# Patient Record
Sex: FEMALE | Race: BLACK OR AFRICAN AMERICAN | NOT HISPANIC OR LATINO | Employment: OTHER | URBAN - METROPOLITAN AREA
[De-identification: names, ages, dates, MRNs, and addresses within clinical notes are randomized per-mention and may not be internally consistent; named-entity substitution may affect disease eponyms.]

---

## 2017-02-22 ENCOUNTER — OFFICE VISIT (OUTPATIENT)
Dept: PLASTIC SURGERY | Facility: CLINIC | Age: 72
End: 2017-02-22
Payer: MEDICARE

## 2017-02-22 VITALS
HEART RATE: 87 BPM | SYSTOLIC BLOOD PRESSURE: 203 MMHG | BODY MASS INDEX: 31.19 KG/M2 | WEIGHT: 154.44 LBS | TEMPERATURE: 99 F | DIASTOLIC BLOOD PRESSURE: 110 MMHG

## 2017-02-22 DIAGNOSIS — T85.44XA CAPSULAR CONTRACTURE OF BREAST IMPLANT, INITIAL ENCOUNTER: Primary | ICD-10-CM

## 2017-02-22 PROCEDURE — 99212 OFFICE O/P EST SF 10 MIN: CPT | Mod: S$GLB,,, | Performed by: SURGERY

## 2017-02-22 PROCEDURE — 99999 PR PBB SHADOW E&M-EST. PATIENT-LVL II: CPT | Mod: PBBFAC,,, | Performed by: SURGERY

## 2017-02-22 PROCEDURE — 99212 OFFICE O/P EST SF 10 MIN: CPT | Mod: PBBFAC,PO | Performed by: SURGERY

## 2017-02-22 NOTE — PROGRESS NOTES
[facility]el[facility]bs [facility] giving a history of having   breast cancer in the 1980s.  She had her mastectomy through a lateral and   nipple-sparing technique with placement of implants.  The patient had no further   treatment.  She had implants placed.  She has a severe capsular contracture   with rupture on the right side.  Capsular contracture is bilateral.  The   implants being this old and over 99% are ruptured.  She needs to have a   bilateral implant exchange with total capsulectomy.      CRB/[facility]  dd: 02/22/2017 17:08:35 (CST)  td: 02/23/2017 09:19:33 (CST)  Doc ID   #[id number]  Job ID #[id number]    CC:

## 2017-03-06 DIAGNOSIS — Z85.3 PERSONAL HISTORY OF MALIGNANT NEOPLASM OF BREAST: Primary | ICD-10-CM

## 2017-04-04 ENCOUNTER — ANESTHESIA EVENT (OUTPATIENT)
Dept: SURGERY | Facility: HOSPITAL | Age: 72
End: 2017-04-04
Payer: MEDICARE

## 2017-04-04 NOTE — PRE ADMISSION SCREENING
Anesthesia Assessment: Preoperative EQUATION    Planned Procedure: Procedure(s) (LRB):  EXCHANGE IMPLANT-BREAST Bilateral (Bilateral)  CAPSULECTOMY-BREAST Bilateral (Bilateral)  Requested Anesthesia Type:General  Surgeon: Rishi Sanchez MD  Service: Plastics  Known or anticipated Date of Surgery:4/10/2017    Surgeon notes: reviewed    Electronic QUestionnaire Assessment completed via nurse interview with patient.        Mariaelena Grubbs [[id number]] - 72 y.o. Female        Providers Outside of [facility]           Pre-admit from 4/10/2017 in [facility]     Has outside PCP  Yes [Dr Figueroa]     Has outside Cardiologist  Yes     Has outside Pulmonologist  Yes [Dr Mccoy]       Surgical Risk Level      Surgical Risk Level:  2           caRDScore (Clinical Anesthesia Rapid Decision Score)        Low  Total Score: 13      13 Sum of Clinical Scores       caRDScores (Grouped)      caRDScore - Ane:  5                caRDScore - CVD:  1                caRDScore - Pul:  6                caRDScore - Met:  1                caRDScore - Phy:  0           caRDScore Items           Pre-admit from 4/10/2017 in [facility]     Anesthesia      Great difficulty with starting IV  Yes     Has severe degenerative arthritis (osteoarthritis)  Yes [knee]     Has sleep apnea confirmed by a sleep study / on CPAP  Yes     CVD      Activity similar to best ability for maximal activity or exercise  METS 4     Diagnosed with high blood pressure  Yes     Typical BP runs <150/90  Yes     Pulmonary      Has history of Asthma  Yes     Asthma requires chronic treatment  Yes [daily advair-last flare up 1 week ago]     Has to take steroids for asthma related bronchospasm within the last year  Yes     Has sleep apnea confirmed by a sleep study / on CPAP  Yes     Metabolic      Has hyperlipoproteinemia  Yes     Physiologic      Obesity Status  Mild Obesity (BMI 30-34.9)       Flags      Red Flag Score:  0                 Yellow Flag Score:  4           Red Flags           Pre-admit from 4/10/2017 in [facility]     Obesity Status  Mild Obesity (BMI 30-34.9)       Yellow Flags           Pre-admit from 4/10/2017 in [facility]     Has had steroids in the last year  Yes [po prednisone sept oct and november 2016]     Obesity Status  Mild Obesity (BMI 30-34.9)     Has history of Asthma  Yes     Has sleep apnea confirmed by a sleep study / on CPAP  Yes     Has pain  Yes       PONV Risk Score (assumes periop narcotic use = +1, Max=4)      PONV Risk Score:  3           PONV Risk Factors  Total Score: 2      1 Female     1 Non-Smoker at present       Sleep Apnea  Total Score: 1      1 Has sleep apnea confirmed by a sleep study / on CPAP       DAT STOP-Bang Risk Factors (Max=8)  Total Score: 3      1 Has loud snoring     1 Takes medication for high blood pressure     1 Age >50       DAT Risk Level - 1 (Low), 2 (Moderate), 3 (High)      DAT Risk Level:  2           RCRI (Revised Cardiac Risk Indices of ACC/AHA guidelines, Max=6)  Total Score: 0        CAD Risk Factors  Total Score: 3      1 Female. Age >55     1 Diagnosed with high blood pressure     1 Has hyperlipoproteinemia       CHADS Score if applicable (history of atrial fib/flutter, Max=6)  Total Score: 1      1 Diagnosed with high blood pressure       Maximal Exercise Capacity           Pre-admit from 4/10/2017 in [facility]     Maximal Exercise Capacity  METS 4       Summary of Dependence  Total Score: 1      1 Is totally independent of others for activities of daily living       Phone Fraility Score (Max = 17)  Total Score: 1      1 Uses 5 or more meds on reg basis       Pain Factors           Pre-admit from 4/10/2017 in [facility]     Has pain  Yes     Location and description of pain  knee     Duration of pain  Pain is chronic, has been present greater than 12 mo     Typical Pain Scores  0 to 4      Uses one of the following medications:  percocet [po percocet prn]       Risk Triggers (Evidence-Based Risk Triggers)        Pulmonary Risk Triggers  Total Score: 4      1 Age > or = 60     1 Obesity Status: Mild Obesity (BMI 30-34.9)     1 Has to take steroids for asthma related bronchospasm within the last year     1 Has sleep apnea confirmed by a sleep study / on CPAP       Renal Risk Triggers  Total Score: 1      1 Diagnosed with high blood pressure       Delirium Risk Triggers  Total Score: 0        Urologic Risk Triggers  Total Score: 0        Logistics        Pre-op Clinic Logistics  Total Score: 6      1 Has outside PCP     1 Has outside Cardiologist     1 Has outside Pulmonologist     1 Has had anesthesia, either as adult or as a child     1 Previous transfusions     1 Takes medication for high blood pressure       DOSC Logistics  Total Score: 3      3 Great difficulty with starting IV       Discharge Logistics  Total Score: 2      1 Has severe degenerative arthritis (osteoarthritis)     1 Has sleep apnea confirmed by a sleep study / on CPAP       Discharge Planning           Pre-admit from 4/10/2017 in [facility]     Discharge Planning      Will assist patient 24/7, if needed  daughter     Who will transport you to therapy, if need  daughter       Anes <For office use only>      Total Score: 12        Surgical Risk Level Assessment                 Triage considerations:     The patient has no apparent active cardiac condition (No unstable coronary Syndrome such as severe unstable angina or recent [<1 month] myocardial infarction, decompensated CHF, severe valvular   disease or significant arrhythmia)    Previous anesthesia records:GETA, MAC and Not available-PT is a HARD STICK    Last PCP note: within 3 months , outside [facility] Dr Nolasco  Subspecialty notes: Cardiology: General, Pulmonary    Other important co-morbidities: HTN/HLP, asthma, DAT, HX breast cancer     Tests already  available:  to be determined-will ask for OS labs and EKG            Instructions given. (See in Nurse's note)    Optimization:  Anesthesia Preop Clinic Assessment  Indicated-not required for this surgery    Medical Opinion Indicated-will get last visit note and testing           Plan:    Testing:  Hematology Profile and EKG      Patient  has previously scheduled Medical Appointment:none    Navigation: Tests Scheduled. -TBD             Consults scheduled.             Results will be tracked by Preop Clinic.

## 2017-04-04 NOTE — ANESTHESIA PREPROCEDURE EVALUATION
Pre Admission Screening  Cate Vázquez RN      []Hide copied text  Anesthesia Assessment: Preoperative EQUATION     Planned Procedure: Procedure(s) (LRB):  EXCHANGE IMPLANT-BREAST Bilateral (Bilateral)  CAPSULECTOMY-BREAST Bilateral (Bilateral)  Requested Anesthesia Type:General  Surgeon: Rishi Sanchez MD  Service: Plastics  Known or anticipated Date of Surgery:4/10/2017     Surgeon notes: reviewed     Electronic QUestionnaire Assessment completed via nurse interview with patient.                    Mariaelena Grubbs [[id number]] - 72 y.o. Female         Providers Outside of [facility]             Pre-admit from 4/10/2017 in [facility]      Has outside PCP   Yes [Dr Figueroa]      Has outside Cardiologist   Yes      Has outside Pulmonologist   Yes [Dr Mccoy]        Surgical Risk Level       Surgical Risk Level:   2              caRDScore (Clinical Anesthesia Rapid Decision Score)         Low  Total Score: 13       13 Sum of Clinical Scores        caRDScores (Grouped)       caRDScore - Ane:   5                       caRDScore - CVD:   1                       caRDScore - Pul:   6                       caRDScore - Met:   1                       caRDScore - Phy:   0              caRDScore Items             Pre-admit from 4/10/2017 in [facility]      Anesthesia        Great difficulty with starting IV   Yes      Has severe degenerative arthritis (osteoarthritis)   Yes [knee]      Has sleep apnea confirmed by a sleep study / on CPAP   Yes      CVD        Activity similar to best ability for maximal activity or exercise   METS 4      Diagnosed with high blood pressure   Yes      Typical BP runs <150/90   Yes      Pulmonary        Has history of Asthma   Yes      Asthma requires chronic treatment   Yes [daily advair-last flare up 1 week ago]      Has to take steroids for asthma related bronchospasm within the last year   Yes      Has sleep apnea confirmed by a sleep study / on  CPAP   Yes      Metabolic        Has hyperlipoproteinemia   Yes      Physiologic        Obesity Status   Mild Obesity (BMI 30-34.9)        Flags       Red Flag Score:   0                       Yellow Flag Score:   4              Red Flags             Pre-admit from 4/10/2017 in [facility]      Obesity Status   Mild Obesity (BMI 30-34.9)        Yellow Flags             Pre-admit from 4/10/2017 in [facility]      Has had steroids in the last year   Yes [po prednisone sept oct and november 2016]      Obesity Status   Mild Obesity (BMI 30-34.9)      Has history of Asthma   Yes      Has sleep apnea confirmed by a sleep study / on CPAP   Yes      Has pain   Yes        PONV Risk Score (assumes periop narcotic use = +1, Max=4)       PONV Risk Score:   3              PONV Risk Factors  Total Score: 2       1 Female      1 Non-Smoker at present        Sleep Apnea  Total Score: 1       1 Has sleep apnea confirmed by a sleep study / on CPAP        DAT STOP-Bang Risk Factors (Max=8)  Total Score: 3       1 Has loud snoring      1 Takes medication for high blood pressure      1 Age >50        DAT Risk Level - 1 (Low), 2 (Moderate), 3 (High)       DAT Risk Level:   2              RCRI (Revised Cardiac Risk Indices of ACC/AHA guidelines, Max=6)  Total Score: 0         CAD Risk Factors  Total Score: 3       1 Female. Age >55      1 Diagnosed with high blood pressure      1 Has hyperlipoproteinemia        CHADS Score if applicable (history of atrial fib/flutter, Max=6)  Total Score: 1       1 Diagnosed with high blood pressure        Maximal Exercise Capacity             Pre-admit from 4/10/2017 in [facility]      Maximal Exercise Capacity   METS 4        Summary of Dependence  Total Score: 1       1 Is totally independent of others for activities of daily living        Phone Fraility Score (Max = 17)  Total Score: 1       1 Uses 5 or more meds on reg basis        Pain  Factors             Pre-admit from 4/10/2017 in [facility]      Has pain   Yes      Location and description of pain   knee      Duration of pain   Pain is chronic, has been present greater than 12 mo      Typical Pain Scores   0 to 4      Uses one of the following medications:   percocet [po percocet prn]        Risk Triggers (Evidence-Based Risk Triggers)         Pulmonary Risk Triggers  Total Score: 4       1 Age > or = 60      1 Obesity Status: Mild Obesity (BMI 30-34.9)      1 Has to take steroids for asthma related bronchospasm within the last year      1 Has sleep apnea confirmed by a sleep study / on CPAP        Renal Risk Triggers  Total Score: 1       1 Diagnosed with high blood pressure        Delirium Risk Triggers  Total Score: 0         Urologic Risk Triggers  Total Score: 0         Logistics         Pre-op Clinic Logistics  Total Score: 6       1 Has outside PCP      1 Has outside Cardiologist      1 Has outside Pulmonologist      1 Has had anesthesia, either as adult or as a child      1 Previous transfusions      1 Takes medication for high blood pressure        DOSC Logistics  Total Score: 3       3 Great difficulty with starting IV        Discharge Logistics  Total Score: 2       1 Has severe degenerative arthritis (osteoarthritis)      1 Has sleep apnea confirmed by a sleep study / on CPAP        Discharge Planning             Pre-admit from 4/10/2017 in [facility]      Discharge Planning        Will assist patient 24/7, if needed   daughter      Who will transport you to therapy, if need   daughter        Anes <For office use only>       Total Score: 12          Surgical Risk Level Assessment                       Triage considerations:      The patient has no apparent active cardiac condition (No unstable coronary Syndrome such as severe unstable angina or recent [<1 month] myocardial infarction, decompensated CHF, severe valvular disease or significant  arrhythmia)     Previous anesthesia records:GETA, MAC and Not available-PT is a HARD STICK     Last PCP note: within 3 months , outside [facility] Dr Nolasco  Subspecialty notes: Cardiology: General, Pulmonary     Other important co-morbidities: HTN/HLP, COPD/asthma, DAT, HX breast cancer      Tests already available:  to be determined-will ask for OS labs and EKG      Instructions given. (See in Nurse's note)     Optimization:  Anesthesia Preop Clinic Assessment Indicated-not required for this surgery  Medical Opinion Indicated-will get last visit note and testing      Plan:   Testing: Hematology Profile and EKG  Patient has previously scheduled Medical Appointment:none     Navigation: Tests Scheduled. -TBD  Consults scheduled.  Results will be tracked by Preop Clinic.  4/4 last PCP visit note 3/7/17; 12/29/16 CBC, TSH,PTH, A1c, CMP scanned to media.  4/7 after multiple attempts to get OS EKG-cards office fax is broken. Will order EKG for am of surgery          Electronically signed by Cate Vázquez RN at 4/4/2017 12:10 PM        Pre-admit on 4/10/2017              Detailed Report                                                                                                                         04/04/2017  Mariaelena Grubbs is a 72 y.o., female.    [facility] Anesthesia Evaluation    I have reviewed the Patient Summary Reports.    I have reviewed the Nursing Notes.   I have reviewed the Medications.     Review of Systems  Anesthesia Hx:  No problems with previous Anesthesia  History of prior surgery of interest to airway management or planning: Previous anesthesia: General Denies Family Hx of Anesthesia complications.   Denies Personal Hx of Anesthesia complications.   Cardiovascular:   Exercise tolerance: good Hypertension    Pulmonary:   Asthma    Renal/:   Chronic Renal Disease, CRI    Psych:   anxiety          Physical Exam  General:  Well nourished    Airway/Jaw/Neck:  Airway Findings: Mouth Opening: Normal  Tongue: Normal  General Airway Assessment: Adult  Mallampati: II  Improves to II with phonation.  TM Distance: Normal, at least 6 cm  Jaw/Neck Findings:  Neck ROM: Normal ROM      Dental:  Dental Findings: In tact   Chest/Lungs:  Chest/Lungs Findings: Clear to auscultation, Normal Respiratory Rate     Heart/Vascular:  Heart Findings: Rate: Normal  Rhythm: Regular Rhythm  Sounds: Normal        Mental Status:  Mental Status Findings:  Cooperative, Alert and Oriented         Anesthesia Plan  Type of Anesthesia, risks & benefits discussed:  Anesthesia Type:  general  Patient's Preference:   Intra-op Monitoring Plan: standard ASA monitors  Intra-op Monitoring Plan Comments:   Post Op Pain Control Plan:   Post Op Pain Control Plan Comments:   Induction:   IV  Beta Blocker:  Patient is on a Beta-Blocker and has received one dose within the past 24 hours (No further documentation required).       Informed Consent: Patient understands risks and agrees with Anesthesia plan.  Questions answered. Anesthesia consent signed with patient.  ASA Score: 3     Day of Surgery Review of History & Physical:    H&P update referred to the surgeon.         Ready For Surgery From Anesthesia Perspective.

## 2017-04-07 ENCOUNTER — TELEPHONE (OUTPATIENT)
Dept: PLASTIC SURGERY | Facility: CLINIC | Age: 72
End: 2017-04-07

## 2017-04-10 ENCOUNTER — ANESTHESIA (OUTPATIENT)
Dept: SURGERY | Facility: HOSPITAL | Age: 72
End: 2017-04-10
Payer: MEDICARE

## 2017-04-10 ENCOUNTER — HOSPITAL ENCOUNTER (OUTPATIENT)
Facility: HOSPITAL | Age: 72
Discharge: HOME | End: 2017-04-10
Attending: SURGERY | Admitting: SURGERY
Payer: MEDICARE

## 2017-04-10 VITALS
WEIGHT: 152 LBS | BODY MASS INDEX: 30.64 KG/M2 | HEART RATE: 58 BPM | TEMPERATURE: 98 F | SYSTOLIC BLOOD PRESSURE: 122 MMHG | DIASTOLIC BLOOD PRESSURE: 62 MMHG | HEIGHT: 59 IN | OXYGEN SATURATION: 100 % | RESPIRATION RATE: 18 BRPM

## 2017-04-10 DIAGNOSIS — T85.44XA BREAST IMPLANT CAPSULAR CONTRACTURE: Primary | ICD-10-CM

## 2017-04-10 DIAGNOSIS — I10 HYPERTENSION: ICD-10-CM

## 2017-04-10 DIAGNOSIS — Z01.818 PREOPERATIVE TESTING: ICD-10-CM

## 2017-04-10 DIAGNOSIS — T85.44XA CAPSULAR CONTRACTURE OF BREAST IMPLANT: ICD-10-CM

## 2017-04-10 LAB
ERYTHROCYTE [DISTWIDTH] IN BLOOD BY AUTOMATED COUNT: 13.5 %
HCT VFR BLD AUTO: 39.6 %
HGB BLD-MCNC: 12.5 G/DL
MCH RBC QN AUTO: 29.9 PG
MCHC RBC AUTO-ENTMCNC: 31.6 %
MCV RBC AUTO: 95 FL
PLATELET # BLD AUTO: 317 K/UL
PMV BLD AUTO: 10.8 FL
RBC # BLD AUTO: 4.18 M/UL
WBC # BLD AUTO: 9.63 K/UL

## 2017-04-10 PROCEDURE — 88304 TISSUE EXAM BY PATHOLOGIST: CPT | Mod: 26,,, | Performed by: PATHOLOGY

## 2017-04-10 PROCEDURE — 63600175 PHARM REV CODE 636 W HCPCS: Performed by: NURSE ANESTHETIST, CERTIFIED REGISTERED

## 2017-04-10 PROCEDURE — 27200750 HC INSULATED NEEDLE/ STIMUPLEX: Performed by: ANESTHESIOLOGY

## 2017-04-10 PROCEDURE — 36000707: Performed by: SURGERY

## 2017-04-10 PROCEDURE — 63600175 PHARM REV CODE 636 W HCPCS: Performed by: ANESTHESIOLOGY

## 2017-04-10 PROCEDURE — 25000003 PHARM REV CODE 250: Performed by: SURGERY

## 2017-04-10 PROCEDURE — 71000033 HC RECOVERY, INTIAL HOUR: Performed by: SURGERY

## 2017-04-10 PROCEDURE — 71000015 HC POSTOP RECOV 1ST HR: Performed by: SURGERY

## 2017-04-10 PROCEDURE — D9220A PRA ANESTHESIA: Mod: CRNA,,, | Performed by: NURSE ANESTHETIST, CERTIFIED REGISTERED

## 2017-04-10 PROCEDURE — C1789 PROSTHESIS, BREAST, IMP: HCPCS | Performed by: SURGERY

## 2017-04-10 PROCEDURE — 63600175 PHARM REV CODE 636 W HCPCS

## 2017-04-10 PROCEDURE — 88300 SURGICAL PATH GROSS: CPT | Mod: 26,,, | Performed by: PATHOLOGY

## 2017-04-10 PROCEDURE — 19371 PERI-IMPLT CAPSLC BRST COMPL: CPT | Mod: 50,51,, | Performed by: SURGERY

## 2017-04-10 PROCEDURE — 94761 N-INVAS EAR/PLS OXIMETRY MLT: CPT

## 2017-04-10 PROCEDURE — 36000706: Performed by: SURGERY

## 2017-04-10 PROCEDURE — 71000039 HC RECOVERY, EACH ADD'L HOUR: Performed by: SURGERY

## 2017-04-10 PROCEDURE — 27100025 HC TUBING, SET FLUID WARMER: Performed by: NURSE ANESTHETIST, CERTIFIED REGISTERED

## 2017-04-10 PROCEDURE — C1729 CATH, DRAINAGE: HCPCS | Performed by: SURGERY

## 2017-04-10 PROCEDURE — 93010 ELECTROCARDIOGRAM REPORT: CPT | Mod: ,,, | Performed by: INTERNAL MEDICINE

## 2017-04-10 PROCEDURE — 93005 ELECTROCARDIOGRAM TRACING: CPT

## 2017-04-10 PROCEDURE — D9220A PRA ANESTHESIA: Mod: ANES,,, | Performed by: ANESTHESIOLOGY

## 2017-04-10 PROCEDURE — 88300 SURGICAL PATH GROSS: CPT | Performed by: PATHOLOGY

## 2017-04-10 PROCEDURE — 19340 INSJ BREAST IMPLT SM D MAST: CPT | Mod: 50,,, | Performed by: SURGERY

## 2017-04-10 PROCEDURE — 25000003 PHARM REV CODE 250: Performed by: NURSE ANESTHETIST, CERTIFIED REGISTERED

## 2017-04-10 PROCEDURE — 37000008 HC ANESTHESIA 1ST 15 MINUTES: Performed by: SURGERY

## 2017-04-10 PROCEDURE — 25000003 PHARM REV CODE 250: Performed by: ANESTHESIOLOGY

## 2017-04-10 PROCEDURE — 85027 COMPLETE CBC AUTOMATED: CPT

## 2017-04-10 PROCEDURE — 88304 TISSUE EXAM BY PATHOLOGIST: CPT | Performed by: PATHOLOGY

## 2017-04-10 PROCEDURE — 37000009 HC ANESTHESIA EA ADD 15 MINS: Performed by: SURGERY

## 2017-04-10 DEVICE — IMPLANTABLE DEVICE: Type: IMPLANTABLE DEVICE | Site: BREAST | Status: FUNCTIONAL

## 2017-04-10 RX ORDER — PROPOFOL 10 MG/ML
VIAL (ML) INTRAVENOUS
Status: DISCONTINUED | OUTPATIENT
Start: 2017-04-10 | End: 2017-04-10

## 2017-04-10 RX ORDER — HYDROMORPHONE HYDROCHLORIDE 1 MG/ML
0.2 INJECTION, SOLUTION INTRAMUSCULAR; INTRAVENOUS; SUBCUTANEOUS EVERY 5 MIN PRN
Status: DISCONTINUED | OUTPATIENT
Start: 2017-04-10 | End: 2017-04-10 | Stop reason: HOSPADM

## 2017-04-10 RX ORDER — SODIUM CHLORIDE 9 MG/ML
INJECTION, SOLUTION INTRAVENOUS CONTINUOUS
Status: DISCONTINUED | OUTPATIENT
Start: 2017-04-10 | End: 2017-04-10 | Stop reason: HOSPADM

## 2017-04-10 RX ORDER — FENTANYL CITRATE 50 UG/ML
25 INJECTION, SOLUTION INTRAMUSCULAR; INTRAVENOUS EVERY 5 MIN PRN
Status: DISCONTINUED | OUTPATIENT
Start: 2017-04-10 | End: 2017-04-10 | Stop reason: HOSPADM

## 2017-04-10 RX ORDER — PHENYLEPHRINE HYDROCHLORIDE 10 MG/ML
INJECTION INTRAVENOUS
Status: DISCONTINUED | OUTPATIENT
Start: 2017-04-10 | End: 2017-04-10

## 2017-04-10 RX ORDER — GLYCOPYRROLATE 0.2 MG/ML
INJECTION INTRAMUSCULAR; INTRAVENOUS
Status: DISCONTINUED | OUTPATIENT
Start: 2017-04-10 | End: 2017-04-10

## 2017-04-10 RX ORDER — ONDANSETRON 2 MG/ML
INJECTION INTRAMUSCULAR; INTRAVENOUS
Status: DISCONTINUED | OUTPATIENT
Start: 2017-04-10 | End: 2017-04-10

## 2017-04-10 RX ORDER — FENTANYL CITRATE 50 UG/ML
INJECTION, SOLUTION INTRAMUSCULAR; INTRAVENOUS
Status: DISCONTINUED | OUTPATIENT
Start: 2017-04-10 | End: 2017-04-10

## 2017-04-10 RX ORDER — CIPROFLOXACIN 2 MG/ML
INJECTION, SOLUTION INTRAVENOUS CONTINUOUS PRN
Status: DISCONTINUED | OUTPATIENT
Start: 1840-12-31 | End: 2017-04-10 | Stop reason: HOSPADM

## 2017-04-10 RX ORDER — BACITRACIN 50000 [IU]/1
INJECTION, POWDER, FOR SOLUTION INTRAMUSCULAR
Status: DISCONTINUED | OUTPATIENT
Start: 2017-04-10 | End: 2017-04-10 | Stop reason: HOSPADM

## 2017-04-10 RX ORDER — SODIUM CHLORIDE 0.9 % (FLUSH) 0.9 %
3 SYRINGE (ML) INJECTION
Status: DISCONTINUED | OUTPATIENT
Start: 2017-04-10 | End: 2017-04-10 | Stop reason: HOSPADM

## 2017-04-10 RX ORDER — ACETAMINOPHEN 10 MG/ML
INJECTION, SOLUTION INTRAVENOUS
Status: COMPLETED
Start: 2017-04-10 | End: 2017-04-10

## 2017-04-10 RX ORDER — CLINDAMYCIN PHOSPHATE 150 MG/ML
INJECTION, SOLUTION INTRAVENOUS
Status: DISCONTINUED | OUTPATIENT
Start: 2017-04-10 | End: 2017-04-10 | Stop reason: HOSPADM

## 2017-04-10 RX ORDER — LIDOCAINE HYDROCHLORIDE 10 MG/ML
1 INJECTION, SOLUTION EPIDURAL; INFILTRATION; INTRACAUDAL; PERINEURAL ONCE
Status: COMPLETED | OUTPATIENT
Start: 2017-04-10 | End: 2017-04-10

## 2017-04-10 RX ORDER — LIDOCAINE HYDROCHLORIDE 10 MG/ML
1 INJECTION, SOLUTION EPIDURAL; INFILTRATION; INTRACAUDAL; PERINEURAL ONCE
Status: DISCONTINUED | OUTPATIENT
Start: 2017-04-10 | End: 2017-04-10 | Stop reason: HOSPADM

## 2017-04-10 RX ORDER — ACETAMINOPHEN 10 MG/ML
1000 INJECTION, SOLUTION INTRAVENOUS ONCE
Status: COMPLETED | OUTPATIENT
Start: 2017-04-10 | End: 2017-04-10

## 2017-04-10 RX ORDER — MIDAZOLAM HYDROCHLORIDE 1 MG/ML
0.5 INJECTION INTRAMUSCULAR; INTRAVENOUS EVERY 5 MIN PRN
Status: DISCONTINUED | OUTPATIENT
Start: 2017-04-10 | End: 2017-04-10 | Stop reason: HOSPADM

## 2017-04-10 RX ORDER — LORAZEPAM 2 MG/ML
0.25 INJECTION INTRAMUSCULAR ONCE AS NEEDED
Status: DISCONTINUED | OUTPATIENT
Start: 2017-04-10 | End: 2017-04-10 | Stop reason: HOSPADM

## 2017-04-10 RX ORDER — CEPHALEXIN 500 MG/1
500 CAPSULE ORAL 4 TIMES DAILY
Qty: 40 CAPSULE | Refills: 0 | Status: SHIPPED | OUTPATIENT
Start: 2017-04-10 | End: 2017-04-20

## 2017-04-10 RX ORDER — DIPHENHYDRAMINE HYDROCHLORIDE 50 MG/ML
25 INJECTION INTRAMUSCULAR; INTRAVENOUS EVERY 6 HOURS PRN
Status: DISCONTINUED | OUTPATIENT
Start: 2017-04-10 | End: 2017-04-10 | Stop reason: HOSPADM

## 2017-04-10 RX ORDER — HYDROCODONE BITARTRATE AND ACETAMINOPHEN 5; 325 MG/1; MG/1
1 TABLET ORAL ONCE AS NEEDED
Status: COMPLETED | OUTPATIENT
Start: 2017-04-10 | End: 2017-04-10

## 2017-04-10 RX ORDER — HYDROCODONE BITARTRATE AND ACETAMINOPHEN 5; 325 MG/1; MG/1
TABLET ORAL
Status: COMPLETED
Start: 2017-04-10 | End: 2017-04-10

## 2017-04-10 RX ORDER — ROCURONIUM BROMIDE 10 MG/ML
INJECTION, SOLUTION INTRAVENOUS
Status: DISCONTINUED | OUTPATIENT
Start: 2017-04-10 | End: 2017-04-10

## 2017-04-10 RX ORDER — ONDANSETRON 2 MG/ML
4 INJECTION INTRAMUSCULAR; INTRAVENOUS ONCE AS NEEDED
Status: DISCONTINUED | OUTPATIENT
Start: 2017-04-10 | End: 2017-04-10 | Stop reason: HOSPADM

## 2017-04-10 RX ORDER — HYDROCODONE BITARTRATE AND ACETAMINOPHEN 5; 325 MG/1; MG/1
1 TABLET ORAL EVERY 6 HOURS PRN
Qty: 41 TABLET | Refills: 0 | OUTPATIENT
Start: 2017-04-10 | End: 2021-09-13

## 2017-04-10 RX ORDER — NEOSTIGMINE METHYLSULFATE 1 MG/ML
INJECTION, SOLUTION INTRAVENOUS
Status: DISCONTINUED | OUTPATIENT
Start: 2017-04-10 | End: 2017-04-10

## 2017-04-10 RX ORDER — LIDOCAINE HCL/PF 100 MG/5ML
SYRINGE (ML) INTRAVENOUS
Status: DISCONTINUED | OUTPATIENT
Start: 2017-04-10 | End: 2017-04-10

## 2017-04-10 RX ADMIN — PHENYLEPHRINE HYDROCHLORIDE 100 MCG: 10 INJECTION INTRAVENOUS at 11:04

## 2017-04-10 RX ADMIN — ACETAMINOPHEN 1000 MG: 10 INJECTION, SOLUTION INTRAVENOUS at 02:04

## 2017-04-10 RX ADMIN — ROCURONIUM BROMIDE 30 MG: 10 INJECTION, SOLUTION INTRAVENOUS at 10:04

## 2017-04-10 RX ADMIN — HYDROMORPHONE HYDROCHLORIDE 0.2 MG: 1 INJECTION, SOLUTION INTRAMUSCULAR; INTRAVENOUS; SUBCUTANEOUS at 12:04

## 2017-04-10 RX ADMIN — GLYCOPYRROLATE 0.6 MG: 0.2 INJECTION, SOLUTION INTRAMUSCULAR; INTRAVENOUS at 11:04

## 2017-04-10 RX ADMIN — FENTANYL CITRATE 50 MCG: 50 INJECTION, SOLUTION INTRAMUSCULAR; INTRAVENOUS at 10:04

## 2017-04-10 RX ADMIN — HYDROCODONE BITARTRATE AND ACETAMINOPHEN 1 TABLET: 5; 325 TABLET ORAL at 12:04

## 2017-04-10 RX ADMIN — PHENYLEPHRINE HYDROCHLORIDE 200 MCG: 10 INJECTION INTRAVENOUS at 10:04

## 2017-04-10 RX ADMIN — NEOSTIGMINE METHYLSULFATE 5 MG: 1 INJECTION INTRAVENOUS at 11:04

## 2017-04-10 RX ADMIN — LIDOCAINE HYDROCHLORIDE 60 MG: 20 INJECTION, SOLUTION INTRAVENOUS at 10:04

## 2017-04-10 RX ADMIN — MIDAZOLAM HYDROCHLORIDE 2 MG: 1 INJECTION, SOLUTION INTRAMUSCULAR; INTRAVENOUS at 09:04

## 2017-04-10 RX ADMIN — PROPOFOL 140 MG: 10 INJECTION, EMULSION INTRAVENOUS at 10:04

## 2017-04-10 RX ADMIN — ONDANSETRON 4 MG: 2 INJECTION INTRAMUSCULAR; INTRAVENOUS at 11:04

## 2017-04-10 RX ADMIN — SODIUM CHLORIDE, SODIUM GLUCONATE, SODIUM ACETATE, POTASSIUM CHLORIDE, MAGNESIUM CHLORIDE, SODIUM PHOSPHATE, DIBASIC, AND POTASSIUM PHOSPHATE: .53; .5; .37; .037; .03; .012; .00082 INJECTION, SOLUTION INTRAVENOUS at 11:04

## 2017-04-10 RX ADMIN — SODIUM CHLORIDE: 0.9 INJECTION, SOLUTION INTRAVENOUS at 08:04

## 2017-04-10 RX ADMIN — LIDOCAINE HYDROCHLORIDE 1 MG: 10 INJECTION, SOLUTION EPIDURAL; INFILTRATION; INTRACAUDAL; PERINEURAL at 08:04

## 2017-04-10 RX ADMIN — DEXTROSE 2 G: 50 INJECTION, SOLUTION INTRAVENOUS at 10:04

## 2017-04-10 NOTE — PROGRESS NOTES
Dr. Crowley notified of pts c/o pain 10/10, although pt falls asleep and is difficult to arouse at times. When aroused, pt is grimacing, moaning, stating pain is 10/10 to bilateral breast. MD stated pt may have acetaminophen IV 1G at this time. MD s/w surgery service and they stated pt may not have Toradol.  Will administer Acetaminophen as ordered and reassess pain after completion.

## 2017-04-10 NOTE — ANESTHESIA RELEASE NOTE
"Anesthesia Release from PACU Note    Patient: Mariaelena Grubbs    Procedure(s) Performed: Procedure(s) (LRB):  EXCHANGE IMPLANT-BREAST Bilateral (Bilateral)  CAPSULECTOMY-BREAST Bilateral (Bilateral)  CAPSULORRHAPHY (Bilateral)    Anesthesia type: general    Post pain: Adequate analgesia    Post assessment: no apparent anesthetic complications, tolerated procedure well and no evidence of recall    Last Vitals:   Visit Vitals    BP (!) 106/55    Pulse 60    Temp 36.8 °C (98.3 °F) (Oral)    Resp 18    Ht 4' 11" (1.499 m)    Wt 68.9 kg (152 lb)    SpO2 95%    Breastfeeding No    BMI 30.7 kg/m2       Post vital signs: stable    Level of consciousness: awake, alert  and oriented    Nausea/Vomiting: no nausea/no vomiting    Complications: none    Airway Patency: patent    Respiratory: unassisted    Cardiovascular: stable and blood pressure at baseline    Hydration: euvolemic  "

## 2017-04-10 NOTE — OP NOTE
DATE OF PROCEDURE:  04/10/2017.    PREOPERATIVE DIAGNOSIS:  History of breast cancer.    POSTOPERATIVE DIAGNOSIS:  History of breast cancer.    PROCEDURES PERFORMED:  1.  Bilateral implant exchange.  2.  Bilateral total capsulectomy.  3.  Bilateral lateral capsulorrhaphy.    SURGEON:  Rishi Sanchez M.D., F.A.C.S.    ANESTHESIA:  General.    DESCRIPTION OF PROCEDURE:  The patient was evaluated in the preoperative holding   area.  In the standing position, the patient was marked for the procedure.  I   used the same previous mastectomy incisions that she had in the 1980s.  She   stated that she wanted her breasts to look the same.  She did not want a lift or   any other alteration.  The patient was then taken to the Operating Room, placed   in the supine position after adequate general endotracheal anesthesia, and was   prepped and draped in a normal sterile fashion.  The previous incisions were   then reexcised.  Extracapsular dissection was then proceeded.  On the left side,   the capsule was noted to be thick, but minimal calcification.  On the right   side, dense calcification was noted.  The capsules came directly up to the   posterior aspect of each nipple.  They were shaved off the back of the nipple   using a #10 blade.  Total capsulectomies were then performed on both sides.  It   was necessary then to perform lateral capsulorrhaphies on both sides using   running 2-0 Vicryl sutures.  Next, the patient was prepped and draped.  Gloves   were changed.  Next, two 371 mL smooth silicone implants were opened on the back   table, soaked in antibiotic solution, and placed in the pockets.  The fascia   was closed using running 2-0 Vicryl and the skin using 3-0 Monocryl, followed by   a running 4-0 Monocryl subcuticular suture.  Similar closure was performed on   the opposite side.  There were no complications.      CRB/HN  dd: 04/10/2017 14:55:47 (CDT)  td: 04/10/2017 15:58:03 (CDT)  Doc ID   #[id number]  Job ID  #[id number]    CC:

## 2017-04-10 NOTE — IP AVS SNAPSHOT
[facility]  [address]  [facility] [address]  Phone: [phone]           Patient Discharge Instructions   Our goal is to set you up for success. This packet includes information on your condition, medications, and your home care.  It will help you care for yourself to prevent having to return to the hospital.     Please ask your nurse if you have any questions.      There are many details to remember when preparing to leave the hospital. Here is what you will need to do:    1. Take your medicine. If you are prescribed medications, review your Medication List on the following pages. You may have new medications to  at the pharmacy and others that you'll need to stop taking. Review the instructions for how and when to take your medications. Talk with your doctor or nurses if you are unsure of what to do.     2. Go to your follow-up appointments. Specific follow-up information is listed in the following pages. Your may be contacted by a nurse or clinical provider about future appointments. Be sure we have all of the phone numbers to reach you. Please contact your provider's office if you are unable to make an appointment.     3. Watch for warning signs. Your doctor or nurse will give you detailed warning signs to watch for and when to call for assistance. These instructions may also include educational information about your condition. If you experience any of warning signs to your health, call your doctor.           [facility]  Unless otherwise directed by your provider, please   contact [facility], our nurse care line   that is available for 24/7 assistance.     [phone] (toll-free)     Registered nurses in the [facility]   provide: appointment scheduling, clinical advisement, health education, and other advisory services.                  ** Verify the list of medication(s) below is accurate and up to date. Carry this with you in case of  emergency. If your medications have changed, please notify your healthcare provider.             Medication List      START taking these medications        Additional Info                      cephALEXin 500 MG capsule   Commonly known as:  KEFLEX   Quantity:  40 capsule   Refills:  0   Dose:  500 mg    Instructions:  Take 1 capsule (500 mg total) by mouth 4 (four) times daily.     Begin Date    AM    Noon    PM    Bedtime       hydrocodone-acetaminophen 5-325mg 5-325 mg per tablet   Commonly known as:  NORCO   Quantity:  41 tablet   Refills:  0   Dose:  1 tablet    Last time this was given:  1 tablet on 4/10/2017 12:29 PM   Instructions:  Take 1 tablet by mouth every 6 (six) hours as needed for Pain.     Begin Date    AM    Noon    PM    Bedtime         CONTINUE taking these medications        Additional Info                      * albuterol 1.25 mg/3 mL Nebu   Commonly known as:  ACCUNEB   Refills:  0   Dose:  1.25 mg    Instructions:  Take 1.25 mg by nebulization every 6 (six) hours as needed.     Begin Date    AM    Noon    PM    Bedtime       * PROAIR HFA INHL   Refills:  0    Instructions:  Inhale into the lungs.     Begin Date    AM    Noon    PM    Bedtime       alprazolam 2 MG Tab   Commonly known as:  XANAX   Refills:  0    Instructions:  Take by mouth nightly as needed.     Begin Date    AM    Noon    PM    Bedtime       diphenoxylate-atropine 2.5-0.025 mg 2.5-0.025 mg per tablet   Commonly known as:  LOMOTIL   Refills:  0   Dose:  1 tablet    Instructions:  Take 1 tablet by mouth 4 (four) times daily as needed.     Begin Date    AM    Noon    PM    Bedtime       EPIPEN INJ   Refills:  0    Instructions:  Inject as directed.     Begin Date    AM    Noon    PM    Bedtime       fluconazole 150 MG Tab   Commonly known as:  DIFLUCAN   Refills:  0   Dose:  100 mg    Instructions:  Take 100 mg by mouth once daily.     Begin Date    AM    Noon    PM    Bedtime       fluticasone-salmeterol 250-50 mcg/dose 250-50  mcg/dose diskus inhaler   Commonly known as:  ADVAIR   Refills:  0   Dose:  1 puff    Instructions:  Inhale 1 puff into the lungs 2 (two) times daily.     Begin Date    AM    Noon    PM    Bedtime       ibuprofen 800 MG tablet   Commonly known as:  ADVIL,MOTRIN   Refills:  0   Dose:  800 mg    Instructions:  Take 800 mg by mouth every 6 (six) hours as needed.     Begin Date    AM    Noon    PM    Bedtime       metoprolol succinate 200 MG 24 hr tablet   Commonly known as:  TOPROL-XL   Refills:  0   Dose:  200 mg    Instructions:  Take 200 mg by mouth once daily.     Begin Date    AM    Noon    PM    Bedtime       nifedipine 90 MG (OSM) Tr24   Commonly known as:  PROCARDIA-XL   Refills:  0   Dose:  60 mg    Instructions:  Take 60 mg by mouth once daily.     Begin Date    AM    Noon    PM    Bedtime       oxycodone-acetaminophen 5-325 mg per tablet   Commonly known as:  PERCOCET   Refills:  0   Dose:  1 tablet    Instructions:  Take 1 tablet by mouth every 4 (four) hours as needed.     Begin Date    AM    Noon    PM    Bedtime       valsartan-hydrochlorothiazide 320-25 mg per tablet   Commonly known as:  DIOVAN-HCT   Refills:  0   Dose:  1 tablet    Instructions:  Take 1 tablet by mouth once daily.     Begin Date    AM    Noon    PM    Bedtime       * Notice:  This list has 2 medication(s) that are the same as other medications prescribed for you. Read the directions carefully, and ask your doctor or other care provider to review them with you.         Where to Get Your Medications      You can get these medications from any pharmacy     Bring a paper prescription for each of these medications     cephALEXin 500 MG capsule    hydrocodone-acetaminophen 5-325mg 5-325 mg per tablet                  Please bring to all follow up appointments:    1. A copy of your discharge instructions.  2. All medicines you are currently taking in their original bottles.  3. Identification and insurance card.    Please arrive 15 minutes  ahead of scheduled appointment time.    Please call 24 hours in advance if you must reschedule your appointment and/or time.        Your Scheduled Appointments     Apr 19, 2017 10:45 AM CDT   Post OP with MD Law Hoff Autumn - Plastic Surg [place] ([facility] )    [address]  [facility] [address] [phone]              Follow-up Information     Follow up with Rishi Sanchez MD. Call in 1 week.    Specialty:  Plastic Surgery    Contact information:    [address]  [facility] [id number] [phone]          Discharge Instructions     Future Orders    Activity as tolerated     Call MD for:  difficulty breathing or increased cough     Call MD for:  increased confusion or weakness     Call MD for:  persistent dizziness, light-headedness, or visual disturbances     Call MD for:  persistent nausea and vomiting or diarrhea     Call MD for:  redness, tenderness, or signs of infection (pain, swelling, redness, odor or green/yellow discharge around incision site)     Call MD for:  severe persistent headache     Call MD for:  severe uncontrolled pain     Call MD for:  temperature >100.4     Call MD for:  worsening rash     Diet general     Questions:    Total calories:      Fat restriction, if any:      Protein restriction, if any:      Na restriction, if any:      Fluid restriction:      Additional restrictions:      EKG 12-lead     Questions:    Diagnosis:  Hypertension        Discharge Instructions       Rev 3/07  Activity: Remember to:   Turn, cough and deep breathe every 2-3 hrs while awake to expand your lungs.   Use a breathing device every 1-2 hrs while awake to expand your lungs.   Wear your PRERNA hose daily until you are moving about freely.   Use a pillow to splint your incisions while coughing.   Point your toes to the ceiling and push your heels downward frequently in order to prevent blood clots.   Keep your chest elevated on 2-3 pillows for 2 weeks  or until all the swelling has diminished. Do not lie  on either side for 6 weeks.   You may begin to do leg and lower body exercises but nothing using the upper torso for 4-6 weeks  after surgery. Check with your doctor before resuming any exercise program.   Do not engage in sexual activity for at least 2 weeks after surgery.   Avoid bending down and picking up heavy objects. Do not  anything over 10 lbs for the first 4  weeks. At 4-6 weeks you may increase weight up to 25 lbs. After 6 weeks you are free to lift anything.   Do not operate a motor vehicle until you can handle the steering wheel without causing any discomfort  or within 24 hrs of taking prescribed pain medication.  Dressings:   Keep your dressings dry. You may shower in 48 hours at which time you may change your dressings.   Do not remove your steristrips. They will fall off in 5-7 days or be removed by your doctor on your  postoperative visit.   Wear the surgical bra at all times for the first 2-3 weeks. You may remove it to shower or to clean it.  Check with your doctor to see when you can switch to a good support bra. Additional bras may be  purchased from the department at an additional cost.  If you have any drains, you will need to:   Strip the drain tubings   Empty the drain bulbs   Compress the bulbs, every 3-4 hrs unless they are more than ½ full  The nurse should demonstrate this before your discharge from the surgery unit. The drains will be removed  when they put out less than 25 cc in 24 hrs. You should record the amount of drainage on a sheet of paper for  each drain for every 24 hour period. Call those totals to your doctors nurse daily.  You may see little pimple-like blisters along your incision site. They may appear as tiny abscesses, wounds that  wont heal, or as pimples. Theyre sutures that have not completely dissolved.  Home Care Instructions  Breast Implant/Explant  Dr Sanchez  Page 1 of 2  Rev  3/07  Medications:  Take your prescribed medications as ordered. Take your antibiotics until theyre all gone. You will need to be  on antibiotics as long as you have a drain tube in. Stay away from aspirin, ibuprofen, and Vitamin E products  for 2 weeks after your surgery. You may take Tylenol if experiencing pain, if it is not an ingredient in your pain  medication. Pain medication, anesthesia, and a lack of exercise can cause you to have bowel problems. If you  have not had a bowel movement in 3-5 days after surgery call the office and an over the counter remedy will  be recommended.  Diet:  At home, continue with liquids, and if there is no nausea, you may eat a soft bland diet. Gradually resume your  normal diet. Avoid foods, such as Mexican, Chinese, seafood, or salty soups, for 1 week after surgery.  Other:   Do not smoke or be with smokers for 2 weeks after surgery.   Avoid direct sunlight to your incision. Apply a good sun block frequently, (a spf 20 or greater) to your  incision site if you plan to expose them to sunlight.   Do not be alarmed if your breasts appear too large or too high. The swelling and shape will change the  further away from surgery you get.  Call the office immediately if you have:   A temperature of 101 or greater.   Redness thats beginning to spread away from the incision site.   Any unusually painful swelling.   Any active bleeding saturating more than a 4x4 gauze.   Any purulent drainage coming from the incision site.   Pain that is not relieved by your pain medication.   If you notice any asymmetry or flatness of your chest. (This may mean your implant is leaking or  has a hole in it.)  RETURN APPOINTMENT:________________________________________________________________  FOR EMERGENCIES:  If any unusual problems or difficulties occur, contact Dr. Sanchez or the resident at [phone] (page  ) or at the Clinic office, [phone].  Home Care  Instructions  Breast Implant/Explant  Dr Sanchez      Rev 11/09  CARE OF THE DRAIN:   Wash hands before starting!   Change the bandages daily, keep drain site clean and dry.   Check the drain every 4 hours for the first 24 hours and empty the drain when half full of liquid. After  the first day, empty when half full or every 8 to 12 hours. DO NOT LET THE DRAIN FILL MORE  THAN HALF WAY.   After the drain is emptied, the bulb needs to be squeezed between the palm and the fingers until they  meet and then recap the plug. (See picture.) This is to keep the suction continuous.  HOW TO EMPTY THE DRAIN:   Wash hands before starting!   Remove plug from top and pour fluid into measuring cup.   DO NOT TOUCH OPEN PORT.   Squeeze bulb tightly while plug is still off.   While squeezing bulb replace plug to seal the bulb.   Measure the amount of fluid that was removed from the  bulb and record the amount of the fluid for the doctor  with the date and time it was collected.   Flush the fluid down toilet.  HOW TO PREVENT PROBLEMS:   Always keep the bulb lower than the wound. This will stop the fluid from going back into your body.   Do not pull on the tubing. This can loosen the stitches holding the drain to your skin, causing the drain  to fall out.  WHEN TO CALL THE DOCTOR OR SEEK IMMEDIATE CARE IF:   The fluid removed by the HILARY drain is cloudy, yellow or foul-smelling OR suddenly stops draining into  the bulb of your HILARY drain.   Swelling or redness where the drain enters your skin.   Feel more pain in the drain area.   See holes or cracks in the tubing or bulb of the drain, or if the drain leaks, the drain stitches come loose  or break off, OR the HILARY DRAIN COMES OUT.   The HILRAY drain starts filling up very quickly with bright red blood.   You develop fever above 101ºF (38.4ºC).   Your bandages are soaked with blood.  FOR EMERGENCIES:  If any unusual problems or difficulties occur, contact  __________________  "at [phone] (page  ) or at the Clinic office, [phone]  Home Care Instructions  HILARY Drain      Admission Information     Date & Time Provider Department CSN    4/10/2017  7:35 AM Rishi Sanchez MD [facility] [id number]      Care Providers     Provider Role Specialty Primary office phone    Rishi Sanchez MD Attending Provider Plastic Surgery [phone]    Rishi Sanchez MD Surgeon  Plastic Surgery [phone]      Your Vitals Were     BP Pulse Temp Resp Height Weight    111/66 (BP Location: Right arm, Patient Position: Lying, BP Method: Automatic) 56 98.3 °F (36.8 °C) (Oral) 18 4' 11" (1.499 m) 68.9 kg (152 lb)    SpO2 BMI             100% 30.7 kg/m2         Recent Lab Values     No lab values to display.      Pending Labs     Order Current Status    Specimen to Pathology - Surgery Collected (04/10/17 1150)      Allergies as of 4/10/2017        Reactions    Bananas [Banana] Anaphylaxis    Crayfish     Hard time breathing    Iodine And Iodide Containing Products     Told to avoid due to crayfish allergy    Avocado (Laurus Persea) Swelling, Rash    Kiwi (Actinidia Chinensis) Itching, Swelling, Rash    Latex, Natural Rubber Itching, Swelling, Rash    Papaya Itching, Swelling, Rash      Advance Directives     An advance directive is a document which, in the event you are no longer able to make decisions for yourself, tells your healthcare team what kind of treatment you do or do not want to receive, or who you would like to make those decisions for you.  If you do not currently have an advance directive, [facility] encourages you to create one.  For more information call:  [phone]), [phone] ([phone]),  or log on to [email or web address].        Language Assistance Services     ATTENTION: Language assistance services are available, free of charge. Please call [phone].      ATENCIÓN: Si betty sahu " disposición servicios gratuitos de asistencia lingüística. Gary al [phone].     [facility] Ý: N?u b?n nói Ti?ng Vi?t, có các d?ch v? h? tr? ngôn ng? mi?n phí dành cho b?n. G?i s? [phone].        Chronic Kindey Disease Education             [facility] Sign-Up     Activating your [facility] account is as easy as 1-2-3!     1) Visit [email or web address], select Sign Up Now, enter this activation code and your date of birth, then select Next.  [email or web address]  Expires: 5/25/2017  2:59 PM      2) Create a username and password to use when you visit [facility] in the future and select a security question in case you lose your password and select Next.    3) Enter your e-mail address and click Sign Up!    Additional Information  If you have questions, please e-mail [email or web address] or call [phone] to talk to our [facility] staff. Remember, [facility] is NOT to be used for urgent needs. For medical emergencies, dial 911.          [facility] complies with applicable Federal civil rights laws and does not discriminate on the basis of race, color, national origin, age, disability, or sex.

## 2017-04-10 NOTE — H&P
[facility]  History & Physical  Plastic Surgery    SUBJECTIVE:     Chief Complaint/Reason for Admission: Capsular contracture     History of Present Illness:  Patient is a 72 y.o. female presents with Capsular contracture for bilateral implant exchange and capsulectomy.    No prescriptions prior to admission.       Review of patient's allergies indicates:   Allergen Reactions    Bananas [banana] Anaphylaxis    Crayfish     Iodine and iodide containing products     Avocado (laurus persea) Swelling and Rash    Kiwi (actinidia chinensis) Itching, Swelling and Rash    Latex, natural rubber Itching, Swelling and Rash    Papaya Itching, Swelling and Rash       Past Medical History:   Diagnosis Date    Anxiety 4/13/2014    Arthritis     Asthma     Asthma 4/13/2014    Gout     HTN (hypertension) 4/13/2014    Hyperlipidemia 4/13/2014    Hypertension      Past Surgical History:   Procedure Laterality Date    APPENDECTOMY      HYSTERECTOMY      implanted defibrillator      SHOULDER ARTHROSCOPY  2012    TONSILLECTOMY       History reviewed. No pertinent family history.  Social History   Substance Use Topics    Smoking status: Never Smoker    Smokeless tobacco: None    Alcohol use No        OBJECTIVE:     Vital Signs (Most Recent):       Physical Exam:  Bilateral breast s/p implant placement    ASSESSMENT/PLAN:     73 y/o female with Capsular contracture  Presents for implant exchange and capsulectomy.    Risks benefits and alternatives of the procedure were explained to the patient. Specific risks including but not limited to bleeding, infection, asymmetry, skin necrosis, skin loss, wound healing problems, seroma, hematoma, need for drain placement, scarring, pain, and PE/DVT. Patient understands these risks and wishes to proceed with surgery.

## 2017-04-10 NOTE — TRANSFER OF CARE
"Anesthesia Transfer of Care Note    Patient: Mariaelena Grubbs    Procedure(s) Performed: Procedure(s) (LRB):  EXCHANGE IMPLANT-BREAST Bilateral (Bilateral)  CAPSULECTOMY-BREAST Bilateral (Bilateral)  CAPSULORRHAPHY (Bilateral)    Patient location: PACU    Anesthesia Type: general    Transport from OR: Transported from OR on room air with adequate spontaneous ventilation    Post pain: adequate analgesia    Post assessment: no apparent anesthetic complications and tolerated procedure well    Post vital signs: stable    Level of consciousness: awake, alert and oriented    Nausea/Vomiting: no nausea/vomiting    Complications: none          Last vitals:   Visit Vitals    BP (!) 144/81    Pulse 60    Temp 36.4 °C (97.6 °F) (Oral)    Resp 14    Ht 4' 11" (1.499 m)    Wt 68.9 kg (152 lb)    SpO2 99%    Breastfeeding No    BMI 30.7 kg/m2     "

## 2017-04-10 NOTE — PLAN OF CARE
D/C instructions and HILARY drain instructions given to pt. and family. Verb. Understanding. RX for pain given and next time and dose explained. Pt. Tolerating PO fluids. VSS. IV dc'd. Pain level tolerable. Pt. Denies N/V at this time. Family at  for d/c. All consent in chart at time of d/c.

## 2017-04-10 NOTE — ANESTHESIA PROCEDURE NOTES
PEC I    Patient location during procedure: pre-op   Block not for primary anesthetic.  Reason for block: at surgeon's request and post-op pain management   Post-op Pain Location: bilateral breast pain  Start time: 4/10/2017 9:05 AM  Timeout: 4/10/2017 9:05 AM   End time: 4/10/2017 9:12 AM  Staffing  Anesthesiologist: ALLAN REECE  Resident/CRNA: LANETTE BRIDGES  Performed by: resident/CRNA   Preanesthetic Checklist  Completed: patient identified, site marked, surgical consent, pre-op evaluation, timeout performed, IV checked, risks and benefits discussed and monitors and equipment checked  Peripheral Block  Patient position: sitting  Prep: ChloraPrep  Patient monitoring: heart rate, cardiac monitor, continuous capnometry and frequent blood pressure checks  Laterality: bilateral  Injection technique: single shot  Needle  Needle type: Stimuplex   Needle gauge: 21 G  Needle length: 4 in  Needle localization: anatomical landmarks and ultrasound guidance     Assessment  Injection assessment: negative aspiration  Paresthesia pain: none  Heart rate change: no  Slow fractionated injection: yes  Medications:  Bolus administered: 40 mL of 0.25 ropivacaine  Epinephrine added: 3.75 mcg/mL (1/300,000)

## 2017-04-11 NOTE — ADDENDUM NOTE
Addendum  created 04/11/17 0615 by Tavares Ann MD    Anesthesia Event edited, Procedure Event Log accessed

## 2017-04-11 NOTE — DISCHARGE SUMMARY
[facility]  Discharge Note  Short Stay    Admit Date: 4/10/2017    Discharge Date and Time: 4/10/2017  3:40 PM     Attending Physician: No att. 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       Discharged Condition: good    Hospital Course: Patient was admitted for an outpatient procedure and tolerated the procedure well with no complications.    Final Diagnoses: Same as principal problem.    Disposition: Home or Self Care    Follow up/Patient Instructions:    Medications:  Reconciled Home Medications:   Discharge Medication List as of 4/10/2017  3:07 PM      START taking these medications    Details   cephALEXin (KEFLEX) 500 MG capsule Take 1 capsule (500 mg total) by mouth 4 (four) times daily., Starting 4/10/2017, Until Thu 4/20/17, Print      hydrocodone-acetaminophen 5-325mg (NORCO) 5-325 mg per tablet Take 1 tablet by mouth every 6 (six) hours as needed for Pain., Starting 4/10/2017, Until Discontinued, Print         CONTINUE these medications which have NOT CHANGED    Details   albuterol (ACCUNEB) 1.25 mg/3 mL Nebu Take 1.25 mg by nebulization every 6 (six) hours as needed., Until Discontinued, Historical Med      ALBUTEROL SULFATE (PROAIR HFA INHL) Inhale into the lungs., Until Discontinued, Historical Med      alprazolam (XANAX) 2 MG Tab Take by mouth nightly as needed., Until Discontinued, Historical Med      diphenoxylate-atropine 2.5-0.025 mg (LOMOTIL) 2.5-0.025 mg per tablet Take 1 tablet by mouth 4 (four) times daily as needed., Until Discontinued, Historical Med      EPINEPHRINE (EPIPEN INJ) Inject as directed., Until Discontinued, Historical Med      fluconazole (DIFLUCAN) 150 MG Tab Take 100 mg by mouth once daily., Until Discontinued, Historical Med       fluticasone-salmeterol 250-50 mcg/dose (ADVAIR) 250-50 mcg/dose diskus inhaler Inhale 1 puff into the lungs 2 (two) times daily., Until Discontinued, Historical Med      ibuprofen (ADVIL,MOTRIN) 800 MG tablet Take 800 mg by mouth every 6 (six) hours as needed., Until Discontinued, Historical Med      metoprolol succinate (TOPROL-XL) 200 MG 24 hr tablet Take 200 mg by mouth once daily., Until Discontinued, Historical Med      nifedipine (PROCARDIA-XL) 90 MG (OSM) TR24 Take 60 mg by mouth once daily., Until Discontinued, Historical Med      oxycodone-acetaminophen 5-325 mg (PERCOCET) 5-325 mg per tablet Take 1 tablet by mouth every 4 (four) hours as needed., Until Discontinued, Historical Med      valsartan-hydrochlorothiazide (DIOVAN-HCT) 320-25 mg per tablet Take 1 tablet by mouth once daily., Until Discontinued, Historical Med             Discharge Procedure Orders  Diet general     Activity as tolerated     Call MD for:  increased confusion or weakness     Call MD for:  persistent dizziness, light-headedness, or visual disturbances     Call MD for:  worsening rash     Call MD for:  severe persistent headache     Call MD for:  difficulty breathing or increased cough     Call MD for:  redness, tenderness, or signs of infection (pain, swelling, redness, odor or green/yellow discharge around incision site)     Call MD for:  severe uncontrolled pain     Call MD for:  persistent nausea and vomiting or diarrhea     Call MD for:  temperature >100.4     Change dressing (specify)   Order Comments: May remove gauze dressings in 24 hours.  May shower starting 4/12/17.  Empty and record output from drains twice daily.     EKG 12-lead   Standing Status: Future  Standing Exp. Date: 04/07/18   Order Specific Question Answer Comments   Diagnosis Hypertension [746196]        Follow-up Information     Follow up with Rishi Sanchez MD. Call in 1 week.    Specialty:  Plastic Surgery    Contact information:    [address]  [facility] [id number]  [phone]            Discharge Procedure Orders (must include Diet, Follow-up, Activity):    Discharge Procedure Orders (must include Diet, Follow-up, Activity)  Diet general     Activity as tolerated     Call MD for:  increased confusion or weakness     Call MD for:  persistent dizziness, light-headedness, or visual disturbances     Call MD for:  worsening rash     Call MD for:  severe persistent headache     Call MD for:  difficulty breathing or increased cough     Call MD for:  redness, tenderness, or signs of infection (pain, swelling, redness, odor or green/yellow discharge around incision site)     Call MD for:  severe uncontrolled pain     Call MD for:  persistent nausea and vomiting or diarrhea     Call MD for:  temperature >100.4     Change dressing (specify)   Order Comments: May remove gauze dressings in 24 hours.  May shower starting 4/12/17.  Empty and record output from drains twice daily.     EKG 12-lead   Standing Status: Future  Standing Exp. Date: 04/07/18   Order Specific Question Answer Comments   Diagnosis Hypertension [[id number]]

## 2017-04-12 ENCOUNTER — OFFICE VISIT (OUTPATIENT)
Dept: PLASTIC SURGERY | Facility: CLINIC | Age: 72
End: 2017-04-12
Payer: MEDICARE

## 2017-04-12 VITALS
WEIGHT: 155.56 LBS | TEMPERATURE: 99 F | HEART RATE: 63 BPM | DIASTOLIC BLOOD PRESSURE: 77 MMHG | BODY MASS INDEX: 31.41 KG/M2 | SYSTOLIC BLOOD PRESSURE: 144 MMHG

## 2017-04-12 DIAGNOSIS — Z09 SURGERY FOLLOW-UP EXAMINATION: Primary | ICD-10-CM

## 2017-04-12 PROCEDURE — 99024 POSTOP FOLLOW-UP VISIT: CPT | Mod: S$GLB,,, | Performed by: SURGERY

## 2017-04-12 PROCEDURE — 99999 PR PBB SHADOW E&M-EST. PATIENT-LVL II: CPT | Mod: PBBFAC,,, | Performed by: SURGERY

## 2017-04-12 NOTE — PROGRESS NOTES
[facility]elena Grubbs [facility] after having an implant   exchange and extensive capsulectomy with lateral capsulorrhaphies.  She has done   very well.  Everything looks good.  Her drains are removed.  We will see her   back in two weeks.      ANYA/MARSHALL  dd: 04/12/2017 14:50:40 (CDT)  td: 04/13/2017 10:24:22 (CDT)  Doc ID   #[id number]  Job ID #[id number]    CC:     Dictation #2  MRN:[id number]  CSN:[id number]

## 2017-04-26 ENCOUNTER — OFFICE VISIT (OUTPATIENT)
Dept: PLASTIC SURGERY | Facility: CLINIC | Age: 72
End: 2017-04-26
Payer: MEDICARE

## 2017-04-26 VITALS
HEART RATE: 63 BPM | BODY MASS INDEX: 30.38 KG/M2 | DIASTOLIC BLOOD PRESSURE: 86 MMHG | HEIGHT: 60 IN | SYSTOLIC BLOOD PRESSURE: 146 MMHG | WEIGHT: 154.75 LBS | TEMPERATURE: 99 F

## 2017-04-26 DIAGNOSIS — Z09 SURGERY FOLLOW-UP EXAMINATION: Primary | ICD-10-CM

## 2017-04-26 PROCEDURE — 99024 POSTOP FOLLOW-UP VISIT: CPT | Mod: S$GLB,,, | Performed by: SURGERY

## 2017-04-26 PROCEDURE — 99999 PR PBB SHADOW E&M-EST. PATIENT-LVL III: CPT | Mod: PBBFAC,,, | Performed by: SURGERY

## 2017-04-26 NOTE — PROGRESS NOTES
Mariaelena Grubbs presents after having a bilateral silicone implant removal with   bilateral capsulectomy, right implant was ruptured.  She had gross calcification   of both of the capsules.  She had an implant exchange, everything looks great.    She is healing nicely.  We will see her back in three weeks.      CRISSYB/IN  dd: 04/26/2017 09:54:48 (CDT)  td: 04/27/2017 06:42:39 (CDT)  Doc ID   #[id number]  Job ID #[id number]    CC:

## 2019-10-05 ENCOUNTER — HOSPITAL ENCOUNTER (EMERGENCY)
Facility: OTHER | Age: 74
Discharge: HOME | End: 2019-10-05
Attending: EMERGENCY MEDICINE
Payer: MEDICARE

## 2019-10-05 VITALS
OXYGEN SATURATION: 100 % | WEIGHT: 148 LBS | BODY MASS INDEX: 29.84 KG/M2 | HEART RATE: 99 BPM | HEIGHT: 59 IN | SYSTOLIC BLOOD PRESSURE: 160 MMHG | RESPIRATION RATE: 15 BRPM | TEMPERATURE: 99 F | DIASTOLIC BLOOD PRESSURE: 94 MMHG

## 2019-10-05 DIAGNOSIS — J10.1 INFLUENZA B: ICD-10-CM

## 2019-10-05 DIAGNOSIS — L72.3 SEBACEOUS CYST: ICD-10-CM

## 2019-10-05 DIAGNOSIS — J18.9 PNEUMONIA OF RIGHT LOWER LOBE DUE TO INFECTIOUS ORGANISM: Primary | ICD-10-CM

## 2019-10-05 DIAGNOSIS — R05.9 COUGH: ICD-10-CM

## 2019-10-05 LAB
INFLUENZA A, MOLECULAR: NEGATIVE
INFLUENZA B, MOLECULAR: POSITIVE
SPECIMEN SOURCE: ABNORMAL

## 2019-10-05 PROCEDURE — 99284 EMERGENCY DEPT VISIT MOD MDM: CPT | Mod: 25

## 2019-10-05 PROCEDURE — 87502 INFLUENZA DNA AMP PROBE: CPT

## 2019-10-05 RX ORDER — OSELTAMIVIR PHOSPHATE 75 MG/1
75 CAPSULE ORAL 2 TIMES DAILY
Qty: 10 CAPSULE | Refills: 0 | Status: SHIPPED | OUTPATIENT
Start: 2019-10-05 | End: 2019-10-10

## 2019-10-05 RX ORDER — LEVOFLOXACIN 500 MG/1
500 TABLET, FILM COATED ORAL DAILY
Qty: 10 TABLET | Refills: 0 | Status: SHIPPED | OUTPATIENT
Start: 2019-10-05 | End: 2019-10-15

## 2019-10-05 NOTE — ED PROVIDER NOTES
"Encounter Date: 10/5/2019    SCRIBE #1 NOTE: I, Loni Woodard, am scribing for, and in the presence of, Dr. Santacruz .       History     Chief Complaint   Patient presents with    Cough     Reports dry cough with hoarseness since Thursday with chills at night.      Time seen by provider: 4:54 PM    This is a 74 y.o. female who presents with complaint of cough since 3 days ago and no appetite. She mentions being around her 18 month year old grandson who may have been sick. She did not check her fever at home however she does report a sebaceous cyst on her back. Her friend squeezed the cyst a couple of days ago and "black stuff" came out followed by a "creamy color". Her PCP is Dr. Mosher and she has an upcoming surgery with Dr. Larson to get kidney stones removed.       The history is provided by the patient.     Review of patient's allergies indicates:   Allergen Reactions    Bananas [banana] Anaphylaxis    Crayfish      Hard time breathing    Iodine and iodide containing products      Told to avoid due to crayfish allergy    Avocado (laurus persea) Swelling and Rash    Kiwi (actinidia chinensis) Itching, Swelling and Rash    Latex, natural rubber Itching, Swelling and Rash    Papaya Itching, Swelling and Rash     Past Medical History:   Diagnosis Date    Anxiety 4/13/2014    Arthritis     Asthma     Asthma 4/13/2014    Cancer     Breast    Gout     HTN (hypertension) 4/13/2014    Hyperlipidemia 4/13/2014    Hypertension      Past Surgical History:   Procedure Laterality Date    APPENDECTOMY      BREAST SURGERY      HYSTERECTOMY      Port a cath placement and removal      SHOULDER ARTHROSCOPY  2012    TONSILLECTOMY       No family history on file.  Social History     Tobacco Use    Smoking status: Never Smoker   Substance Use Topics    Alcohol use: No     Comment: socially    Drug use: No     Review of Systems   Constitutional: Positive for appetite change. Negative for chills and fever.   HENT: " Negative for congestion, rhinorrhea and sore throat.    Eyes: Negative for visual disturbance.   Respiratory: Positive for cough. Negative for shortness of breath.    Cardiovascular: Negative for chest pain.   Gastrointestinal: Negative for abdominal pain, diarrhea, nausea and vomiting.   Genitourinary: Negative for dysuria.   Musculoskeletal: Negative for back pain.   Skin: Negative for rash.        Positive for cyst.    Neurological: Negative for dizziness, weakness and light-headedness.   Psychiatric/Behavioral: Negative for confusion.       Physical Exam     Initial Vitals [10/05/19 1620]   BP Pulse Resp Temp SpO2   (!) 140/105 92 18 99.9 °F (37.7 °C) 100 %      MAP       --         Physical Exam    Nursing note and vitals reviewed.  Constitutional: She appears well-developed and well-nourished. She is not diaphoretic. No distress.   Well appearing. Not diaphoretic or hot to touch.    HENT:   Head: Normocephalic and atraumatic.   Right Ear: External ear normal.   Left Ear: External ear normal.   Eyes: Right eye exhibits no discharge. Left eye exhibits no discharge.   Neck: Normal range of motion. Neck supple.   Cardiovascular: Normal rate, regular rhythm and normal heart sounds. Exam reveals no gallop and no friction rub.    No murmur heard.  Pulmonary/Chest: Breath sounds normal. No respiratory distress. She has no wheezes. She has no rhonchi. She has no rales.   Frequent cough during exam. No dyspnea with ambulation. Speaks in full sentences. No accessory muscle use.    Abdominal: Soft. Bowel sounds are normal. She exhibits no distension. There is no tenderness. There is no rebound and no guarding.   Musculoskeletal: Normal range of motion. She exhibits no edema or tenderness.   Lymphadenopathy:     She has no cervical adenopathy.   Neurological: She is alert and oriented to person, place, and time. She has normal strength. No sensory deficit.   Skin: Skin is warm and dry. No rash noted. There is erythema.    Right posterior thorax approximately T6 there is a sebaceous cyst with mild erythema and tenderness. No fluctuance or surrounding cellulitis.     Psychiatric: She has a normal mood and affect. Her behavior is normal.         ED Course   Procedures  Labs Reviewed   INFLUENZA A & B BY MOLECULAR - Abnormal; Notable for the following components:       Result Value    Influenza B, Molecular Positive (*)     All other components within normal limits          Imaging Results          X-Ray Chest PA And Lateral (Final result)  Result time 10/05/19 17:11:58    Final result by Dimitrios Mckeon MD (10/05/19 17:11:58)                 Impression:      Airspace opacity in the right lower lobe, consistent with pneumonia.  Follow-up to resolution is suggested.      Electronically signed by: Dimitrios Mckeon MD  Date:    10/05/2019  Time:    17:11             Narrative:    EXAMINATION:  XR CHEST PA AND LATERAL    CLINICAL HISTORY:  Cough    TECHNIQUE:  PA and lateral views of the chest were performed.    COMPARISON:  08/27/2011.    FINDINGS:  The trachea is unremarkable.  There are calcifications of the aortic knob.  The cardiomediastinal silhouette is within normal limits.  There is no evidence of free air beneath the hemidiaphragms.  No significant pleural effusion is identified.  There is no evidence of a pneumothorax.  There is no evidence of pneumomediastinum.  There is an airspace opacity in the right lower lobe.  There are postoperative changes involving the right breast.  There also postoperative changes of the right shoulder.                              X-Rays:   Independently Interpreted Readings:   Chest X-Ray: Right lower lobe infiltrate.      Medical Decision Making:   History:   Old Medical Records: I decided to obtain old medical records.  Independently Interpreted Test(s):   I have ordered and independently interpreted X-rays - see prior notes.  Clinical Tests:   Lab Tests: Ordered and Reviewed  Radiological Study:  Ordered and Reviewed            Scribe Attestation:   Scribe #1: I performed the above scribed service and the documentation accurately describes the services I performed. I attest to the accuracy of the note.    Attending Attestation:           Physician Attestation for Scribe:  Physician Attestation Statement for Scribe #1: I, Dr. Santacruz, reviewed documentation, as scribed by Loni Woodard  in my presence, and it is both accurate and complete.           Patient presents with cough for the past 2 days.  She was concerned as she had been taking care of her 18-month-old grandchild earlier in the week although the the child had not been ill.  She states the cough is occasionally productive.  Complains of mild sore throat.  Denies fever.  Is not a smoker.  Does have a history of asthma but has not felt tight or short winded.  Has not had to use her inhaler more often.  On exam she is afebrile, well-appearing not warm to touch or toxic appearing.  Lung exam did not reveal any abnormalities however chest x-ray shows right lower lobe infiltrate.  In addition although she was not expect displaying the classic symptoms of influenza based on the early appearance of fluid this season this was checked as well and is positive for influenza B. she will be started on Levaquin, and Tamiflu.  Upon repeat assessment prior to discharge she is still well-appearing, afebrile, is comfortable with outpatient management.  Reports she has her inhalers at home feels comfortable returning for any worsening in her condition. She also incidentally complained of a sebaceous cyst.  This does not appear acutely infected.  Recommend she speak with her primary care physician for referral to either Dermatology plastic surgery or similar for excision.  Return precautions discussed.  Encouraged follow-up with primary care             Clinical Impression:     1. Pneumonia of right lower lobe due to infectious organism    2. Cough    3. Influenza B    4.  Sebaceous cyst                                 Giancarlo Santacruz II, MD  10/05/19 0026

## 2020-01-02 ENCOUNTER — HOSPITAL ENCOUNTER (EMERGENCY)
Facility: HOSPITAL | Age: 75
Discharge: HOME | End: 2020-01-03
Attending: EMERGENCY MEDICINE
Payer: MEDICARE

## 2020-01-02 VITALS
BODY MASS INDEX: 26.45 KG/M2 | RESPIRATION RATE: 15 BRPM | OXYGEN SATURATION: 98 % | DIASTOLIC BLOOD PRESSURE: 76 MMHG | HEIGHT: 59 IN | SYSTOLIC BLOOD PRESSURE: 161 MMHG | WEIGHT: 131.19 LBS | HEART RATE: 88 BPM | TEMPERATURE: 99 F

## 2020-01-02 DIAGNOSIS — R10.9 ABDOMINAL PAIN, UNSPECIFIED ABDOMINAL LOCATION: Primary | ICD-10-CM

## 2020-01-02 LAB
ALBUMIN SERPL BCP-MCNC: 3.2 G/DL (ref 3.5–5.2)
ALP SERPL-CCNC: 93 U/L (ref 55–135)
ALT SERPL W/O P-5'-P-CCNC: 12 U/L (ref 10–44)
ANION GAP SERPL CALC-SCNC: 9 MMOL/L (ref 8–16)
AST SERPL-CCNC: 14 U/L (ref 10–40)
BACTERIA #/AREA URNS AUTO: ABNORMAL /HPF
BASOPHILS # BLD AUTO: 0.02 K/UL (ref 0–0.2)
BASOPHILS NFR BLD: 0.2 % (ref 0–1.9)
BILIRUB SERPL-MCNC: 0.6 MG/DL (ref 0.1–1)
BILIRUB UR QL STRIP: NEGATIVE
BUN SERPL-MCNC: 13 MG/DL (ref 8–23)
CALCIUM SERPL-MCNC: 9.2 MG/DL (ref 8.7–10.5)
CHLORIDE SERPL-SCNC: 106 MMOL/L (ref 95–110)
CLARITY UR REFRACT.AUTO: CLEAR
CO2 SERPL-SCNC: 28 MMOL/L (ref 23–29)
COLOR UR AUTO: ABNORMAL
CREAT SERPL-MCNC: 1 MG/DL (ref 0.5–1.4)
DIFFERENTIAL METHOD: ABNORMAL
EOSINOPHIL # BLD AUTO: 0.2 K/UL (ref 0–0.5)
EOSINOPHIL NFR BLD: 1.6 % (ref 0–8)
ERYTHROCYTE [DISTWIDTH] IN BLOOD BY AUTOMATED COUNT: 15.5 % (ref 11.5–14.5)
EST. GFR  (AFRICAN AMERICAN): >60 ML/MIN/1.73 M^2
EST. GFR  (NON AFRICAN AMERICAN): 56 ML/MIN/1.73 M^2
GLUCOSE SERPL-MCNC: 96 MG/DL (ref 70–110)
GLUCOSE UR QL STRIP: NEGATIVE
HCT VFR BLD AUTO: 31.7 % (ref 37–48.5)
HGB BLD-MCNC: 9.6 G/DL (ref 12–16)
HGB UR QL STRIP: ABNORMAL
IMM GRANULOCYTES # BLD AUTO: 0.03 K/UL (ref 0–0.04)
IMM GRANULOCYTES NFR BLD AUTO: 0.3 % (ref 0–0.5)
KETONES UR QL STRIP: NEGATIVE
LACTATE SERPL-SCNC: 1 MMOL/L (ref 0.5–2.2)
LEUKOCYTE ESTERASE UR QL STRIP: ABNORMAL
LIPASE SERPL-CCNC: 17 U/L (ref 4–60)
LYMPHOCYTES # BLD AUTO: 3.8 K/UL (ref 1–4.8)
LYMPHOCYTES NFR BLD: 39.6 % (ref 18–48)
MCH RBC QN AUTO: 28.6 PG (ref 27–31)
MCHC RBC AUTO-ENTMCNC: 30.3 G/DL (ref 32–36)
MCV RBC AUTO: 94 FL (ref 82–98)
MICROSCOPIC COMMENT: ABNORMAL
MONOCYTES # BLD AUTO: 0.6 K/UL (ref 0.3–1)
MONOCYTES NFR BLD: 6.6 % (ref 4–15)
NEUTROPHILS # BLD AUTO: 5 K/UL (ref 1.8–7.7)
NEUTROPHILS NFR BLD: 51.7 % (ref 38–73)
NITRITE UR QL STRIP: NEGATIVE
NRBC BLD-RTO: 0 /100 WBC
PH UR STRIP: 7 [PH] (ref 5–8)
PLATELET # BLD AUTO: 383 K/UL (ref 150–350)
PMV BLD AUTO: 9.8 FL (ref 9.2–12.9)
POTASSIUM SERPL-SCNC: 2.9 MMOL/L (ref 3.5–5.1)
PROT SERPL-MCNC: 7.1 G/DL (ref 6–8.4)
PROT UR QL STRIP: NEGATIVE
RBC # BLD AUTO: 3.36 M/UL (ref 4–5.4)
RBC #/AREA URNS AUTO: 3 /HPF (ref 0–4)
SODIUM SERPL-SCNC: 143 MMOL/L (ref 136–145)
SP GR UR STRIP: 1.02 (ref 1–1.03)
SQUAMOUS #/AREA URNS AUTO: 1 /HPF
URN SPEC COLLECT METH UR: ABNORMAL
WBC # BLD AUTO: 9.63 K/UL (ref 3.9–12.7)
WBC #/AREA URNS AUTO: 21 /HPF (ref 0–5)

## 2020-01-02 PROCEDURE — 80053 COMPREHEN METABOLIC PANEL: CPT

## 2020-01-02 PROCEDURE — 96361 HYDRATE IV INFUSION ADD-ON: CPT

## 2020-01-02 PROCEDURE — 25500020 PHARM REV CODE 255: Performed by: EMERGENCY MEDICINE

## 2020-01-02 PROCEDURE — 96374 THER/PROPH/DIAG INJ IV PUSH: CPT

## 2020-01-02 PROCEDURE — 63600175 PHARM REV CODE 636 W HCPCS: Performed by: EMERGENCY MEDICINE

## 2020-01-02 PROCEDURE — 83605 ASSAY OF LACTIC ACID: CPT

## 2020-01-02 PROCEDURE — 25000003 PHARM REV CODE 250: Performed by: EMERGENCY MEDICINE

## 2020-01-02 PROCEDURE — 81001 URINALYSIS AUTO W/SCOPE: CPT

## 2020-01-02 PROCEDURE — 99285 EMERGENCY DEPT VISIT HI MDM: CPT | Mod: ,,, | Performed by: EMERGENCY MEDICINE

## 2020-01-02 PROCEDURE — 99285 PR EMERGENCY DEPT VISIT,LEVEL V: ICD-10-PCS | Mod: ,,, | Performed by: EMERGENCY MEDICINE

## 2020-01-02 PROCEDURE — 85025 COMPLETE CBC W/AUTO DIFF WBC: CPT

## 2020-01-02 PROCEDURE — 96375 TX/PRO/DX INJ NEW DRUG ADDON: CPT

## 2020-01-02 PROCEDURE — 99285 EMERGENCY DEPT VISIT HI MDM: CPT | Mod: 25

## 2020-01-02 PROCEDURE — 83690 ASSAY OF LIPASE: CPT

## 2020-01-02 PROCEDURE — 87086 URINE CULTURE/COLONY COUNT: CPT

## 2020-01-02 RX ORDER — DIPHENHYDRAMINE HYDROCHLORIDE 50 MG/ML
25 INJECTION INTRAMUSCULAR; INTRAVENOUS
Status: DISCONTINUED | OUTPATIENT
Start: 2020-01-02 | End: 2020-01-03 | Stop reason: HOSPADM

## 2020-01-02 RX ORDER — METHYLPREDNISOLONE SOD SUCC 125 MG
125 VIAL (EA) INJECTION
Status: DISCONTINUED | OUTPATIENT
Start: 2020-01-02 | End: 2020-01-02

## 2020-01-02 RX ORDER — VALSARTAN 160 MG/1
160 TABLET ORAL
Status: COMPLETED | OUTPATIENT
Start: 2020-01-02 | End: 2020-01-02

## 2020-01-02 RX ORDER — DIPHENHYDRAMINE HYDROCHLORIDE 50 MG/ML
25 INJECTION INTRAMUSCULAR; INTRAVENOUS
Status: DISCONTINUED | OUTPATIENT
Start: 2020-01-02 | End: 2020-01-02

## 2020-01-02 RX ORDER — OXYCODONE AND ACETAMINOPHEN 5; 325 MG/1; MG/1
1 TABLET ORAL
Status: COMPLETED | OUTPATIENT
Start: 2020-01-02 | End: 2020-01-02

## 2020-01-02 RX ORDER — LABETALOL HCL 20 MG/4 ML
10 SYRINGE (ML) INTRAVENOUS
Status: COMPLETED | OUTPATIENT
Start: 2020-01-02 | End: 2020-01-02

## 2020-01-02 RX ORDER — METHYLPREDNISOLONE SOD SUCC 125 MG
125 VIAL (EA) INJECTION
Status: DISCONTINUED | OUTPATIENT
Start: 2020-01-02 | End: 2020-01-03 | Stop reason: HOSPADM

## 2020-01-02 RX ADMIN — POTASSIUM BICARBONATE 50 MEQ: 978 TABLET, EFFERVESCENT ORAL at 11:01

## 2020-01-02 RX ADMIN — DIPHENHYDRAMINE HYDROCHLORIDE 25 MG: 50 INJECTION, SOLUTION INTRAMUSCULAR; INTRAVENOUS at 10:01

## 2020-01-02 RX ADMIN — IOHEXOL 75 ML: 350 INJECTION, SOLUTION INTRAVENOUS at 10:01

## 2020-01-02 RX ADMIN — VALSARTAN 160 MG: 160 TABLET, FILM COATED ORAL at 11:01

## 2020-01-02 RX ADMIN — SODIUM CHLORIDE 500 ML: 0.9 INJECTION, SOLUTION INTRAVENOUS at 09:01

## 2020-01-02 RX ADMIN — LABETALOL HYDROCHLORIDE 10 MG: 5 INJECTION, SOLUTION INTRAVENOUS at 09:01

## 2020-01-02 RX ADMIN — OXYCODONE HYDROCHLORIDE AND ACETAMINOPHEN 1 TABLET: 5; 325 TABLET ORAL at 09:01

## 2020-01-02 RX ADMIN — METHYLPREDNISOLONE SODIUM SUCCINATE 125 MG: 125 INJECTION, POWDER, FOR SOLUTION INTRAMUSCULAR; INTRAVENOUS at 10:01

## 2020-01-03 LAB
BACTERIA UR CULT: NORMAL
BACTERIA UR CULT: NORMAL

## 2020-01-03 PROCEDURE — 63600175 PHARM REV CODE 636 W HCPCS: Performed by: EMERGENCY MEDICINE

## 2020-01-03 PROCEDURE — 25000003 PHARM REV CODE 250: Performed by: EMERGENCY MEDICINE

## 2020-01-03 RX ORDER — HEPARIN 100 UNIT/ML
5 SYRINGE INTRAVENOUS
Status: COMPLETED | OUTPATIENT
Start: 2020-01-03 | End: 2020-01-03

## 2020-01-03 RX ADMIN — Medication 500 UNITS: at 12:01

## 2020-01-03 NOTE — ED TRIAGE NOTES
"Pt came ion PMV c\o Abdominal Pain (diffuse lower abd 9/10, "for weeks." Hx of colon surgery)  + Arm Pain (Pt reports pain at PICC line site in RUE. 3 weeks  "They need to take it out")  "

## 2020-01-03 NOTE — PROVIDER PROGRESS NOTES - EMERGENCY DEPT.
Signed out to me from previous attending.   CT shows no acute infection or other emergent cause of patient's pain.  She remains hemodynamically stable.  Urine shows no sign of infection.  Stable for discharge and outpatient follow-up.  Advised pt to follow up with PCP or return if concerning symptoms arise. Pt understands and agrees with plan. Will d/c home.

## 2020-01-03 NOTE — ED PROVIDER NOTES
"Encounter Date: 1/2/2020       History     Chief Complaint   Patient presents with    Arm Pain     Pt reports pain at PICC line site in RUE. "They need to take it out"    Abdominal Pain     diffuse lower abd 9/10, "for weeks." Hx of colon surgery     HPI     74-year-old female with past medical history of hypertension, hyperlipidemia, remote history of breast cancer, asthma, 3 months status post right hemicolectomy, partial nephrectomy, and pyelolithotomy, complicated by delayed wound closing /infection, not currently on abxs,  coming in with abdominal pain since discharge from the hospital.  She states her abdominal pain is similar to previous, aching, intermittent.  She states her wound care nurse stating that her wounds are healing appropriately.  She also is concerned that her PICC line needs to be taken out as he is not being used.  She denies significant pain in her arm (nursing notes noted), but does endorse some discomfort from the plastic. She states she has follow-up with her surgeon as well as for infectious disease doctors.      She denies vomiting, she sources having regular bowel movements and passing gas.  No urinary symptoms.    She notes that she has had previously low potassiums.    She endorses non-compliance with her BP meds.      Review of patient's allergies indicates:   Allergen Reactions    Bananas [banana] Anaphylaxis    Crayfish      Hard time breathing    Iodine and iodide containing products      Told to avoid due to crayfish allergy    Avocado (laurus persea) Swelling and Rash    Kiwi (actinidia chinensis) Itching, Swelling and Rash    Latex, natural rubber Itching, Swelling and Rash    Papaya Itching, Swelling and Rash     Past Medical History:   Diagnosis Date    Anxiety 4/13/2014    Arthritis     Asthma     Asthma 4/13/2014    Cancer     Breast    Gout     HTN (hypertension) 4/13/2014    Hyperlipidemia 4/13/2014    Hypertension      Past Surgical History:   Procedure " Laterality Date    APPENDECTOMY      BREAST SURGERY      HYSTERECTOMY      Port a cath placement and removal      SHOULDER ARTHROSCOPY  2012    TONSILLECTOMY       No family history on file.  Social History     Tobacco Use    Smoking status: Never Smoker   Substance Use Topics    Alcohol use: No     Comment: socially    Drug use: No     Review of Systems   Constitutional:  No Fever  Eyes: No Vision Changes  ENT/Mouth: No sore throat  Cardiovascular:  No Chest Pain  Respiratory:  No Cough, No SOB  Gastrointestinal:  No Nausea, No Vomiting, No Diarrhea, positive abdo pain.  Genitourinary:  No  pain, No dysuria   Musculoskeletal:  No Arthralgias, No Back Pain, No Neck Pain, No recent trauma.  Skin:  No skin Lesions  Neuro:  No Weakness,No Dizziness, No Headache        Physical Exam     Initial Vitals [01/02/20 1934]   BP Pulse Resp Temp SpO2   (!) 223/122 109 15 98.5 °F (36.9 °C) 97 %      MAP       --         Physical Exam  Physical Exam:  CONSTITUTIONAL: Well developed, well nourished, in no acute distress.  HENT: Normocephalic, atraumatic    EYES: Sclerae anicteric   NECK: Supple, no thyroid enlargement  CARDIOVASCULAR: Regular rate and rhythm without any murmurs, gallops, rubs.  RESPIRATORY: Speaking in full sentences. Breathing comfortably. Auscultation of the lungs revealed normal breath sounds b/l, no wheezing, no rales, no rhonchi.  ABDOMEN: Soft, with some mild-to-moderate tenderness in the left mid abdomen as well as lower abdomen as well as left lower abdomen.  There is a well-healing right-sided abdominal incision scar with no surrounding erythema, no foul odor, no discharge. Midline incision appears to be healing.  NEUROLOGIC: Alert, interacting normally. No facial droop.   MSK: Moving all four extremities.  Skin: R arm pick line with no surrounding erythema, no TTP. Warm and dry. No visible rash on exposed areas of skin.    Psych: Mood and affect normal.       ED Course   Procedures  Labs  Reviewed - No data to display       Imaging Results    None          Medical Decision Making:   Initial Assessment:   74-year-old female with past medical history as noted coming in with abdominal pain.  Exam is significant for healing incisional scars with no signs of infection as well as tenderness in the right abdomen as well as lower abdomen.    Also her blood pressure is very elevated she reports noncompliance with her BP medications. Will give dose of labetolol and one of her home BP meds. Explained to patient importance of BP and other med compliance.     Will undertake work-up with labs to look for any hemodynamic/electroyte abnormalities, Urine studies to look for UTI.  Will undertake CTAP to look for any collections, abscess or complications from her recent surgery.     Abdo wounds and R arm picc line re-dressed. Also explained that PICC line will not be removed until she is cleared by ID and they confirm it will not be need. No signs of active infection, DVT or other concerning findings from PICC line.   ED Management:  Update:   Pt report allergy to idodine, but does not know what it is, she has had CT scan recently but she is unsure of if they were with or without contrast.   Given Solumedrol/benadryl, had CT with no issues.    Initially labs are benign. K is low, given oral K, she endorses low K since her collectomy. She is not on oral K at home.    S/o to Dr. Lopez pending CT AP and urine studies. If those are not remarkable, and pt continues to be stable anticipate pt can be discharged home with outpatient follow up with her surgeon and Infectious disease.    MDM Complexity Points:   Problem Points:  1.New problem, with no additional ED work-up planned (maximum of 1) - abdominal pain.     Data Points:  Review or order clinical lab tests, Review or order radiology test, Decision to obtain old records (in the EHR) and Review and summarization of old records                                     Clinical  Impression:       ICD-10-CM ICD-9-CM   1. Abdominal pain, unspecified abdominal location R10.9 789.00                             Víctor Escoto MD  01/03/20 0025

## 2021-09-13 ENCOUNTER — HOSPITAL ENCOUNTER (EMERGENCY)
Facility: OTHER | Age: 76
Discharge: HOME | End: 2021-09-13
Attending: EMERGENCY MEDICINE
Payer: MEDICARE

## 2021-09-13 VITALS
DIASTOLIC BLOOD PRESSURE: 99 MMHG | SYSTOLIC BLOOD PRESSURE: 186 MMHG | WEIGHT: 150 LBS | TEMPERATURE: 98 F | RESPIRATION RATE: 18 BRPM | BODY MASS INDEX: 30.3 KG/M2 | HEART RATE: 79 BPM | OXYGEN SATURATION: 97 %

## 2021-09-13 DIAGNOSIS — K64.4 EXTERNAL HEMORRHOID: Primary | ICD-10-CM

## 2021-09-13 PROCEDURE — 25000003 PHARM REV CODE 250: Performed by: EMERGENCY MEDICINE

## 2021-09-13 PROCEDURE — 99284 EMERGENCY DEPT VISIT MOD MDM: CPT

## 2021-09-13 RX ORDER — LIDOCAINE HYDROCHLORIDE 20 MG/ML
5 SOLUTION OROPHARYNGEAL
Status: COMPLETED | OUTPATIENT
Start: 2021-09-13 | End: 2021-09-13

## 2021-09-13 RX ORDER — OXYCODONE AND ACETAMINOPHEN 5; 325 MG/1; MG/1
1 TABLET ORAL EVERY 6 HOURS PRN
Qty: 12 TABLET | Refills: 0 | Status: ON HOLD | OUTPATIENT
Start: 2021-09-13 | End: 2022-07-28 | Stop reason: HOSPADM

## 2021-09-13 RX ORDER — HYDROCORTISONE 25 MG/G
CREAM TOPICAL 2 TIMES DAILY
Qty: 20 G | Refills: 0 | Status: ON HOLD | OUTPATIENT
Start: 2021-09-13 | End: 2022-10-11 | Stop reason: HOSPADM

## 2021-09-13 RX ADMIN — LIDOCAINE HYDROCHLORIDE 5 ML: 20 SOLUTION ORAL; TOPICAL at 10:09

## 2021-11-13 ENCOUNTER — HOSPITAL ENCOUNTER (EMERGENCY)
Facility: OTHER | Age: 76
Discharge: HOME | End: 2021-11-13
Attending: EMERGENCY MEDICINE
Payer: MEDICARE

## 2021-11-13 VITALS
HEART RATE: 95 BPM | OXYGEN SATURATION: 100 % | TEMPERATURE: 100 F | DIASTOLIC BLOOD PRESSURE: 104 MMHG | BODY MASS INDEX: 29.25 KG/M2 | WEIGHT: 149 LBS | SYSTOLIC BLOOD PRESSURE: 197 MMHG | HEIGHT: 60 IN | RESPIRATION RATE: 18 BRPM

## 2021-11-13 DIAGNOSIS — J45.21 INTERMITTENT ASTHMA WITH ACUTE EXACERBATION, UNSPECIFIED ASTHMA SEVERITY: ICD-10-CM

## 2021-11-13 DIAGNOSIS — J18.9 PNEUMONIA OF RIGHT LOWER LOBE DUE TO INFECTIOUS ORGANISM: Primary | ICD-10-CM

## 2021-11-13 DIAGNOSIS — R05.9 COUGH: ICD-10-CM

## 2021-11-13 LAB
CTP QC/QA: YES
SARS-COV-2 RDRP RESP QL NAA+PROBE: NEGATIVE

## 2021-11-13 PROCEDURE — 25000242 PHARM REV CODE 250 ALT 637 W/ HCPCS: Performed by: EMERGENCY MEDICINE

## 2021-11-13 PROCEDURE — U0002 COVID-19 LAB TEST NON-CDC: HCPCS | Performed by: EMERGENCY MEDICINE

## 2021-11-13 PROCEDURE — 99284 EMERGENCY DEPT VISIT MOD MDM: CPT | Mod: 25

## 2021-11-13 PROCEDURE — 94640 AIRWAY INHALATION TREATMENT: CPT

## 2021-11-13 RX ORDER — PREDNISONE 20 MG/1
40 TABLET ORAL DAILY
Qty: 10 TABLET | Refills: 0 | Status: SHIPPED | OUTPATIENT
Start: 2021-11-13 | End: 2021-11-18

## 2021-11-13 RX ORDER — BENZONATATE 100 MG/1
100 CAPSULE ORAL 3 TIMES DAILY PRN
Qty: 20 CAPSULE | Refills: 0 | Status: SHIPPED | OUTPATIENT
Start: 2021-11-13 | End: 2021-11-23

## 2021-11-13 RX ORDER — IPRATROPIUM BROMIDE AND ALBUTEROL SULFATE 2.5; .5 MG/3ML; MG/3ML
3 SOLUTION RESPIRATORY (INHALATION)
Status: COMPLETED | OUTPATIENT
Start: 2021-11-13 | End: 2021-11-13

## 2021-11-13 RX ADMIN — IPRATROPIUM BROMIDE AND ALBUTEROL SULFATE 3 ML: .5; 2.5 SOLUTION RESPIRATORY (INHALATION) at 10:11

## 2021-12-01 ENCOUNTER — OFFICE VISIT (OUTPATIENT)
Dept: PULMONOLOGY | Facility: CLINIC | Age: 76
End: 2021-12-01
Payer: MEDICARE

## 2021-12-01 VITALS
SYSTOLIC BLOOD PRESSURE: 176 MMHG | HEIGHT: 60 IN | OXYGEN SATURATION: 95 % | DIASTOLIC BLOOD PRESSURE: 82 MMHG | HEART RATE: 90 BPM | WEIGHT: 149.94 LBS | BODY MASS INDEX: 29.44 KG/M2

## 2021-12-01 DIAGNOSIS — J18.9 PNEUMONIA DUE TO INFECTIOUS ORGANISM, UNSPECIFIED LATERALITY, UNSPECIFIED PART OF LUNG: ICD-10-CM

## 2021-12-01 DIAGNOSIS — J45.909 UNCOMPLICATED ASTHMA, UNSPECIFIED ASTHMA SEVERITY, UNSPECIFIED WHETHER PERSISTENT: Primary | ICD-10-CM

## 2021-12-01 DIAGNOSIS — I10 HYPERTENSION, UNSPECIFIED TYPE: ICD-10-CM

## 2021-12-01 PROCEDURE — 4010F PR ACE/ARB THEARPY RXD/TAKEN: ICD-10-PCS | Mod: CPTII,S$GLB,, | Performed by: NURSE PRACTITIONER

## 2021-12-01 PROCEDURE — 99999 PR PBB SHADOW E&M-EST. PATIENT-LVL V: ICD-10-PCS | Mod: PBBFAC,,, | Performed by: NURSE PRACTITIONER

## 2021-12-01 PROCEDURE — 99999 PR PBB SHADOW E&M-EST. PATIENT-LVL V: CPT | Mod: PBBFAC,,, | Performed by: NURSE PRACTITIONER

## 2021-12-01 PROCEDURE — 99204 OFFICE O/P NEW MOD 45 MIN: CPT | Mod: S$GLB,,, | Performed by: NURSE PRACTITIONER

## 2021-12-01 PROCEDURE — 4010F ACE/ARB THERAPY RXD/TAKEN: CPT | Mod: CPTII,S$GLB,, | Performed by: NURSE PRACTITIONER

## 2021-12-01 PROCEDURE — 99204 PR OFFICE/OUTPT VISIT, NEW, LEVL IV, 45-59 MIN: ICD-10-PCS | Mod: S$GLB,,, | Performed by: NURSE PRACTITIONER

## 2021-12-01 RX ORDER — DORZOLAMIDE HCL 20 MG/ML
SOLUTION/ DROPS OPHTHALMIC
COMMUNITY
End: 2022-07-25

## 2021-12-01 RX ORDER — BRIMONIDINE TARTRATE 2 MG/ML
SOLUTION/ DROPS OPHTHALMIC
COMMUNITY
End: 2022-07-25

## 2021-12-01 RX ORDER — PREDNISONE 20 MG/1
20 TABLET ORAL 2 TIMES DAILY PRN
Status: ON HOLD | COMMUNITY
Start: 2021-11-26 | End: 2022-10-11 | Stop reason: HOSPADM

## 2021-12-01 RX ORDER — FLUTICASONE PROPIONATE 50 MCG
1 SPRAY, SUSPENSION (ML) NASAL 3 TIMES DAILY PRN
COMMUNITY
Start: 2021-11-09

## 2021-12-01 RX ORDER — HYDRALAZINE HYDROCHLORIDE 50 MG/1
TABLET, FILM COATED ORAL
COMMUNITY
End: 2022-07-25

## 2021-12-01 RX ORDER — ALBUTEROL SULFATE 0.83 MG/ML
2.5 SOLUTION RESPIRATORY (INHALATION) DAILY PRN
COMMUNITY
Start: 2021-04-20

## 2021-12-01 RX ORDER — ONDANSETRON 4 MG/1
4 TABLET, ORALLY DISINTEGRATING ORAL
COMMUNITY

## 2021-12-01 RX ORDER — ALBUTEROL SULFATE 90 UG/1
1 AEROSOL, METERED RESPIRATORY (INHALATION) DAILY PRN
COMMUNITY
Start: 2021-11-29

## 2021-12-01 RX ORDER — ASPIRIN 81 MG/1
81 TABLET ORAL DAILY
COMMUNITY

## 2021-12-01 RX ORDER — PROMETHAZINE HYDROCHLORIDE 6.25 MG/5ML
5 SYRUP ORAL EVERY 6 HOURS
COMMUNITY
Start: 2021-11-12 | End: 2022-07-25

## 2021-12-01 RX ORDER — CHOLESTYRAMINE 4 G/4.8G
POWDER, FOR SUSPENSION ORAL
COMMUNITY
End: 2022-07-25

## 2021-12-03 PROBLEM — J18.9 PNEUMONIA DUE TO INFECTIOUS ORGANISM: Status: ACTIVE | Noted: 2021-12-03

## 2022-02-02 ENCOUNTER — TELEPHONE (OUTPATIENT)
Dept: ORTHOPEDICS | Facility: CLINIC | Age: 77
End: 2022-02-02
Payer: MEDICARE

## 2022-02-02 NOTE — TELEPHONE ENCOUNTER
Pt number is not in service.     Spoke with pt's daughter, Monalisa. Advised Monalisa that pt has an upcoming appt that needs to be rescheduled. Monalisa stated she would try to get in touch with pt and have her call clinic back. No further questions or concerns noted.

## 2022-07-24 ENCOUNTER — HOSPITAL ENCOUNTER (INPATIENT)
Facility: HOSPITAL | Age: 77
LOS: 3 days | Discharge: HOME HEALTH | DRG: 641 | End: 2022-07-28
Attending: EMERGENCY MEDICINE | Admitting: HOSPITALIST
Payer: MEDICARE

## 2022-07-24 DIAGNOSIS — E83.42 HYPOMAGNESEMIA: ICD-10-CM

## 2022-07-24 DIAGNOSIS — R07.9 CHEST PAIN: ICD-10-CM

## 2022-07-24 DIAGNOSIS — N17.9 AKI (ACUTE KIDNEY INJURY): Primary | ICD-10-CM

## 2022-07-24 DIAGNOSIS — R53.83 FATIGUE: ICD-10-CM

## 2022-07-24 DIAGNOSIS — E87.6 HYPOKALEMIA: ICD-10-CM

## 2022-07-24 DIAGNOSIS — M48.02 CERVICAL STENOSIS OF SPINAL CANAL: ICD-10-CM

## 2022-07-24 DIAGNOSIS — K52.9 CHRONIC DIARRHEA: Chronic | ICD-10-CM

## 2022-07-24 DIAGNOSIS — E87.20 METABOLIC ACIDOSIS: ICD-10-CM

## 2022-07-24 LAB
ALBUMIN SERPL BCP-MCNC: 3.8 G/DL (ref 3.5–5.2)
ALLENS TEST: ABNORMAL
ALP SERPL-CCNC: 90 U/L (ref 55–135)
ALT SERPL W/O P-5'-P-CCNC: 28 U/L (ref 10–44)
ANION GAP SERPL CALC-SCNC: 16 MMOL/L (ref 8–16)
AST SERPL-CCNC: 44 U/L (ref 10–40)
BACTERIA #/AREA URNS AUTO: ABNORMAL /HPF
BASOPHILS # BLD AUTO: 0.02 K/UL (ref 0–0.2)
BASOPHILS NFR BLD: 0.2 % (ref 0–1.9)
BILIRUB SERPL-MCNC: 1.5 MG/DL (ref 0.1–1)
BILIRUB UR QL STRIP: NEGATIVE
BNP SERPL-MCNC: 143 PG/ML (ref 0–99)
BUN SERPL-MCNC: 24 MG/DL (ref 8–23)
CALCIUM SERPL-MCNC: 9.5 MG/DL (ref 8.7–10.5)
CHLORIDE SERPL-SCNC: 120 MMOL/L (ref 95–110)
CK SERPL-CCNC: 1558 U/L (ref 20–180)
CLARITY UR REFRACT.AUTO: ABNORMAL
CO2 SERPL-SCNC: 12 MMOL/L (ref 23–29)
COLOR UR AUTO: YELLOW
CREAT SERPL-MCNC: 1.5 MG/DL (ref 0.5–1.4)
DELSYS: ABNORMAL
DIFFERENTIAL METHOD: ABNORMAL
EOSINOPHIL # BLD AUTO: 0 K/UL (ref 0–0.5)
EOSINOPHIL NFR BLD: 0 % (ref 0–8)
ERYTHROCYTE [DISTWIDTH] IN BLOOD BY AUTOMATED COUNT: 15.2 % (ref 11.5–14.5)
EST. GFR  (AFRICAN AMERICAN): 38.7 ML/MIN/1.73 M^2
EST. GFR  (NON AFRICAN AMERICAN): 33.6 ML/MIN/1.73 M^2
GLUCOSE SERPL-MCNC: 117 MG/DL (ref 70–110)
GLUCOSE UR QL STRIP: NEGATIVE
HCO3 UR-SCNC: 18.6 MMOL/L (ref 24–28)
HCT VFR BLD AUTO: 39.5 % (ref 37–48.5)
HGB BLD-MCNC: 12.7 G/DL (ref 12–16)
HGB UR QL STRIP: ABNORMAL
HYALINE CASTS UR QL AUTO: 0 /LPF
IMM GRANULOCYTES # BLD AUTO: 0.06 K/UL (ref 0–0.04)
IMM GRANULOCYTES NFR BLD AUTO: 0.6 % (ref 0–0.5)
KETONES UR QL STRIP: NEGATIVE
LACTATE SERPL-SCNC: 0.8 MMOL/L (ref 0.5–2.2)
LEUKOCYTE ESTERASE UR QL STRIP: ABNORMAL
LIPASE SERPL-CCNC: 5 U/L (ref 4–60)
LYMPHOCYTES # BLD AUTO: 1.4 K/UL (ref 1–4.8)
LYMPHOCYTES NFR BLD: 13.9 % (ref 18–48)
MAGNESIUM SERPL-MCNC: 1.1 MG/DL (ref 1.6–2.6)
MCH RBC QN AUTO: 30.5 PG (ref 27–31)
MCHC RBC AUTO-ENTMCNC: 32.2 G/DL (ref 32–36)
MCV RBC AUTO: 95 FL (ref 82–98)
MICROSCOPIC COMMENT: ABNORMAL
MODE: ABNORMAL
MONOCYTES # BLD AUTO: 0.3 K/UL (ref 0.3–1)
MONOCYTES NFR BLD: 2.5 % (ref 4–15)
NEUTROPHILS # BLD AUTO: 8.6 K/UL (ref 1.8–7.7)
NEUTROPHILS NFR BLD: 82.8 % (ref 38–73)
NITRITE UR QL STRIP: NEGATIVE
NRBC BLD-RTO: 0 /100 WBC
PCO2 BLDA: 45.7 MMHG (ref 35–45)
PH SMN: 7.22 [PH] (ref 7.35–7.45)
PH UR STRIP: 6 [PH] (ref 5–8)
PHOSPHATE SERPL-MCNC: 1.8 MG/DL (ref 2.7–4.5)
PLATELET # BLD AUTO: 328 K/UL (ref 150–450)
PMV BLD AUTO: 11.3 FL (ref 9.2–12.9)
PO2 BLDA: 23 MMHG (ref 40–60)
POC BE: -9 MMOL/L
POC SATURATED O2: 29 % (ref 95–100)
POC TCO2: 20 MMOL/L (ref 24–29)
POTASSIUM SERPL-SCNC: <2 MMOL/L (ref 3.5–5.1)
PROT SERPL-MCNC: 7.5 G/DL (ref 6–8.4)
PROT UR QL STRIP: ABNORMAL
RBC # BLD AUTO: 4.17 M/UL (ref 4–5.4)
RBC #/AREA URNS AUTO: 17 /HPF (ref 0–4)
SAMPLE: ABNORMAL
SITE: ABNORMAL
SODIUM SERPL-SCNC: 148 MMOL/L (ref 136–145)
SP GR UR STRIP: 1 (ref 1–1.03)
SQUAMOUS #/AREA URNS AUTO: 2 /HPF
T4 FREE SERPL-MCNC: 0.75 NG/DL (ref 0.71–1.51)
TROPONIN I SERPL DL<=0.01 NG/ML-MCNC: 0.02 NG/ML (ref 0–0.03)
TSH SERPL DL<=0.005 MIU/L-ACNC: 0.12 UIU/ML (ref 0.4–4)
URN SPEC COLLECT METH UR: ABNORMAL
WBC # BLD AUTO: 10.18 K/UL (ref 3.9–12.7)
WBC #/AREA URNS AUTO: 12 /HPF (ref 0–5)

## 2022-07-24 PROCEDURE — 87077 CULTURE AEROBIC IDENTIFY: CPT | Performed by: STUDENT IN AN ORGANIZED HEALTH CARE EDUCATION/TRAINING PROGRAM

## 2022-07-24 PROCEDURE — 84300 ASSAY OF URINE SODIUM: CPT | Performed by: STUDENT IN AN ORGANIZED HEALTH CARE EDUCATION/TRAINING PROGRAM

## 2022-07-24 PROCEDURE — 87086 URINE CULTURE/COLONY COUNT: CPT | Performed by: STUDENT IN AN ORGANIZED HEALTH CARE EDUCATION/TRAINING PROGRAM

## 2022-07-24 PROCEDURE — 87088 URINE BACTERIA CULTURE: CPT | Performed by: STUDENT IN AN ORGANIZED HEALTH CARE EDUCATION/TRAINING PROGRAM

## 2022-07-24 PROCEDURE — 81001 URINALYSIS AUTO W/SCOPE: CPT | Performed by: STUDENT IN AN ORGANIZED HEALTH CARE EDUCATION/TRAINING PROGRAM

## 2022-07-24 PROCEDURE — 96367 TX/PROPH/DG ADDL SEQ IV INF: CPT

## 2022-07-24 PROCEDURE — 82803 BLOOD GASES ANY COMBINATION: CPT

## 2022-07-24 PROCEDURE — 25000003 PHARM REV CODE 250: Performed by: STUDENT IN AN ORGANIZED HEALTH CARE EDUCATION/TRAINING PROGRAM

## 2022-07-24 PROCEDURE — 96366 THER/PROPH/DIAG IV INF ADDON: CPT

## 2022-07-24 PROCEDURE — 84484 ASSAY OF TROPONIN QUANT: CPT | Performed by: STUDENT IN AN ORGANIZED HEALTH CARE EDUCATION/TRAINING PROGRAM

## 2022-07-24 PROCEDURE — 83735 ASSAY OF MAGNESIUM: CPT | Performed by: STUDENT IN AN ORGANIZED HEALTH CARE EDUCATION/TRAINING PROGRAM

## 2022-07-24 PROCEDURE — 84156 ASSAY OF PROTEIN URINE: CPT | Performed by: STUDENT IN AN ORGANIZED HEALTH CARE EDUCATION/TRAINING PROGRAM

## 2022-07-24 PROCEDURE — 63600175 PHARM REV CODE 636 W HCPCS: Performed by: EMERGENCY MEDICINE

## 2022-07-24 PROCEDURE — 93005 ELECTROCARDIOGRAM TRACING: CPT

## 2022-07-24 PROCEDURE — 84439 ASSAY OF FREE THYROXINE: CPT | Performed by: STUDENT IN AN ORGANIZED HEALTH CARE EDUCATION/TRAINING PROGRAM

## 2022-07-24 PROCEDURE — 87186 SC STD MICRODIL/AGAR DIL: CPT | Performed by: STUDENT IN AN ORGANIZED HEALTH CARE EDUCATION/TRAINING PROGRAM

## 2022-07-24 PROCEDURE — 83690 ASSAY OF LIPASE: CPT | Performed by: STUDENT IN AN ORGANIZED HEALTH CARE EDUCATION/TRAINING PROGRAM

## 2022-07-24 PROCEDURE — 85025 COMPLETE CBC W/AUTO DIFF WBC: CPT | Performed by: STUDENT IN AN ORGANIZED HEALTH CARE EDUCATION/TRAINING PROGRAM

## 2022-07-24 PROCEDURE — 99291 PR CRITICAL CARE, E/M 30-74 MINUTES: ICD-10-PCS | Mod: ,,, | Performed by: EMERGENCY MEDICINE

## 2022-07-24 PROCEDURE — 84100 ASSAY OF PHOSPHORUS: CPT | Performed by: STUDENT IN AN ORGANIZED HEALTH CARE EDUCATION/TRAINING PROGRAM

## 2022-07-24 PROCEDURE — 96365 THER/PROPH/DIAG IV INF INIT: CPT

## 2022-07-24 PROCEDURE — 84443 ASSAY THYROID STIM HORMONE: CPT | Performed by: STUDENT IN AN ORGANIZED HEALTH CARE EDUCATION/TRAINING PROGRAM

## 2022-07-24 PROCEDURE — 93010 EKG 12-LEAD: ICD-10-PCS | Mod: ,,, | Performed by: INTERNAL MEDICINE

## 2022-07-24 PROCEDURE — 99900035 HC TECH TIME PER 15 MIN (STAT)

## 2022-07-24 PROCEDURE — 96368 THER/DIAG CONCURRENT INF: CPT

## 2022-07-24 PROCEDURE — 83605 ASSAY OF LACTIC ACID: CPT | Performed by: EMERGENCY MEDICINE

## 2022-07-24 PROCEDURE — 99291 CRITICAL CARE FIRST HOUR: CPT | Mod: ,,, | Performed by: EMERGENCY MEDICINE

## 2022-07-24 PROCEDURE — 63600175 PHARM REV CODE 636 W HCPCS: Performed by: STUDENT IN AN ORGANIZED HEALTH CARE EDUCATION/TRAINING PROGRAM

## 2022-07-24 PROCEDURE — 99291 CRITICAL CARE FIRST HOUR: CPT | Mod: 25

## 2022-07-24 PROCEDURE — 84133 ASSAY OF URINE POTASSIUM: CPT | Performed by: STUDENT IN AN ORGANIZED HEALTH CARE EDUCATION/TRAINING PROGRAM

## 2022-07-24 PROCEDURE — 83935 ASSAY OF URINE OSMOLALITY: CPT | Performed by: STUDENT IN AN ORGANIZED HEALTH CARE EDUCATION/TRAINING PROGRAM

## 2022-07-24 PROCEDURE — 80053 COMPREHEN METABOLIC PANEL: CPT | Performed by: STUDENT IN AN ORGANIZED HEALTH CARE EDUCATION/TRAINING PROGRAM

## 2022-07-24 PROCEDURE — 83880 ASSAY OF NATRIURETIC PEPTIDE: CPT | Performed by: STUDENT IN AN ORGANIZED HEALTH CARE EDUCATION/TRAINING PROGRAM

## 2022-07-24 PROCEDURE — 82550 ASSAY OF CK (CPK): CPT | Performed by: STUDENT IN AN ORGANIZED HEALTH CARE EDUCATION/TRAINING PROGRAM

## 2022-07-24 PROCEDURE — 93010 ELECTROCARDIOGRAM REPORT: CPT | Mod: 76,,, | Performed by: INTERNAL MEDICINE

## 2022-07-24 PROCEDURE — 80047 BASIC METABLC PNL IONIZED CA: CPT | Mod: 59

## 2022-07-24 RX ORDER — POTASSIUM CHLORIDE 7.45 MG/ML
10 INJECTION INTRAVENOUS
Status: COMPLETED | OUTPATIENT
Start: 2022-07-24 | End: 2022-07-24

## 2022-07-24 RX ORDER — HYDRALAZINE HYDROCHLORIDE 25 MG/1
50 TABLET, FILM COATED ORAL
Status: DISCONTINUED | OUTPATIENT
Start: 2022-07-24 | End: 2022-07-24

## 2022-07-24 RX ORDER — HYDRALAZINE HYDROCHLORIDE 20 MG/ML
10 INJECTION INTRAMUSCULAR; INTRAVENOUS
Status: DISPENSED | OUTPATIENT
Start: 2022-07-24 | End: 2022-07-25

## 2022-07-24 RX ORDER — MAGNESIUM SULFATE HEPTAHYDRATE 40 MG/ML
2 INJECTION, SOLUTION INTRAVENOUS
Status: COMPLETED | OUTPATIENT
Start: 2022-07-24 | End: 2022-07-24

## 2022-07-24 RX ORDER — NIFEDIPINE 30 MG/1
60 TABLET, EXTENDED RELEASE ORAL ONCE
Status: DISCONTINUED | OUTPATIENT
Start: 2022-07-24 | End: 2022-07-24

## 2022-07-24 RX ORDER — MORPHINE SULFATE 4 MG/ML
4 INJECTION, SOLUTION INTRAMUSCULAR; INTRAVENOUS
Status: DISPENSED | OUTPATIENT
Start: 2022-07-24 | End: 2022-07-25

## 2022-07-24 RX ORDER — MAGNESIUM SULFATE 1 G/100ML
1 INJECTION INTRAVENOUS
Status: DISCONTINUED | OUTPATIENT
Start: 2022-07-24 | End: 2022-07-24

## 2022-07-24 RX ADMIN — MAGNESIUM SULFATE HEPTAHYDRATE 2 G: 40 INJECTION, SOLUTION INTRAVENOUS at 07:07

## 2022-07-24 RX ADMIN — SODIUM CHLORIDE 500 ML: 0.9 INJECTION, SOLUTION INTRAVENOUS at 06:07

## 2022-07-24 RX ADMIN — POTASSIUM CHLORIDE 10 MEQ: 7.45 INJECTION INTRAVENOUS at 10:07

## 2022-07-24 RX ADMIN — POTASSIUM BICARBONATE 40 MEQ: 391 TABLET, EFFERVESCENT ORAL at 09:07

## 2022-07-24 RX ADMIN — CEFTRIAXONE 1 G: 1 INJECTION, SOLUTION INTRAVENOUS at 10:07

## 2022-07-24 RX ADMIN — POTASSIUM CHLORIDE 10 MEQ: 7.45 INJECTION INTRAVENOUS at 06:07

## 2022-07-24 NOTE — ED PROVIDER NOTES
Encounter Date: 7/24/2022       History     Chief Complaint   Patient presents with    Multiple Complaints     Seen at  this morning. States cant hold neck up      Ms. Grubbs is a 77yr old female with pmh of Lumbar and Cervical degenerative disc disease, Asthma, Htn and hyperlipidemia who presents to the emergency department due to multiple concerns.  Patient states that she has been having worsening neck pain for the past 2 days she has also had weakness in her hands and her feet and she has not been able to walk she states that she does have a history of chronic neck pain but over the past 2 days something changed.  Patient states that she had gone to [facility] today and she was only given pain medication as well as steroids and discharged but no tests were done  She states that she was fully independent 2 days ago and now everything has changed.  She also states that she is having pain in her abdomen which she describes an aching sensation all over her abdomen she states that she has a history of multiple surgeries to her abdomen.  Additionally patient states that she has had some increased urinary frequency but she denies any urinary or bladder incontinence  Patient denies any fever, chills, chest pain, shortness of breath or vomiting.        Review of patient's allergies indicates:   Allergen Reactions    Bananas [banana] Anaphylaxis    Crayfish      Hard time breathing    Iodine and iodide containing products      Told to avoid due to crayfish allergy    Avocado (laurus persea) Swelling and Rash    Kiwi (actinidia chinensis) Itching, Swelling and Rash    Latex, natural rubber Itching, Swelling and Rash    Papaya Itching, Swelling and Rash     Past Medical History:   Diagnosis Date    Anxiety 4/13/2014    Arthritis     Asthma     Asthma 4/13/2014    Cancer     Breast    Gout     HTN (hypertension) 4/13/2014    Hyperlipidemia 4/13/2014    Hypertension      Past Surgical History:    Procedure Laterality Date    APPENDECTOMY      BREAST SURGERY      HYSTERECTOMY      Port a cath placement and removal      SHOULDER ARTHROSCOPY  2012    TONSILLECTOMY       History reviewed. No pertinent family history.  Social History     Tobacco Use    Smoking status: Former Smoker     Start date: 1966     Quit date: 2011     Years since quittin.5    Smokeless tobacco: Never Used   Substance Use Topics    Alcohol use: No     Comment: socially    Drug use: No     Review of Systems   Constitutional: Positive for activity change and fatigue. Negative for appetite change, chills, fever and unexpected weight change.   HENT: Negative for congestion, postnasal drip, rhinorrhea, sinus pressure, sinus pain and trouble swallowing.    Eyes: Negative for photophobia, pain and visual disturbance.   Respiratory: Negative for cough, chest tightness, shortness of breath and wheezing.    Cardiovascular: Negative for chest pain, palpitations and leg swelling.   Gastrointestinal: Positive for abdominal pain and nausea. Negative for constipation, diarrhea and vomiting.   Genitourinary: Negative for difficulty urinating, dysuria, flank pain, pelvic pain and urgency.   Musculoskeletal: Positive for gait problem, myalgias and neck pain. Negative for arthralgias, back pain, joint swelling and neck stiffness.   Skin: Negative for color change and wound.   Neurological: Positive for weakness. Negative for dizziness, seizures, speech difficulty, light-headedness, numbness and headaches.   Psychiatric/Behavioral: Negative for agitation and confusion.       Physical Exam     Initial Vitals   BP Pulse Resp Temp SpO2   22 1518 22 1518 22 1708 22 1518 22 1518   (!) 209/96 87 18 98.3 °F (36.8 °C) 98 %      MAP       --                Physical Exam    Nursing note and vitals reviewed.  Constitutional: She appears well-developed and well-nourished. She is not diaphoretic. She appears  distressed.   HENT:   Head: Normocephalic and atraumatic.   Mouth/Throat: Oropharynx is clear and moist. No oropharyngeal exudate.   Eyes: Conjunctivae and EOM are normal. Pupils are equal, round, and reactive to light. Right eye exhibits no discharge. Left eye exhibits no discharge. No scleral icterus.   Neck: No tracheal deviation present. No JVD present.   Decreased range of motion in moving neck from left to right   Cardiovascular: Normal rate, normal heart sounds and intact distal pulses. Exam reveals no gallop.    No murmur heard.  Pulmonary/Chest: Breath sounds normal. No stridor. No respiratory distress. She has no wheezes. She has no rales. She exhibits no tenderness.   Abdominal: Abdomen is soft. Bowel sounds are normal. She exhibits no mass. There is abdominal tenderness.   Generalized abdominal tenderness  Presence of well-healing surgical scar There is no rebound.   Musculoskeletal:         General: Tenderness present. No edema. Normal range of motion.      Comments: Cervical spine tenderness     Neurological: She is alert and oriented to person, place, and time.   Decreased strength in bilateral upper and lower extremities  Intact sensation  Unable to lift lower extremities off bed or hold them against gravity     Skin: Skin is warm. Capillary refill takes less than 2 seconds.   Psychiatric: She has a normal mood and affect.         ED Course   Procedures  Labs Reviewed   CBC W/ AUTO DIFFERENTIAL - Abnormal; Notable for the following components:       Result Value    RDW 15.2 (*)     Immature Granulocytes 0.6 (*)     Gran # (ANC) 8.6 (*)     Immature Grans (Abs) 0.06 (*)     Gran % 82.8 (*)     Lymph % 13.9 (*)     Mono % 2.5 (*)     All other components within normal limits   COMPREHENSIVE METABOLIC PANEL - Abnormal; Notable for the following components:    Sodium 148 (*)     Potassium <2.0 (*)     Chloride 120 (*)     CO2 12 (*)     Glucose 117 (*)     BUN 24 (*)     Creatinine 1.5 (*)     Total  Bilirubin 1.5 (*)     AST 44 (*)     eGFR if  38.7 (*)     eGFR if non  33.6 (*)     All other components within normal limits    Narrative:     ADD ON TROP #[id number] PER BAYLEE ZUNIGA RN 07/24/2022  18:06    URINALYSIS, REFLEX TO URINE CULTURE - Abnormal; Notable for the following components:    Appearance, UA Hazy (*)     Protein, UA 1+ (*)     Occult Blood UA 3+ (*)     Leukocytes, UA 1+ (*)     All other components within normal limits    Narrative:     Specimen Source->Urine   MAGNESIUM - Abnormal; Notable for the following components:    Magnesium 1.1 (*)     All other components within normal limits    Narrative:     ADD ON TROP #[id number] PER BAYLEE ZUNIGA RN 07/24/2022  18:06    PHOSPHORUS - Abnormal; Notable for the following components:    Phosphorus 1.8 (*)     All other components within normal limits    Narrative:     ADD ON TROP #[id number] PER BAYLEE ZUNIGA RN 07/24/2022  18:06    TSH - Abnormal; Notable for the following components:    TSH 0.118 (*)     All other components within normal limits    Narrative:     ADD ON TROP #[id number] PER BAYLEE ZUNIGA RN 07/24/2022  18:06    CK - Abnormal; Notable for the following components:    CPK 1558 (*)     All other components within normal limits    Narrative:     ADD ON TROP #[id number] PER BAYLEE ZUNIGA RN 07/24/2022  18:06    B-TYPE NATRIURETIC PEPTIDE - Abnormal; Notable for the following components:     (*)     All other components within normal limits    Narrative:     ADD O BNP PER BAYLEE ZUNIGA RN 07/24/2022  19:59 [id number]    ADD ON TROP #[id number] PER BAYLEE ZUNIGA RN 07/24/2022  18:06    URINALYSIS MICROSCOPIC - Abnormal; Notable for the following components:    RBC, UA 17 (*)     WBC, UA 12 (*)     All other components within normal limits    Narrative:     Specimen Source->Urine   BASIC METABOLIC PANEL - Abnormal; Notable for the following components:    Sodium 149 (*)     Potassium <2.0 (*)      Chloride 119 (*)     CO2 18 (*)     BUN 25 (*)     eGFR if  42.1 (*)     eGFR if non  36.5 (*)     All other components within normal limits   OSMOLALITY, SERUM - Abnormal; Notable for the following components:    Osmolality 318 (*)     All other components within normal limits   PROTEIN / CREATININE RATIO, URINE - Abnormal; Notable for the following components:    Protein, Urine Random 35 (*)     Prot/Creat Ratio, Urine 0.73 (*)     All other components within normal limits    Narrative:     ADD ON URINE OSMOLALITY, POTASSIUM, SODIUM AND PROTEIN/CREATININE   RATIO PER DR EMANUEL SWEENEY/ORDER# [id number], [id number], [id number] AND   [id number] @ 5:34AM       Specimen Source->Urine   POTASSIUM, URINE, RANDOM - Abnormal; Notable for the following components:    Potassium, Urine <10 (*)     All other components within normal limits    Narrative:     ADD ON URINE OSMOLALITY, POTASSIUM, SODIUM AND PROTEIN/CREATININE   RATIO PER DR EMANUEL SWEENEY/ORDER# [id number], [id number], [id number] AND   [id number] @ 5:34AM       Specimen Source->Urine   SODIUM, URINE, RANDOM - Abnormal; Notable for the following components:    Sodium, Urine 16 (*)     All other components within normal limits    Narrative:     ADD ON URINE OSMOLALITY, POTASSIUM, SODIUM AND PROTEIN/CREATININE   RATIO PER DR EMANUEL SWEENEY/ORDER# [id number], [id number], [id number] AND   [id number] @ 5:34AM       Specimen Source->Urine   ISTAT PROCEDURE - Abnormal; Notable for the following components:    POC PH 7.218 (*)     POC PCO2 45.7 (*)     POC PO2 23 (*)     POC HCO3 18.6 (*)     POC SATURATED O2 29 (*)     POC TCO2 20 (*)     All other components within normal limits   CULTURE, URINE   LIPASE    Narrative:     ADD ON TROP #[id number] PER BAYLEE ZUNIGA RN 07/24/2022  18:06    B-TYPE NATRIURETIC PEPTIDE   TROPONIN I   TROPONIN I    Narrative:     ADD ON TROP #[id number] PER BAYLEE ZUNIGA RN 07/24/2022  18:06    T4, FREE    Narrative:     ADD  ON TROP #[id number] PER BAYLEE ZUNIGA RN 07/24/2022  18:06    LACTIC ACID, PLASMA   MAGNESIUM   POTASSIUM, URINE, RANDOM   PROTEIN / CREATININE RATIO, URINE   SODIUM, URINE, RANDOM   OSMOLALITY, URINE RANDOM   CHLORIDE, URINE, RANDOM   OSMOLALITY, URINE RANDOM    Narrative:     ADD ON URINE OSMOLALITY, POTASSIUM, SODIUM AND PROTEIN/CREATININE   RATIO PER DR EMANUEL SWEENEY/ORDER# [id number], [id number], [id number] AND   [id number] @ 5:34AM       Specimen Source->Urine   BASIC METABOLIC PANEL   MAGNESIUM   PHOSPHORUS   CK   ISTAT CHEM8   ISTAT CHEM8     EKG Readings: (Independently Interpreted)   Initial Reading: No STEMI. Rhythm: Normal Sinus Rhythm. Heart Rate: 73.       Imaging Results          CT Head Without Contrast (Final result)  Result time 07/24/22 20:19:13    Final result by Baljit Weber MD (07/24/22 20:19:13)                 Impression:      No CT evidence of acute intracranial abnormality.  If the patient has an acute, focal neurological deficit, MRI of the brain may be indicated.      Electronically signed by: Baljit Weber MD  Date:    07/24/2022  Time:    20:19             Narrative:    EXAMINATION:  CT HEAD WITHOUT CONTRAST    CLINICAL HISTORY:  Neuro deficit, acute, stroke suspected;    TECHNIQUE:  Low dose axial images were obtained through the head.  Coronal and sagittal reformations were also performed. Contrast was not administered.    COMPARISON:  None.    FINDINGS:  Mild generalized cerebral volume loss and minimal periventricular white matter hypoattenuation suggestive of chronic microvascular ischemic change.  Punctate remote lacunar infarct in the left basal ganglia.    No evidence of acute territorial infarct, hemorrhage, mass effect, or midline shift.    Ventricles are normal in size and configuration.    No displaced calvarial fracture.    Visualized paranasal sinuses and mastoid air cells are essentially clear.                                MRI Spine Cervical-Thoracic-Lumbar Without  Contrast (XPD) (Final result)  Result time 07/24/22 20:01:59    Final result by Dimitrios Mckeon MD (07/24/22 20:01:59)                 Impression:      Cervical spine:    Disc osteophyte complexes with disc protrusions resulting in moderate to severe spinal canal narrowing at the C3-C4 and C4-C5 levels.  No cord signal abnormality.  Spine surgery consultation is recommended.    Thoracic spine:    Mild multilevel degenerative changes as above.  No significant spinal canal neural foraminal narrowing.  No cord signal abnormality.    Lumbar spine:    Mild multilevel degenerative changes as above.    No significant neural foraminal or spinal canal narrowing.    This report was flagged in Epic as abnormal.    Electronically signed by resident: Brittany Cooper  Date:    07/24/2022  Time:    18:22    Electronically signed by: Dimitrios Mckeon MD  Date:    07/24/2022  Time:    20:01             Narrative:    EXAMINATION:  MRI SPINE CERVICAL-THORACIC-LUMBAR WITHOUT CONTRAST (XPD)    CLINICAL HISTORY:  Myelopathy, acute;    TECHNIQUE:  Multiplanar, multisequence MR images of the cervical , thoracic and lumbar spine without IV contrast.    COMPARISON:  CT abdomen pelvis: 01/02/2020.    FINDINGS:  Skull base and craniocervical junction: Normal.    CERVICAL SPINE:    Alignment: There is minimal anterolisthesis of C5 on C6 and C6 on C7.    Vertebrae: Normal marrow signal with probable scattered intraosseous hemangioma.  No fracture.    Discs: Multilevel degenerative disease, most prominent at the level of C3-C4 and C4-C5.    Cord: No intramedullary signal abnormality.    Degenerative findings:    C2-C3: Posterior disc osteophyte complex, bilateral uncovertebral spurring and bilateral facet joint arthropathy, resulting in moderate left-sided neural foraminal narrowing.  No spinal canal stenosis.    C3-C4: Posterior disc osteophyte complex, bilateral uncovertebral spurring and bilateral facet joint arthropathy, resulting in moderate to  severe spinal canal stenosis.  No neural foraminal narrowing.    C4-C5: Posterior disc osteophyte complex, bilateral uncovertebral spurring and bilateral facet joint arthropathy, resulting in severe spinal canal stenosis and moderate left/mild right neural foraminal narrowing.    C5-C6: Posterior disc osteophyte complex and bilateral uncovertebral spurring abutting the ventral thecal sac.  No spinal canal stenosis or neural foraminal narrowing.    C6-C7: Posterior disc osteophyte complex and bilateral uncovertebral spurring, resulting in mild spinal canal stenosis.  No neural foraminal narrowing.    C7-T1: No spinal canal stenosis or neural foraminal narrowing.    T1-T2: Posterior disc osteophyte complex abutting the ventral thecal sac.  No spinal canal stenosis or neural foraminal narrowing.    THORACIC SPINE:    Alignment: Normal.    Vertebrae: Normal marrow signal. No fracture.    Discs: Multilevel degenerative disease, most prominent at the level of T6-T7.    Cord: No intramedullary signal abnormality.    No spinal canal stenosis or neural foraminal narrowing throughout the thoracic spine.    LUMBAR SPINE:    Alignment: Normal.    Vertebrae: Normal marrow signal. No fracture.    Discs: Multilevel degenerative disease, most prominent in the lower lumbar vertebral discs.    Cord: Unremarkable.  The conus terminates at the level of L1-L2.    Degenerative findings:    T12-L1: No spinal canal stenosis or neural foraminal narrowing.    L1-L2: No spinal canal stenosis or neural foraminal narrowing.    L2-L3: No spinal canal stenosis or neural foraminal narrowing.    L3-L4: Circumferential disc bulge, bilateral facet joint arthropathy and ligamentum flavum thickening, resulting in moderate spinal canal stenosis.  No neural foraminal narrowing.    L4-L5: Circumferential disc bulge, bilateral facet joint arthropathy and ligamentum flavum thickening, resulting in moderate spinal canal stenosis.  No neural foraminal  narrowing.    L5-S1: Circumferential disc bulge and bilateral facet joint arthropathy, resulting in moderate left and mild right neural foraminal narrowing.  No spinal canal stenosis.    MISCELLANEOUS:    Multiple bilateral thyroid nodules.    Multiple bilateral renal cysts.  There is probable left-sided nonobstructing nephrolithiasis.    Paraspinal muscles & soft tissues: Unremarkable.                               X-Ray Chest AP Portable (Final result)  Result time 07/24/22 16:43:28    Final result by Jose Celestin MD (07/24/22 16:43:28)                 Impression:      1. Bilateral basilar subsegmental atelectasis/scarring, no large focal consolidation.      Electronically signed by: Jose Celestin MD  Date:    07/24/2022  Time:    16:43             Narrative:    EXAMINATION:  XR CHEST AP PORTABLE    CLINICAL HISTORY:  Other fatigue    TECHNIQUE:  Single frontal view of the chest was performed.    COMPARISON:  11/13/2021    FINDINGS:  The cardiomediastinal silhouette is not enlarged noting calcification of the aorta..  There is no pleural effusion.  The trachea is midline.  The lungs are symmetrically expanded bilaterally with bilateral basilar subsegmental atelectasis/scarring..  No large focal consolidation seen.  There is no pneumothorax.  The osseous structures are remarkable for degenerative changes.  Surgical change noted of the proximal right humerus..                                 Medications   morphine injection 4 mg (4 mg Intravenous Not Given 7/24/22 1630)   hydrALAZINE injection 10 mg (10 mg Intravenous Not Given 7/24/22 2145)   sodium chloride 0.9% flush 10 mL (has no administration in time range)   melatonin tablet 6 mg (has no administration in time range)   sodium chloride 0.9% flush 10 mL (has no administration in time range)   naloxone 0.4 mg/mL injection 0.02 mg (has no administration in time range)   glucose chewable tablet 16 g (has no administration in time range)   glucose chewable  tablet 24 g (has no administration in time range)   glucagon (human recombinant) injection 1 mg (has no administration in time range)   dextrose 10% bolus 125 mL (has no administration in time range)   dextrose 10% bolus 250 mL (has no administration in time range)   heparin (porcine) injection 5,000 Units (5,000 Units Subcutaneous Given 7/25/22 0559)   prochlorperazine injection Soln 5 mg (has no administration in time range)   albuterol inhaler 2 puff (has no administration in time range)   aspirin EC tablet 81 mg (has no administration in time range)   brimonidine 0.2% ophthalmic solution 1 drop (1 drop Right Eye Given 7/25/22 0614)   cholestyramine-aspartame 4 gram packet 4 g (has no administration in time range)   diphenoxylate-atropine 2.5-0.025 mg per tablet 1 tablet (has no administration in time range)   dorzolamide 2 % ophthalmic solution 1 drop (has no administration in time range)   fluticasone propionate 50 mcg/actuation nasal spray 50 mcg (has no administration in time range)   fluticasone furoate-vilanteroL 100-25 mcg/dose diskus inhaler 1 puff ( Inhalation Canceled Entry 7/25/22 0900)   hydrALAZINE tablet 50 mg (has no administration in time range)   NIFEdipine 24 hr tablet 60 mg (has no administration in time range)   oxyCODONE-acetaminophen 5-325 mg per tablet 1 tablet (has no administration in time range)   0.45 % NaCl with KCl 20 mEq infusion ( Intravenous New Bag 7/25/22 0624)   potassium chloride SA CR tablet 40 mEq (has no administration in time range)   potassium, sodium phosphates 280-160-250 mg packet 2 packet (has no administration in time range)   potassium chloride 10 mEq in 100 mL IVPB (has no administration in time range)   sodium chloride 0.9% bolus 500 mL (0 mLs Intravenous Stopped 7/24/22 1943)   potassium chloride 10 mEq in 100 mL IVPB (0 mEq Intravenous Stopped 7/24/22 1948)   potassium bicarbonate disintegrating tablet 40 mEq (40 mEq Oral Given 7/24/22 2136)   sodium chloride  0.9% bolus 500 mL (0 mLs Intravenous Stopped 7/24/22 1942)   magnesium sulfate 2g in water 50mL IVPB (premix) (0 g Intravenous Stopped 7/24/22 2020)   cefTRIAXone (ROCEPHIN) 1 g/50 mL D5W IVPB (0 g Intravenous Stopped 7/24/22 2239)   potassium chloride 10 mEq in 100 mL IVPB (0 mEq Intravenous Stopped 7/24/22 2300)   potassium chloride SA CR tablet 40 mEq (40 mEq Oral Given 7/25/22 0211)     Medical Decision Making:   History:   Old Medical Records: I decided to obtain old medical records.  Old Records Summarized: records from previous admission(s).  Initial Assessment:   Ms. Grubbs is a 77yr old female with pmh of Lumbar and Cervical degenerative disc disease, Asthma, Htn and hyperlipidemia who presents to the emergency department due to multiple concerns.   Differential Diagnosis:   Electrolyte Abnormality  Cervical spinal stenosis  Spinal epidural abscess  Myasthenia Gravis  Sepsis    Clinical Tests:   Lab Tests: Ordered and Reviewed  Radiological Study: Ordered and Reviewed  ED Management:  Patient was examined,  Given patient's neck pain and her neurologic deficits a CT of her head was obtained which did not show any significant findings   MRI of her cervical spine was obtained which showed severe cervical spine stenosis  Discussion was had with neurosurgery who evaluated the patient  They stated that there was no emergent intervention at this time but they will set up follow-up after patient is discharged   Patient had multiple electrolyte abnormalities including hypokalemia, hypomagnesemia and hypophosphatemia   Her electrolytes were repleted  Patient was signed out to incoming team pending discussion with medicine and re-evaluation of her electrolytes            Attending Attestation:   Physician Attestation Statement for Resident:  As the supervising MD   Physician Attestation Statement: I have personally seen and examined this patient.   I agree with the above history. -:   As the supervising MD I agree  "with the above PE.    As the supervising MD I agree with the above treatment, course, plan, and disposition.   -: 76 yo F presents with low back and neck pain x 3-4 days associated w/ left shoulder pain and inability to walk today    + urinary frequency no dysuria, + a few urinary "accidents" today  + n/v  + abd pain  No diarrhea or constipation  No fever  No falls    Took her valsartan today but not the hydralazine ort nifedipine      On exam: dry mucosa  aaox3  UE and LE 3/5 strenght, sensation intact, toes down going, reflexes symmetrical x 4 extremities      Labs with severe hypoK and hypoMg, davonte, metabolic acidosis. ? RTA  BP repeat improved    Mri C spine with severe spinal stenosis. NSGY consulted, no surgical intervention  EKG with RBBB, PVCs, prolonged QTc.   Rpt EKGs with improving QTc  IV and PO KCL, Mg sulphate    Patient was signed-out to Dr. Reno at the change of shift with plan for: rpt K pending for decision re: HM vs MICU admit          Attending Critical Care:   Critical Care Times:   ==============================================================  · Total Critical Care Time - exclusive of procedural time: 30 minutes.  ==============================================================  Critical care reasons: Hypokalemia, hypermagnesemia, metabolic acidosis, generalized weakness.   Critical care was time spent personally by me on the following activities: obtaining history from patient or relative, examination of patient, review of old charts, ordering lab, x-rays, and/or EKG, development of treatment plan with patient or relative, ordering and performing treatments and interventions and evaluation of patient's response to treatment.   Critical Care Condition: potentially life-threatening           ED Course as of 07/25/22 0909   Sun Jul 24, 2022 2020 Discussion was had with neurosurgery concerning patient's MRI and they stated that they will come see the patient but is unlikely that there will be " any emergent neurosurgical intervention [OO]   2044 Patient's urinalysis showed signs of infection and so she was given antibiotics [OO]   2346 Patient continues to undetectable potassium despite a total of 80 mEq oral and 20 mg IV infused.  Plan for admission higher level of care [DS]   Mon Jul 25, 2022   0117 MICU @ bedside [DS]      ED Course User Index  [DS] Alejandro Reno MD  [OO] Ryan Klein MD             Clinical Impression:   Final diagnoses:  [R53.83] Fatigue  [N17.9] CADE (acute kidney injury) (Primary)  [E87.6] Hypokalemia  [E83.42] Hypomagnesemia  [E87.2] Metabolic acidosis          ED Disposition Condition    Admit               Ryan Klein MD  Resident  07/25/22 0909       April Webb MD  07/25/22 1051

## 2022-07-24 NOTE — ED TRIAGE NOTES
Mariaelena Grubbs, a 77 y.o. female presents to the ED w/ complaint of multiple complaints. Pt states she has had increased weakness, fatigue, pain in her neck x several days. Arrives from triage after having an episode of emesis w/ difficulty holding weight. Seen at  earlier today for same chief complaint. Pt is AAO x4. Hx of spine surgery, kidney surgery, kidney stones, abdominal surgery per pt. Pt is a poor historian. Two family members at bedside.     Triage note:  Chief Complaint   Patient presents with    Multiple Complaints     Seen at  this morning. States cant hold neck up      Review of patient's allergies indicates:   Allergen Reactions    Bananas [banana] Anaphylaxis    Crayfish      Hard time breathing    Iodine and iodide containing products      Told to avoid due to crayfish allergy    Avocado (laurus persea) Swelling and Rash    Kiwi (actinidia chinensis) Itching, Swelling and Rash    Latex, natural rubber Itching, Swelling and Rash    Papaya Itching, Swelling and Rash     Past Medical History:   Diagnosis Date    Anxiety 4/13/2014    Arthritis     Asthma     Asthma 4/13/2014    Cancer     Breast    Gout     HTN (hypertension) 4/13/2014    Hyperlipidemia 4/13/2014    Hypertension

## 2022-07-25 PROBLEM — K52.9 CHRONIC DIARRHEA: Chronic | Status: ACTIVE | Noted: 2022-07-25

## 2022-07-25 PROBLEM — E87.8 HYPERCHLOREMIA: Status: ACTIVE | Noted: 2022-07-25

## 2022-07-25 PROBLEM — E87.0 HYPERNATREMIA: Status: ACTIVE | Noted: 2022-07-25

## 2022-07-25 PROBLEM — E87.20 METABOLIC ACIDOSIS: Status: ACTIVE | Noted: 2022-07-25

## 2022-07-25 PROBLEM — H40.9 GLAUCOMA OF RIGHT EYE: Status: ACTIVE | Noted: 2022-07-25

## 2022-07-25 PROBLEM — E87.6 HYPOKALEMIA: Status: ACTIVE | Noted: 2022-07-25

## 2022-07-25 PROBLEM — T81.49XA SURGICAL WOUND INFECTION: Status: ACTIVE | Noted: 2022-07-25

## 2022-07-25 PROBLEM — E87.6 HYPOKALEMIA: Chronic | Status: ACTIVE | Noted: 2022-07-25

## 2022-07-25 LAB
ANION GAP SERPL CALC-SCNC: 11 MMOL/L (ref 8–16)
ANION GAP SERPL CALC-SCNC: 12 MMOL/L (ref 8–16)
ANION GAP SERPL CALC-SCNC: 12 MMOL/L (ref 8–16)
ANION GAP SERPL CALC-SCNC: 9 MMOL/L (ref 8–16)
BACTERIA #/AREA URNS AUTO: ABNORMAL /HPF
BILIRUB UR QL STRIP: NEGATIVE
BUN SERPL-MCNC: 23 MG/DL (ref 8–23)
BUN SERPL-MCNC: 24 MG/DL (ref 8–23)
BUN SERPL-MCNC: 25 MG/DL (ref 8–23)
BUN SERPL-MCNC: 26 MG/DL (ref 8–23)
CALCIUM SERPL-MCNC: 8.1 MG/DL (ref 8.7–10.5)
CALCIUM SERPL-MCNC: 8.3 MG/DL (ref 8.7–10.5)
CALCIUM SERPL-MCNC: 8.6 MG/DL (ref 8.7–10.5)
CALCIUM SERPL-MCNC: 9.2 MG/DL (ref 8.7–10.5)
CHLORIDE SERPL-SCNC: 114 MMOL/L (ref 95–110)
CHLORIDE SERPL-SCNC: 114 MMOL/L (ref 95–110)
CHLORIDE SERPL-SCNC: 115 MMOL/L (ref 95–110)
CHLORIDE SERPL-SCNC: 119 MMOL/L (ref 95–110)
CHLORIDE UR-SCNC: 35 MMOL/L (ref 25–200)
CK SERPL-CCNC: 1760 U/L (ref 20–180)
CLARITY UR REFRACT.AUTO: CLEAR
CO2 SERPL-SCNC: 17 MMOL/L (ref 23–29)
CO2 SERPL-SCNC: 17 MMOL/L (ref 23–29)
CO2 SERPL-SCNC: 18 MMOL/L (ref 23–29)
CO2 SERPL-SCNC: 20 MMOL/L (ref 23–29)
COLOR UR AUTO: YELLOW
CREAT SERPL-MCNC: 1.3 MG/DL (ref 0.5–1.4)
CREAT SERPL-MCNC: 1.4 MG/DL (ref 0.5–1.4)
CREAT SERPL-MCNC: 1.4 MG/DL (ref 0.5–1.4)
CREAT SERPL-MCNC: 1.5 MG/DL (ref 0.5–1.4)
CREAT UR-MCNC: 48 MG/DL (ref 15–325)
CREAT UR-MCNC: 53 MG/DL (ref 15–325)
CREAT UR-MCNC: 53 MG/DL (ref 15–325)
EST. GFR  (AFRICAN AMERICAN): 38.7 ML/MIN/1.73 M^2
EST. GFR  (AFRICAN AMERICAN): 42.1 ML/MIN/1.73 M^2
EST. GFR  (AFRICAN AMERICAN): 42.1 ML/MIN/1.73 M^2
EST. GFR  (AFRICAN AMERICAN): 46 ML/MIN/1.73 M^2
EST. GFR  (NON AFRICAN AMERICAN): 33.6 ML/MIN/1.73 M^2
EST. GFR  (NON AFRICAN AMERICAN): 36.5 ML/MIN/1.73 M^2
EST. GFR  (NON AFRICAN AMERICAN): 36.5 ML/MIN/1.73 M^2
EST. GFR  (NON AFRICAN AMERICAN): 39.9 ML/MIN/1.73 M^2
GLUCOSE SERPL-MCNC: 101 MG/DL (ref 70–110)
GLUCOSE SERPL-MCNC: 105 MG/DL (ref 70–110)
GLUCOSE SERPL-MCNC: 92 MG/DL (ref 70–110)
GLUCOSE SERPL-MCNC: 95 MG/DL (ref 70–110)
GLUCOSE UR QL STRIP: NEGATIVE
HGB UR QL STRIP: ABNORMAL
KETONES UR QL STRIP: NEGATIVE
LEUKOCYTE ESTERASE UR QL STRIP: ABNORMAL
MAGNESIUM SERPL-MCNC: 1.8 MG/DL (ref 1.6–2.6)
MAGNESIUM SERPL-MCNC: 2.2 MG/DL (ref 1.6–2.6)
MICROSCOPIC COMMENT: ABNORMAL
NITRITE UR QL STRIP: NEGATIVE
OSMOLALITY SERPL: 318 MOSM/KG (ref 275–295)
OSMOLALITY UR: 205 MOSM/KG (ref 50–1200)
PH UR STRIP: 6 [PH] (ref 5–8)
PHOSPHATE SERPL-MCNC: 2 MG/DL (ref 2.7–4.5)
POTASSIUM SERPL-SCNC: 2 MMOL/L (ref 3.5–5.1)
POTASSIUM SERPL-SCNC: 2.1 MMOL/L (ref 3.5–5.1)
POTASSIUM SERPL-SCNC: <2 MMOL/L (ref 3.5–5.1)
POTASSIUM SERPL-SCNC: <2 MMOL/L (ref 3.5–5.1)
POTASSIUM UR-SCNC: <10 MMOL/L (ref 15–95)
POTASSIUM UR-SCNC: <10 MMOL/L (ref 15–95)
PROT UR QL STRIP: NEGATIVE
PROT UR-MCNC: 32 MG/DL (ref 0–15)
PROT UR-MCNC: 35 MG/DL (ref 0–15)
PROT/CREAT UR: 0.6 MG/G{CREAT} (ref 0–0.2)
PROT/CREAT UR: 0.73 MG/G{CREAT} (ref 0–0.2)
RBC #/AREA URNS AUTO: 20 /HPF (ref 0–4)
SODIUM SERPL-SCNC: 143 MMOL/L (ref 136–145)
SODIUM SERPL-SCNC: 149 MMOL/L (ref 136–145)
SODIUM UR-SCNC: 16 MMOL/L (ref 20–250)
SODIUM UR-SCNC: 21 MMOL/L (ref 20–250)
SP GR UR STRIP: 1.01 (ref 1–1.03)
SQUAMOUS #/AREA URNS AUTO: 1 /HPF
URN SPEC COLLECT METH UR: ABNORMAL
WBC #/AREA URNS AUTO: 4 /HPF (ref 0–5)

## 2022-07-25 PROCEDURE — 81001 URINALYSIS AUTO W/SCOPE: CPT | Performed by: INTERNAL MEDICINE

## 2022-07-25 PROCEDURE — 93010 EKG 12-LEAD: ICD-10-PCS | Mod: ,,, | Performed by: INTERNAL MEDICINE

## 2022-07-25 PROCEDURE — 25000003 PHARM REV CODE 250: Performed by: EMERGENCY MEDICINE

## 2022-07-25 PROCEDURE — 11000001 HC ACUTE MED/SURG PRIVATE ROOM

## 2022-07-25 PROCEDURE — 80048 BASIC METABOLIC PNL TOTAL CA: CPT | Performed by: NURSE PRACTITIONER

## 2022-07-25 PROCEDURE — 25000003 PHARM REV CODE 250

## 2022-07-25 PROCEDURE — 93010 ELECTROCARDIOGRAM REPORT: CPT | Mod: ,,, | Performed by: INTERNAL MEDICINE

## 2022-07-25 PROCEDURE — 82550 ASSAY OF CK (CPK): CPT | Performed by: HOSPITALIST

## 2022-07-25 PROCEDURE — 63600175 PHARM REV CODE 636 W HCPCS

## 2022-07-25 PROCEDURE — 99223 PR INITIAL HOSPITAL CARE,LEVL III: ICD-10-PCS | Mod: AI,GC,, | Performed by: HOSPITALIST

## 2022-07-25 PROCEDURE — 99233 SBSQ HOSP IP/OBS HIGH 50: CPT | Mod: ,,, | Performed by: PHYSICIAN ASSISTANT

## 2022-07-25 PROCEDURE — 99223 1ST HOSP IP/OBS HIGH 75: CPT | Mod: AI,GC,, | Performed by: HOSPITALIST

## 2022-07-25 PROCEDURE — 99233 PR SUBSEQUENT HOSPITAL CARE,LEVL III: ICD-10-PCS | Mod: ,,, | Performed by: PHYSICIAN ASSISTANT

## 2022-07-25 PROCEDURE — 84156 ASSAY OF PROTEIN URINE: CPT | Performed by: INTERNAL MEDICINE

## 2022-07-25 PROCEDURE — 84100 ASSAY OF PHOSPHORUS: CPT | Performed by: HOSPITALIST

## 2022-07-25 PROCEDURE — 25000003 PHARM REV CODE 250: Performed by: STUDENT IN AN ORGANIZED HEALTH CARE EDUCATION/TRAINING PROGRAM

## 2022-07-25 PROCEDURE — 99223 PR INITIAL HOSPITAL CARE,LEVL III: ICD-10-PCS | Mod: GC,,, | Performed by: NEUROLOGICAL SURGERY

## 2022-07-25 PROCEDURE — 84133 ASSAY OF URINE POTASSIUM: CPT | Performed by: INTERNAL MEDICINE

## 2022-07-25 PROCEDURE — 99284 EMERGENCY DEPT VISIT MOD MDM: CPT | Mod: GC,,, | Performed by: INTERNAL MEDICINE

## 2022-07-25 PROCEDURE — 80048 BASIC METABOLIC PNL TOTAL CA: CPT | Mod: 91 | Performed by: HOSPITALIST

## 2022-07-25 PROCEDURE — 93005 ELECTROCARDIOGRAM TRACING: CPT

## 2022-07-25 PROCEDURE — 25000242 PHARM REV CODE 250 ALT 637 W/ HCPCS

## 2022-07-25 PROCEDURE — 99223 1ST HOSP IP/OBS HIGH 75: CPT | Mod: ,,, | Performed by: INTERNAL MEDICINE

## 2022-07-25 PROCEDURE — 63600175 PHARM REV CODE 636 W HCPCS: Performed by: HOSPITALIST

## 2022-07-25 PROCEDURE — 94761 N-INVAS EAR/PLS OXIMETRY MLT: CPT

## 2022-07-25 PROCEDURE — 25000003 PHARM REV CODE 250: Performed by: HOSPITALIST

## 2022-07-25 PROCEDURE — 99284 PR EMERGENCY DEPT VISIT,LEVEL IV: ICD-10-PCS | Mod: GC,,, | Performed by: INTERNAL MEDICINE

## 2022-07-25 PROCEDURE — 99223 PR INITIAL HOSPITAL CARE,LEVL III: ICD-10-PCS | Mod: ,,, | Performed by: INTERNAL MEDICINE

## 2022-07-25 PROCEDURE — 84300 ASSAY OF URINE SODIUM: CPT | Performed by: INTERNAL MEDICINE

## 2022-07-25 PROCEDURE — 94640 AIRWAY INHALATION TREATMENT: CPT

## 2022-07-25 PROCEDURE — 82436 ASSAY OF URINE CHLORIDE: CPT | Performed by: INTERNAL MEDICINE

## 2022-07-25 PROCEDURE — 99223 1ST HOSP IP/OBS HIGH 75: CPT | Mod: GC,,, | Performed by: NEUROLOGICAL SURGERY

## 2022-07-25 PROCEDURE — 83735 ASSAY OF MAGNESIUM: CPT | Performed by: EMERGENCY MEDICINE

## 2022-07-25 PROCEDURE — 83735 ASSAY OF MAGNESIUM: CPT | Mod: 91 | Performed by: HOSPITALIST

## 2022-07-25 PROCEDURE — 83930 ASSAY OF BLOOD OSMOLALITY: CPT

## 2022-07-25 RX ORDER — POTASSIUM CHLORIDE 7.45 MG/ML
10 INJECTION INTRAVENOUS
Status: COMPLETED | OUTPATIENT
Start: 2022-07-25 | End: 2022-07-25

## 2022-07-25 RX ORDER — IBUPROFEN 200 MG
24 TABLET ORAL
Status: DISCONTINUED | OUTPATIENT
Start: 2022-07-25 | End: 2022-07-28 | Stop reason: HOSPADM

## 2022-07-25 RX ORDER — PROCHLORPERAZINE EDISYLATE 5 MG/ML
5 INJECTION INTRAMUSCULAR; INTRAVENOUS EVERY 6 HOURS PRN
Status: DISCONTINUED | OUTPATIENT
Start: 2022-07-25 | End: 2022-07-28 | Stop reason: HOSPADM

## 2022-07-25 RX ORDER — HEPARIN SODIUM 5000 [USP'U]/ML
5000 INJECTION, SOLUTION INTRAVENOUS; SUBCUTANEOUS EVERY 8 HOURS
Status: DISCONTINUED | OUTPATIENT
Start: 2022-07-25 | End: 2022-07-28 | Stop reason: HOSPADM

## 2022-07-25 RX ORDER — POTASSIUM CHLORIDE 750 MG/1
30 CAPSULE, EXTENDED RELEASE ORAL
Status: DISCONTINUED | OUTPATIENT
Start: 2022-07-25 | End: 2022-07-25

## 2022-07-25 RX ORDER — FLUTICASONE PROPIONATE 50 MCG
1 SPRAY, SUSPENSION (ML) NASAL DAILY
Status: DISCONTINUED | OUTPATIENT
Start: 2022-07-25 | End: 2022-07-28 | Stop reason: HOSPADM

## 2022-07-25 RX ORDER — SODIUM CHLORIDE AND POTASSIUM CHLORIDE 150; 450 MG/100ML; MG/100ML
INJECTION, SOLUTION INTRAVENOUS CONTINUOUS
Status: DISCONTINUED | OUTPATIENT
Start: 2022-07-25 | End: 2022-07-25

## 2022-07-25 RX ORDER — VALSARTAN 320 MG/1
320 TABLET ORAL DAILY
Status: ON HOLD | COMMUNITY
Start: 2022-05-03 | End: 2022-10-12 | Stop reason: HOSPADM

## 2022-07-25 RX ORDER — ACETAMINOPHEN 500 MG
500 TABLET ORAL EVERY 8 HOURS PRN
Status: ON HOLD | COMMUNITY
End: 2022-10-11 | Stop reason: HOSPADM

## 2022-07-25 RX ORDER — DORZOLAMIDE HCL 20 MG/ML
1 SOLUTION/ DROPS OPHTHALMIC 3 TIMES DAILY
Status: DISCONTINUED | OUTPATIENT
Start: 2022-07-25 | End: 2022-07-28 | Stop reason: HOSPADM

## 2022-07-25 RX ORDER — IBUPROFEN 200 MG
16 TABLET ORAL
Status: DISCONTINUED | OUTPATIENT
Start: 2022-07-25 | End: 2022-07-28 | Stop reason: HOSPADM

## 2022-07-25 RX ORDER — SODIUM,POTASSIUM PHOSPHATES 280-250MG
2 POWDER IN PACKET (EA) ORAL EVERY 4 HOURS
Status: COMPLETED | OUTPATIENT
Start: 2022-07-25 | End: 2022-07-26

## 2022-07-25 RX ORDER — POTASSIUM CHLORIDE 7.45 MG/ML
10 INJECTION INTRAVENOUS
Status: DISPENSED | OUTPATIENT
Start: 2022-07-25 | End: 2022-07-25

## 2022-07-25 RX ORDER — HYDRALAZINE HYDROCHLORIDE 50 MG/1
50 TABLET, FILM COATED ORAL
Status: DISCONTINUED | OUTPATIENT
Start: 2022-07-25 | End: 2022-07-28 | Stop reason: HOSPADM

## 2022-07-25 RX ORDER — NALOXONE HCL 0.4 MG/ML
0.02 VIAL (ML) INJECTION
Status: DISCONTINUED | OUTPATIENT
Start: 2022-07-25 | End: 2022-07-28 | Stop reason: HOSPADM

## 2022-07-25 RX ORDER — POTASSIUM CHLORIDE 750 MG/1
10 CAPSULE, EXTENDED RELEASE ORAL ONCE
Status: CANCELLED | OUTPATIENT
Start: 2022-07-26 | End: 2022-07-26

## 2022-07-25 RX ORDER — ALENDRONATE SODIUM 70 MG/1
70 TABLET ORAL
COMMUNITY
Start: 2022-07-05

## 2022-07-25 RX ORDER — POTASSIUM CHLORIDE 20 MEQ/1
40 TABLET, EXTENDED RELEASE ORAL
Status: ACTIVE | OUTPATIENT
Start: 2022-07-25 | End: 2022-07-25

## 2022-07-25 RX ORDER — ESCITALOPRAM OXALATE 10 MG/1
10 TABLET ORAL DAILY
COMMUNITY
Start: 2022-07-08

## 2022-07-25 RX ORDER — SODIUM CHLORIDE 0.9 % (FLUSH) 0.9 %
10 SYRINGE (ML) INJECTION
Status: DISCONTINUED | OUTPATIENT
Start: 2022-07-25 | End: 2022-07-28 | Stop reason: HOSPADM

## 2022-07-25 RX ORDER — ALBUTEROL SULFATE 90 UG/1
2 AEROSOL, METERED RESPIRATORY (INHALATION) EVERY 4 HOURS PRN
Status: DISCONTINUED | OUTPATIENT
Start: 2022-07-25 | End: 2022-07-28 | Stop reason: HOSPADM

## 2022-07-25 RX ORDER — POTASSIUM CHLORIDE 750 MG/1
30 CAPSULE, EXTENDED RELEASE ORAL
Status: COMPLETED | OUTPATIENT
Start: 2022-07-25 | End: 2022-07-26

## 2022-07-25 RX ORDER — GABAPENTIN 300 MG/1
300 CAPSULE ORAL 3 TIMES DAILY
COMMUNITY
Start: 2022-06-02

## 2022-07-25 RX ORDER — POTASSIUM CHLORIDE 20 MEQ/1
40 TABLET, EXTENDED RELEASE ORAL
Status: DISCONTINUED | OUTPATIENT
Start: 2022-07-25 | End: 2022-07-25

## 2022-07-25 RX ORDER — DORZOLAMIDE HCL 20 MG/ML
1 SOLUTION/ DROPS OPHTHALMIC 3 TIMES DAILY
Status: DISCONTINUED | OUTPATIENT
Start: 2022-07-25 | End: 2022-07-25

## 2022-07-25 RX ORDER — CHOLESTYRAMINE 4 G/4.8G
1 POWDER, FOR SUSPENSION ORAL DAILY
Status: DISCONTINUED | OUTPATIENT
Start: 2022-07-25 | End: 2022-07-28 | Stop reason: HOSPADM

## 2022-07-25 RX ORDER — BRIMONIDINE TARTRATE 2 MG/ML
1 SOLUTION/ DROPS OPHTHALMIC EVERY 8 HOURS
Status: DISCONTINUED | OUTPATIENT
Start: 2022-07-25 | End: 2022-07-25

## 2022-07-25 RX ORDER — OXYCODONE AND ACETAMINOPHEN 5; 325 MG/1; MG/1
1 TABLET ORAL EVERY 6 HOURS PRN
Status: DISCONTINUED | OUTPATIENT
Start: 2022-07-25 | End: 2022-07-28 | Stop reason: HOSPADM

## 2022-07-25 RX ORDER — ALBUTEROL SULFATE 2.5 MG/.5ML
2.5 SOLUTION RESPIRATORY (INHALATION)
Status: DISCONTINUED | OUTPATIENT
Start: 2022-07-25 | End: 2022-07-25

## 2022-07-25 RX ORDER — BRIMONIDINE TARTRATE 1.5 MG/ML
1 SOLUTION/ DROPS OPHTHALMIC 3 TIMES DAILY
Status: DISCONTINUED | OUTPATIENT
Start: 2022-07-25 | End: 2022-07-28 | Stop reason: HOSPADM

## 2022-07-25 RX ORDER — DIPHENOXYLATE HYDROCHLORIDE AND ATROPINE SULFATE 2.5; .025 MG/1; MG/1
1 TABLET ORAL 4 TIMES DAILY PRN
Status: DISCONTINUED | OUTPATIENT
Start: 2022-07-25 | End: 2022-07-27

## 2022-07-25 RX ORDER — ASPIRIN 81 MG/1
81 TABLET ORAL DAILY
Status: DISCONTINUED | OUTPATIENT
Start: 2022-07-25 | End: 2022-07-28 | Stop reason: HOSPADM

## 2022-07-25 RX ORDER — NYSTATIN AND TRIAMCINOLONE ACETONIDE 100000; 1 [USP'U]/G; MG/G
OINTMENT TOPICAL
COMMUNITY
Start: 2022-07-20

## 2022-07-25 RX ORDER — TALC
6 POWDER (GRAM) TOPICAL NIGHTLY PRN
Status: DISCONTINUED | OUTPATIENT
Start: 2022-07-25 | End: 2022-07-28 | Stop reason: HOSPADM

## 2022-07-25 RX ORDER — NIFEDIPINE 60 MG/1
60 TABLET, EXTENDED RELEASE ORAL DAILY
Status: DISCONTINUED | OUTPATIENT
Start: 2022-07-25 | End: 2022-07-28 | Stop reason: HOSPADM

## 2022-07-25 RX ORDER — TRAVOPROST OPHTHALMIC SOLUTION 0.04 MG/ML
1 SOLUTION OPHTHALMIC NIGHTLY
Status: DISCONTINUED | OUTPATIENT
Start: 2022-07-25 | End: 2022-07-28 | Stop reason: HOSPADM

## 2022-07-25 RX ORDER — FLUTICASONE FUROATE AND VILANTEROL 100; 25 UG/1; UG/1
1 POWDER RESPIRATORY (INHALATION) DAILY
Status: DISCONTINUED | OUTPATIENT
Start: 2022-07-25 | End: 2022-07-28 | Stop reason: HOSPADM

## 2022-07-25 RX ORDER — POTASSIUM CHLORIDE 20 MEQ/1
40 TABLET, EXTENDED RELEASE ORAL ONCE
Status: COMPLETED | OUTPATIENT
Start: 2022-07-25 | End: 2022-07-25

## 2022-07-25 RX ORDER — GLUCAGON 1 MG
1 KIT INJECTION
Status: DISCONTINUED | OUTPATIENT
Start: 2022-07-25 | End: 2022-07-28 | Stop reason: HOSPADM

## 2022-07-25 RX ORDER — SODIUM CHLORIDE 0.9 % (FLUSH) 0.9 %
10 SYRINGE (ML) INJECTION EVERY 12 HOURS PRN
Status: DISCONTINUED | OUTPATIENT
Start: 2022-07-25 | End: 2022-07-28 | Stop reason: HOSPADM

## 2022-07-25 RX ORDER — SODIUM CHLORIDE, SODIUM LACTATE, POTASSIUM CHLORIDE, CALCIUM CHLORIDE 600; 310; 30; 20 MG/100ML; MG/100ML; MG/100ML; MG/100ML
INJECTION, SOLUTION INTRAVENOUS CONTINUOUS
Status: DISCONTINUED | OUTPATIENT
Start: 2022-07-25 | End: 2022-07-25

## 2022-07-25 RX ADMIN — POTASSIUM CHLORIDE 40 MEQ: 1500 TABLET, EXTENDED RELEASE ORAL at 02:07

## 2022-07-25 RX ADMIN — ASPIRIN 81 MG: 81 TABLET, COATED ORAL at 09:07

## 2022-07-25 RX ADMIN — HEPARIN SODIUM 5000 UNITS: 5000 INJECTION INTRAVENOUS; SUBCUTANEOUS at 10:07

## 2022-07-25 RX ADMIN — POTASSIUM & SODIUM PHOSPHATES POWDER PACK 280-160-250 MG 2 PACKET: 280-160-250 PACK at 08:07

## 2022-07-25 RX ADMIN — POTASSIUM CHLORIDE AND SODIUM CHLORIDE: 450; 150 INJECTION, SOLUTION INTRAVENOUS at 06:07

## 2022-07-25 RX ADMIN — POTASSIUM CHLORIDE 10 MEQ: 7.46 INJECTION, SOLUTION INTRAVENOUS at 08:07

## 2022-07-25 RX ADMIN — HYDRALAZINE HYDROCHLORIDE 50 MG: 50 TABLET ORAL at 01:07

## 2022-07-25 RX ADMIN — DORZOLAMIDE HYDROCHLORIDE 1 DROP: 20 SOLUTION/ DROPS OPHTHALMIC at 09:07

## 2022-07-25 RX ADMIN — POTASSIUM CHLORIDE 10 MEQ: 10 INJECTION, SOLUTION INTRAVENOUS at 04:07

## 2022-07-25 RX ADMIN — POTASSIUM CHLORIDE 10 MEQ: 10 INJECTION, SOLUTION INTRAVENOUS at 01:07

## 2022-07-25 RX ADMIN — HYDRALAZINE HYDROCHLORIDE 50 MG: 50 TABLET ORAL at 08:07

## 2022-07-25 RX ADMIN — POTASSIUM CHLORIDE 10 MEQ: 10 INJECTION, SOLUTION INTRAVENOUS at 11:07

## 2022-07-25 RX ADMIN — BRIMONIDINE TARTRATE 1 DROP: 2 SOLUTION OPHTHALMIC at 06:07

## 2022-07-25 RX ADMIN — POTASSIUM & SODIUM PHOSPHATES POWDER PACK 280-160-250 MG 2 PACKET: 280-160-250 PACK at 09:07

## 2022-07-25 RX ADMIN — NIFEDIPINE 60 MG: 60 TABLET, FILM COATED, EXTENDED RELEASE ORAL at 09:07

## 2022-07-25 RX ADMIN — POTASSIUM CHLORIDE 30 MEQ: 10 CAPSULE, COATED, EXTENDED RELEASE ORAL at 09:07

## 2022-07-25 RX ADMIN — Medication 6 MG: at 11:07

## 2022-07-25 RX ADMIN — POTASSIUM CHLORIDE 10 MEQ: 7.46 INJECTION, SOLUTION INTRAVENOUS at 10:07

## 2022-07-25 RX ADMIN — ALBUTEROL SULFATE 2.5 MG: 2.5 SOLUTION RESPIRATORY (INHALATION) at 07:07

## 2022-07-25 RX ADMIN — POTASSIUM CHLORIDE 10 MEQ: 7.46 INJECTION, SOLUTION INTRAVENOUS at 11:07

## 2022-07-25 RX ADMIN — HEPARIN SODIUM 5000 UNITS: 5000 INJECTION INTRAVENOUS; SUBCUTANEOUS at 05:07

## 2022-07-25 RX ADMIN — HYDRALAZINE HYDROCHLORIDE 50 MG: 50 TABLET ORAL at 09:07

## 2022-07-25 RX ADMIN — FLUTICASONE FUROATE AND VILANTEROL TRIFENATATE 1 PUFF: 100; 25 POWDER RESPIRATORY (INHALATION) at 07:07

## 2022-07-25 RX ADMIN — POTASSIUM & SODIUM PHOSPHATES POWDER PACK 280-160-250 MG 2 PACKET: 280-160-250 PACK at 04:07

## 2022-07-25 RX ADMIN — DORZOLAMIDE HYDROCHLORIDE 1 DROP: 20 SOLUTION/ DROPS OPHTHALMIC at 04:07

## 2022-07-25 RX ADMIN — POTASSIUM CHLORIDE 30 MEQ: 10 CAPSULE, COATED, EXTENDED RELEASE ORAL at 05:07

## 2022-07-25 RX ADMIN — DIPHENOXYLATE HYDROCHLORIDE AND ATROPINE SULFATE 1 TABLET: 2.5; .025 TABLET ORAL at 07:07

## 2022-07-25 RX ADMIN — POTASSIUM CHLORIDE 10 MEQ: 10 INJECTION, SOLUTION INTRAVENOUS at 03:07

## 2022-07-25 RX ADMIN — OXYCODONE HYDROCHLORIDE AND ACETAMINOPHEN 1 TABLET: 5; 325 TABLET ORAL at 11:07

## 2022-07-25 RX ADMIN — POTASSIUM CHLORIDE 10 MEQ: 7.46 INJECTION, SOLUTION INTRAVENOUS at 05:07

## 2022-07-25 RX ADMIN — CHOLESTYRAMINE 4 G: 4 POWDER, FOR SUSPENSION ORAL at 11:07

## 2022-07-25 NOTE — ASSESSMENT & PLAN NOTE
Metabolic acidosis  Chronic diarrhea  CKD3  Hypernatremia  Hyperchloremia    On admission, anion gap 16 with pH of 7.22. Patient endorsing abdominal tenderness with intermittent drainage from midline abdominal scar. Denies fevers, SOB, or HA. Abdomen is tender globally with suprapubic>right side>left side. Lactate and WBC normal.  Patient had complicated partial bowel and partial kidney resection in 2019 at [facility] and has been admitted a couple of times this year for hypokalemia, most recently in April to [facility].  Suspect this is likely 2/2 to her chronic diarrhea vs RTA type I or II. On chart review, it does not appear patient has been worked up and was not discharged with potassium or bicarb medications to mediate her now chronic electrolyte derangements. EKG unchanged from past 3 from previous admissions.     PLAN:   RTA workup pending, including Serum Osm and Urine 'lytes & Osm   Consider abdominal source if patient fevers or has further infectious concern    Pt can eat and drink and hypokalemia is chronic, so start potassium repletion with LR infusion and PO K-Cl tablets given pt's other electrolyte abnormalities.  K repletion rate not to exceed 20 mEq/hr   Max correction of Na in coming 24 hrs: no lower than 140.  However, using free water at this time is not possible given her hypokalemia.  Use LR infusion.   Maintain Mg>2   Telemetry    Nephrology consult placed

## 2022-07-25 NOTE — ASSESSMENT & PLAN NOTE
On admission, anion gap 16 with pH of 7.22. Patient endorsing abdominal tenderness with intermittent drainage from midline abdominal scar. Denies fevers, SOB, or HA. Abdomen is tender globally with suprapubic>right side>left side. Lactate and WBC normal. Believe this may be related to chronic diarrhea vs RTA vs both.     - RTA workup as above  - Consider abdominal source if patient fevers or has further infectious concern

## 2022-07-25 NOTE — ASSESSMENT & PLAN NOTE
Continue home glaucoma drops.  Unclear when last evaluated by an ophthalmologist, so I will consult them for optimization of her meds.

## 2022-07-25 NOTE — SUBJECTIVE & OBJECTIVE
Interval History: Patient reports pain is improved. She remains weak proximally L > R upper extremities. She will required decompression given onset was Saturday and no improvement. Will needs medical optimization prior to surgery.     Medications:  Continuous Infusions:  Scheduled Meds:   aspirin  81 mg Oral Daily    brimonidine 0.15 % OPTH DROP  1 drop Both Eyes TID    cholestyramine-aspartame  1 packet Oral Daily    dorzolamide  1 drop Both Eyes TID    fluticasone furoate-vilanteroL  1 puff Inhalation Daily    fluticasone propionate  1 spray Each Nostril Daily    heparin (porcine)  5,000 Units Subcutaneous Q8H    hydrALAZINE  50 mg Oral TID WM    NIFEdipine  60 mg Oral Daily    potassium chloride  10 mEq Intravenous Q1H    potassium chloride  30 mEq Oral Q3H    potassium, sodium phosphates  2 packet Oral Q4H    travoprost  1 drop Both Eyes QHS     PRN Meds:albuterol, dextrose 10%, dextrose 10%, diphenoxylate-atropine 2.5-0.025 mg, glucagon (human recombinant), glucose, glucose, melatonin, naloxone, oxyCODONE-acetaminophen, prochlorperazine, sodium chloride 0.9%, sodium chloride 0.9%     Review of Systems  Objective:     Weight: 67.6 kg (149 lb)  Body mass index is 29.59 kg/m².  Vital Signs (Most Recent):  Temp: 98.3 °F (36.8 °C) (07/24/22 1518)  Pulse: 76 (07/25/22 1338)  Resp: 18 (07/25/22 1338)  BP: 135/60 (07/25/22 1338)  SpO2: 97 % (07/25/22 0920) Vital Signs (24h Range):  Pulse:  [63-85] 76  Resp:  [14-24] 18  SpO2:  [96 %-100 %] 97 %  BP: (119-200)/(60-89) 135/60     Date 07/25/22 0700 - 07/26/22 0659   Shift 8951-5992 0818-3413 7620-3813 24 Hour Total   INTAKE   IV Piggyback  100  100   Shift Total(mL/kg)  100(1.5)  100(1.5)   OUTPUT   Urine(mL/kg/hr) 300(0.6)   300   Shift Total(mL/kg) 300(4.4)   300(4.4)   Weight (kg) 67.6 67.6 67.6 67.6                        Closed/Suction Drain 04/10/17 1135 Right Breast Bulb 10 Fr. (Active)            Closed/Suction Drain 04/10/17 1135 Left Breast Bulb 10 Fr.  (Active)       Female External Urinary Catheter 07/25/22 0612 (Active)       Neurosurgery Physical Exam  General: well developed, well nourished, no distress.   Head: normocephalic, atraumatic  Neurologic: Alert and oriented. Thought content appropriate.  Cranial nerves: face symmetric, tongue midline, CN II-XII grossly intact.   Eyes: pupils equal, round, reactive to light with accommodation, EOMI.   Pulmonary: normal respirations, no signs of respiratory distress  Skin: Skin is warm, dry and intact.  Sensory: intact to light touch throughout  Motor Strength:    Strength  Deltoids Triceps Biceps Wrist Extension Wrist Flexion Hand    Upper: R 3/5 4/5 4/5 4/5 4/5 4/5    L 2/5 3/5 4/5 4/5 4/5 4/5     Iliopsoas Quadriceps Knee  Flexion Tibialis  anterior Gastro- cnemius EHL   Lower: R 3/5 4/5 4/5 4/5 4/5 4/5    L 2/5 4/5 4/5 4/5 4/5 4/5       Balderas's: +R balderas's.  Clonus: Negative.       Significant Labs:  Recent Labs   Lab 07/24/22  1704 07/25/22  0001 07/25/22  0141 07/25/22  0939 07/25/22  1507   *  --  105 92 95   *  --  149* 143 143   K <2.0*  --  <2.0* 2.1* <2.0*   *  --  119* 114* 115*   CO2 12*  --  18* 20* 17*   BUN 24*  --  25* 26* 24*   CREATININE 1.5*  --  1.4 1.5* 1.4   CALCIUM 9.5  --  9.2 8.6* 8.1*   MG 1.1* 2.2  --  1.8  --      Recent Labs   Lab 07/24/22  1704   WBC 10.18   HGB 12.7   HCT 39.5        No results for input(s): LABPT, INR, APTT in the last 48 hours.  Microbiology Results (last 7 days)       Procedure Component Value Units Date/Time    Urine culture [[id number]]  (Abnormal) Collected: 07/24/22 1957    Order Status: Completed Specimen: Urine Updated: 07/25/22 1551     Urine Culture, Routine GRAM NEGATIVE FEDE  10,000 - 49,999 cfu/ml  Identification and susceptibility pending  No other significant isolate      Narrative:      ADD ON URINE OSMOLALITY, POTASSIUM, SODIUM AND PROTEIN/CREATININE   RATIO PER DR EMANUEL SWEENEY/ORDER# [id number], [id number], [id number] AND    [id number] @ :[facility]       Specimen Source->Urine          All pertinent labs from the last 24 hours have been reviewed.    Imaging Results              X-Ray Cervical Spine AP Lat with Flexion Extension (Final result)  Result time 07/25/22 15:16:26      Final result by John Wu IV, MD (07/25/22 15:16:26)                   Impression:      No acute abnormality.  Cervical spondylosis as above.      Electronically signed by: John Wu  Date:    07/25/2022  Time:    15:16               Narrative:    EXAMINATION:  XR CERVICAL SPINE AP LAT WITH FLEX EXTEN    CLINICAL HISTORY:  cervical stenosis;    TECHNIQUE:  Five views of the cervical spine including flexion and extension views were.    COMPARISON:  CT cervical spine from earlier today.  MRI cervical spine from 10/24/2022.    FINDINGS:  C1-C2: Pre-dens space is maintained.  Dens and lateral masses of C1 appear within normal limits although poorly characterized.    Alignment: Stable stepwise grade 1 anterolisthesis of C5 on C6 and C6 on C7.  No evidence of dynamic instability on flexion/extension views.    Vertebrae: Vertebral body heights are maintained.  No suspicious appearing lytic or blastic lesions.    Discs and facets: Multilevel mild to moderate disc space narrowing most prominent at C3-C4 and C4-C5.  Multilevel anterior osteophyte formation.  Moderate facet arthropathy.    Miscellaneous: Prevertebral soft tissues are unremarkable.                                       CT Cervical Spine Without Contrast (Final result)  Result time 07/25/22 13:20:46      Final result by Talat Zhao MD (07/25/22 13:20:46)                   Impression:      Degenerative changes resulting in high-grade canal and foraminal stenosis at multiple levels, better demonstrated on yesterday's cervical spine MRI.    No acute fractures or traumatic malalignment.      Electronically signed by: Talat Zhao  Date:    07/25/2022  Time:    13:20               Narrative:     EXAMINATION:  CT CERVICAL SPINE WITHOUT CONTRAST    CLINICAL HISTORY:  cervical stenosis;    TECHNIQUE:  Low dose axial images, sagittal and coronal reformations were performed though the cervical spine.  Contrast was not administered.    COMPARISON:  MRI cervical spine 07/24/2022; CT head 07/24/2022    FINDINGS:  Straightening of the normal cervical lordosis.  Stepwise grade 1 anterolisthesis spanning C5-T1.    No fractures.  No focal osseous lesions.    Multilevel disc, uncovertebral, and facet joint degeneration.  Degenerative changes result in high-grade canal and foraminal stenosis at multiple levels, better demonstrated on yesterday's cervical spine MRI.  Degenerative changes also involve the atlantodental joint.    Visualized structures in the posterior fossa are unremarkable. The cervical spinal cord is grossly unremarkable.    Vascular calcifications.  Paraspinal soft tissues are otherwise unremarkable.  Lung apices are clear.                                       CT Head Without Contrast (Final result)  Result time 07/24/22 20:19:13      Final result by Baljit Weber MD (07/24/22 20:19:13)                   Impression:      No CT evidence of acute intracranial abnormality.  If the patient has an acute, focal neurological deficit, MRI of the brain may be indicated.      Electronically signed by: Baljit Weber MD  Date:    07/24/2022  Time:    20:19               Narrative:    EXAMINATION:  CT HEAD WITHOUT CONTRAST    CLINICAL HISTORY:  Neuro deficit, acute, stroke suspected;    TECHNIQUE:  Low dose axial images were obtained through the head.  Coronal and sagittal reformations were also performed. Contrast was not administered.    COMPARISON:  None.    FINDINGS:  Mild generalized cerebral volume loss and minimal periventricular white matter hypoattenuation suggestive of chronic microvascular ischemic change.  Punctate remote lacunar infarct in the left basal ganglia.    No evidence of acute  territorial infarct, hemorrhage, mass effect, or midline shift.    Ventricles are normal in size and configuration.    No displaced calvarial fracture.    Visualized paranasal sinuses and mastoid air cells are essentially clear.                                        MRI Spine Cervical-Thoracic-Lumbar Without Contrast (XPD) (Final result)  Result time 07/24/22 20:01:59      Final result by Dimitrios Mckeon MD (07/24/22 20:01:59)                   Impression:      Cervical spine:    Disc osteophyte complexes with disc protrusions resulting in moderate to severe spinal canal narrowing at the C3-C4 and C4-C5 levels.  No cord signal abnormality.  Spine surgery consultation is recommended.    Thoracic spine:    Mild multilevel degenerative changes as above.  No significant spinal canal neural foraminal narrowing.  No cord signal abnormality.    Lumbar spine:    Mild multilevel degenerative changes as above.    No significant neural foraminal or spinal canal narrowing.    This report was flagged in Epic as abnormal.    Electronically signed by resident: Brittany Cooper  Date:    07/24/2022  Time:    18:22    Electronically signed by: Dimitrios Mckeon MD  Date:    07/24/2022  Time:    20:01               Narrative:    EXAMINATION:  MRI SPINE CERVICAL-THORACIC-LUMBAR WITHOUT CONTRAST (XPD)    CLINICAL HISTORY:  Myelopathy, acute;    TECHNIQUE:  Multiplanar, multisequence MR images of the cervical , thoracic and lumbar spine without IV contrast.    COMPARISON:  CT abdomen pelvis: 01/02/2020.    FINDINGS:  Skull base and craniocervical junction: Normal.    CERVICAL SPINE:    Alignment: There is minimal anterolisthesis of C5 on C6 and C6 on C7.    Vertebrae: Normal marrow signal with probable scattered intraosseous hemangioma.  No fracture.    Discs: Multilevel degenerative disease, most prominent at the level of C3-C4 and C4-C5.    Cord: No intramedullary signal abnormality.    Degenerative findings:    C2-C3: Posterior disc  osteophyte complex, bilateral uncovertebral spurring and bilateral facet joint arthropathy, resulting in moderate left-sided neural foraminal narrowing.  No spinal canal stenosis.    C3-C4: Posterior disc osteophyte complex, bilateral uncovertebral spurring and bilateral facet joint arthropathy, resulting in moderate to severe spinal canal stenosis.  No neural foraminal narrowing.    C4-C5: Posterior disc osteophyte complex, bilateral uncovertebral spurring and bilateral facet joint arthropathy, resulting in severe spinal canal stenosis and moderate left/mild right neural foraminal narrowing.    C5-C6: Posterior disc osteophyte complex and bilateral uncovertebral spurring abutting the ventral thecal sac.  No spinal canal stenosis or neural foraminal narrowing.    C6-C7: Posterior disc osteophyte complex and bilateral uncovertebral spurring, resulting in mild spinal canal stenosis.  No neural foraminal narrowing.    C7-T1: No spinal canal stenosis or neural foraminal narrowing.    T1-T2: Posterior disc osteophyte complex abutting the ventral thecal sac.  No spinal canal stenosis or neural foraminal narrowing.    THORACIC SPINE:    Alignment: Normal.    Vertebrae: Normal marrow signal. No fracture.    Discs: Multilevel degenerative disease, most prominent at the level of T6-T7.    Cord: No intramedullary signal abnormality.    No spinal canal stenosis or neural foraminal narrowing throughout the thoracic spine.    LUMBAR SPINE:    Alignment: Normal.    Vertebrae: Normal marrow signal. No fracture.    Discs: Multilevel degenerative disease, most prominent in the lower lumbar vertebral discs.    Cord: Unremarkable.  The conus terminates at the level of L1-L2.    Degenerative findings:    T12-L1: No spinal canal stenosis or neural foraminal narrowing.    L1-L2: No spinal canal stenosis or neural foraminal narrowing.    L2-L3: No spinal canal stenosis or neural foraminal narrowing.    L3-L4: Circumferential disc bulge,  bilateral facet joint arthropathy and ligamentum flavum thickening, resulting in moderate spinal canal stenosis.  No neural foraminal narrowing.    L4-L5: Circumferential disc bulge, bilateral facet joint arthropathy and ligamentum flavum thickening, resulting in moderate spinal canal stenosis.  No neural foraminal narrowing.    L5-S1: Circumferential disc bulge and bilateral facet joint arthropathy, resulting in moderate left and mild right neural foraminal narrowing.  No spinal canal stenosis.    MISCELLANEOUS:    Multiple bilateral thyroid nodules.    Multiple bilateral renal cysts.  There is probable left-sided nonobstructing nephrolithiasis.    Paraspinal muscles & soft tissues: Unremarkable.                                       X-Ray Chest AP Portable (Final result)  Result time 07/24/22 16:43:28      Final result by Jose Celestin MD (07/24/22 16:43:28)                   Impression:      1. Bilateral basilar subsegmental atelectasis/scarring, no large focal consolidation.      Electronically signed by: Jose Celestin MD  Date:    07/24/2022  Time:    16:43               Narrative:    EXAMINATION:  XR CHEST AP PORTABLE    CLINICAL HISTORY:  Other fatigue    TECHNIQUE:  Single frontal view of the chest was performed.    COMPARISON:  11/13/2021    FINDINGS:  The cardiomediastinal silhouette is not enlarged noting calcification of the aorta..  There is no pleural effusion.  The trachea is midline.  The lungs are symmetrically expanded bilaterally with bilateral basilar subsegmental atelectasis/scarring..  No large focal consolidation seen.  There is no pneumothorax.  The osseous structures are remarkable for degenerative changes.  Surgical change noted of the proximal right humerus..

## 2022-07-25 NOTE — HPI
Mariaelena Grubbs is a 77 y.o. female with cervical and lumbar stenosis, hx of multiple bowel resections, hx of chronic pyelopnephritis s/p R partial nephrectomy, asthma, HTN, HLD who presented to [facility] ED on 07/24/2022 with worsening neck pain and diffuse weakness since the previous morning 7/23. Patient was in her usual state of health prior to this time.  Patient states that she was folding laundry, when she felt a popping sensation in her neck when she turned it to the side. After this she felt neck pain which was remitted with recumbent positioning and exacerbated with movement. She is usually independent in her ADLs however since Saturday she has required significant assistance with ambulation and other basic tasks. They also report that she has chronic diarrhea, and that recently she has not been eating much and been vomiting. Denies fever/chills, HA, vision/hearing changes, dysphagia, dysarthria, new-onset sensory change, new bowel/bladder changes. She does also c/o diffuse abominal pain. Denies AC/AP. Patient states that she had gone to [facility] prior to coming to [facility], and she was only given pain medication as well as steroids and discharged but no tests were done.     On presentation to [facility] ED her BP was 209/96, but within several hours came down to normotensive levels.  Her abdomen was tender on exam, and there was drainage noted from a midline scar that looked concerning for potential fungal infection.  Her labs were significant for K<2.0, Mg 1.1, and CK 1500.  EKG: Frequent PVCs with QTc > 480.  MRI C-spine: moderate to severe spinal canal narrowing at the C3-C4 and C4-C5 levels without cord signal change.  IV and PO K repletion were given.  MICU was consulted to evaluate her for ICU admission due to her immeasurably low K in the setting of PVCs on EKG, but declined given the chronic nature of her hypokalemia.  Chart review indicates pt underwent a complicated operation in 2019 at [facility]: an  "Oct. 2019 discharge summary describes her as a pt with "chronic pyelonephritis and right renal calculi who presented on 10/23 for open right partial nephrectomy with pyelolithotomy. An enterotomy occurred on the initial access and the decision was made to precede with open surgery. General surgery was consulted intraoperatively and assisted with identification and repair of colon and small bowel enterotomies including right hemicolectomy."      Her K looks to have been > 3 until Feb 2022.  She was admitted for severe hypokalemia to [facility] in late April 2022.  That DC summary notes: "Hypokalemia K was 1.9 on admit. 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 Last two days patient is hyperkalemic. Spironolactone and potassium supplements are discontinued. Nephrology recommends follow up outpatient next for repeat blood work."  It appears this was not done.    She was admitted to [facility] for further evaluation and treatment.    "

## 2022-07-25 NOTE — ED NOTES
I-STAT Chem-8+ Results:   Value Reference Range   Sodium 151 136-145 mmol/L   Potassium  <2.0 3.5-5.1 mmol/L   Chloride 117  mmol/L   Ionized Calcium 1.33 1.06-1.42 mmol/L   CO2 (measured) 19 23-29 mmol/L   Glucose 102  mg/dL   BUN 26 6-30 mg/dL   Creatinine 1.6 0.5-1.4 mg/dL   Hematocrit 37 36-54%

## 2022-07-25 NOTE — SUBJECTIVE & OBJECTIVE
Past Medical History:   Diagnosis Date    Anxiety 4/13/2014    Arthritis     Asthma     Asthma 4/13/2014    Cancer     Breast    Gout     HTN (hypertension) 4/13/2014    Hyperlipidemia 4/13/2014    Hypertension        Past Surgical History:   Procedure Laterality Date    APPENDECTOMY      BREAST SURGERY      HYSTERECTOMY      Port a cath placement and removal      SHOULDER ARTHROSCOPY  2012    TONSILLECTOMY         Review of patient's allergies indicates:   Allergen Reactions    Bananas [banana] Anaphylaxis    Crayfish      Hard time breathing    Iodine and iodide containing products      Told to avoid due to crayfish allergy    Avocado (laurus persea) Swelling and Rash    Kiwi (actinidia chinensis) Itching, Swelling and Rash    Latex, natural rubber Itching, Swelling and Rash    Papaya Itching, Swelling and Rash     Current Facility-Administered Medications   Medication Frequency    albuterol inhaler 2 puff Q4H PRN    aspirin EC tablet 81 mg Daily    brimonidine 0.15 % OPTH DROP ophthalmic solution 1 drop TID    cholestyramine-aspartame 4 gram packet 4 g Daily    dextrose 10% bolus 125 mL PRN    dextrose 10% bolus 250 mL PRN    diphenoxylate-atropine 2.5-0.025 mg per tablet 1 tablet QID PRN    dorzolamide 2 % ophthalmic solution 1 drop TID    fluticasone furoate-vilanteroL 100-25 mcg/dose diskus inhaler 1 puff Daily    fluticasone propionate 50 mcg/actuation nasal spray 50 mcg Daily    glucagon (human recombinant) injection 1 mg PRN    glucose chewable tablet 16 g PRN    glucose chewable tablet 24 g PRN    heparin (porcine) injection 5,000 Units Q8H    hydrALAZINE tablet 50 mg TID WM    melatonin tablet 6 mg Nightly PRN    naloxone 0.4 mg/mL injection 0.02 mg PRN    NIFEdipine 24 hr tablet 60 mg Daily    oxyCODONE-acetaminophen 5-325 mg per tablet 1 tablet Q6H PRN    potassium chloride 10 mEq in 100 mL IVPB Q1H    potassium, sodium phosphates 280-160-250 mg packet 2 packet Q4H    prochlorperazine injection Soln 5  mg Q6H PRN    sodium chloride 0.9% flush 10 mL PRN    sodium chloride 0.9% flush 10 mL Q12H PRN    travoprost 0.004 % ophthalmic solution 1 drop QHS     Current Outpatient Medications   Medication    albuterol (ACCUNEB) 1.25 mg/3 mL Nebu    albuterol (PROVENTIL) 2.5 mg /3 mL (0.083 %) nebulizer solution    albuterol (PROVENTIL/VENTOLIN HFA) 90 mcg/actuation inhaler    ALBUTEROL SULFATE (PROAIR HFA INHL)    alprazolam (XANAX) 2 MG Tab    aspirin (ECOTRIN) 81 MG EC tablet    brimonidine 0.2% (ALPHAGAN) 0.2 % Drop    cholestyramine-aspartame (QUESTRAN LIGHT) 4 gram PwPk    diphenoxylate-atropine 2.5-0.025 mg (LOMOTIL) 2.5-0.025 mg per tablet    dorzolamide (TRUSOPT) 2 % ophthalmic solution    EPINEPHRINE (EPIPEN INJ)    fluconazole (DIFLUCAN) 150 MG Tab    fluticasone propionate (FLONASE) 50 mcg/actuation nasal spray    fluticasone-salmeterol 250-50 mcg/dose (ADVAIR) 250-50 mcg/dose diskus inhaler    hydrALAZINE (APRESOLINE) 50 MG tablet    hydrocortisone 2.5 % cream    ibuprofen (ADVIL,MOTRIN) 800 MG tablet    NIFEdipine (ADALAT CC) 60 MG TbSR    ondansetron (ZOFRAN-ODT) 4 MG TbDL    oxyCODONE-acetaminophen (PERCOCET) 5-325 mg per tablet    predniSONE (DELTASONE) 20 MG tablet    promethazine (PHENERGAN) 6.25 mg/5 mL syrup    travoprost, benzalkonium, (TRAVATAN) 0.004 % ophthalmic solution    valsartan-hydrochlorothiazide (DIOVAN-HCT) 320-25 mg per tablet     Family History    None       Tobacco Use    Smoking status: Former Smoker     Start date: 1966     Quit date: 2011     Years since quittin.5    Smokeless tobacco: Never Used   Substance and Sexual Activity    Alcohol use: No     Comment: socially    Drug use: No    Sexual activity: Not on file     Review of Systems   Constitutional:  Positive for activity change and fatigue. Negative for fever.   Respiratory:  Negative for cough, chest tightness, shortness of breath and wheezing.    Cardiovascular:  Negative for chest pain, palpitations and leg  swelling.   Gastrointestinal:  Positive for diarrhea. Negative for abdominal distention, abdominal pain, nausea and vomiting.   Genitourinary:  Negative for decreased urine volume, difficulty urinating, dysuria and flank pain.   Objective:     Vital Signs (Most Recent):  Temp: 98.3 °F (36.8 °C) (07/24/22 1518)  Pulse: 76 (07/25/22 1338)  Resp: 18 (07/25/22 1338)  BP: 135/60 (07/25/22 1338)  SpO2: 97 % (07/25/22 0920)  O2 Device (Oxygen Therapy): room air (07/25/22 0920) Vital Signs (24h Range):  Temp:  [98.3 °F (36.8 °C)] 98.3 °F (36.8 °C)  Pulse:  [63-87] 76  Resp:  [14-24] 18  SpO2:  [96 %-100 %] 97 %  BP: (119-209)/(60-96) 135/60     Weight: 67.6 kg (149 lb) (07/24/22 1518)  Body mass index is 29.59 kg/m².  Body surface area is 1.68 meters squared.    I/O last 3 completed shifts:  In: -   Out: 240 [Emesis/NG output:240]    Physical Exam  Constitutional:       General: She is not in acute distress.     Appearance: She is ill-appearing.   HENT:      Head: Normocephalic and atraumatic.      Nose: Nose normal.      Mouth/Throat:      Mouth: Mucous membranes are moist.   Eyes:      Pupils: Pupils are equal, round, and reactive to light.   Pulmonary:      Effort: Pulmonary effort is normal. No respiratory distress.   Abdominal:      Palpations: Abdomen is soft.   Musculoskeletal:         General: No swelling.      Right lower leg: No edema.      Left lower leg: No edema.   Skin:     General: Skin is warm and dry.      Coloration: Skin is not jaundiced.   Neurological:      Mental Status: She is alert and oriented to person, place, and time.       Significant Labs:  CBC:   Recent Labs   Lab 07/24/22  1704   WBC 10.18   RBC 4.17   HGB 12.7   HCT 39.5      MCV 95   MCH 30.5   MCHC 32.2     CMP:   Recent Labs   Lab 07/24/22  1704 07/25/22  0141 07/25/22  0939   *   < > 92   CALCIUM 9.5   < > 8.6*   ALBUMIN 3.8  --   --    PROT 7.5  --   --    *   < > 143   K <2.0*   < > 2.1*   CO2 12*   < > 20*   CL  120*   < > 114*   BUN 24*   < > 26*   CREATININE 1.5*   < > 1.5*   ALKPHOS 90  --   --    ALT 28  --   --    AST 44*  --   --    BILITOT 1.5*  --   --     < > = values in this interval not displayed.

## 2022-07-25 NOTE — CONSULTS
[facility] - Emergency Dept  Neurosurgery  Consult Note    Inpatient consult to Neurosurgery  Consult performed by: Franck Razo MD  Consult ordered by: Ryan Klein MD        Subjective:     Chief Complaint/Reason for Admission: Weakness    History of Present Illness: Mariaelena Grubbs is a 77 y.o. female h/o cervical and lumbar stenosis, asthma, HTN, HLD, presents to ED c/o worsening neck pain and diffuse weakness since Saturday morning. Patient states that she had gone to [facility] today and she was only given pain medication as well as steroids and discharged but no tests were done. Patient was in her usual state of health prior to this time. She is accompanied by her son and his girlfriend who assist with the history. Patient states that she was folding laundry, when she felt a popping sensation in her neck when she turned it to the side. After this she felt neck pain which was remitted with recumbent positioning and exacerbated with movement. She is usually independent in her ADLs however since Saturday she has required significant assistance with ambulation and other basic tasks. They also report that she has not been eating much and that she has been vomiting recently. Denies fever/chills, HA, vision/hearing changes, dysphagia, dysarthria, nausea/vomiting, new-onset sensory change, bowel/bladder changes. She does also c/o diffuse abominal pain. Denies AC/AP. MRI Csp reviewed; there is moderate to severe spinal canal narrowing at the C3-C4 and C4-C5 levels without cord signal change. On presentation her labs are significant for K<2.0, Mg 1.1, and CK 1500.       (Not in a hospital admission)      Review of patient's allergies indicates:   Allergen Reactions    Bananas [banana] Anaphylaxis    Crayfish      Hard time breathing    Iodine and iodide containing products      Told to avoid due to crayfish allergy    Avocado (laurus persea) Swelling and Rash    Kiwi (actinidia chinensis)  Itching, Swelling and Rash    Latex, natural rubber Itching, Swelling and Rash    Papaya Itching, Swelling and Rash       Past Medical History:   Diagnosis Date    Anxiety 2014    Arthritis     Asthma     Asthma 2014    Cancer     Breast    Gout     HTN (hypertension) 2014    Hyperlipidemia 2014    Hypertension      Past Surgical History:   Procedure Laterality Date    APPENDECTOMY      BREAST SURGERY      HYSTERECTOMY      Port a cath placement and removal      SHOULDER ARTHROSCOPY  2012    TONSILLECTOMY       Family History    None       Tobacco Use    Smoking status: Former Smoker     Start date: 1966     Quit date: 2011     Years since quittin.5    Smokeless tobacco: Never Used   Substance and Sexual Activity    Alcohol use: No     Comment: socially    Drug use: No    Sexual activity: Not on file     Review of Systems: see HPI for pertinent positives and negatives.   Objective:     Weight: 67.6 kg (149 lb)  Body mass index is 29.59 kg/m².  Vital Signs (Most Recent):  Temp: 98.3 °F (36.8 °C) (22 1518)  Pulse: 85 (22 1837)  Resp: 18 (22)  BP: (!) 200/89 (228)  SpO2: 100 % (22)   Vital Signs (24h Range):  Temp:  [98.3 °F (36.8 °C)] 98.3 °F (36.8 °C)  Pulse:  [83-87] 85  Resp:  [18] 18  SpO2:  [96 %-100 %] 100 %  BP: (144-209)/(69-96) 200/89                          Closed/Suction Drain 04/10/17 1135 Right Breast Bulb 10 Fr. (Active)            Closed/Suction Drain 04/10/17 1135 Left Breast Bulb 10 Fr. (Active)       Physical Exam    Neurosurgery Physical Exam    GENERAL: resting comfortably  HEENT: NCAT, PERRL, mucous membranes moist  NECK: supple, trachea midline  CV: normal capillary refill  PULM: aerating well, symmetric expansion, no distress  ABD: ND  EXT: no c/c/e    NEURO:    AAO x 3  CN II-XII grossly intact  BUE - 2d, 4- distal  BLE - 2ip, 4- distal  SILT    + R hoffmans  No clonus    Mild TTP cervical  paraspinal musculature      Significant Labs:  Recent Labs   Lab 07/24/22  1704   *   *   K <2.0*   *   CO2 12*   BUN 24*   CREATININE 1.5*   CALCIUM 9.5   MG 1.1*     Recent Labs   Lab 07/24/22  1704   WBC 10.18   HGB 12.7   HCT 39.5        No results for input(s): LABPT, INR, APTT in the last 48 hours.  Microbiology Results (last 7 days)       Procedure Component Value Units Date/Time    Urine culture [[id number]] Collected: 07/24/22 1957    Order Status: No result Specimen: Urine Updated: 07/24/22 2029          All pertinent labs from the last 24 hours have been reviewed.    Significant Diagnostics:  I have reviewed all pertinent imaging results/findings within the past 24 hours.  CT Head Without Contrast    Result Date: 7/24/2022  No CT evidence of acute intracranial abnormality.  If the patient has an acute, focal neurological deficit, MRI of the brain may be indicated. Electronically signed by: Baljit Weber MD Date:    07/24/2022 Time:    20:19    X-Ray Chest AP Portable    Result Date: 7/24/2022  1. Bilateral basilar subsegmental atelectasis/scarring, no large focal consolidation. Electronically signed by: Jose Celestin MD Date:    07/24/2022 Time:    16:43    MRI Spine Cervical-Thoracic-Lumbar Without Contrast (XPD)    Result Date: 7/24/2022  Cervical spine: Disc osteophyte complexes with disc protrusions resulting in moderate to severe spinal canal narrowing at the C3-C4 and C4-C5 levels.  No cord signal abnormality.  Spine surgery consultation is recommended. Thoracic spine: Mild multilevel degenerative changes as above.  No significant spinal canal neural foraminal narrowing.  No cord signal abnormality. Lumbar spine: Mild multilevel degenerative changes as above. No significant neural foraminal or spinal canal narrowing. This report was flagged in Epic as abnormal. Electronically signed by resident: Brittany Cooper Date:    07/24/2022 Time:    18:22 Electronically signed  by: Dimitrios Mckeon MD Date:    07/24/2022 Time:    20:01       Assessment/Plan:     Cervical stenosis of spinal canal  Mariaelena Grubbs is a 77 y.o. female h/o cervical and lumbar stenosis, asthma, HTN, HLD, presents to ED c/o worsening neck pain and diffuse weakness since Saturday morning. MRI Csp reviewed; there is moderate to severe spinal canal narrowing at the C3-C4 and C4-C5 levels without cord signal change. On presentation her labs are significant for K<2.0, Mg 1.1, and CK 1500.     --No emergent surgery.  --Recommend admission to  for correction of metabolic derangements.  --We will continue to follow. If her weakness persists she may require inpatient surgery.   --Please page with exam change or questions.     Plan d/w Dr. Lozano.    Dispo: per primary        Thank you for your consult. I will follow-up with patient. Please contact us if you have any additional questions.    Franck Razo MD  Neurosurgery  [facility] - Emergency Dept

## 2022-07-25 NOTE — ASSESSMENT & PLAN NOTE
Mariaelena Grubbs is a 77 y.o. female h/o cervical and lumbar stenosis, asthma, HTN, HLD, presents to ED c/o worsening neck pain and diffuse weakness since Saturday morning. MRI Csp reviewed; there is moderate to severe spinal canal narrowing at the C3-C4 and C4-C5 levels without cord signal change. On presentation her labs are significant for K<2.0, Mg 1.1, and CK 1500.     --No emergent surgery.  --Recommend admission to  for correction of metabolic derangements.  --We will continue to follow. If her weakness persists she may require inpatient surgery.   --Please page with exam change or questions.     Plan d/w Dr. Lozano.    Dispo: per primary

## 2022-07-25 NOTE — ED NOTES
I-STAT Chem-8+ Results:   Value Reference Range   Sodium 151 136-145 mmol/L   Potassium  <2.0 3.5-5.1 mmol/L   Chloride 116  mmol/L   Ionized Calcium 1.30 1.06-1.42 mmol/L   CO2 (measured) 19 23-29 mmol/L   Glucose 108  mg/dL   BUN 27 6-30 mg/dL   Creatinine 1.6 0.5-1.4 mg/dL   Hematocrit 36 36-54%

## 2022-07-25 NOTE — CONSULTS
"Consultation Report  Ophthalmology Service    Date: 07/25/2022    Reason for Consult: "glaucoma management"     History of Present Illness: Mariaelena Grubbs is a 77 y.o. female with extensive PMHx including cervical and lumbar stenosis, multiple bowel resections, R partial nephrectomy, HTN, HLD, asthma, and glaucoma who presented to [facility] with diffuse weakness. Ophthalmology was consulted for glaucoma management as she is on multiple drops and does not recall when the last time she followed with her ophthalmologist. Patient reports history of glaucoma for many years. States she follows with an ophthalmologist on [facility], but does not know if they are a glaucoma specialist. States she was previously on multiple drops, does not recall which ones. She does not remember if she uses her drops in both eyes or just her right eye, but states she remembers hearing her R eye had more severe glaucoma. States she ran out of drops ~8 months ago and has not been using any glaucoma drops in either eye since that time. She denies any changes in her vision, eye pain/discomfort, flashes/floaters, or loss of vision.     POcularHx: cataract surgery OU, yag cap    Current eye gtts: previously on brimonidine, dorzolamide, and travatan     PMHx:  has a past medical history of Anxiety (4/13/2014), Arthritis, Asthma, Asthma (4/13/2014), Cancer, Gout, HTN (hypertension) (4/13/2014), Hyperlipidemia (4/13/2014), and Hypertension.     PSurgHx:  has a past surgical history that includes Hysterectomy; Appendectomy; Tonsillectomy; Shoulder arthroscopy (2012); Breast surgery; and Port a cath placement and removal.     Home Medications:   Prior to Admission medications    Medication Sig Start Date End Date Taking? Authorizing Provider   albuterol (ACCUNEB) 1.25 mg/3 mL Nebu Take 1.25 mg by nebulization every 6 (six) hours as needed.    Historical Provider   albuterol (PROVENTIL) 2.5 mg /3 mL (0.083 %) nebulizer solution Inhale 2.5 mg into the " lungs. 4/20/21   Historical Provider   albuterol (PROVENTIL/VENTOLIN HFA) 90 mcg/actuation inhaler Inhale into the lungs. 11/29/21   Historical Provider   ALBUTEROL SULFATE (PROAIR HFA INHL) Inhale into the lungs.    Historical Provider   alprazolam (XANAX) 2 MG Tab Take by mouth nightly as needed.    Historical Provider   aspirin (ECOTRIN) 81 MG EC tablet Take 81 mg by mouth.    Historical Provider   brimonidine 0.2% (ALPHAGAN) 0.2 % Drop instill 1 drop by ophthalmic route  every 8 hours into affected eye(s)    Historical Provider   cholestyramine-aspartame (QUESTRAN LIGHT) 4 gram PwPk take 1 packet by oral route  every day    Historical Provider   diphenoxylate-atropine 2.5-0.025 mg (LOMOTIL) 2.5-0.025 mg per tablet Take 1 tablet by mouth 4 (four) times daily as needed.    Historical Provider   dorzolamide (TRUSOPT) 2 % ophthalmic solution instill 1 drop by ophthalmic route 3 times every day into affected eye(s)    Historical Provider   EPINEPHRINE (EPIPEN INJ) Inject as directed.    Historical Provider   fluconazole (DIFLUCAN) 150 MG Tab Take 100 mg by mouth once daily.    Historical Provider   fluticasone propionate (FLONASE) 50 mcg/actuation nasal spray 1 spray in each nostril 11/9/21   Historical Provider   fluticasone-salmeterol 250-50 mcg/dose (ADVAIR) 250-50 mcg/dose diskus inhaler Inhale 1 puff into the lungs 2 (two) times daily.    Historical Provider   hydrALAZINE (APRESOLINE) 50 MG tablet 1 tablet with food    Historical Provider   hydrocortisone 2.5 % cream Apply topically 2 (two) times daily. 9/13/21   Cordell Garcia MD   ibuprofen (ADVIL,MOTRIN) 800 MG tablet Take 800 mg by mouth every 6 (six) hours as needed.    Historical Provider   NIFEdipine (ADALAT CC) 60 MG TbSR Take 60 mg by mouth once daily.    Historical Provider   ondansetron (ZOFRAN-ODT) 4 MG TbDL 1 tablet on the tongue and allow to dissolve    Historical Provider   oxyCODONE-acetaminophen (PERCOCET) 5-325 mg per tablet Take 1  tablet by mouth every 6 (six) hours as needed.  Patient not taking: Reported on 12/1/2021 9/13/21   Cordell Garcia MD   predniSONE (DELTASONE) 20 MG tablet Take 20 mg by mouth 2 (two) times daily. 11/26/21   Historical Provider   promethazine (PHENERGAN) 6.25 mg/5 mL syrup Take 5 mLs by mouth every 6 (six) hours. 11/12/21   Historical Provider   travoprost, benzalkonium, (TRAVATAN) 0.004 % ophthalmic solution Apply 1 drop to eye nightly.    Historical Provider   valsartan-hydrochlorothiazide (DIOVAN-HCT) 320-25 mg per tablet Take 1 tablet by mouth once daily.    Historical Provider        Medications this encounter:    aspirin  81 mg Oral Daily    brimonidine 0.15 % OPTH DROP  1 drop Both Eyes TID    cholestyramine-aspartame  1 packet Oral Daily    dorzolamide  1 drop Both Eyes TID    fluticasone furoate-vilanteroL  1 puff Inhalation Daily    fluticasone propionate  1 spray Each Nostril Daily    heparin (porcine)  5,000 Units Subcutaneous Q8H    hydrALAZINE  10 mg Intravenous ED 1 Time    hydrALAZINE  50 mg Oral TID WM    NIFEdipine  60 mg Oral Daily    potassium chloride  10 mEq Intravenous Q1H    potassium chloride  40 mEq Oral Q2H    potassium, sodium phosphates  2 packet Oral Q4H    travoprost  1 drop Both Eyes QHS       Allergies: is allergic to bananas [banana]; crayfish; iodine and iodide containing products; avocado (laurus persea); kiwi (actinidia chinensis); latex, natural rubber; and papaya.     Social:  reports that she quit smoking about 11 years ago. She started smoking about 56 years ago. She has never used smokeless tobacco. She reports that she does not drink alcohol and does not use drugs.     ROS: As per HPI    Ocular examination/Dilated fundus examination:  Base Eye Exam     Visual Acuity (Snellen - Linear)       Right Left    Dist sc 20/40 20/20          Tonometry (Tonopen, 9:16 AM)       Right Left    Pressure 20 11          Pupils       Dark Light Shape React APD    Right  4 2 Round reactive trace APD    Left 3 2 Round reactive None          Visual Fields       Right Left     Full Full   Grossly full            Extraocular Movement       Right Left     Full, Ortho Full, Ortho          Neuro/Psych     Oriented x3: Yes          Dilation     Both eyes: 2.5% Phenylephrine @ 9:17 AM            Slit Lamp and Fundus Exam     External Exam       Right Left    External Normal Normal          Pen Light Exam       Right Left    Lids/Lashes Normal Normal    Conjunctiva/Sclera CAM CAM    Cornea Clear Clear    Anterior Chamber Deep and formed Deep and formed    Iris Round and reactive Round and reactive    Lens PCIOL PCIOL    Anterior Vitreous Normal Normal          Fundus Exam       Right Left    Disc pale pale    C/D Ratio 0.99 0.9    Macula flat flat    Vessels Normal Normal    Periphery Normal area of hypopigmentation temporally                  Assessment/Plan:     1. Glaucoma of Indeterminate Stage, OU  - Pt reports long standing hx of glaucoma. Does not recall ever seeing a glaucoma specialist  - Previous drops per med history - brimonidine, travatan, and dorzolamide  - (+) asthma history, avoid timolol   - Base exam: VA 20/40//20/20, IOP 20//11, pupils w/ trace APD OD, EOM full, VF grossly full OU  - DFE significant for glaucomatous appearing nerves OU   - Unable to determine staging of glaucoma while inpatient as this requires other testing including OCT and formal visual field   Recommendations:  - Restart brimonidine TID, dorzolamide TID, and travatan qhs in both eyes  - Will plan to recheck IOP tomorrow after drop regimen is in place and adjust if necessary  - Referral to our glaucoma specialist team (Dr. Rodrgiuez, Dr. Richmond, Dr. Haines) upon discharge for formal glaucoma evaluation     Point of contact: Monalisa Grubbs (Daughter)   [phone]    Cleo Morales MD   [facility] Ophthalmology PGY2  07/25/2022  9:17 AM        Common Ophthalmologic Abbreviations  OD: right eye  OS: left  eye  OU: both eyes  IOP: intraocular pressure  VA: visual acuity  PH: pinhole  HM: hand motion  LP: light perception  NLP: no light perception  DFE: dilated fundus examination  SLE: slit lamp examination  RD: retinal detachment   AT: artificial tears  PFAT: preservative free artificial tears

## 2022-07-25 NOTE — H&P
[facility] - Emergency Dept  [facility] Medicine  History & Physical    Patient Name: Mariaelena Grubbs  MRN: [id number]  Patient Class: IP- Inpatient  Admission Date: 7/24/2022  Attending Physician: Theresa Dalal MD   Primary Care Provider: Primary Doctor No         Patient information was obtained from patient, relative(s), past medical records and ER records.     Subjective:     Principal Problem:Hypokalemia    Chief Complaint:   Chief Complaint   Patient presents with    Multiple Complaints     Seen at  this morning. States cant hold neck up         HPI: Mariaelena Grubbs is a 77 y.o. female with cervical and lumbar stenosis, hx of multiple bowel resections, hx of chronic pyelopnephritis s/p R partial nephrectomy, asthma, HTN, HLD who presented to [facility] ED on 07/24/2022 with worsening neck pain and diffuse weakness since the previous morning 7/23. Patient was in her usual state of health prior to this time.  Patient states that she was folding laundry, when she felt a popping sensation in her neck when she turned it to the side. After this she felt neck pain which was remitted with recumbent positioning and exacerbated with movement. She is usually independent in her ADLs however since Saturday she has required significant assistance with ambulation and other basic tasks. They also report that she has chronic diarrhea, and that recently she has not been eating much and been vomiting. Denies fever/chills, HA, vision/hearing changes, dysphagia, dysarthria, new-onset sensory change, new bowel/bladder changes. She does also c/o diffuse abominal pain. Denies AC/AP. Patient states that she had gone to [facility] prior to coming to [facility], and she was only given pain medication as well as steroids and discharged but no tests were done.     On presentation to [facility] ED her BP was 209/96, but within several hours came down to normotensive levels.  Her abdomen was tender on exam, and there was drainage noted from a midline  "scar that looked concerning for potential fungal infection.  Her labs were significant for K<2.0, Mg 1.1, and CK 1500.  EKG: Frequent PVCs with QTc > 480.  MRI C-spine: moderate to severe spinal canal narrowing at the C3-C4 and C4-C5 levels without cord signal change.  IV and PO K repletion were given.  MICU was consulted to evaluate her for ICU admission due to her immeasurably low K in the setting of PVCs on EKG, but declined given the chronic nature of her hypokalemia.  Chart review indicates pt underwent a complicated operation in 2019 at [facility]: an Oct. 2019 discharge summary describes her as a pt with "chronic pyelonephritis and right renal calculi who presented on 10/23 for open right partial nephrectomy with pyelolithotomy. An enterotomy occurred on the initial access and the decision was made to precede with open surgery. General surgery was consulted intraoperatively and assisted with identification and repair of colon and small bowel enterotomies including right hemicolectomy."      Her K looks to have been > 3 until Feb 2022.  She was admitted for severe hypokalemia to [facility] in late April 2022.  That DC summary notes: "Hypokalemia K was 1.9 on admit. 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 Last two days patient is hyperkalemic. Spironolactone and potassium supplements are discontinued. Nephrology recommends follow up outpatient next for repeat blood work."  It appears this was not done.    She was admitted to [facility] for further evaluation and treatment.          Review of patient's allergies indicates:   Allergen Reactions    Bananas [banana] Anaphylaxis    Crayfish      Hard time breathing    Iodine and iodide containing products      Told to avoid due to crayfish allergy    Avocado (laurus persea) Swelling and Rash    Kiwi (actinidia chinensis) Itching, Swelling and Rash    Latex, natural rubber Itching, " Swelling and Rash    Papaya Itching, Swelling and Rash       Past Medical History:   Diagnosis Date    Anxiety 2014    Arthritis     Asthma     Asthma 2014    Cancer     Breast    Gout     HTN (hypertension) 2014    Hyperlipidemia 2014    Hypertension      Past Surgical History:   Procedure Laterality Date    APPENDECTOMY      BREAST SURGERY      HYSTERECTOMY      Port a cath placement and removal      SHOULDER ARTHROSCOPY  2012    TONSILLECTOMY       Family History    None       Tobacco Use    Smoking status: Former Smoker     Start date: 1966     Quit date: 2011     Years since quittin.5    Smokeless tobacco: Never Used   Substance and Sexual Activity    Alcohol use: No     Comment: socially    Drug use: No    Sexual activity: Not on file     Review of Systems   Constitutional:  Positive for activity change and appetite change. Negative for fever.   HENT:  Negative for congestion.    Respiratory:  Negative for shortness of breath.    Cardiovascular:  Negative for chest pain.   Gastrointestinal:  Positive for abdominal pain, nausea and vomiting. Negative for diarrhea.   Genitourinary:  Negative for dysuria.   Musculoskeletal:  Positive for back pain and neck pain.   Skin:  Positive for wound.   Neurological:  Negative for dizziness and numbness. : see HPI for pertinent positives and negatives.   Objective:     Weight: 67.6 kg (149 lb)  Body mass index is 29.59 kg/m².  Vital Signs (Most Recent):  Temp: 98.3 °F (36.8 °C) (22 1518)  Pulse: 68 (22 0332)  Resp: 14 (22 0232)  BP: 119/61 (22 0332)  SpO2: 99 % (22 0332)   Vital Signs (24h Range):  Temp:  [98.3 °F (36.8 °C)] 98.3 °F (36.8 °C)  Pulse:  [67-87] 68  Resp:  [14-24] 14  SpO2:  [96 %-100 %] 99 %  BP: (119-209)/(60-96) 119/61                          Closed/Suction Drain 04/10/17 1135 Right Breast Bulb 10 Fr. (Active)            Closed/Suction Drain 04/10/17 1135 Left Breast Bulb  10 Fr. (Active)       Physical Exam  Vitals and nursing note reviewed.   Constitutional:       Appearance: Normal appearance. She is not ill-appearing.   HENT:      Head: Normocephalic.      Right Ear: External ear normal.      Left Ear: External ear normal.      Nose: Nose normal.      Mouth/Throat:      Mouth: Mucous membranes are dry.      Pharynx: Oropharynx is clear.   Eyes:      Extraocular Movements: Extraocular movements intact.      Conjunctiva/sclera: Conjunctivae normal.      Pupils: Pupils are equal, round, and reactive to light.   Cardiovascular:      Rate and Rhythm: Normal rate and regular rhythm.      Pulses: Normal pulses.      Heart sounds: Normal heart sounds.   Pulmonary:      Effort: Pulmonary effort is normal.      Breath sounds: Normal breath sounds.   Abdominal:      General: Bowel sounds are normal.      Palpations: Abdomen is soft. There is no mass.      Tenderness: There is abdominal tenderness.      Comments: Several surgical scars over abdomen. Well healing at all site. Appearance of fungal infection in right later scar fold.    Musculoskeletal:         General: No signs of injury.      Right lower leg: Edema present.      Left lower leg: Edema present.   Skin:     General: Skin is warm and dry.      Capillary Refill: Capillary refill takes less than 2 seconds.      Findings: Rash (fungal changes in skin fold of abdomen) present.   Neurological:      Mental Status: She is alert and oriented to person, place, and time.   Psychiatric:         Mood and Affect: Mood normal.         Behavior: Behavior normal.       Physical Exam:  Nursing note and vitals reviewed.    Eyes: Pupils are equal, round, and reactive to light. Conjunctivae are normal.     Cardiovascular: Normal rate, regular rhythm and normal pulses.     Abdominal: Soft. Bowel sounds are normal.   Several surgical scars over abdomen. Well healing at all site. Appearance of fungal infection in right later scar fold.       Psych/Behavior: She is alert. She is oriented to person, place, and time.       Significant Labs:  Recent Labs   Lab 07/24/22  1704 07/25/22  0001 07/25/22  0141   *  --  105   *  --  149*   K <2.0*  --  <2.0*   *  --  119*   CO2 12*  --  18*   BUN 24*  --  25*   CREATININE 1.5*  --  1.4   CALCIUM 9.5  --  9.2   MG 1.1* 2.2  --        Recent Labs   Lab 07/24/22  1704   WBC 10.18   HGB 12.7   HCT 39.5          No results for input(s): LABPT, INR, APTT in the last 48 hours.  Microbiology Results (last 7 days)       Procedure Component Value Units Date/Time    Urine culture [[id number]] Collected: 07/24/22 1957    Order Status: No result Specimen: Urine Updated: 07/24/22 2029          All pertinent labs from the last 24 hours have been reviewed.    Significant Diagnostics:  I have reviewed all pertinent imaging results/findings within the past 24 hours.  CT Head Without Contrast    Result Date: 7/24/2022  No CT evidence of acute intracranial abnormality.  If the patient has an acute, focal neurological deficit, MRI of the brain may be indicated. Electronically signed by: Baljit Weber MD Date:    07/24/2022 Time:    20:19    X-Ray Chest AP Portable    Result Date: 7/24/2022  1. Bilateral basilar subsegmental atelectasis/scarring, no large focal consolidation. Electronically signed by: Jose Celestin MD Date:    07/24/2022 Time:    16:43    MRI Spine Cervical-Thoracic-Lumbar Without Contrast (XPD)    Result Date: 7/24/2022  Cervical spine: Disc osteophyte complexes with disc protrusions resulting in moderate to severe spinal canal narrowing at the C3-C4 and C4-C5 levels.  No cord signal abnormality.  Spine surgery consultation is recommended. Thoracic spine: Mild multilevel degenerative changes as above.  No significant spinal canal neural foraminal narrowing.  No cord signal abnormality. Lumbar spine: Mild multilevel degenerative changes as above. No significant neural foraminal or  spinal canal narrowing. This report was flagged in Epic as abnormal. Electronically signed by resident: Brittany Cooper Date:    07/24/2022 Time:    18:22 Electronically signed by: Dimitrios Mckeon MD Date:    07/24/2022 Time:    20:01         CRANIAL NERVES     CN III, IV, VI   Pupils are equal, round, and reactive to light.    Assessment/Plan:     * Hypokalemia  Metabolic acidosis  Chronic diarrhea  CKD3  Hypernatremia  Hyperchloremia    On presentation, anion gap 16 with pH of 7.22.  Subsequent BMP with AG 12.  Patient endorsing abdominal tenderness with intermittent drainage from midline abdominal scar. Denies fevers, SOB, or HA. Abdomen is tender globally with suprapubic>right side>left side. Lactate and WBC normal.  Patient had complicated partial bowel and partial kidney resection in 2019 at [facility] and has been admitted a couple of times this year for hypokalemia, most recently in April to [facility].  Given she has a NAGMA, suspect this is likely 2/2 to her chronic diarrhea vs RTA type I or II. On chart review, it does not appear patient has been worked up and was not discharged with potassium or bicarb medications to mediate her now chronic electrolyte derangements. EKG unchanged from past 3 from previous admissions.     PLAN:   RTA workup pending, including Serum Osm and Urine 'lytes & Osm   Consider abdominal source if patient fevers or has further infectious concern    Pt can eat and drink and hypokalemia is chronic, so would ideally start potassium repletion with LR infusion and PO K-Cl tablets given pt's other electrolyte abnormalities.  However, pt is having diarrhea which likely precludes absorption of this.  Will start 0.45 NaCl with 20 mEq/hr.  K repletion rate not to exceed 20 mEq/hr   Max correction of Na in coming 24 hrs: no lower than 140.  However, using free water at this time is not possible given her hypokalemia.  Use LR infusion or 0.45NS w KCl.   Maintain Mg>2   Telemetry    Nephrology  consult placed    Glaucoma of right eye  Continue home glaucoma drops.  Unclear when last evaluated by an ophthalmologist, so I will consult them for optimization of her meds.      Surgical wound infection  ID and Wound Care consulted given that her 2019 surgical wound appears to be draining.    Cervical stenosis of spinal canal  No emergent surgery.  NSGY will continue to follow. If her weakness persists she may require inpatient surgery.  Will page NSGY with exam change or questions.     Anxiety  Xanax 2mg qhs PRN listed on home meds, pt cannot remember if she is taking this regularly.  Will continue to investigate and order if appropriate.    Asthma  Continue home Advair and albuterol.    HTN (hypertension)  Continue home Hydralazine and Nicardipine.  Hold valsartan-HCTZ in setting of hypokalemia.        VTE Risk Mitigation (From admission, onward)         Ordered     heparin (porcine) injection 5,000 Units  Every 8 hours         07/25/22 0401     IP VTE HIGH RISK PATIENT  Once         07/25/22 0401     Place sequential compression device  Until discontinued         07/25/22 0401     Place sequential compression device  Until discontinued         07/25/22 0348                   Darius Bhandari MD  Department of Hospital Medicine   [facility] - Emergency Dept

## 2022-07-25 NOTE — HPI
Mariaelena Grubbs is a 77 y.o. female h/o cervical and lumbar stenosis, asthma, HTN, HLD, presents to ED c/o worsening neck pain and diffuse weakness since Saturday morning. Patient states that she had gone to [facility] today and she was only given pain medication as well as steroids and discharged but no tests were done. Patient was in her usual state of health prior to this time. She is accompanied by her son and his girlfriend who assist with the history. Patient states that she was folding laundry, when she felt a popping sensation in her neck when she turned it to the side. After this she felt neck pain which was remitted with recumbent positioning and exacerbated with movement. She is usually independent in her ADLs however since Saturday she has required significant assistance with ambulation and other basic tasks. They also report that she has not been eating much and that she has been vomiting recently. Denies fever/chills, HA, vision/hearing changes, dysphagia, dysarthria, nausea/vomiting, new-onset sensory change, bowel/bladder changes. She does also c/o diffuse abominal pain. Denies AC/AP. MRI Csp reviewed; there is moderate to severe spinal canal narrowing at the C3-C4 and C4-C5 levels without cord signal change. On presentation her labs are significant for K<2.0, Mg 1.1, and CK 1500.

## 2022-07-25 NOTE — ASSESSMENT & PLAN NOTE
Xanax 2mg qhs PRN listed on home meds, pt cannot remember if she is taking this regularly.  Will continue to investigate and order if appropriate.

## 2022-07-25 NOTE — HPI
Mariaelena Grubbs is a 77 y.o. female with HTN, HLD, Asthma, cervical and lumbar stenosis, chronic R sided pyelonephritis requiring R sided partial nephrectomy (2019), complicated by bowel perforation, required multiple bowel resection and R sided hemicolectomy and chronic diarrhea. Pt presents to the ED for evaluation of weakness after feeling a popping sensation in her neck. Upon initial evaluation she was noted to have severe hypokalemia (<2) and was started on replacement therapy. Neurosurgery was consulted for evaluation of her cervical spine stenosis however recommend no surgical intervention at this time. Pt refers she has 7-8 loose bowel movements per day. She denies any nausea, vomits, fevers, chest pain, palpitations or any other symptoms.

## 2022-07-25 NOTE — PROGRESS NOTES
[facility] - Emergency Dept  Neurosurgery  Progress Note    Subjective:     History of Present Illness: Mariaelena Grubbs is a 77 y.o. female h/o cervical and lumbar stenosis, asthma, HTN, HLD, presents to ED c/o worsening neck pain and diffuse weakness since Saturday morning. Patient states that she had gone to [facility] today and she was only given pain medication as well as steroids and discharged but no tests were done. Patient was in her usual state of health prior to this time. She is accompanied by her son and his girlfriend who assist with the history. Patient states that she was folding laundry, when she felt a popping sensation in her neck when she turned it to the side. After this she felt neck pain which was remitted with recumbent positioning and exacerbated with movement. She is usually independent in her ADLs however since Saturday she has required significant assistance with ambulation and other basic tasks. They also report that she has not been eating much and that she has been vomiting recently. Denies fever/chills, HA, vision/hearing changes, dysphagia, dysarthria, nausea/vomiting, new-onset sensory change, bowel/bladder changes. She does also c/o diffuse abominal pain. Denies AC/AP. MRI Csp reviewed; there is moderate to severe spinal canal narrowing at the C3-C4 and C4-C5 levels without cord signal change. On presentation her labs are significant for K<2.0, Mg 1.1, and CK 1500.       Post-Op Info:  * No surgery found *         Interval History: Patient reports pain is improved. She remains weak proximally L > R upper extremities. She will required decompression given onset was Saturday and no improvement. Will needs medical optimization prior to surgery.     Medications:  Continuous Infusions:  Scheduled Meds:   aspirin  81 mg Oral Daily    brimonidine 0.15 % OPTH DROP  1 drop Both Eyes TID    cholestyramine-aspartame  1 packet Oral Daily    dorzolamide  1 drop Both Eyes TID     fluticasone furoate-vilanteroL  1 puff Inhalation Daily    fluticasone propionate  1 spray Each Nostril Daily    heparin (porcine)  5,000 Units Subcutaneous Q8H    hydrALAZINE  50 mg Oral TID WM    NIFEdipine  60 mg Oral Daily    potassium chloride  10 mEq Intravenous Q1H    potassium chloride  30 mEq Oral Q3H    potassium, sodium phosphates  2 packet Oral Q4H    travoprost  1 drop Both Eyes QHS     PRN Meds:albuterol, dextrose 10%, dextrose 10%, diphenoxylate-atropine 2.5-0.025 mg, glucagon (human recombinant), glucose, glucose, melatonin, naloxone, oxyCODONE-acetaminophen, prochlorperazine, sodium chloride 0.9%, sodium chloride 0.9%     Review of Systems  Objective:     Weight: 67.6 kg (149 lb)  Body mass index is 29.59 kg/m².  Vital Signs (Most Recent):  Temp: 98.3 °F (36.8 °C) (07/24/22 1518)  Pulse: 76 (07/25/22 1338)  Resp: 18 (07/25/22 1338)  BP: 135/60 (07/25/22 1338)  SpO2: 97 % (07/25/22 0920) Vital Signs (24h Range):  Pulse:  [63-85] 76  Resp:  [14-24] 18  SpO2:  [96 %-100 %] 97 %  BP: (119-200)/(60-89) 135/60     Date 07/25/22 0700 - 07/26/22 0659   Shift 0019-2490 6654-5047 2223-3965 24 Hour Total   INTAKE   IV Piggyback  100  100   Shift Total(mL/kg)  100(1.5)  100(1.5)   OUTPUT   Urine(mL/kg/hr) 300(0.6)   300   Shift Total(mL/kg) 300(4.4)   300(4.4)   Weight (kg) 67.6 67.6 67.6 67.6                        Closed/Suction Drain 04/10/17 1135 Right Breast Bulb 10 Fr. (Active)            Closed/Suction Drain 04/10/17 1135 Left Breast Bulb 10 Fr. (Active)       Female External Urinary Catheter 07/25/22 0612 (Active)       Neurosurgery Physical Exam  General: well developed, well nourished, no distress.   Head: normocephalic, atraumatic  Neurologic: Alert and oriented. Thought content appropriate.  Cranial nerves: face symmetric, tongue midline, CN II-XII grossly intact.   Eyes: pupils equal, round, reactive to light with accommodation, EOMI.   Pulmonary: normal respirations, no signs of  respiratory distress  Skin: Skin is warm, dry and intact.  Sensory: intact to light touch throughout  Motor Strength:    Strength  Deltoids Triceps Biceps Wrist Extension Wrist Flexion Hand    Upper: R 3/5 4/5 4/5 4/5 4/5 4/5    L 2/5 3/5 4/5 4/5 4/5 4/5     Iliopsoas Quadriceps Knee  Flexion Tibialis  anterior Gastro- cnemius EHL   Lower: R 3/5 4/5 4/5 4/5 4/5 4/5    L 2/5 4/5 4/5 4/5 4/5 4/5       Balderas's: +R balderas's.  Clonus: Negative.       Significant Labs:  Recent Labs   Lab 07/24/22  1704 07/25/22  0001 07/25/22  0141 07/25/22  0939 07/25/22  1507   *  --  105 92 95   *  --  149* 143 143   K <2.0*  --  <2.0* 2.1* <2.0*   *  --  119* 114* 115*   CO2 12*  --  18* 20* 17*   BUN 24*  --  25* 26* 24*   CREATININE 1.5*  --  1.4 1.5* 1.4   CALCIUM 9.5  --  9.2 8.6* 8.1*   MG 1.1* 2.2  --  1.8  --      Recent Labs   Lab 07/24/22  1704   WBC 10.18   HGB 12.7   HCT 39.5        No results for input(s): LABPT, INR, APTT in the last 48 hours.  Microbiology Results (last 7 days)       Procedure Component Value Units Date/Time    Urine culture [[id number]]  (Abnormal) Collected: 07/24/22 1957    Order Status: Completed Specimen: Urine Updated: 07/25/22 1551     Urine Culture, Routine GRAM NEGATIVE FEDE  10,000 - 49,999 cfu/ml  Identification and susceptibility pending  No other significant isolate      Narrative:      ADD ON URINE OSMOLALITY, POTASSIUM, SODIUM AND PROTEIN/CREATININE   RATIO PER DR EMANUEL SWEENEY/ORDER# [id number], [id number], [id number] AND   [id number] @ 5:34AM       Specimen Source->Urine          All pertinent labs from the last 24 hours have been reviewed.    Imaging Results              X-Ray Cervical Spine AP Lat with Flexion Extension (Final result)  Result time 07/25/22 15:16:26      Final result by John Wu IV, MD (07/25/22 15:16:26)                   Impression:      No acute abnormality.  Cervical spondylosis as above.      Electronically signed by: John  Jazmin  Date:    07/25/2022  Time:    15:16               Narrative:    EXAMINATION:  XR CERVICAL SPINE AP LAT WITH FLEX EXTEN    CLINICAL HISTORY:  cervical stenosis;    TECHNIQUE:  Five views of the cervical spine including flexion and extension views were.    COMPARISON:  CT cervical spine from earlier today.  MRI cervical spine from 10/24/2022.    FINDINGS:  C1-C2: Pre-dens space is maintained.  Dens and lateral masses of C1 appear within normal limits although poorly characterized.    Alignment: Stable stepwise grade 1 anterolisthesis of C5 on C6 and C6 on C7.  No evidence of dynamic instability on flexion/extension views.    Vertebrae: Vertebral body heights are maintained.  No suspicious appearing lytic or blastic lesions.    Discs and facets: Multilevel mild to moderate disc space narrowing most prominent at C3-C4 and C4-C5.  Multilevel anterior osteophyte formation.  Moderate facet arthropathy.    Miscellaneous: Prevertebral soft tissues are unremarkable.                                       CT Cervical Spine Without Contrast (Final result)  Result time 07/25/22 13:20:46      Final result by Talat Zhao MD (07/25/22 13:20:46)                   Impression:      Degenerative changes resulting in high-grade canal and foraminal stenosis at multiple levels, better demonstrated on yesterday's cervical spine MRI.    No acute fractures or traumatic malalignment.      Electronically signed by: Talat Zhao  Date:    07/25/2022  Time:    13:20               Narrative:    EXAMINATION:  CT CERVICAL SPINE WITHOUT CONTRAST    CLINICAL HISTORY:  cervical stenosis;    TECHNIQUE:  Low dose axial images, sagittal and coronal reformations were performed though the cervical spine.  Contrast was not administered.    COMPARISON:  MRI cervical spine 07/24/2022; CT head 07/24/2022    FINDINGS:  Straightening of the normal cervical lordosis.  Stepwise grade 1 anterolisthesis spanning C5-T1.    No fractures.  No focal osseous  lesions.    Multilevel disc, uncovertebral, and facet joint degeneration.  Degenerative changes result in high-grade canal and foraminal stenosis at multiple levels, better demonstrated on yesterday's cervical spine MRI.  Degenerative changes also involve the atlantodental joint.    Visualized structures in the posterior fossa are unremarkable. The cervical spinal cord is grossly unremarkable.    Vascular calcifications.  Paraspinal soft tissues are otherwise unremarkable.  Lung apices are clear.                                       CT Head Without Contrast (Final result)  Result time 07/24/22 20:19:13      Final result by Baljit Weber MD (07/24/22 20:19:13)                   Impression:      No CT evidence of acute intracranial abnormality.  If the patient has an acute, focal neurological deficit, MRI of the brain may be indicated.      Electronically signed by: Baljit Weber MD  Date:    07/24/2022  Time:    20:19               Narrative:    EXAMINATION:  CT HEAD WITHOUT CONTRAST    CLINICAL HISTORY:  Neuro deficit, acute, stroke suspected;    TECHNIQUE:  Low dose axial images were obtained through the head.  Coronal and sagittal reformations were also performed. Contrast was not administered.    COMPARISON:  None.    FINDINGS:  Mild generalized cerebral volume loss and minimal periventricular white matter hypoattenuation suggestive of chronic microvascular ischemic change.  Punctate remote lacunar infarct in the left basal ganglia.    No evidence of acute territorial infarct, hemorrhage, mass effect, or midline shift.    Ventricles are normal in size and configuration.    No displaced calvarial fracture.    Visualized paranasal sinuses and mastoid air cells are essentially clear.                                        MRI Spine Cervical-Thoracic-Lumbar Without Contrast (XPD) (Final result)  Result time 07/24/22 20:01:59      Final result by Dimitrios Mckeon MD (07/24/22 20:01:59)                    Impression:      Cervical spine:    Disc osteophyte complexes with disc protrusions resulting in moderate to severe spinal canal narrowing at the C3-C4 and C4-C5 levels.  No cord signal abnormality.  Spine surgery consultation is recommended.    Thoracic spine:    Mild multilevel degenerative changes as above.  No significant spinal canal neural foraminal narrowing.  No cord signal abnormality.    Lumbar spine:    Mild multilevel degenerative changes as above.    No significant neural foraminal or spinal canal narrowing.    This report was flagged in Epic as abnormal.    Electronically signed by resident: Brittany Cooper  Date:    07/24/2022  Time:    18:22    Electronically signed by: Dimitrios Mckeon MD  Date:    07/24/2022  Time:    20:01               Narrative:    EXAMINATION:  MRI SPINE CERVICAL-THORACIC-LUMBAR WITHOUT CONTRAST (XPD)    CLINICAL HISTORY:  Myelopathy, acute;    TECHNIQUE:  Multiplanar, multisequence MR images of the cervical , thoracic and lumbar spine without IV contrast.    COMPARISON:  CT abdomen pelvis: 01/02/2020.    FINDINGS:  Skull base and craniocervical junction: Normal.    CERVICAL SPINE:    Alignment: There is minimal anterolisthesis of C5 on C6 and C6 on C7.    Vertebrae: Normal marrow signal with probable scattered intraosseous hemangioma.  No fracture.    Discs: Multilevel degenerative disease, most prominent at the level of C3-C4 and C4-C5.    Cord: No intramedullary signal abnormality.    Degenerative findings:    C2-C3: Posterior disc osteophyte complex, bilateral uncovertebral spurring and bilateral facet joint arthropathy, resulting in moderate left-sided neural foraminal narrowing.  No spinal canal stenosis.    C3-C4: Posterior disc osteophyte complex, bilateral uncovertebral spurring and bilateral facet joint arthropathy, resulting in moderate to severe spinal canal stenosis.  No neural foraminal narrowing.    C4-C5: Posterior disc osteophyte complex, bilateral uncovertebral  spurring and bilateral facet joint arthropathy, resulting in severe spinal canal stenosis and moderate left/mild right neural foraminal narrowing.    C5-C6: Posterior disc osteophyte complex and bilateral uncovertebral spurring abutting the ventral thecal sac.  No spinal canal stenosis or neural foraminal narrowing.    C6-C7: Posterior disc osteophyte complex and bilateral uncovertebral spurring, resulting in mild spinal canal stenosis.  No neural foraminal narrowing.    C7-T1: No spinal canal stenosis or neural foraminal narrowing.    T1-T2: Posterior disc osteophyte complex abutting the ventral thecal sac.  No spinal canal stenosis or neural foraminal narrowing.    THORACIC SPINE:    Alignment: Normal.    Vertebrae: Normal marrow signal. No fracture.    Discs: Multilevel degenerative disease, most prominent at the level of T6-T7.    Cord: No intramedullary signal abnormality.    No spinal canal stenosis or neural foraminal narrowing throughout the thoracic spine.    LUMBAR SPINE:    Alignment: Normal.    Vertebrae: Normal marrow signal. No fracture.    Discs: Multilevel degenerative disease, most prominent in the lower lumbar vertebral discs.    Cord: Unremarkable.  The conus terminates at the level of L1-L2.    Degenerative findings:    T12-L1: No spinal canal stenosis or neural foraminal narrowing.    L1-L2: No spinal canal stenosis or neural foraminal narrowing.    L2-L3: No spinal canal stenosis or neural foraminal narrowing.    L3-L4: Circumferential disc bulge, bilateral facet joint arthropathy and ligamentum flavum thickening, resulting in moderate spinal canal stenosis.  No neural foraminal narrowing.    L4-L5: Circumferential disc bulge, bilateral facet joint arthropathy and ligamentum flavum thickening, resulting in moderate spinal canal stenosis.  No neural foraminal narrowing.    L5-S1: Circumferential disc bulge and bilateral facet joint arthropathy, resulting in moderate left and mild right neural  foraminal narrowing.  No spinal canal stenosis.    MISCELLANEOUS:    Multiple bilateral thyroid nodules.    Multiple bilateral renal cysts.  There is probable left-sided nonobstructing nephrolithiasis.    Paraspinal muscles & soft tissues: Unremarkable.                                       X-Ray Chest AP Portable (Final result)  Result time 07/24/22 16:43:28      Final result by Jose Celestin MD (07/24/22 16:43:28)                   Impression:      1. Bilateral basilar subsegmental atelectasis/scarring, no large focal consolidation.      Electronically signed by: Jose Celestin MD  Date:    07/24/2022  Time:    16:43               Narrative:    EXAMINATION:  XR CHEST AP PORTABLE    CLINICAL HISTORY:  Other fatigue    TECHNIQUE:  Single frontal view of the chest was performed.    COMPARISON:  11/13/2021    FINDINGS:  The cardiomediastinal silhouette is not enlarged noting calcification of the aorta..  There is no pleural effusion.  The trachea is midline.  The lungs are symmetrically expanded bilaterally with bilateral basilar subsegmental atelectasis/scarring..  No large focal consolidation seen.  There is no pneumothorax.  The osseous structures are remarkable for degenerative changes.  Surgical change noted of the proximal right humerus..                                        Assessment/Plan:     Cervical stenosis of spinal canal  Mariaelena Grubbs is a 77 y.o. female h/o cervical and lumbar stenosis, asthma, HTN, HLD, presents to ED c/o worsening neck pain and diffuse weakness since Saturday morning. MRI Csp reviewed; there is moderate to severe spinal canal narrowing at the C3-C4 and C4-C5 levels without cord signal change. On presentation her labs are significant for K<2.0, Mg 1.1, and CK 1500.     --Patient continues have proximal left > right weakness in extremities x 4 with hyperreflexia. Symptoms onset Saturday, reports no weakness prior to this day  --MRI with myelomalacia and severe  stenosis  --Given acute symptoms, will need surgical decompression during hospitalization, will need to be medically optimized and cleared. Timing will be dependent on that.   --Call neurosurgery immediately with any neuro exam changes, we will continue to follow and work with primary team regarding surgery timing.     Discussed with Dr. Lozano and Dr. Miles    Dispo: per primary        Em Champion PA-C  Neurosurgery  [facility] - Emergency Dept

## 2022-07-25 NOTE — ASSESSMENT & PLAN NOTE
No emergent surgery.  NSGY will continue to follow. If her weakness persists she may require inpatient surgery.  Will page NSGY with exam change or questions.

## 2022-07-25 NOTE — PROVIDER PROGRESS NOTES - EMERGENCY DEPT.
Encounter Date: 7/24/2022    ED Physician Progress Notes        Physician Note:   [place] of care at 11:00 p.m..  Patient has received 20 mg of IV potassium.  She has received 40 mEq p.o. potassium x2.  Her potassium on the repeat i-STAT remains less than 2.0    4:59 AM  Patient continues to have undetectable potassiums by the whole attempts to repletion.  Discussed with ICU for admission.  Given the patient's chronic severely low potassium will continue to replete and plan for telemetry admission.  Medicine assuming care of patient.  EKG consistent with potassium 1.7-2.0

## 2022-07-25 NOTE — HPI
77 year old female with past medical history of spinal stenosis with chronic pain, CKD, kidney stones, asthma, colitis s/p resection presenting to the ED with BUE and BLE weakness, abdominal pain and vomiting. Patient states since Saturday she had sudden onset weakness that has been constant and yesterday began having abdominal pain and diarrhea. Found to have metabolic acidosis with anion gap of 16. Hyponatremia 148, hypokalemia <2, CADE Cr. 1.5, CK 1558, Phos 1.8, hypoMg 1.1 and elevated . MRI imaging with several canal narrowing, but no cord compression.

## 2022-07-25 NOTE — SUBJECTIVE & OBJECTIVE
Review of patient's allergies indicates:   Allergen Reactions    Bananas [banana] Anaphylaxis    Crayfish      Hard time breathing    Iodine and iodide containing products      Told to avoid due to crayfish allergy    Avocado (laurus persea) Swelling and Rash    Kiwi (actinidia chinensis) Itching, Swelling and Rash    Latex, natural rubber Itching, Swelling and Rash    Papaya Itching, Swelling and Rash       Past Medical History:   Diagnosis Date    Anxiety 2014    Arthritis     Asthma     Asthma 2014    Cancer     Breast    Gout     HTN (hypertension) 2014    Hyperlipidemia 2014    Hypertension      Past Surgical History:   Procedure Laterality Date    APPENDECTOMY      BREAST SURGERY      HYSTERECTOMY      Port a cath placement and removal      SHOULDER ARTHROSCOPY  2012    TONSILLECTOMY       Family History    None       Tobacco Use    Smoking status: Former Smoker     Start date: 1966     Quit date: 2011     Years since quittin.5    Smokeless tobacco: Never Used   Substance and Sexual Activity    Alcohol use: No     Comment: socially    Drug use: No    Sexual activity: Not on file     Review of Systems   Constitutional:  Positive for activity change and appetite change. Negative for fever.   HENT:  Negative for congestion.    Respiratory:  Negative for shortness of breath.    Cardiovascular:  Negative for chest pain.   Gastrointestinal:  Positive for abdominal pain, nausea and vomiting. Negative for diarrhea.   Genitourinary:  Negative for dysuria.   Musculoskeletal:  Positive for back pain and neck pain.   Skin:  Positive for wound.   Neurological:  Negative for dizziness and numbness. : see HPI for pertinent positives and negatives.   Objective:     Weight: 67.6 kg (149 lb)  Body mass index is 29.59 kg/m².  Vital Signs (Most Recent):  Temp: 98.3 °F (36.8 °C) (22 1518)  Pulse: 68 (22 0332)  Resp: 14 (22 0232)  BP: 119/61 (22 0332)  SpO2: 99 % (22  0332)   Vital Signs (24h Range):  Temp:  [98.3 °F (36.8 °C)] 98.3 °F (36.8 °C)  Pulse:  [67-87] 68  Resp:  [14-24] 14  SpO2:  [96 %-100 %] 99 %  BP: (119-209)/(60-96) 119/61                          Closed/Suction Drain 04/10/17 1135 Right Breast Bulb 10 Fr. (Active)            Closed/Suction Drain 04/10/17 1135 Left Breast Bulb 10 Fr. (Active)       Physical Exam  Vitals and nursing note reviewed.   Constitutional:       Appearance: Normal appearance. She is not ill-appearing.   HENT:      Head: Normocephalic.      Right Ear: External ear normal.      Left Ear: External ear normal.      Nose: Nose normal.      Mouth/Throat:      Mouth: Mucous membranes are dry.      Pharynx: Oropharynx is clear.   Eyes:      Extraocular Movements: Extraocular movements intact.      Conjunctiva/sclera: Conjunctivae normal.      Pupils: Pupils are equal, round, and reactive to light.   Cardiovascular:      Rate and Rhythm: Normal rate and regular rhythm.      Pulses: Normal pulses.      Heart sounds: Normal heart sounds.   Pulmonary:      Effort: Pulmonary effort is normal.      Breath sounds: Normal breath sounds.   Abdominal:      General: Bowel sounds are normal.      Palpations: Abdomen is soft. There is no mass.      Tenderness: There is abdominal tenderness.      Comments: Several surgical scars over abdomen. Well healing at all site. Appearance of fungal infection in right later scar fold.    Musculoskeletal:         General: No signs of injury.      Right lower leg: Edema present.      Left lower leg: Edema present.   Skin:     General: Skin is warm and dry.      Capillary Refill: Capillary refill takes less than 2 seconds.      Findings: Rash (fungal changes in skin fold of abdomen) present.   Neurological:      Mental Status: She is alert and oriented to person, place, and time.   Psychiatric:         Mood and Affect: Mood normal.         Behavior: Behavior normal.       Physical Exam:  Nursing note and vitals  reviewed.    Eyes: Pupils are equal, round, and reactive to light. Conjunctivae are normal.     Cardiovascular: Normal rate, regular rhythm and normal pulses.     Abdominal: Soft. Bowel sounds are normal.   Several surgical scars over abdomen. Well healing at all site. Appearance of fungal infection in right later scar fold.      Psych/Behavior: She is alert. She is oriented to person, place, and time.       Significant Labs:  Recent Labs   Lab 07/24/22  1704 07/25/22  0001 07/25/22  0141   *  --  105   *  --  149*   K <2.0*  --  <2.0*   *  --  119*   CO2 12*  --  18*   BUN 24*  --  25*   CREATININE 1.5*  --  1.4   CALCIUM 9.5  --  9.2   MG 1.1* 2.2  --        Recent Labs   Lab 07/24/22  1704   WBC 10.18   HGB 12.7   HCT 39.5          No results for input(s): LABPT, INR, APTT in the last 48 hours.  Microbiology Results (last 7 days)       Procedure Component Value Units Date/Time    Urine culture [[id number]] Collected: 07/24/22 1957    Order Status: No result Specimen: Urine Updated: 07/24/22 2029          All pertinent labs from the last 24 hours have been reviewed.    Significant Diagnostics:  I have reviewed all pertinent imaging results/findings within the past 24 hours.  CT Head Without Contrast    Result Date: 7/24/2022  No CT evidence of acute intracranial abnormality.  If the patient has an acute, focal neurological deficit, MRI of the brain may be indicated. Electronically signed by: Baljit Weber MD Date:    07/24/2022 Time:    20:19    X-Ray Chest AP Portable    Result Date: 7/24/2022  1. Bilateral basilar subsegmental atelectasis/scarring, no large focal consolidation. Electronically signed by: Jose Celestin MD Date:    07/24/2022 Time:    16:43    MRI Spine Cervical-Thoracic-Lumbar Without Contrast (XPD)    Result Date: 7/24/2022  Cervical spine: Disc osteophyte complexes with disc protrusions resulting in moderate to severe spinal canal narrowing at the C3-C4 and C4-C5  levels.  No cord signal abnormality.  Spine surgery consultation is recommended. Thoracic spine: Mild multilevel degenerative changes as above.  No significant spinal canal neural foraminal narrowing.  No cord signal abnormality. Lumbar spine: Mild multilevel degenerative changes as above. No significant neural foraminal or spinal canal narrowing. This report was flagged in Epic as abnormal. Electronically signed by resident: Brittany Cooper Date:    07/24/2022 Time:    18:22 Electronically signed by: Dimitrios Mckeon MD Date:    07/24/2022 Time:    20:01         CRANIAL NERVES     CN III, IV, VI   Pupils are equal, round, and reactive to light.

## 2022-07-25 NOTE — MEDICAL/APP STUDENT
"History & Physical  [facility] [place] [facility] HOSP MED 4    SUBJECTIVE:   Chief Complaint/Reason for Admission: hypoklalema    History of Present Illness:  Patient is a 77 y.o. female w/ hx of asthma, HTN, HLD, multiple bowel resections, chronic pyelonephritis, who presents to the hospital for weakness. Patient states 2 days ago she began to feel extremely week. States that at baselines she is independent but now has difficulty moving arms. Furthermore, she states that she felt a weird sensation in her neck while folding the laundry. Patient denies any trauma. She notes of hx of cervical and lumbar stenosis and states she has some pain secondary to that.     Patient states that the day her weakness started, she had a few episodes of non-bloody vomiting. Unable to quantify how much. She also notes she has chronic diarrhea after last bowel resection 2018. She states she has an episode of diarrhea "basically any time after [she] eats". Pt states she has some abdominal pain near her surgical scar from 2018. Worried there is some discharge.     In the ED, patient was hypertensive at 209/86 and potassium <2.0. CK was 1558 and magnesium at 1.1. Her EKG showed prolonged QT. In the ED, her potassium was repleted and ICU was consulted.     Patient states that when she was treated for Hypokalemia in April 2022, she did not have as much weakness then.     Old chart was reviewed.    Past Medical History:   Diagnosis Date    Anxiety 4/13/2014    Arthritis     Asthma     Asthma 4/13/2014    Cancer     Breast    Gout     HTN (hypertension) 4/13/2014    Hyperlipidemia 4/13/2014    Hypertension        Past Surgical History:   Procedure Laterality Date    APPENDECTOMY      BREAST SURGERY      HYSTERECTOMY      Port a cath placement and removal      SHOULDER ARTHROSCOPY  2012    TONSILLECTOMY        History reviewed. No pertinent family history.    Social History     Tobacco Use    Smoking status: Former Smoker     Start date: 1/1/1966     " Quit date: 2011     Years since quittin.5    Smokeless tobacco: Never Used   Substance Use Topics    Alcohol use: No     Comment: socially    Drug use: No      Review of patient's allergies indicates:   Allergen Reactions    Bananas [banana] Anaphylaxis    Crayfish      Hard time breathing    Iodine and iodide containing products      Told to avoid due to crayfish allergy    Avocado (laurus persea) Swelling and Rash    Kiwi (actinidia chinensis) Itching, Swelling and Rash    Latex, natural rubber Itching, Swelling and Rash    Papaya Itching, Swelling and Rash       No current facility-administered medications on file prior to encounter.     Current Outpatient Medications on File Prior to Encounter   Medication Sig Dispense Refill    albuterol (ACCUNEB) 1.25 mg/3 mL Nebu Take 1.25 mg by nebulization every 6 (six) hours as needed.      albuterol (PROVENTIL) 2.5 mg /3 mL (0.083 %) nebulizer solution Inhale 2.5 mg into the lungs.      albuterol (PROVENTIL/VENTOLIN HFA) 90 mcg/actuation inhaler Inhale into the lungs.      ALBUTEROL SULFATE (PROAIR HFA INHL) Inhale into the lungs.      alprazolam (XANAX) 2 MG Tab Take by mouth nightly as needed.      aspirin (ECOTRIN) 81 MG EC tablet Take 81 mg by mouth.      brimonidine 0.2% (ALPHAGAN) 0.2 % Drop instill 1 drop by ophthalmic route  every 8 hours into affected eye(s)      cholestyramine-aspartame (QUESTRAN LIGHT) 4 gram PwPk take 1 packet by oral route  every day      diphenoxylate-atropine 2.5-0.025 mg (LOMOTIL) 2.5-0.025 mg per tablet Take 1 tablet by mouth 4 (four) times daily as needed.      dorzolamide (TRUSOPT) 2 % ophthalmic solution instill 1 drop by ophthalmic route 3 times every day into affected eye(s)      EPINEPHRINE (EPIPEN INJ) Inject as directed.      fluconazole (DIFLUCAN) 150 MG Tab Take 100 mg by mouth once daily.      fluticasone propionate (FLONASE) 50 mcg/actuation nasal spray 1 spray in each nostril       fluticasone-salmeterol 250-50 mcg/dose (ADVAIR) 250-50 mcg/dose diskus inhaler Inhale 1 puff into the lungs 2 (two) times daily.      hydrALAZINE (APRESOLINE) 50 MG tablet 1 tablet with food      hydrocortisone 2.5 % cream Apply topically 2 (two) times daily. 20 g 0    ibuprofen (ADVIL,MOTRIN) 800 MG tablet Take 800 mg by mouth every 6 (six) hours as needed.      NIFEdipine (ADALAT CC) 60 MG TbSR Take 60 mg by mouth once daily.      ondansetron (ZOFRAN-ODT) 4 MG TbDL 1 tablet on the tongue and allow to dissolve      oxyCODONE-acetaminophen (PERCOCET) 5-325 mg per tablet Take 1 tablet by mouth every 6 (six) hours as needed. (Patient not taking: Reported on 12/1/2021) 12 tablet 0    predniSONE (DELTASONE) 20 MG tablet Take 20 mg by mouth 2 (two) times daily.      promethazine (PHENERGAN) 6.25 mg/5 mL syrup Take 5 mLs by mouth every 6 (six) hours.      travoprost, benzalkonium, (TRAVATAN) 0.004 % ophthalmic solution Apply 1 drop to eye nightly.      valsartan-hydrochlorothiazide (DIOVAN-HCT) 320-25 mg per tablet Take 1 tablet by mouth once daily.          Review of Systems:  Constitutional: no fever or chills  Eyes: no visual changes  ENT: no nasal congestion or sore throat  Respiratory: no cough or shortness of breath  Cardiovascular: no chest pain or palpitations  Gastrointestinal: positive for abdominal pain, diarrhea and vomiting  Genitourinary: negative for dysuria, frequency, hematuria, nocturia and urinary incontinence  Musculoskeletal: positive for bone pain and muscle weakness  Neurological: no seizures or tremors  Behavioral/Psych: no auditory or visual hallucinations     OBJECTIVE:     Vital Signs (Most Recent)   Temp: 98.3 °F (36.8 °C) (07/24/22 1518)  Pulse: 76 (07/25/22 1338)  Resp: 18 (07/25/22 1338)  BP: 135/60 (07/25/22 1338)  SpO2: 97 % (07/25/22 0920)  Body mass index is 29.59 kg/m².      Physical Exam  Constitutional:       General: She is awake.      Appearance: Normal appearance. She is  not ill-appearing or toxic-appearing.   HENT:      Head: Normocephalic and atraumatic.   Eyes:      Extraocular Movements: Extraocular movements intact.      Pupils: Pupils are equal, round, and reactive to light.   Cardiovascular:      Rate and Rhythm: Normal rate and regular rhythm.      Heart sounds: Normal heart sounds.   Pulmonary:      Effort: Pulmonary effort is normal.      Breath sounds: Normal breath sounds and air entry.   Abdominal:      Tenderness: There is generalized abdominal tenderness. There is no right CVA tenderness, left CVA tenderness or guarding.   Musculoskeletal:      Right lower leg: No edema.      Left lower leg: No edema.   Skin:     Capillary Refill: Capillary refill takes less than 2 seconds.      Comments: Scar to mid-umbilicus. Scar not draining, dry, no signs of pus   Neurological:      General: No focal deficit present.      Mental Status: She is alert and oriented to person, place, and time.   Psychiatric:         Mood and Affect: Mood normal.           Laboratory:  CBC/Anemia Labs: Coags:    Recent Labs   Lab 07/24/22  1704   WBC 10.18   HGB 12.7   HCT 39.5      MCV 95   RDW 15.2*    No results for input(s): PT, INR, APTT in the last 168 hours.     Chemistries: ABG:   Recent Labs   Lab 07/24/22  1704 07/25/22  0001 07/25/22  0141 07/25/22  0939   *  --  149* 143   K <2.0*  --  <2.0* 2.1*   *  --  119* 114*   CO2 12*  --  18* 20*   BUN 24*  --  25* 26*   CREATININE 1.5*  --  1.4 1.5*   CALCIUM 9.5  --  9.2 8.6*   PROT 7.5  --   --   --    BILITOT 1.5*  --   --   --    ALKPHOS 90  --   --   --    ALT 28  --   --   --    AST 44*  --   --   --    MG 1.1* 2.2  --  1.8   PHOS 1.8*  --   --  2.0*    Recent Labs   Lab 07/24/22 1953   PH 7.218*   PCO2 45.7*   PO2 23*   HCO3 18.6*   POCSATURATED 29*   BE -9            Diagnostic Results:  Imaging Results          X-Ray Cervical Spine AP Lat with Flexion Extension (Final result)  Result time 07/25/22 15:16:26    Final  result by John Wu IV, MD (07/25/22 15:16:26)                 Impression:      No acute abnormality.  Cervical spondylosis as above.      Electronically signed by: John Wu  Date:    07/25/2022  Time:    15:16             Narrative:    EXAMINATION:  XR CERVICAL SPINE AP LAT WITH FLEX EXTEN    CLINICAL HISTORY:  cervical stenosis;    TECHNIQUE:  Five views of the cervical spine including flexion and extension views were.    COMPARISON:  CT cervical spine from earlier today.  MRI cervical spine from 10/24/2022.    FINDINGS:  C1-C2: Pre-dens space is maintained.  Dens and lateral masses of C1 appear within normal limits although poorly characterized.    Alignment: Stable stepwise grade 1 anterolisthesis of C5 on C6 and C6 on C7.  No evidence of dynamic instability on flexion/extension views.    Vertebrae: Vertebral body heights are maintained.  No suspicious appearing lytic or blastic lesions.    Discs and facets: Multilevel mild to moderate disc space narrowing most prominent at C3-C4 and C4-C5.  Multilevel anterior osteophyte formation.  Moderate facet arthropathy.    Miscellaneous: Prevertebral soft tissues are unremarkable.                               CT Cervical Spine Without Contrast (Final result)  Result time 07/25/22 13:20:46    Final result by Talat Zhao MD (07/25/22 13:20:46)                 Impression:      Degenerative changes resulting in high-grade canal and foraminal stenosis at multiple levels, better demonstrated on yesterday's cervical spine MRI.    No acute fractures or traumatic malalignment.      Electronically signed by: Talat Zhao  Date:    07/25/2022  Time:    13:20             Narrative:    EXAMINATION:  CT CERVICAL SPINE WITHOUT CONTRAST    CLINICAL HISTORY:  cervical stenosis;    TECHNIQUE:  Low dose axial images, sagittal and coronal reformations were performed though the cervical spine.  Contrast was not administered.    COMPARISON:  MRI cervical spine  07/24/2022; CT head 07/24/2022    FINDINGS:  Straightening of the normal cervical lordosis.  Stepwise grade 1 anterolisthesis spanning C5-T1.    No fractures.  No focal osseous lesions.    Multilevel disc, uncovertebral, and facet joint degeneration.  Degenerative changes result in high-grade canal and foraminal stenosis at multiple levels, better demonstrated on yesterday's cervical spine MRI.  Degenerative changes also involve the atlantodental joint.    Visualized structures in the posterior fossa are unremarkable. The cervical spinal cord is grossly unremarkable.    Vascular calcifications.  Paraspinal soft tissues are otherwise unremarkable.  Lung apices are clear.                               CT Head Without Contrast (Final result)  Result time 07/24/22 20:19:13    Final result by Baljit Weber MD (07/24/22 20:19:13)                 Impression:      No CT evidence of acute intracranial abnormality.  If the patient has an acute, focal neurological deficit, MRI of the brain may be indicated.      Electronically signed by: Baljit Weber MD  Date:    07/24/2022  Time:    20:19             Narrative:    EXAMINATION:  CT HEAD WITHOUT CONTRAST    CLINICAL HISTORY:  Neuro deficit, acute, stroke suspected;    TECHNIQUE:  Low dose axial images were obtained through the head.  Coronal and sagittal reformations were also performed. Contrast was not administered.    COMPARISON:  None.    FINDINGS:  Mild generalized cerebral volume loss and minimal periventricular white matter hypoattenuation suggestive of chronic microvascular ischemic change.  Punctate remote lacunar infarct in the left basal ganglia.    No evidence of acute territorial infarct, hemorrhage, mass effect, or midline shift.    Ventricles are normal in size and configuration.    No displaced calvarial fracture.    Visualized paranasal sinuses and mastoid air cells are essentially clear.                                MRI Spine Cervical-Thoracic-Lumbar  Without Contrast (XPD) (Final result)  Result time 07/24/22 20:01:59    Final result by Dimitrios Mckeon MD (07/24/22 20:01:59)                 Impression:      Cervical spine:    Disc osteophyte complexes with disc protrusions resulting in moderate to severe spinal canal narrowing at the C3-C4 and C4-C5 levels.  No cord signal abnormality.  Spine surgery consultation is recommended.    Thoracic spine:    Mild multilevel degenerative changes as above.  No significant spinal canal neural foraminal narrowing.  No cord signal abnormality.    Lumbar spine:    Mild multilevel degenerative changes as above.    No significant neural foraminal or spinal canal narrowing.    This report was flagged in Epic as abnormal.    Electronically signed by resident: Brittany Cooper  Date:    07/24/2022  Time:    18:22    Electronically signed by: Dimitrios Mckeon MD  Date:    07/24/2022  Time:    20:01             Narrative:    EXAMINATION:  MRI SPINE CERVICAL-THORACIC-LUMBAR WITHOUT CONTRAST (XPD)    CLINICAL HISTORY:  Myelopathy, acute;    TECHNIQUE:  Multiplanar, multisequence MR images of the cervical , thoracic and lumbar spine without IV contrast.    COMPARISON:  CT abdomen pelvis: 01/02/2020.    FINDINGS:  Skull base and craniocervical junction: Normal.    CERVICAL SPINE:    Alignment: There is minimal anterolisthesis of C5 on C6 and C6 on C7.    Vertebrae: Normal marrow signal with probable scattered intraosseous hemangioma.  No fracture.    Discs: Multilevel degenerative disease, most prominent at the level of C3-C4 and C4-C5.    Cord: No intramedullary signal abnormality.    Degenerative findings:    C2-C3: Posterior disc osteophyte complex, bilateral uncovertebral spurring and bilateral facet joint arthropathy, resulting in moderate left-sided neural foraminal narrowing.  No spinal canal stenosis.    C3-C4: Posterior disc osteophyte complex, bilateral uncovertebral spurring and bilateral facet joint arthropathy, resulting in  moderate to severe spinal canal stenosis.  No neural foraminal narrowing.    C4-C5: Posterior disc osteophyte complex, bilateral uncovertebral spurring and bilateral facet joint arthropathy, resulting in severe spinal canal stenosis and moderate left/mild right neural foraminal narrowing.    C5-C6: Posterior disc osteophyte complex and bilateral uncovertebral spurring abutting the ventral thecal sac.  No spinal canal stenosis or neural foraminal narrowing.    C6-C7: Posterior disc osteophyte complex and bilateral uncovertebral spurring, resulting in mild spinal canal stenosis.  No neural foraminal narrowing.    C7-T1: No spinal canal stenosis or neural foraminal narrowing.    T1-T2: Posterior disc osteophyte complex abutting the ventral thecal sac.  No spinal canal stenosis or neural foraminal narrowing.    THORACIC SPINE:    Alignment: Normal.    Vertebrae: Normal marrow signal. No fracture.    Discs: Multilevel degenerative disease, most prominent at the level of T6-T7.    Cord: No intramedullary signal abnormality.    No spinal canal stenosis or neural foraminal narrowing throughout the thoracic spine.    LUMBAR SPINE:    Alignment: Normal.    Vertebrae: Normal marrow signal. No fracture.    Discs: Multilevel degenerative disease, most prominent in the lower lumbar vertebral discs.    Cord: Unremarkable.  The conus terminates at the level of L1-L2.    Degenerative findings:    T12-L1: No spinal canal stenosis or neural foraminal narrowing.    L1-L2: No spinal canal stenosis or neural foraminal narrowing.    L2-L3: No spinal canal stenosis or neural foraminal narrowing.    L3-L4: Circumferential disc bulge, bilateral facet joint arthropathy and ligamentum flavum thickening, resulting in moderate spinal canal stenosis.  No neural foraminal narrowing.    L4-L5: Circumferential disc bulge, bilateral facet joint arthropathy and ligamentum flavum thickening, resulting in moderate spinal canal stenosis.  No neural  foraminal narrowing.    L5-S1: Circumferential disc bulge and bilateral facet joint arthropathy, resulting in moderate left and mild right neural foraminal narrowing.  No spinal canal stenosis.    MISCELLANEOUS:    Multiple bilateral thyroid nodules.    Multiple bilateral renal cysts.  There is probable left-sided nonobstructing nephrolithiasis.    Paraspinal muscles & soft tissues: Unremarkable.                               X-Ray Chest AP Portable (Final result)  Result time 07/24/22 16:43:28    Final result by Jose Celestin MD (07/24/22 16:43:28)                 Impression:      1. Bilateral basilar subsegmental atelectasis/scarring, no large focal consolidation.      Electronically signed by: Jose Celestin MD  Date:    07/24/2022  Time:    16:43             Narrative:    EXAMINATION:  XR CHEST AP PORTABLE    CLINICAL HISTORY:  Other fatigue    TECHNIQUE:  Single frontal view of the chest was performed.    COMPARISON:  11/13/2021    FINDINGS:  The cardiomediastinal silhouette is not enlarged noting calcification of the aorta..  There is no pleural effusion.  The trachea is midline.  The lungs are symmetrically expanded bilaterally with bilateral basilar subsegmental atelectasis/scarring..  No large focal consolidation seen.  There is no pneumothorax.  The osseous structures are remarkable for degenerative changes.  Surgical change noted of the proximal right humerus..                                  ASSESSMENT/PLAN:     Active Hospital Problems    Diagnosis  POA    *Hypokalemia [E87.6]  Yes     Chronic    Metabolic acidosis [E87.2]  Yes    Hypernatremia [E87.0]  Yes    Hyperchloremia [E87.8]  Yes    Surgical wound infection [T81.49XA]  Yes    Glaucoma of right eye [H40.9]  Yes    Chronic diarrhea [K52.9]  Yes     Chronic    Cervical stenosis of spinal canal [M48.02]  Yes    CKD (chronic kidney disease) stage 3, GFR 30-59 ml/min [N18.30]  Yes    Anxiety [F41.9]  Yes    Asthma [J45.909]  Yes     "HTN (hypertension) [I10]  Yes     /82.   Asymptomatic  Recommend to follow up with PCP         Resolved Hospital Problems   No resolved problems to display.     Ms. Grubbs is a 77 year old patient hx of cervical and lumbar stenosis, asthma, HTN, HLD and chronic diarrhea who presents to the ED for weakness. Weakness likely attributed to hypokalemia. Hypokalemia likely from GI losses, but can also include RTA, polydipsia, hypomagnesia and albuterol over use.     Electrolyte abnormalities   Patient labs showed initial elevated sodium at 148. Sodium now at 143. Potassium initially <2, now at 2.1. Magnesium initially at 1.1, now at 1.8. Phosphorus at 2.0 today. Likely cause to patient's weakness. Elevated creatine kinase likely from dehydration as well.  -BMP q8 hours to monitor levels  -Daily CK levels  -Potassium chloride 10 mEq in 100mL IVPB, for 5 doses  -Potassium sodium phosphates 2 packet  -Potassium chloride CR capsule 30 mEq  -Goal Mg>2  -Diphenoxylate-atropine for diarrhea  -Prochlorperazine   -Taken off .45%NS due to hypernatremia, continue to monitor sodium levels   -PT/OT  -Low salt diet     CADE  Patient with Cr of 1.5. Cr not improving.   -Continue to monitor Creatinine. Likely secondary for hypoperfusion and dehydration.   -Avoid nephrotoxic agents  -Hold valsartan/HCTZ   -low salt diet    Cervical Stenosis  MRI C-T-L spine shows "severe spinal canal narrowing at C3-C4 and C4-C5" and "mild multilevel degenerative changes in lumbar spine, no significant neural foraminal or spinal canal narrowing".  -Neurosurgery consulted   -No emergent surgery needed  -Will continue to follow, if weakness persists, may require inpatient surgery    HTN  Patient with HTN at home. Blood pressure initially hypertensive. Well managed now  -Continue home meds of hydralazine and nicardipine  -Hold valsartan/HCTZ in setting of CADE     Asthma  Patient with hx of asthma. Uses inhaler 2x a day, uses nebulizer 2x a day. CXR " "shows "bilateral basilar subsegmental atelectasis/scaring".   -Hold scheduled albuterol in setting of hypokalemia  -Albuterol PRN   -Incentive spirometer     Surgical wound pain  Pain at surgical site from 2018.   -Wound care consulted   -Oxycodone-acetaminophen for 7-10/10 pain    Glaucoma  -Continue home medications     DVT ppx-Heparin q8 5000 units  CODE status- Full      "

## 2022-07-25 NOTE — ASSESSMENT & PLAN NOTE
Mariaelena Grubbs is a 77 y.o. female h/o cervical and lumbar stenosis, asthma, HTN, HLD, presents to ED c/o worsening neck pain and diffuse weakness since Saturday morning. MRI Csp reviewed; there is moderate to severe spinal canal narrowing at the C3-C4 and C4-C5 levels without cord signal change. On presentation her labs are significant for K<2.0, Mg 1.1, and CK 1500.     --Patient continues have proximal left > right weakness in extremities x 4 with hyperreflexia. Symptoms onset Saturday, reports no weakness prior to this day  --MRI with myelomalacia and severe stenosis  --Given acute symptoms, will need surgical decompression during hospitalization, will need to be medically optimized and cleared. Timing will be dependent on that.   --Call neurosurgery immediately with any neuro exam changes, we will continue to follow and work with primary team regarding surgery timing.     Discussed with Dr. Lozano and Dr. Miles    Dispo: per primary

## 2022-07-25 NOTE — SUBJECTIVE & OBJECTIVE
(Not in a hospital admission)      Review of patient's allergies indicates:   Allergen Reactions    Bananas [banana] Anaphylaxis    Crayfish      Hard time breathing    Iodine and iodide containing products      Told to avoid due to crayfish allergy    Avocado (laurus persea) Swelling and Rash    Kiwi (actinidia chinensis) Itching, Swelling and Rash    Latex, natural rubber Itching, Swelling and Rash    Papaya Itching, Swelling and Rash       Past Medical History:   Diagnosis Date    Anxiety 2014    Arthritis     Asthma     Asthma 2014    Cancer     Breast    Gout     HTN (hypertension) 2014    Hyperlipidemia 2014    Hypertension      Past Surgical History:   Procedure Laterality Date    APPENDECTOMY      BREAST SURGERY      HYSTERECTOMY      Port a cath placement and removal      SHOULDER ARTHROSCOPY  2012    TONSILLECTOMY       Family History    None       Tobacco Use    Smoking status: Former Smoker     Start date: 1966     Quit date: 2011     Years since quittin.5    Smokeless tobacco: Never Used   Substance and Sexual Activity    Alcohol use: No     Comment: socially    Drug use: No    Sexual activity: Not on file     Review of Systems: see HPI for pertinent positives and negatives.   Objective:     Weight: 67.6 kg (149 lb)  Body mass index is 29.59 kg/m².  Vital Signs (Most Recent):  Temp: 98.3 °F (36.8 °C) (22 1518)  Pulse: 85 (22 1837)  Resp: 18 (22)  BP: (!) 200/89 (22)  SpO2: 100 % (22)   Vital Signs (24h Range):  Temp:  [98.3 °F (36.8 °C)] 98.3 °F (36.8 °C)  Pulse:  [83-87] 85  Resp:  [18] 18  SpO2:  [96 %-100 %] 100 %  BP: (144-209)/(69-96) 200/89                          Closed/Suction Drain 04/10/17 1135 Right Breast Bulb 10 Fr. (Active)            Closed/Suction Drain 04/10/17 1135 Left Breast Bulb 10 Fr. (Active)       Physical Exam    Neurosurgery Physical Exam    GENERAL: resting comfortably  HEENT: NCAT, PERRL, mucous  membranes moist  NECK: supple, trachea midline  CV: normal capillary refill  PULM: aerating well, symmetric expansion, no distress  ABD: ND  EXT: no c/c/e    NEURO:    AAO x 3  CN II-XII grossly intact  BUE - 2d, 4- distal  BLE - 2ip, 4- distal  SILT    + R hoffmans  No clonus    Mild TTP cervical paraspinal musculature      Significant Labs:  Recent Labs   Lab 07/24/22  1704   *   *   K <2.0*   *   CO2 12*   BUN 24*   CREATININE 1.5*   CALCIUM 9.5   MG 1.1*     Recent Labs   Lab 07/24/22  1704   WBC 10.18   HGB 12.7   HCT 39.5        No results for input(s): LABPT, INR, APTT in the last 48 hours.  Microbiology Results (last 7 days)       Procedure Component Value Units Date/Time    Urine culture [[id number]] Collected: 07/24/22 1957    Order Status: No result Specimen: Urine Updated: 07/24/22 2029          All pertinent labs from the last 24 hours have been reviewed.    Significant Diagnostics:  I have reviewed all pertinent imaging results/findings within the past 24 hours.  CT Head Without Contrast    Result Date: 7/24/2022  No CT evidence of acute intracranial abnormality.  If the patient has an acute, focal neurological deficit, MRI of the brain may be indicated. Electronically signed by: Baljit Weber MD Date:    07/24/2022 Time:    20:19    X-Ray Chest AP Portable    Result Date: 7/24/2022  1. Bilateral basilar subsegmental atelectasis/scarring, no large focal consolidation. Electronically signed by: Jose Celestin MD Date:    07/24/2022 Time:    16:43    MRI Spine Cervical-Thoracic-Lumbar Without Contrast (XPD)    Result Date: 7/24/2022  Cervical spine: Disc osteophyte complexes with disc protrusions resulting in moderate to severe spinal canal narrowing at the C3-C4 and C4-C5 levels.  No cord signal abnormality.  Spine surgery consultation is recommended. Thoracic spine: Mild multilevel degenerative changes as above.  No significant spinal canal neural foraminal narrowing.  No  cord signal abnormality. Lumbar spine: Mild multilevel degenerative changes as above. No significant neural foraminal or spinal canal narrowing. This report was flagged in Epic as abnormal. Electronically signed by resident: Brittany Cooper Date:    07/24/2022 Time:    18:22 Electronically signed by: Dimitrios Mckeon MD Date:    07/24/2022 Time:    20:01

## 2022-07-25 NOTE — CONSULTS
[facility] - Emergency Dept  Nephrology  Consult Note    Patient Name: Mariaelena Grubbs  MRN: [id number]  Admission Date: 7/24/2022  Hospital Length of Stay: 0 days  Attending Provider: Theresa Dalal MD   Primary Care Physician: Primary Doctor No  Principal Problem:Hypokalemia    Inpatient consult to Nephrology  Consult performed by: Wayne Deras MD  Consult ordered by: Darius Bhandari MD        Subjective:     HPI: Mariaelena Grubbs is a 77 y.o. female with HTN, HLD, Asthma, cervical and lumbar stenosis, chronic R sided pyelonephritis requiring R sided partial nephrectomy (2019), complicated by bowel perforation, required multiple bowel resection and R sided hemicolectomy and chronic diarrhea. Pt presents to the ED for evaluation of weakness after feeling a popping sensation in her neck. Upon initial evaluation she was noted to have severe hypokalemia (<2) and was started on replacement therapy. Neurosurgery was consulted for evaluation of her cervical spine stenosis however recommend no surgical intervention at this time. Pt refers she has 7-8 loose bowel movements per day. She denies any nausea, vomits, fevers, chest pain, palpitations or any other symptoms.              Past Medical History:   Diagnosis Date    Anxiety 4/13/2014    Arthritis     Asthma     Asthma 4/13/2014    Cancer     Breast    Gout     HTN (hypertension) 4/13/2014    Hyperlipidemia 4/13/2014    Hypertension        Past Surgical History:   Procedure Laterality Date    APPENDECTOMY      BREAST SURGERY      HYSTERECTOMY      Port a cath placement and removal      SHOULDER ARTHROSCOPY  2012    TONSILLECTOMY         Review of patient's allergies indicates:   Allergen Reactions    Bananas [banana] Anaphylaxis    Crayfish      Hard time breathing    Iodine and iodide containing products      Told to avoid due to crayfish allergy    Avocado (laurus persea) Swelling and Rash    Kiwi (actinidia chinensis) Itching, Swelling and  Rash    Latex, natural rubber Itching, Swelling and Rash    Papaya Itching, Swelling and Rash     Current Facility-Administered Medications   Medication Frequency    albuterol inhaler 2 puff Q4H PRN    aspirin EC tablet 81 mg Daily    brimonidine 0.15 % OPTH DROP ophthalmic solution 1 drop TID    cholestyramine-aspartame 4 gram packet 4 g Daily    dextrose 10% bolus 125 mL PRN    dextrose 10% bolus 250 mL PRN    diphenoxylate-atropine 2.5-0.025 mg per tablet 1 tablet QID PRN    dorzolamide 2 % ophthalmic solution 1 drop TID    fluticasone furoate-vilanteroL 100-25 mcg/dose diskus inhaler 1 puff Daily    fluticasone propionate 50 mcg/actuation nasal spray 50 mcg Daily    glucagon (human recombinant) injection 1 mg PRN    glucose chewable tablet 16 g PRN    glucose chewable tablet 24 g PRN    heparin (porcine) injection 5,000 Units Q8H    hydrALAZINE tablet 50 mg TID WM    melatonin tablet 6 mg Nightly PRN    naloxone 0.4 mg/mL injection 0.02 mg PRN    NIFEdipine 24 hr tablet 60 mg Daily    oxyCODONE-acetaminophen 5-325 mg per tablet 1 tablet Q6H PRN    potassium chloride 10 mEq in 100 mL IVPB Q1H    potassium, sodium phosphates 280-160-250 mg packet 2 packet Q4H    prochlorperazine injection Soln 5 mg Q6H PRN    sodium chloride 0.9% flush 10 mL PRN    sodium chloride 0.9% flush 10 mL Q12H PRN    travoprost 0.004 % ophthalmic solution 1 drop QHS     Current Outpatient Medications   Medication    albuterol (ACCUNEB) 1.25 mg/3 mL Nebu    albuterol (PROVENTIL) 2.5 mg /3 mL (0.083 %) nebulizer solution    albuterol (PROVENTIL/VENTOLIN HFA) 90 mcg/actuation inhaler    ALBUTEROL SULFATE (PROAIR HFA INHL)    alprazolam (XANAX) 2 MG Tab    aspirin (ECOTRIN) 81 MG EC tablet    brimonidine 0.2% (ALPHAGAN) 0.2 % Drop    cholestyramine-aspartame (QUESTRAN LIGHT) 4 gram PwPk    diphenoxylate-atropine 2.5-0.025 mg (LOMOTIL) 2.5-0.025 mg per tablet    dorzolamide (TRUSOPT) 2 % ophthalmic solution     EPINEPHRINE (EPIPEN INJ)    fluconazole (DIFLUCAN) 150 MG Tab    fluticasone propionate (FLONASE) 50 mcg/actuation nasal spray    fluticasone-salmeterol 250-50 mcg/dose (ADVAIR) 250-50 mcg/dose diskus inhaler    hydrALAZINE (APRESOLINE) 50 MG tablet    hydrocortisone 2.5 % cream    ibuprofen (ADVIL,MOTRIN) 800 MG tablet    NIFEdipine (ADALAT CC) 60 MG TbSR    ondansetron (ZOFRAN-ODT) 4 MG TbDL    oxyCODONE-acetaminophen (PERCOCET) 5-325 mg per tablet    predniSONE (DELTASONE) 20 MG tablet    promethazine (PHENERGAN) 6.25 mg/5 mL syrup    travoprost, benzalkonium, (TRAVATAN) 0.004 % ophthalmic solution    valsartan-hydrochlorothiazide (DIOVAN-HCT) 320-25 mg per tablet     Family History    None       Tobacco Use    Smoking status: Former Smoker     Start date: 1966     Quit date: 2011     Years since quittin.5    Smokeless tobacco: Never Used   Substance and Sexual Activity    Alcohol use: No     Comment: socially    Drug use: No    Sexual activity: Not on file     Review of Systems   Constitutional:  Positive for activity change and fatigue. Negative for fever.   Respiratory:  Negative for cough, chest tightness, shortness of breath and wheezing.    Cardiovascular:  Negative for chest pain, palpitations and leg swelling.   Gastrointestinal:  Positive for diarrhea. Negative for abdominal distention, abdominal pain, nausea and vomiting.   Genitourinary:  Negative for decreased urine volume, difficulty urinating, dysuria and flank pain.   Objective:     Vital Signs (Most Recent):  Temp: 98.3 °F (36.8 °C) (22 1518)  Pulse: 76 (22 1338)  Resp: 18 (22 1338)  BP: 135/60 (22 1338)  SpO2: 97 % (22 0920)  O2 Device (Oxygen Therapy): room air (22 0920) Vital Signs (24h Range):  Temp:  [98.3 °F (36.8 °C)] 98.3 °F (36.8 °C)  Pulse:  [63-87] 76  Resp:  [14-24] 18  SpO2:  [96 %-100 %] 97 %  BP: (119-209)/(60-96) 135/60     Weight: 67.6 kg (149 lb) (22  1518)  Body mass index is 29.59 kg/m².  Body surface area is 1.68 meters squared.    I/O last 3 completed shifts:  In: -   Out: 240 [Emesis/NG output:240]    Physical Exam  Constitutional:       General: She is not in acute distress.     Appearance: She is ill-appearing.   HENT:      Head: Normocephalic and atraumatic.      Nose: Nose normal.      Mouth/Throat:      Mouth: Mucous membranes are moist.   Eyes:      Pupils: Pupils are equal, round, and reactive to light.   Pulmonary:      Effort: Pulmonary effort is normal. No respiratory distress.   Abdominal:      Palpations: Abdomen is soft.   Musculoskeletal:         General: No swelling.      Right lower leg: No edema.      Left lower leg: No edema.   Skin:     General: Skin is warm and dry.      Coloration: Skin is not jaundiced.   Neurological:      Mental Status: She is alert and oriented to person, place, and time.       Significant Labs:  CBC:   Recent Labs   Lab 07/24/22  1704   WBC 10.18   RBC 4.17   HGB 12.7   HCT 39.5      MCV 95   MCH 30.5   MCHC 32.2     CMP:   Recent Labs   Lab 07/24/22  1704 07/25/22  0141 07/25/22  0939   *   < > 92   CALCIUM 9.5   < > 8.6*   ALBUMIN 3.8  --   --    PROT 7.5  --   --    *   < > 143   K <2.0*   < > 2.1*   CO2 12*   < > 20*   *   < > 114*   BUN 24*   < > 26*   CREATININE 1.5*   < > 1.5*   ALKPHOS 90  --   --    ALT 28  --   --    AST 44*  --   --    BILITOT 1.5*  --   --     < > = values in this interval not displayed.           Assessment/Plan:     * Hypokalemia  Severe hypokalemia with K < 2 on initial presentation   Started on aggressive supplementation and most recent labs at 2.1   Urine K < 10 consistent with extrarenal losses, in her case most likely GI tract loses due to chronic diarrhea (the pt reports 7-8 loose bowel movements per day)   Recommend replacement with oral KCL and IV K-phosph  Would also start oral potassium bicarb for her metabolic acidosis   FU repeat urine studies    Consider GI evaluation for options in management of her chronic diarrhea        Thank you for your consult. I will follow-up with patient. Please contact us if you have any additional questions.    Wayne Deras MD  Nephrology  [facility] - Emergency Dept    ATTENDING PHYSICIAN ATTESTATION  I have personally verified the history and examined the patient. I thoroughly reviewed the demographic, clinical, laboratorial and imaging information available in medical records. I agree with the assessment and recommendations provided by the subspecialty resident who was under my supervision.

## 2022-07-25 NOTE — ED NOTES
Assumed care of patient. Reports fatigue. Denies other complaints.    Patient identifiers verified and correct for Mariaelena Grubbs    LOC: The patient is awake, alert, and aware of environment. The patient is AOX4 and speaking appropriately.   APPEARANCE: No acute distress noted.   HEENT: WDL, PERRLA  PSYCHOSOCIAL: Patient is calm and cooperative. Denies SI/HI.  SKIN: The skin is warm, dry, color consistent with ethnicity. No breakdown or brusing visible.  RESPIRATORY: Airway is open and patent. Bilateral chest rise and fall. Respiratory rate even and unlabored.  No accessory muscle use noted.  CARDIAC: Patient has a normal rate and rhythm. No complaints of chest pain.  ABDOMEN/GI: Soft, non tender. No distention noted. Denies n/v/d.   URINARY:  Voids independently without difficulty. No complaints of frequency, urgency, burning, or blood in urine.   NEUROLOGIC: Eyes open spontaneously. Speech clear.  Able to follow commands, demonstrating ability to actively and appropriately communicate within context of current conversation. Symmetrical facial muscles. Movement is purposeful. Denies dizziness/lightheadedness.  MUSCULOSKELETAL: No obvious deformities noted. Full ROM in all extremities.  PERIPHERAL VASCULAR: Cap refill <3 secs bilaterally. No peripheral edema noted. Denies numbness and tingling in extremities.

## 2022-07-25 NOTE — PHARMACY MED REC
"  Admission Medication History     The home medication history was taken by [facility].    You may go to "Admission" then "Reconcile Home Medications" tabs to review and/or act upon these items.      The home medication list has been updated by the Pharmacy department.    Please read ALL comments highlighted in yellow.    Please address this information as you see fit.     Feel free to contact us if you have any questions or require assistance.      The medications listed below were removed from the home medication list. Please reorder if appropriate:  Patient reports no longer taking the following medication(s):   BRIMONIDINE 0.2 % DROP   CHOLESTYRAMINE-ASPARTAME 4 GRAM PWPK   DORZOLAMIDE 2 % OPHT SOL   FLUCONAZOLE 150 MG TAB   FLUTICASONE-SALMETEROL 250-50 MCG/DOSE DISKUS INH   HYDRALAZINE 50 MG TAB   IBUPROFEN 800 MG TAB   NIFEDIPINE 60 MG TAB   PROMETHAZINE 6.25MG SYRUP   TRAVOPROST, BENZALKONIUM 0.004 % OPHT SOL    Medications listed below were obtained from: Patient    Current Outpatient Medications on File Prior to Encounter   Medication Sig    acetaminophen (TYLENOL) 500 MG tablet   Take 500 mg by mouth every 8 (eight) hours as needed for Pain.    albuterol (ACCUNEB) 1.25 mg/3 mL Nebu   Take 1.25 mg by nebulization every 6 (six) hours as needed.    albuterol (PROVENTIL) 2.5 mg /3 mL (0.083 %) nebulizer solution   Inhale 2.5 mg into the lungs daily as needed for Wheezing.    albuterol (PROVENTIL/VENTOLIN HFA) 90 mcg/actuation inhaler   Inhale 1 puff into the lungs daily as needed for Wheezing.    ALPRAZolam (XANAX) 0.5 MG tablet   Take 0.5 mg by mouth nightly as needed.    aspirin (ECOTRIN) 81 MG EC tablet   Take 81 mg by mouth once daily.    diphenoxylate-atropine 2.5-0.025 mg (LOMOTIL) 2.5-0.025 mg per tablet   Take 1 tablet by mouth 4 (four) times daily as needed.    EScitalopram oxalate (LEXAPRO) 10 MG tablet   Take 10 mg by mouth once daily.    fluticasone propionate (FLONASE) 50 " mcg/actuation nasal spray   1 spray by Each Nostril route 3 (three) times daily as needed for Rhinitis.    gabapentin (NEURONTIN) 300 MG capsule   Take 300 mg by mouth 3 (three) times daily.    hydrocortisone 2.5 % cream   Apply topically 2 (two) times daily.    methyl salicylate/menthol (ICY HOT TOP)   Apply topically daily as needed (Pain).    nystatin-triamcinolone (MYCOLOG) ointment   SMARTSIG:Sparingly Topical Twice Daily    predniSONE (DELTASONE) 20 MG tablet   Take 20 mg by mouth 2 (two) times daily as needed.    valsartan (DIOVAN) 320 MG tablet   Take 320 mg by mouth once daily.    alendronate (FOSAMAX) 70 MG tablet   Take 70 mg by mouth every 7 days.    EPINEPHRINE (EPIPEN INJ)   Inject as directed.    ondansetron (ZOFRAN-ODT) 4 MG TbDL   Take 4 mg by mouth. Dissolve 1 tablet on the tongue daily as needed for nausea.       Potential issues to be addressed PRIOR TO DISCHARGE  Patient reported not taking the following medications: (PERCOCET). This medication remain on the home medication list. Please address accordingly.     Please discuss with the patient barriers to adherence with medication treatment plans    Patient requested refills for the following medications: (VENTOLIN INH, EPIPEN, ZOFRAN-ODT)    [facility] CPhT  EXT [id number]                .

## 2022-07-25 NOTE — ASSESSMENT & PLAN NOTE
Patient had colon resection last year and has been admitted a couple of times this year for hypokalemia. Believe this is likely 2/2 to her chronic diarrhea vs RTA type I or II. On chart review, it does not appear patient has been worked up and was not discharged with potassium or bicarb medications to mediate her now chronic electrolyte derangements. EKG unchanged from past 3 from previous admissions.     - Replete potassium  - Maintain Mg>2  - Telemetry   - Recommend nephrology consult

## 2022-07-25 NOTE — CONSULTS
[facility] - Emergency Dept  Critical Care Medicine  Consult Note    Patient Name: Mariaelena Grubbs  MRN: [id number]  Admission Date: 7/24/2022  Hospital Length of Stay: 0 days  Code Status: No Order  Attending Physician: April Webb MD   Primary Care Provider: Primary Doctor No   Principal Problem: <principal problem not specified>    Inpatient consult to Critical Care Medicine  Consult performed by: Thais Núñez MD  Consult ordered by: Jimy Daall MD        Subjective:     HPI:  77 year old female with past medical history of spinal stenosis with chronic pain, CKD, kidney stones, asthma, colitis s/p resection presenting to the ED with BUE and BLE weakness, abdominal pain and vomiting. Patient states since Saturday she had sudden onset weakness that has been constant and yesterday began having abdominal pain and diarrhea. Found to have metabolic acidosis with anion gap of 16. Hyponatremia 148, hypokalemia <2, CADE Cr. 1.5, CK 1558, Phos 1.8, hypoMg 1.1 and elevated . MRI imaging with several canal narrowing, but no cord compression.       Hospital/ICU Course:  No notes on file    Past Medical History:   Diagnosis Date    Anxiety 4/13/2014    Arthritis     Asthma     Asthma 4/13/2014    Cancer     Breast    Gout     HTN (hypertension) 4/13/2014    Hyperlipidemia 4/13/2014    Hypertension        Past Surgical History:   Procedure Laterality Date    APPENDECTOMY      BREAST SURGERY      HYSTERECTOMY      Port a cath placement and removal      SHOULDER ARTHROSCOPY  2012    TONSILLECTOMY         Review of patient's allergies indicates:   Allergen Reactions    Bananas [banana] Anaphylaxis    Crayfish      Hard time breathing    Iodine and iodide containing products      Told to avoid due to crayfish allergy    Avocado (laurus persea) Swelling and Rash    Kiwi (actinidia chinensis) Itching, Swelling and Rash    Latex, natural rubber Itching, Swelling and Rash    Papaya Itching, Swelling and  Rash       Family History    None       Tobacco Use    Smoking status: Former Smoker     Start date: 1966     Quit date: 2011     Years since quittin.5    Smokeless tobacco: Never Used   Substance and Sexual Activity    Alcohol use: No     Comment: socially    Drug use: No    Sexual activity: Not on file      Review of Systems   Constitutional:  Positive for activity change and appetite change. Negative for chills and fever.   HENT:  Negative for congestion and rhinorrhea.    Eyes:  Negative for visual disturbance.   Respiratory:  Negative for cough and shortness of breath.    Cardiovascular:  Negative for chest pain.   Gastrointestinal:  Positive for abdominal pain, diarrhea and vomiting.   Endocrine: Positive for polyuria.   Genitourinary:  Positive for frequency.   Musculoskeletal:  Positive for back pain and neck pain.   Skin:  Negative for rash.   Neurological:  Positive for weakness.   Psychiatric/Behavioral:  Negative for dysphoric mood.    Objective:     Vital Signs (Most Recent):  Temp: 98.3 °F (36.8 °C) (22 1518)  Pulse: 67 (22 2302)  Resp: 20 (22 2305)  BP: 129/60 (22 2302)  SpO2: 99 % (22 2211) Vital Signs (24h Range):  Temp:  [98.3 °F (36.8 °C)] 98.3 °F (36.8 °C)  Pulse:  [67-87] 67  Resp:  [18-24] 20  SpO2:  [96 %-100 %] 99 %  BP: (129-209)/(60-96) 129/60   Weight: 67.6 kg (149 lb)  Body mass index is 29.59 kg/m².      Intake/Output Summary (Last 24 hours) at 2022 0200  Last data filed at 2022 0028  Gross per 24 hour   Intake --   Output 240 ml   Net -240 ml       Physical Exam  Vitals and nursing note reviewed.   Constitutional:       Appearance: Normal appearance. She is not ill-appearing.   HENT:      Head: Normocephalic.      Right Ear: External ear normal.      Left Ear: External ear normal.      Nose: Nose normal.      Mouth/Throat:      Mouth: Mucous membranes are dry.      Pharynx: Oropharynx is clear.   Eyes:      Extraocular Movements:  Extraocular movements intact.      Conjunctiva/sclera: Conjunctivae normal.      Pupils: Pupils are equal, round, and reactive to light.   Cardiovascular:      Rate and Rhythm: Normal rate and regular rhythm.      Pulses: Normal pulses.      Heart sounds: Normal heart sounds.   Pulmonary:      Effort: Pulmonary effort is normal.      Breath sounds: Normal breath sounds.   Abdominal:      General: Bowel sounds are normal.      Palpations: Abdomen is soft. There is no mass.      Tenderness: There is abdominal tenderness.      Comments: Several surgical scars over abdomen. Well healing at all site. Appearance of fungal infection in right later scar fold.    Musculoskeletal:         General: No signs of injury.      Right lower leg: Edema present.      Left lower leg: Edema present.   Skin:     General: Skin is warm and dry.      Capillary Refill: Capillary refill takes less than 2 seconds.      Findings: Rash (fungal changes in skin fold of abdomen) present.   Neurological:      Mental Status: She is alert and oriented to person, place, and time.   Psychiatric:         Mood and Affect: Mood normal.         Behavior: Behavior normal.       Vents:     Lines/Drains/Airways       Drain  Duration                  Closed/Suction Drain 04/10/17 1135 Left Breast Bulb 10 Fr. 1931 days         Closed/Suction Drain 04/10/17 1135 Right Breast Bulb 10 Fr. 1931 days              Epidural Line  Duration                  Perineural Analgesia/Anesthesia Assessment (using dermatomes) Epidural 04/10/17 0905 1931 days              Peripheral Intravenous Line  Duration                  Peripheral IV - Single Lumen 07/24/22 1712 20 G Left Antecubital <1 day         Peripheral IV - Single Lumen 07/24/22 1712 22 G Left Wrist <1 day                  Significant Labs:    CBC/Anemia Profile:  Recent Labs   Lab 07/24/22  1704   WBC 10.18   HGB 12.7   HCT 39.5      MCV 95   RDW 15.2*        Chemistries:  Recent Labs   Lab 07/24/22  1704  07/25/22  0001   *  --    K <2.0*  --    *  --    CO2 12*  --    BUN 24*  --    CREATININE 1.5*  --    CALCIUM 9.5  --    ALBUMIN 3.8  --    PROT 7.5  --    BILITOT 1.5*  --    ALKPHOS 90  --    ALT 28  --    AST 44*  --    MG 1.1* 2.2   PHOS 1.8*  --        All pertinent labs within the past 24 hours have been reviewed.    Significant Imaging: I have reviewed all pertinent imaging results/findings within the past 24 hours.      ABG  Recent Labs   Lab 07/24/22 1953   PH 7.218*   PO2 23*   PCO2 45.7*   HCO3 18.6*   BE -9     Assessment/Plan:     Renal/  Metabolic acidosis  On admission, hyperchloremic anion gap 16 with pH of 7.22. Patient endorsing abdominal tenderness with intermittent drainage from midline abdominal scar. Denies fevers, SOB, or HA. Abdomen is tender globally with suprapubic>right side>left side. Lactate and WBC normal. Believe this is related to chronic diarrhea vs RTA vs both.     - RTA workup as below  - Use LR for fluids given other electrolyte derangements   - Consider abdominal source if patient fevers or has further infectious concern       Hypokalemia  Patient had colon resection last year and has been admitted 2 times this year for hypokalemia, chronic diarrhea and vomiting. Likely 2/2 to her chronic diarrhea vs RTA. On chart review, it does not appear patient has been worked up and was not discharged with potassium or bicarb medications to mediate her now chronic electrolyte derangements. EKG unchanged from past 3 from previous admissions.     - Replete potassium  - Maintain Mg>2  - Telemetry   - Recommend nephrology consult     Thais Núñez MD  Critical Care Medicine  [facility] - Emergency Dept

## 2022-07-25 NOTE — ASSESSMENT & PLAN NOTE
Severe hypokalemia with K < 2 on initial presentation   Started on aggressive supplementation and most recent labs at 2.1   Urine K < 10 consistent with extrarenal losses, in her case most likely GI tract loses due to chronic diarrhea (the pt reports 7-8 loose bowel movements per day)   Recommend replacement with oral KCL and IV K-phosph  Would also start oral potassium bicarb for her metabolic acidosis   FU repeat urine studies   Consider GI evaluation for options in management of her chronic diarrhea

## 2022-07-25 NOTE — PROVIDER PROGRESS NOTES - EMERGENCY DEPT.
"Encounter Date: 7/24/2022    ED Physician Progress Notes           ED Resident HAND-OFF NOTE:  10:48 PM 7/24/2022  Mariaelena Grubbs is a 77 y.o. female who presented to the ED on 7/24/2022 and has been managed by Dr. Webb, who reports patient C/O neck pain. I assumed care of patient from off-going ED physician team at 10:00 PM pending BMP recheck, ICU consultation, and disposition.    On my evaluation, Mariaelena Grubbs appears well, hemodynamically stable and in NAD. Thus far, Mariaelena Grubbs has received:  Medications   morphine injection 4 mg (4 mg Intravenous Not Given 7/24/22 1630)   hydrALAZINE injection 10 mg (has no administration in time range)   sodium chloride 0.9% bolus 500 mL (0 mLs Intravenous Stopped 7/24/22 1943)   potassium chloride 10 mEq in 100 mL IVPB (0 mEq Intravenous Stopped 7/24/22 1948)   potassium bicarbonate disintegrating tablet 40 mEq (40 mEq Oral Given 7/24/22 2136)   sodium chloride 0.9% bolus 500 mL (0 mLs Intravenous Stopped 7/24/22 1942)   magnesium sulfate 2g in water 50mL IVPB (premix) (0 g Intravenous Stopped 7/24/22 2020)   cefTRIAXone (ROCEPHIN) 1 g/50 mL D5W IVPB (1 g Intravenous New Bag 7/24/22 2209)   potassium chloride 10 mEq in 100 mL IVPB (10 mEq Intravenous New Bag 7/24/22 2203)       On my exam, I appreciate:  BP (!) 144/68   Pulse 74   Temp 98.3 °F (36.8 °C) (Oral)   Resp 18   Ht 4' 11.5" (1.511 m)   Wt 67.6 kg (149 lb)   SpO2 97%   Breastfeeding No   BMI 29.59 kg/m²     ED Course as of 07/25/22 0720   Sun Jul 24, 2022 2020 Discussion was had with neurosurgery concerning patient's MRI and they stated that they will come see the patient but is unlikely that there will be any emergent neurosurgical intervention [OO]   2044 Patient's urinalysis showed signs of infection and so she was given antibiotics [OO]   2346 Patient continues to undetectable potassium despite a total of 80 mEq oral and 20 mg IV infused.  Plan for admission higher level of care [DS]   Mon " Jul 25, 2022   0117 MICU @ bedside [DS]      ED Course User Index  [DS] Alejandro Reno MD  [OO] Ryan Klein MD        Disposition:  Patient found to have hypo magnesemia, replaced with IV magnesium.  Repeated potassium less than 2, patient remained asymptomatic, no EKG change. Consulted MICU, they believed that her hypokalemia is a chronic condition, no need for ICU level of care.  Discussed with Hospital Medicine agreed to admit patient for further management and neuro surgery to follow.  I have discussed and counseled Mariaelena Grubbs regarding exam, results, diagnosis, treatment, and plan.  ______________________  Jimy Dalal MD   Emergency Medicine Resident  7/24/2022

## 2022-07-25 NOTE — SUBJECTIVE & OBJECTIVE
Past Medical History:   Diagnosis Date    Anxiety 2014    Arthritis     Asthma     Asthma 2014    Cancer     Breast    Gout     HTN (hypertension) 2014    Hyperlipidemia 2014    Hypertension        Past Surgical History:   Procedure Laterality Date    APPENDECTOMY      BREAST SURGERY      HYSTERECTOMY      Port a cath placement and removal      SHOULDER ARTHROSCOPY  2012    TONSILLECTOMY         Review of patient's allergies indicates:   Allergen Reactions    Bananas [banana] Anaphylaxis    Crayfish      Hard time breathing    Iodine and iodide containing products      Told to avoid due to crayfish allergy    Avocado (laurus persea) Swelling and Rash    Kiwi (actinidia chinensis) Itching, Swelling and Rash    Latex, natural rubber Itching, Swelling and Rash    Papaya Itching, Swelling and Rash       Family History    None       Tobacco Use    Smoking status: Former Smoker     Start date: 1966     Quit date: 2011     Years since quittin.5    Smokeless tobacco: Never Used   Substance and Sexual Activity    Alcohol use: No     Comment: socially    Drug use: No    Sexual activity: Not on file      Review of Systems   Constitutional:  Positive for activity change and appetite change. Negative for chills and fever.   HENT:  Negative for congestion and rhinorrhea.    Eyes:  Negative for visual disturbance.   Respiratory:  Negative for cough and shortness of breath.    Cardiovascular:  Negative for chest pain.   Gastrointestinal:  Positive for abdominal pain, diarrhea and vomiting.   Endocrine: Positive for polyuria.   Genitourinary:  Positive for frequency.   Musculoskeletal:  Positive for back pain and neck pain.   Skin:  Negative for rash.   Neurological:  Positive for weakness.   Psychiatric/Behavioral:  Negative for dysphoric mood.    Objective:     Vital Signs (Most Recent):  Temp: 98.3 °F (36.8 °C) (22 1518)  Pulse: 67 (22 2302)  Resp: 20 (22 2305)  BP: 129/60  (07/24/22 2302)  SpO2: 99 % (07/24/22 2211) Vital Signs (24h Range):  Temp:  [98.3 °F (36.8 °C)] 98.3 °F (36.8 °C)  Pulse:  [67-87] 67  Resp:  [18-24] 20  SpO2:  [96 %-100 %] 99 %  BP: (129-209)/(60-96) 129/60   Weight: 67.6 kg (149 lb)  Body mass index is 29.59 kg/m².      Intake/Output Summary (Last 24 hours) at 7/25/2022 0200  Last data filed at 7/25/2022 0028  Gross per 24 hour   Intake --   Output 240 ml   Net -240 ml       Physical Exam  Vitals and nursing note reviewed.   Constitutional:       Appearance: Normal appearance. She is not ill-appearing.   HENT:      Head: Normocephalic.      Right Ear: External ear normal.      Left Ear: External ear normal.      Nose: Nose normal.      Mouth/Throat:      Mouth: Mucous membranes are dry.      Pharynx: Oropharynx is clear.   Eyes:      Extraocular Movements: Extraocular movements intact.      Conjunctiva/sclera: Conjunctivae normal.      Pupils: Pupils are equal, round, and reactive to light.   Cardiovascular:      Rate and Rhythm: Normal rate and regular rhythm.      Pulses: Normal pulses.      Heart sounds: Normal heart sounds.   Pulmonary:      Effort: Pulmonary effort is normal.      Breath sounds: Normal breath sounds.   Abdominal:      General: Bowel sounds are normal.      Palpations: Abdomen is soft. There is no mass.      Tenderness: There is abdominal tenderness.      Comments: Several surgical scars over abdomen. Well healing at all site. Appearance of fungal infection in right later scar fold.    Musculoskeletal:         General: No signs of injury.      Right lower leg: Edema present.      Left lower leg: Edema present.   Skin:     General: Skin is warm and dry.      Capillary Refill: Capillary refill takes less than 2 seconds.      Findings: Rash (fungal changes in skin fold of abdomen) present.   Neurological:      Mental Status: She is alert and oriented to person, place, and time.   Psychiatric:         Mood and Affect: Mood normal.          Behavior: Behavior normal.       Vents:     Lines/Drains/Airways       Drain  Duration                  Closed/Suction Drain 04/10/17 1135 Left Breast Bulb 10 Fr. 1931 days         Closed/Suction Drain 04/10/17 1135 Right Breast Bulb 10 Fr. 1931 days              Epidural Line  Duration                  Perineural Analgesia/Anesthesia Assessment (using dermatomes) Epidural 04/10/17 0905 1931 days              Peripheral Intravenous Line  Duration                  Peripheral IV - Single Lumen 07/24/22 1712 20 G Left Antecubital <1 day         Peripheral IV - Single Lumen 07/24/22 1712 22 G Left Wrist <1 day                  Significant Labs:    CBC/Anemia Profile:  Recent Labs   Lab 07/24/22 1704   WBC 10.18   HGB 12.7   HCT 39.5      MCV 95   RDW 15.2*        Chemistries:  Recent Labs   Lab 07/24/22 1704 07/25/22  0001   *  --    K <2.0*  --    *  --    CO2 12*  --    BUN 24*  --    CREATININE 1.5*  --    CALCIUM 9.5  --    ALBUMIN 3.8  --    PROT 7.5  --    BILITOT 1.5*  --    ALKPHOS 90  --    ALT 28  --    AST 44*  --    MG 1.1* 2.2   PHOS 1.8*  --        All pertinent labs within the past 24 hours have been reviewed.    Significant Imaging: I have reviewed all pertinent imaging results/findings within the past 24 hours.

## 2022-07-26 ENCOUNTER — TELEPHONE (OUTPATIENT)
Dept: NEUROSURGERY | Facility: CLINIC | Age: 77
End: 2022-07-26
Payer: MEDICARE

## 2022-07-26 LAB
ANION GAP SERPL CALC-SCNC: 11 MMOL/L (ref 8–16)
ANION GAP SERPL CALC-SCNC: 13 MMOL/L (ref 8–16)
BACTERIA UR CULT: ABNORMAL
BASOPHILS # BLD AUTO: 0.06 K/UL (ref 0–0.2)
BASOPHILS NFR BLD: 0.3 % (ref 0–1.9)
BUN SERPL-MCNC: 17 MG/DL (ref 8–23)
BUN SERPL-MCNC: 19 MG/DL (ref 8–23)
CALCIUM SERPL-MCNC: 7.9 MG/DL (ref 8.7–10.5)
CALCIUM SERPL-MCNC: 8.1 MG/DL (ref 8.7–10.5)
CHLORIDE SERPL-SCNC: 115 MMOL/L (ref 95–110)
CHLORIDE SERPL-SCNC: 116 MMOL/L (ref 95–110)
CK SERPL-CCNC: 6752 U/L (ref 20–180)
CO2 SERPL-SCNC: 14 MMOL/L (ref 23–29)
CO2 SERPL-SCNC: 14 MMOL/L (ref 23–29)
CREAT SERPL-MCNC: 1.3 MG/DL (ref 0.5–1.4)
CREAT SERPL-MCNC: 1.4 MG/DL (ref 0.5–1.4)
DIFFERENTIAL METHOD: ABNORMAL
EOSINOPHIL # BLD AUTO: 0.3 K/UL (ref 0–0.5)
EOSINOPHIL NFR BLD: 1.9 % (ref 0–8)
ERYTHROCYTE [DISTWIDTH] IN BLOOD BY AUTOMATED COUNT: 15.9 % (ref 11.5–14.5)
EST. GFR  (AFRICAN AMERICAN): 42.1 ML/MIN/1.73 M^2
EST. GFR  (AFRICAN AMERICAN): 46 ML/MIN/1.73 M^2
EST. GFR  (NON AFRICAN AMERICAN): 36.5 ML/MIN/1.73 M^2
EST. GFR  (NON AFRICAN AMERICAN): 39.9 ML/MIN/1.73 M^2
GLUCOSE SERPL-MCNC: 105 MG/DL (ref 70–110)
GLUCOSE SERPL-MCNC: 97 MG/DL (ref 70–110)
HCT VFR BLD AUTO: 31.4 % (ref 37–48.5)
HGB BLD-MCNC: 10.5 G/DL (ref 12–16)
IMM GRANULOCYTES # BLD AUTO: 0.09 K/UL (ref 0–0.04)
IMM GRANULOCYTES NFR BLD AUTO: 0.5 % (ref 0–0.5)
LYMPHOCYTES # BLD AUTO: 3.9 K/UL (ref 1–4.8)
LYMPHOCYTES NFR BLD: 22.9 % (ref 18–48)
MAGNESIUM SERPL-MCNC: 1.6 MG/DL (ref 1.6–2.6)
MCH RBC QN AUTO: 31.6 PG (ref 27–31)
MCHC RBC AUTO-ENTMCNC: 33.4 G/DL (ref 32–36)
MCV RBC AUTO: 95 FL (ref 82–98)
MONOCYTES # BLD AUTO: 0.7 K/UL (ref 0.3–1)
MONOCYTES NFR BLD: 3.8 % (ref 4–15)
NEUTROPHILS # BLD AUTO: 12.1 K/UL (ref 1.8–7.7)
NEUTROPHILS NFR BLD: 70.6 % (ref 38–73)
NRBC BLD-RTO: 0 /100 WBC
PHOSPHATE SERPL-MCNC: 3.2 MG/DL (ref 2.7–4.5)
PLATELET # BLD AUTO: 318 K/UL (ref 150–450)
PMV BLD AUTO: 11.7 FL (ref 9.2–12.9)
POTASSIUM SERPL-SCNC: 2.7 MMOL/L (ref 3.5–5.1)
POTASSIUM SERPL-SCNC: 2.8 MMOL/L (ref 3.5–5.1)
POTASSIUM SERPL-SCNC: 3.6 MMOL/L (ref 3.5–5.1)
RBC # BLD AUTO: 3.32 M/UL (ref 4–5.4)
SODIUM SERPL-SCNC: 140 MMOL/L (ref 136–145)
SODIUM SERPL-SCNC: 143 MMOL/L (ref 136–145)
WBC # BLD AUTO: 17.15 K/UL (ref 3.9–12.7)

## 2022-07-26 PROCEDURE — 25000003 PHARM REV CODE 250: Performed by: HOSPITALIST

## 2022-07-26 PROCEDURE — 25000003 PHARM REV CODE 250

## 2022-07-26 PROCEDURE — 80048 BASIC METABOLIC PNL TOTAL CA: CPT | Performed by: HOSPITALIST

## 2022-07-26 PROCEDURE — 36415 COLL VENOUS BLD VENIPUNCTURE: CPT | Performed by: HOSPITALIST

## 2022-07-26 PROCEDURE — 63600175 PHARM REV CODE 636 W HCPCS

## 2022-07-26 PROCEDURE — 99232 SBSQ HOSP IP/OBS MODERATE 35: CPT | Mod: ,,, | Performed by: INTERNAL MEDICINE

## 2022-07-26 PROCEDURE — 85025 COMPLETE CBC W/AUTO DIFF WBC: CPT

## 2022-07-26 PROCEDURE — 97165 OT EVAL LOW COMPLEX 30 MIN: CPT

## 2022-07-26 PROCEDURE — 99233 SBSQ HOSP IP/OBS HIGH 50: CPT | Mod: ,,,

## 2022-07-26 PROCEDURE — 36415 COLL VENOUS BLD VENIPUNCTURE: CPT

## 2022-07-26 PROCEDURE — 99232 PR SUBSEQUENT HOSPITAL CARE,LEVL II: ICD-10-PCS | Mod: ,,, | Performed by: INTERNAL MEDICINE

## 2022-07-26 PROCEDURE — 82550 ASSAY OF CK (CPK): CPT | Performed by: HOSPITALIST

## 2022-07-26 PROCEDURE — 84132 ASSAY OF SERUM POTASSIUM: CPT | Performed by: HOSPITALIST

## 2022-07-26 PROCEDURE — 97535 SELF CARE MNGMENT TRAINING: CPT

## 2022-07-26 PROCEDURE — 80048 BASIC METABOLIC PNL TOTAL CA: CPT | Mod: 91 | Performed by: HOSPITALIST

## 2022-07-26 PROCEDURE — 84100 ASSAY OF PHOSPHORUS: CPT

## 2022-07-26 PROCEDURE — 97116 GAIT TRAINING THERAPY: CPT

## 2022-07-26 PROCEDURE — 99233 SBSQ HOSP IP/OBS HIGH 50: CPT | Mod: GC,,, | Performed by: HOSPITALIST

## 2022-07-26 PROCEDURE — 25000003 PHARM REV CODE 250: Performed by: STUDENT IN AN ORGANIZED HEALTH CARE EDUCATION/TRAINING PROGRAM

## 2022-07-26 PROCEDURE — 97161 PT EVAL LOW COMPLEX 20 MIN: CPT

## 2022-07-26 PROCEDURE — 11000001 HC ACUTE MED/SURG PRIVATE ROOM

## 2022-07-26 PROCEDURE — 99233 PR SUBSEQUENT HOSPITAL CARE,LEVL III: ICD-10-PCS | Mod: ,,,

## 2022-07-26 PROCEDURE — 99233 PR SUBSEQUENT HOSPITAL CARE,LEVL III: ICD-10-PCS | Mod: GC,,, | Performed by: HOSPITALIST

## 2022-07-26 PROCEDURE — 83735 ASSAY OF MAGNESIUM: CPT

## 2022-07-26 RX ORDER — ESCITALOPRAM OXALATE 10 MG/1
10 TABLET ORAL DAILY
Status: DISCONTINUED | OUTPATIENT
Start: 2022-07-26 | End: 2022-07-28 | Stop reason: HOSPADM

## 2022-07-26 RX ORDER — SODIUM CHLORIDE, SODIUM LACTATE, POTASSIUM CHLORIDE, CALCIUM CHLORIDE 600; 310; 30; 20 MG/100ML; MG/100ML; MG/100ML; MG/100ML
INJECTION, SOLUTION INTRAVENOUS CONTINUOUS
Status: DISCONTINUED | OUTPATIENT
Start: 2022-07-26 | End: 2022-07-28 | Stop reason: HOSPADM

## 2022-07-26 RX ORDER — SODIUM CHLORIDE AND POTASSIUM CHLORIDE 150; 900 MG/100ML; MG/100ML
INJECTION, SOLUTION INTRAVENOUS CONTINUOUS
Status: DISCONTINUED | OUTPATIENT
Start: 2022-07-26 | End: 2022-07-26

## 2022-07-26 RX ORDER — POTASSIUM CHLORIDE 750 MG/1
30 CAPSULE, EXTENDED RELEASE ORAL
Status: DISCONTINUED | OUTPATIENT
Start: 2022-07-26 | End: 2022-07-26

## 2022-07-26 RX ORDER — POTASSIUM CHLORIDE 7.45 MG/ML
10 INJECTION INTRAVENOUS
Status: DISCONTINUED | OUTPATIENT
Start: 2022-07-26 | End: 2022-07-26

## 2022-07-26 RX ORDER — POTASSIUM CHLORIDE 750 MG/1
30 CAPSULE, EXTENDED RELEASE ORAL
Status: COMPLETED | OUTPATIENT
Start: 2022-07-26 | End: 2022-07-26

## 2022-07-26 RX ORDER — SODIUM CHLORIDE 9 MG/ML
INJECTION, SOLUTION INTRAVENOUS CONTINUOUS
Status: DISCONTINUED | OUTPATIENT
Start: 2022-07-26 | End: 2022-07-26

## 2022-07-26 RX ADMIN — HEPARIN SODIUM 5000 UNITS: 5000 INJECTION INTRAVENOUS; SUBCUTANEOUS at 06:07

## 2022-07-26 RX ADMIN — POTASSIUM CHLORIDE 30 MEQ: 10 CAPSULE, COATED, EXTENDED RELEASE ORAL at 12:07

## 2022-07-26 RX ADMIN — POTASSIUM & SODIUM PHOSPHATES POWDER PACK 280-160-250 MG 2 PACKET: 280-160-250 PACK at 12:07

## 2022-07-26 RX ADMIN — NIFEDIPINE 60 MG: 60 TABLET, FILM COATED, EXTENDED RELEASE ORAL at 08:07

## 2022-07-26 RX ADMIN — DORZOLAMIDE HYDROCHLORIDE 1 DROP: 20 SOLUTION/ DROPS OPHTHALMIC at 09:07

## 2022-07-26 RX ADMIN — HYDRALAZINE HYDROCHLORIDE 50 MG: 50 TABLET ORAL at 08:07

## 2022-07-26 RX ADMIN — TRAVOPROST 1 DROP: 0.04 SOLUTION/ DROPS OPHTHALMIC at 10:07

## 2022-07-26 RX ADMIN — POTASSIUM BICARBONATE 20 MEQ: 391 TABLET, EFFERVESCENT ORAL at 09:07

## 2022-07-26 RX ADMIN — POTASSIUM BICARBONATE 20 MEQ: 391 TABLET, EFFERVESCENT ORAL at 10:07

## 2022-07-26 RX ADMIN — OXYCODONE HYDROCHLORIDE AND ACETAMINOPHEN 1 TABLET: 5; 325 TABLET ORAL at 07:07

## 2022-07-26 RX ADMIN — FLUTICASONE FUROATE AND VILANTEROL TRIFENATATE 1 PUFF: 100; 25 POWDER RESPIRATORY (INHALATION) at 08:07

## 2022-07-26 RX ADMIN — HEPARIN SODIUM 5000 UNITS: 5000 INJECTION INTRAVENOUS; SUBCUTANEOUS at 09:07

## 2022-07-26 RX ADMIN — POTASSIUM BICARBONATE 20 MEQ: 391 TABLET, EFFERVESCENT ORAL at 02:07

## 2022-07-26 RX ADMIN — POTASSIUM CHLORIDE 30 MEQ: 10 CAPSULE, COATED, EXTENDED RELEASE ORAL at 10:07

## 2022-07-26 RX ADMIN — HYDRALAZINE HYDROCHLORIDE 50 MG: 50 TABLET ORAL at 12:07

## 2022-07-26 RX ADMIN — POTASSIUM CHLORIDE 10 MEQ: 10 INJECTION INTRAVENOUS at 08:07

## 2022-07-26 RX ADMIN — ASPIRIN 81 MG: 81 TABLET, COATED ORAL at 08:07

## 2022-07-26 RX ADMIN — DORZOLAMIDE HYDROCHLORIDE 1 DROP: 20 SOLUTION/ DROPS OPHTHALMIC at 03:07

## 2022-07-26 RX ADMIN — CHOLESTYRAMINE 4 G: 4 POWDER, FOR SUSPENSION ORAL at 10:07

## 2022-07-26 RX ADMIN — HYDRALAZINE HYDROCHLORIDE 50 MG: 50 TABLET ORAL at 06:07

## 2022-07-26 RX ADMIN — ESCITALOPRAM OXALATE 10 MG: 10 TABLET ORAL at 12:07

## 2022-07-26 RX ADMIN — BRIMONIDINE TARTRATE 1 DROP: 1.5 SOLUTION OPHTHALMIC at 09:07

## 2022-07-26 RX ADMIN — POTASSIUM CHLORIDE 30 MEQ: 10 CAPSULE, COATED, EXTENDED RELEASE ORAL at 02:07

## 2022-07-26 RX ADMIN — DORZOLAMIDE HYDROCHLORIDE 1 DROP: 20 SOLUTION/ DROPS OPHTHALMIC at 08:07

## 2022-07-26 RX ADMIN — HEPARIN SODIUM 5000 UNITS: 5000 INJECTION INTRAVENOUS; SUBCUTANEOUS at 02:07

## 2022-07-26 NOTE — PT/OT/SLP EVAL
"Physical Therapy Evaluation and Treatment     Patient Name:  Mariaelena Grubbs  MRN: [id number]    Admit Date: 7/24/2022  Admitting Diagnosis:  Hypokalemia  Length of Stay: 1 days  Recent Surgery: * No surgery found *      Recommendations:     Discharge Recommendations: Home Health PT  Equipment recommendations: rolling walker  Barriers to discharge: None Identified     Assessment:     Mariaelena Grubbs is a 77 y.o. female admitted [place] [facility] [place] with medical diagnosis of Hypokalemia. Pt presents with weakness, impaired endurance, impaired self care skills, impaired functional mobility, gait instability, impaired balance, pain. These deficits effect their roles and responsibilities in which they were able to complete prior to admit. PTA, pt was (I) with ADLs and ambulation. PT demonstrated safety with gait today, mostly limited by posterior cervical pain. Mariaelena Grubbs would benefit from acute PT intervention to improve quality of life, focus on recovery of impairments, provide patient/caregiver education, reduce fall risk, and maximize (I) and safety with functional mobility. Once medically stable, recommending pt discharge to Home Health PT.      Rehab Prognosis: Good    Plan:     During this hospitalization, patient to be seen 3 x/week to address the identified rehab impairments via gait training, therapeutic activities, therapeutic exercises, neuromuscular re-education and progress towards stated goals.     Plan of Care Expires:  08/25/22  Plan of Care reviewed with: patient, family    This plan of care has been discussed with the patient/caregiver, who was included in its development and is in agreement with the identified goals and treatment plan.     Subjective     Communicated with RN prior to session.  Patient found supine upon PT entry to room, agreeable to evaluation. Pt's family present during session.    Chief Complaint: posterior neck pain      Patient/Family Comments/goals: "I just want to not " "hurt. I am also so tired. Everyone keeps coming in"    Pain/Comfort:  · Pain Rating 1:  (pt did not rate)  · Location - Side 1: Bilateral  · Location - Orientation 1: posterior  · Location 1: cervical spine  · Pain Addressed 1: Reposition, Distraction, Cessation of Activity  · Pain Rating Post-Intervention 1:  (pt did not rate)    Patients cultural, spiritual, [employer] conflicts given the current situation: None identified     Patient History: information obtained from pt     Living Environment: Pt lives with daughter in single level home  with 1 SHARON. Bathroom set-up: tub/shower combo  Prior Level of Function: independent with mobility and ADLs; drives when she needs to but will have son drive if he is available  DME owned: rollator and tub bench (not currently using)  Support Available/Caregiver Assistance: daughter can help (works nights, home during day sleeping)    Objective:     Recent Surgery: * No surgery found *    General Precautions: Standard, fall   Orthopedic Precautions:N/A   Braces: N/A   Oxygen Device: room air      Exams:     Cognition:  · Alert  · Command following: Follows multistep verbal commands  · Communication: clear/fluent     Sensation:   o Light touch sensation: Intact BLEs     Gross Motor Coordination: No deficits noted during functional mobility tasks      Edema/Skin Integrity: None noted; Visible skin intact     Postural examination/scapula alignment: Rounded shoulder, Head forward and Decreased lordosis     Lower Extremity Range of Motion:  o Right Lower Extremity: WFL  o Left Lower Extremity: WFL     Lower Extremity Strength:  o Right Lower Extremity: knee ext: 3/5, knee flex: 3/5, hip flex: 3/5, DF/PF: 3/5  o Left Lower Extremity: knee ext: 3+/5, knee flex: 3+/5, hip flex: 3+/5, DF/PF: 4/5    Functional Mobility:    Bed Mobility:  · Supine > Sit with stand by assistance  · Sit > Supine with stand by assistance    Transfers:   · Sit <> Stand Transfer: Stand-by Assistance x 1 " trials from eob with RW AD              Gait:  · Distance: 80 ft  · Assistance level: Contact Guard Assistance  · Assistive Device: rolling walker  · Gait Assessment: decreased gait speed with increased forward trunk flexion    Balance:  · Dynamic Sitting: FAIR+: Maintains balance through MINIMAL excursions of active trunk motion  · Standing:  · Static: FAIR+: Takes MINIMAL challenges from all directions   · Dynamic: FAIR: Needs CONTACT GUARD during gait    Outcome Measure: AM-PAC 6 CLICK MOBILITY  Total Score:18     Patient/Caregiver Education:     Therapist educated pt/caregiver regarding:    PT POC and goals for therapy    Safety with mobility and fall risk    Instruction on use of call button and importance of calling nursing staff for assistance with mobility    Time provided for therapeutic counseling and discussion of current health disposition. All questions answered to satisfaction, within scope of PT practice     Patient/caregiver able to verbalize understanding and expressed no further questions this visit; will follow-up with pt/caregiver during current admit for additional questions/concerns within scope of practice.     White board updated.     Patient left supine with family present.    GOALS:   Multidisciplinary Problems     Physical Therapy Goals        Problem: Physical Therapy    Goal Priority Disciplines Outcome Goal Variances Interventions   Physical Therapy Goal     PT, PT/OT Ongoing, Progressing     Description: Goals to be met by: 22     Patient will increase functional independence with mobility by performin. Supine to sit with [place]  2. Sit to stand transfer with [place]  3. Gait  x 150 feet with Supervision using LRAD  4. Ascend/Descend 6 inch curb step with Supervision using LRAD  5. Stand for 10 minutes with Supervision using LRAD  6. Lower extremity exercise program x30 reps per handout, with independence                       History:     Past Medical  History:   Diagnosis Date    Anxiety 4/13/2014    Arthritis     Asthma     Asthma 4/13/2014    Cancer     Breast    Gout     HTN (hypertension) 4/13/2014    Hyperlipidemia 4/13/2014    Hypertension        Past Surgical History:   Procedure Laterality Date    APPENDECTOMY      BREAST SURGERY      HYSTERECTOMY      Port a cath placement and removal      SHOULDER ARTHROSCOPY  2012    TONSILLECTOMY         Time Tracking:     PT Received On: 07/26/22  PT Start Time: 1010     PT Stop Time: 1026  PT Total Time (min): 16 min     Billable Minutes: Evaluation 8 and Gait Training 8    07/26/2022

## 2022-07-26 NOTE — ASSESSMENT & PLAN NOTE
See hypokalemia.  - starting low sodium diet particularly with hx of htn.   - no diarrhea noted overnight

## 2022-07-26 NOTE — SUBJECTIVE & OBJECTIVE
Interval History: Muscle weakness improving today. Patient is afebrile. Continuing potassium repletion. Patient reports arm pain at site of peripheral line and refuses further IV potassium. She is continuing on oral potassium.    Review of Systems   Constitutional:  Negative for fever.   HENT:  Negative for congestion.    Respiratory:  Negative for shortness of breath.    Cardiovascular:  Negative for chest pain.   Gastrointestinal:  Negative for diarrhea.   Genitourinary:  Negative for dysuria.   Musculoskeletal:  Positive for back pain and neck stiffness.   Skin:  Positive for wound.   Neurological:  Positive for weakness. Negative for dizziness and numbness.   Objective:     Vital Signs (Most Recent):  Temp: 97 °F (36.1 °C) (07/26/22 1625)  Pulse: 68 (07/26/22 1625)  Resp: 18 (07/26/22 1625)  BP: (!) 104/56 (07/26/22 1625)  SpO2: 98 % (07/26/22 1625)   Vital Signs (24h Range):  Temp:  [97 °F (36.1 °C)-98.3 °F (36.8 °C)] 97 °F (36.1 °C)  Pulse:  [68-89] 68  Resp:  [14-18] 18  SpO2:  [93 %-98 %] 98 %  BP: (104-127)/(55-63) 104/56     Weight: 67.6 kg (149 lb)  Body mass index is 29.59 kg/m².  No intake or output data in the 24 hours ending 07/26/22 1734   Physical Exam  Constitutional:       Appearance: Normal appearance.   HENT:      Head: Normocephalic and atraumatic.      Nose: Nose normal.   Eyes:      Extraocular Movements: Extraocular movements intact.      Conjunctiva/sclera: Conjunctivae normal.   Cardiovascular:      Rate and Rhythm: Normal rate and regular rhythm.   Pulmonary:      Effort: Pulmonary effort is normal.   Abdominal:      General: Abdomen is flat.      Palpations: Abdomen is soft.   Musculoskeletal:         General: No swelling or tenderness.      Cervical back: Rigidity present.      Comments: Upper extremity pain at peripheral IV insertion site.   Neurological:      Mental Status: She is alert.      Motor: Weakness present.       Significant Labs: All pertinent labs within the past 24 hours  have been reviewed.    Significant Imaging: I have reviewed all pertinent imaging results/findings within the past 24 hours.

## 2022-07-26 NOTE — PLAN OF CARE
[facility] - Med Surg  Initial Discharge Assessment       Primary Care Provider: Primary Doctor No    Admission Diagnosis: Hypokalemia [E87.6]  Hypomagnesemia [E83.42]  Metabolic acidosis [E87.2]  Fatigue [R53.83]  Cervical stenosis of spinal canal [M48.02]  CADE (acute kidney injury) [N17.9]  Chest pain [R07.9]    Admission Date: 7/24/2022  Expected Discharge Date: 7/27/2022    Discharge Barriers Identified: None    Payor: [employer] MANAGED MEDICARE / Plan: [employer] SNP (SPECIAL NEEDS PLAN) / Product Type: Medicare Advantage /     Extended Emergency Contact Information  Primary Emergency Contact: Lolis Grubbs   [facility]  Home Phone: [phone]  Relation: Daughter  Secondary Emergency Contact: MENDEZ GRUBBS  Mobile Phone: [phone]  Relation: Son    Discharge Plan A: Home Health  Discharge Plan B: Home with family      [id number] - [address] AT [place] [id number] [facility] [address]  Phone: [phone] Fax: [phone]    [facility] - [place], [place] - [address]  [address]  [facility] [address]  Phone: [phone] Fax: [phone]      Initial Assessment (most recent)     Adult Discharge Assessment - 07/26/22 1512        Discharge Assessment    Assessment Type Discharge Planning Assessment     Confirmed/corrected address, phone number and insurance Yes     Confirmed Demographics Contacted registration to update     Source of Information patient     Communicated SHERIDAN with patient/caregiver Yes     Reason For Admission hypokalemia     Lives With child(rossana), adult     Do you expect to return to your current living situation? Yes     Do you have help at home or someone to help you manage your care at home? Yes     Who are your caregiver(s) and their phone number(s)? lolis grubbs (daughter) [phone]     Prior to hospitilization cognitive status: Alert/Oriented     Current  cognitive status: Alert/Oriented     Walking or Climbing Stairs Difficulty none     Dressing/Bathing Difficulty none     Equipment Currently Used at Home none     Readmission within 30 days? No     Patient currently being followed by outpatient case management? No     Do you currently have service(s) that help you manage your care at home? No     Do you take prescription medications? Yes     Do you have prescription coverage? Yes     Coverage human[employer] managed     Do you have any problems affording any of your prescribed medications? No     Is the patient taking medications as prescribed? yes     Who is going to help you get home at discharge? lolis roberts (daughter) [phone]     How do you get to doctors appointments? family or friend will provide     Are you on dialysis? No     Do you take coumadin? No     Discharge Plan A Home Health     Discharge Plan B Home with family     DME Needed Upon Discharge  none     Discharge Plan discussed with: Patient     Discharge Barriers Identified None        Relationship/Environment    Name(s) of Who Lives With Patient lolis roberts (daughter) [phone]                    Cm spoke to patient in the room.  She lives with her daughter at address [address] [place] [address].  No home health. No home o2.  No dme.  No needs at this time.  Son is able to provide transportation.  She sees a dr. Stapleton as her pcp.  CM will continue to follow.     DCP: home health    Dora Arnold RN [facility] [phone]    [facility] has agreed to accept patient.

## 2022-07-26 NOTE — SUBJECTIVE & OBJECTIVE
Interval History: NAEON, AF. Patient does have muscle weakness notably in upper extremities. Otherwise she feels better today. She denies chest pain, sob, or palpitations.     Review of Systems   Constitutional:  Negative for fever.   HENT:  Negative for congestion.    Respiratory:  Negative for shortness of breath.    Cardiovascular:  Negative for chest pain.   Gastrointestinal:  Positive for nausea. Negative for diarrhea.   Genitourinary:  Negative for dysuria.   Musculoskeletal:  Positive for back pain and neck stiffness.   Skin:  Positive for wound.   Neurological:  Negative for dizziness and numbness.   Objective:     Vital Signs (Most Recent):  Temp: 98.3 °F (36.8 °C) (07/25/22 1833)  Pulse: 88 (07/25/22 1833)  Resp: 17 (07/25/22 1833)  BP: 122/63 (07/25/22 1833)  SpO2: (!) 93 % (07/25/22 1833)   Vital Signs (24h Range):  Temp:  [98.3 °F (36.8 °C)] 98.3 °F (36.8 °C)  Pulse:  [63-88] 88  Resp:  [14-24] 17  SpO2:  [93 %-100 %] 93 %  BP: (119-200)/(60-89) 122/63     Weight: 67.6 kg (149 lb)  Body mass index is 29.59 kg/m².    Intake/Output Summary (Last 24 hours) at 7/25/2022 2032  Last data filed at 7/25/2022 1543  Gross per 24 hour   Intake 100 ml   Output 540 ml   Net -440 ml      Physical Exam    Significant Labs: All pertinent labs within the past 24 hours have been reviewed.    Significant Imaging: I have reviewed all pertinent imaging results/findings within the past 24 hours.

## 2022-07-26 NOTE — NURSING
Nurse removed all of pt's peripheral IV bc they were all infiltrated. Both of pt's arms are currently swollen. Pt stated that she will not agree to get another IV at this time b/c her arms are too painful on both sides from the potassium. She stated she will agree with oral medications but will not agree to another peripheral IV. Pt was already educated about emergency purposes and administering of medications. She also stated that she will not agree to any more potassium via IV

## 2022-07-26 NOTE — PROGRESS NOTES
07/26/22 1130   WOCN Assessment   WOCN Total Time (mins) 20   Visit Date 07/26/22   Visit Time 1130   Consult Type New   WOCN Speciality Wound   Number of Wounds 1   Intervention assessed;chart review;orders   Teaching on-going        Incision/Site 07/26/22 1130 Lower quadrant midline midline   Date First Assessed/Time First Assessed: 07/26/22 1130   Present Prior to Hospital Arrival?: Yes  Location: Lower quadrant  Orientation: midline  Incision Type: midline   Wound Image    Dressing Appearance Open to air   Drainage Amount None   Periwound Area Intact   Care Cleansed with:;Sterile normal saline   Wound consult performed.  Unable to see any wound or drainage from area.  Pt states she has had drainage from this area (which apparently is pinpoint) since 2018.  She states that she can smell it when it drains.  She leaves it open to air.  Cleans it with wound cleanser or peroxide when it does drain.  Unable to see drainage after several efforts.  Juanita well.  Will place orders for prn drainage.  Will sign off.  Please re consult if needed.

## 2022-07-26 NOTE — PROGRESS NOTES
[facility] Medicine  Progress Note    Patient Name: Mariaelena Grubbs  MRN: [id number]  Patient Class: IP- Inpatient   Admission Date: 7/24/2022  Length of Stay: 0 days  Attending Physician: Theresa Dalal MD  Primary Care Provider: Primary Doctor No        Subjective:     Principal Problem:Hypokalemia        HPI:  Mariaelena Grubbs is a 77 y.o. female with cervical and lumbar stenosis, hx of multiple bowel resections, hx of chronic pyelopnephritis s/p R partial nephrectomy, asthma, HTN, HLD who presented to [facility] ED on 07/24/2022 with worsening neck pain and diffuse weakness since the previous morning 7/23. Patient was in her usual state of health prior to this time.  Patient states that she was folding laundry, when she felt a popping sensation in her neck when she turned it to the side. After this she felt neck pain which was remitted with recumbent positioning and exacerbated with movement. She is usually independent in her ADLs however since Saturday she has required significant assistance with ambulation and other basic tasks. They also report that she has chronic diarrhea, and that recently she has not been eating much and been vomiting. Denies fever/chills, HA, vision/hearing changes, dysphagia, dysarthria, new-onset sensory change, new bowel/bladder changes. She does also c/o diffuse abominal pain. Denies AC/AP. Patient states that she had gone to [facility] prior to coming to [facility], and she was only given pain medication as well as steroids and discharged but no tests were done.     On presentation to [facility] ED her BP was 209/96, but within several hours came down to normotensive levels.  Her abdomen was tender on exam, and there was drainage noted from a midline scar that looked concerning for potential fungal infection.  Her labs were significant for K<2.0, Mg 1.1, and CK 1500.  EKG: Frequent PVCs with QTc > 480.  MRI C-spine: moderate to severe spinal canal narrowing at the C3-C4 and  "C4-C5 levels without cord signal change.  IV and PO K repletion were given.  MICU was consulted to evaluate her for ICU admission due to her immeasurably low K in the setting of PVCs on EKG, but declined given the chronic nature of her hypokalemia.  Chart review indicates pt underwent a complicated operation in 2019 at [facility]: an Oct. 2019 discharge summary describes her as a pt with "chronic pyelonephritis and right renal calculi who presented on 10/23 for open right partial nephrectomy with pyelolithotomy. An enterotomy occurred on the initial access and the decision was made to precede with open surgery. General surgery was consulted intraoperatively and assisted with identification and repair of colon and small bowel enterotomies including right hemicolectomy."      Her K looks to have been > 3 until Feb 2022.  She was admitted for severe hypokalemia to [facility] in late April 2022.  That DC summary notes: "Hypokalemia K was 1.9 on admit. 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 Last two days patient is hyperkalemic. Spironolactone and potassium supplements are discontinued. Nephrology recommends follow up outpatient next for repeat blood work."  It appears this was not done.    She was admitted to [facility] Medicine for further evaluation and treatment.        Overview/Hospital Course:  Patient being treated for hypokalemia < 2.0 on K+ repletion therapy. Continuation of glaucoma medications including: brimonidine, dorzolamide, and travosprost. Neurosurgery on board with likely plans for surgical decompression. MRI showed spinal canal stenosis at C3-C4 and C4-C5.      Interval History: NAEON, AF. Patient does have muscle weakness notably in upper extremities. Otherwise she feels better today. She denies chest pain, sob, or palpitations.     Review of Systems   Constitutional:  Negative for fever.   HENT:  Negative for congestion.  "   Respiratory:  Negative for shortness of breath.    Cardiovascular:  Negative for chest pain.   Gastrointestinal:  Positive for nausea. Negative for diarrhea.   Genitourinary:  Negative for dysuria.   Musculoskeletal:  Positive for back pain and neck stiffness.   Skin:  Positive for wound.   Neurological:  Negative for dizziness and numbness.   Objective:     Vital Signs (Most Recent):  Temp: 98.3 °F (36.8 °C) (07/25/22 1833)  Pulse: 88 (07/25/22 1833)  Resp: 17 (07/25/22 1833)  BP: 122/63 (07/25/22 1833)  SpO2: (!) 93 % (07/25/22 1833)   Vital Signs (24h Range):  Temp:  [98.3 °F (36.8 °C)] 98.3 °F (36.8 °C)  Pulse:  [63-88] 88  Resp:  [14-24] 17  SpO2:  [93 %-100 %] 93 %  BP: (119-200)/(60-89) 122/63     Weight: 67.6 kg (149 lb)  Body mass index is 29.59 kg/m².    Intake/Output Summary (Last 24 hours) at 7/25/2022 2032  Last data filed at 7/25/2022 1543  Gross per 24 hour   Intake 100 ml   Output 540 ml   Net -440 ml      Physical Exam    Significant Labs: All pertinent labs within the past 24 hours have been reviewed.    Significant Imaging: I have reviewed all pertinent imaging results/findings within the past 24 hours.      Assessment/Plan:      * Hypokalemia  Metabolic acidosis  Chronic diarrhea  CKD3  Hypernatremia  Hyperchloremia    On admission, anion gap 16 with pH of 7.22. Patient endorsing abdominal tenderness with intermittent drainage from midline abdominal scar. Denies fevers, SOB, or HA. Abdomen is tender globally with suprapubic>right side>left side. Lactate and WBC normal.  Patient had complicated partial bowel and partial kidney resection in 2019 at [facility] and has been admitted a couple of times this year for hypokalemia, most recently in April to [facility].  Suspect this is likely 2/2 to her chronic diarrhea vs RTA type I or II. On chart review, it does not appear patient has been worked up and was not discharged with potassium or bicarb medications to mediate her now chronic electrolyte derangements.  EKG unchanged from past 3 from previous admissions.     PLAN:   RTA workup pending, including Serum Osm and Urine 'lytes & Osm   Consider abdominal source if patient fevers or has further infectious concern    Pt can eat and drink and hypokalemia is most likely  chronic,continue  potassium repletion with 10mEq KCl in 100ml IVBP every hour for 5 doses and 30mEq PO K-Cl tablets q3h for 3 doses    Max correction of Na in coming 24 hrs: no lower than 140.  However, using free water at this time is not possible given her hypokalemia.  Use LR infusion.   Maintain Mg>2   Telemetry    Nephrology consult placed      Chronic diarrhea  See hypokalemia.  - starting low sodium diet particularly with hx of htn.     Asthma  - Continue home Advair   - Hold albuterol  Inhaler, primarily in the setting of hypokalemia because albuterol will drive potassium into cells and potentially worsen hypokalemia.    Cervical stenosis of spinal canal    NSGY following. If her weakness persists she may require inpatient surgery.  C3-C4 spinal canal stenosis as well and C4-C5 stenosis.     Glaucoma of right eye  Continue home dorzolamide, travoprost, and brimonidine per ophthalmology. .     Surgical wound infection  ID and Wound Care consulted given that her 2019 surgical wound appears to be draining.      CKD (chronic kidney disease) stage 3, GFR 30-59 ml/min  Latest BUN 23, Cr 1.3.  - Continue to montior     Anxiety  Xanax 2mg qhs PRN listed on home meds, pt cannot remember if she is taking this regularly.  Will continue to investigate and order if appropriate.      HTN (hypertension)  Continue home Hydralazine and Nicardipine.  Hold valsartan-HCTZ in setting of hypokalemia.        VTE Risk Mitigation (From admission, onward)         Ordered     heparin (porcine) injection 5,000 Units  Every 8 hours         07/25/22 0401     IP VTE HIGH RISK PATIENT  Once         07/25/22 0401     Place sequential compression device  Until discontinued          07/25/22 0401     Place sequential compression device  Until discontinued         07/25/22 0348                Discharge Planning   SHERIDAN: 7/27/2022     Code Status: Full Code   Is the patient medically ready for discharge?: No    Reason for patient still in hospital (select all that apply): Treatment                     Markus Molina MD  Department of Hospital Medicine   [facility] Surg

## 2022-07-26 NOTE — PLAN OF CARE
Problem: Occupational Therapy  Goal: Occupational Therapy Goal  Description: Goals to be met by:  8/16/2022    Patient will increase functional independence with ADLs by performing:    Grooming while standing at sink with Modified Girdler.  Toileting from toilet with Modified Girdler for hygiene and clothing management.   Supine to sit with Modified Girdler.  Stand pivot transfers with Modified Girdler.  Toilet transfer to toilet with Modified Girdler.    Outcome: Ongoing, Progressing   Patient's goals are set.

## 2022-07-26 NOTE — ASSESSMENT & PLAN NOTE
NSGY following. If her weakness persists she may require inpatient surgery.  C3-C4 spinal canal stenosis as well and C4-C5 stenosis.

## 2022-07-26 NOTE — PLAN OF CARE
PT evaluation complete - see note for details. POC and goals established.    Problem: Physical Therapy  Goal: Physical Therapy Goal  Description: Goals to be met by: 22     Patient will increase functional independence with mobility by performin. Supine to sit with Fleming  2. Sit to stand transfer with Fleming  3. Gait  x 150 feet with Supervision using LRAD  4. Ascend/Descend 6 inch curb step with Supervision using LRAD  5. Stand for 10 minutes with Supervision using LRAD  6. Lower extremity exercise program x30 reps per handout, with independence    Outcome: Ongoing, Progressing     2022

## 2022-07-26 NOTE — ASSESSMENT & PLAN NOTE
Severe hypokalemia with K < 2 on initial presentation   Started on aggressive supplementation and most recent labs at 2.8  Weakness significantly improved  Urine K < 10 consistent with extrarenal losses, in her case most likely GI tract loses due to chronic diarrhea (the pt reports 7-8 loose bowel movements per day)   CPK at 6K   Recommend replacement with oral KCL and IV K-phosph  Continue oral potassium bicarb for her metabolic acidosis    Pt is hyperchloremic; would recommend to change IVF to LR   Consider GI evaluation for options in management of her chronic diarrhea

## 2022-07-26 NOTE — ASSESSMENT & PLAN NOTE
Mariaelena Grubbs is a 77 y.o. female h/o cervical and lumbar stenosis, asthma, HTN, HLD, presents to ED c/o worsening neck pain and diffuse weakness since Saturday morning. MRI Csp reviewed; there is moderate to severe spinal canal narrowing at the C3-C4 and C4-C5 levels without cord signal change. On presentation her labs are significant for K<2.0, Mg 1.1, and CK 1500.     --Patient with significant improvement in strength.   --MRI with myelomalacia and severe cervical stenosis.  --CT cervical spine and X-ray cervical spine flex/ex imaging reviewed.   --Will need surgical intervention in the future. Given significant improvement in strength, patient can follow up with neurosurgery as outpatient to address cervical spine stenosis.  --Discussed pathology of cervical stenosis with patient, as well as symptoms that require urgent intervention, and she voices understanding.   --Continue medical management per primary.  --Call neurosurgery with any neuro exam changes, we will sign off at this time. Thank you for the consult.     Discussed with Dr. Miles

## 2022-07-26 NOTE — ASSESSMENT & PLAN NOTE
Metabolic acidosis  Chronic diarrhea  CKD3  Hypernatremia  Hyperchloremia    On admission, anion gap 16 with pH of 7.22. Patient endorsing abdominal tenderness with intermittent drainage from midline abdominal scar. Denies fevers, SOB, or HA. Abdomen is tender globally with suprapubic>right side>left side. Lactate and WBC normal.  Patient had complicated partial bowel and partial kidney resection in 2019 at [facility] and has been admitted a couple of times this year for hypokalemia, most recently in April to [facility].  Suspect this is likely 2/2 to her chronic diarrhea vs RTA type I or II. On chart review, it does not appear patient has been worked up and was not discharged with potassium or bicarb medications to mediate her now chronic electrolyte derangements. EKG unchanged from past 3 from previous admissions.      Latest CPK 6752     PLAN:   RTA workup pending, including Serum Osm and Urine 'lytes & Osm   Consider abdominal source if patient fevers or has further infectious concern    Pt can eat and drink and hypokalemia is chronic, so start potassium repletion with LR infusion and PO K-Cl tablets given pt's other electrolyte abnormalities.  K repletion rate not to exceed 20 mEq/hr   Max correction of Na in coming 24 hrs: no lower than 140.  However, using free water at this time is not possible given her hypokalemia.  Use LR infusion.   Maintain Mg>2   Telemetry    Nephrology consult placed   Continue KCL 30mEq q3h capsule    Per nephrology recommendations, plan for KCL 20 mEq in LR, however patient refuses IV potassium at this time due to pain

## 2022-07-26 NOTE — ASSESSMENT & PLAN NOTE
ID and Wound Care following given that her 2019 surgical wound appears to be draining. Wound care

## 2022-07-26 NOTE — HOSPITAL COURSE
Patient was treated for hypokalemia < 2.0 on K+ repletion therapy. She started glaucoma medications including: brimonidine, dorzolamide, and travosprost. Neurosurgery discussed meeting outpatient to discuss surgical decompression. MRI showed C3-C4 and C4-C5 cervical stenosis. Bicarbonate at 14, per nephrology repletion with 1300mg tid sodium bicarbonate showed much improvement. Potassium improved to 3.6 and patient was feeling better with less weakness in her arms.     Dispo - Continue oral potassium 20meq TID and sodium bicarb 650mg PO TID for one week and recheck BMP. F/u with PCP, Neurosurgery, and Nephrology.

## 2022-07-26 NOTE — ASSESSMENT & PLAN NOTE
MRI w/o contrast - C3-C4 spinal canal stenosis as well and C4-C5 stenosis.   -   NSGY following, recommend outpatient follow up and reassessment for surgical decompression

## 2022-07-26 NOTE — TELEPHONE ENCOUNTER
Scheduled patient an appointment with Dr. Miles for 8/18/22 @ 11:30 am patient verbally understood.

## 2022-07-26 NOTE — ASSESSMENT & PLAN NOTE
- Continue home Advair   - Hold albuterol  Inhaler, primarily in the setting of hypokalemia because albuterol will drive potassium into cells and potentially worsen hypokalemia.

## 2022-07-26 NOTE — PROGRESS NOTES
[facility] - [facility] Surg  Nephrology  Progress Note    Patient Name: Mariaelena Grubbs  MRN: [id number]  Admission Date: 7/24/2022  Hospital Length of Stay: 1 days  Attending Provider: Theresa Dalal MD   Primary Care Physician: Primary Doctor No  Principal Problem:Hypokalemia    Subjective:     HPI: Mariaelena Grubbs is a 77 y.o. female with HTN, HLD, Asthma, cervical and lumbar stenosis, chronic R sided pyelonephritis requiring R sided partial nephrectomy (2019), complicated by bowel perforation, required multiple bowel resection and R sided hemicolectomy and chronic diarrhea. Pt presents to the ED for evaluation of weakness after feeling a popping sensation in her neck. Upon initial evaluation she was noted to have severe hypokalemia (<2) and was started on replacement therapy. Neurosurgery was consulted for evaluation of her cervical spine stenosis however recommend no surgical intervention at this time. Pt refers she has 7-8 loose bowel movements per day. She denies any nausea, vomits, fevers, chest pain, palpitations or any other symptoms.              Interval History:   Potassium up to 2.8 this AM. Pt feeling much better. Weakness significantly improved.     Review of patient's allergies indicates:   Allergen Reactions    Bananas [banana] Anaphylaxis    Crayfish      Hard time breathing    Iodine and iodide containing products      Told to avoid due to crayfish allergy    Avocado (laurus persea) Swelling and Rash    Kiwi (actinidia chinensis) Itching, Swelling and Rash    Latex, natural rubber Itching, Swelling and Rash    Papaya Itching, Swelling and Rash     Current Facility-Administered Medications   Medication Frequency    0.9 % NaCl with KCl 20 mEq infusion Continuous    albuterol inhaler 2 puff Q4H PRN    aspirin EC tablet 81 mg Daily    brimonidine 0.15 % OPTH DROP ophthalmic solution 1 drop TID    cholestyramine-aspartame 4 gram packet 4 g Daily    dextrose 10% bolus 125 mL PRN    dextrose 10%  bolus 250 mL PRN    diphenoxylate-atropine 2.5-0.025 mg per tablet 1 tablet QID PRN    dorzolamide 2 % ophthalmic solution 1 drop TID    EScitalopram oxalate tablet 10 mg Daily    fluticasone furoate-vilanteroL 100-25 mcg/dose diskus inhaler 1 puff Daily    fluticasone propionate 50 mcg/actuation nasal spray 50 mcg Daily    glucagon (human recombinant) injection 1 mg PRN    glucose chewable tablet 16 g PRN    glucose chewable tablet 24 g PRN    heparin (porcine) injection 5,000 Units Q8H    hydrALAZINE tablet 50 mg TID WM    melatonin tablet 6 mg Nightly PRN    naloxone 0.4 mg/mL injection 0.02 mg PRN    NIFEdipine 24 hr tablet 60 mg Daily    oxyCODONE-acetaminophen 5-325 mg per tablet 1 tablet Q6H PRN    potassium bicarbonate disintegrating tablet 20 mEq TID    potassium chloride CR capsule 30 mEq Q3H    prochlorperazine injection Soln 5 mg Q6H PRN    sodium chloride 0.9% flush 10 mL PRN    sodium chloride 0.9% flush 10 mL Q12H PRN    travoprost 0.004 % ophthalmic solution 1 drop QHS       Objective:     Vital Signs (Most Recent):  Temp: 97.9 °F (36.6 °C) (07/26/22 1228)  Pulse: 82 (07/26/22 1228)  Resp: 17 (07/26/22 1228)  BP: 127/60 (07/26/22 1228)  SpO2: (!) 94 % (07/26/22 0826)  O2 Device (Oxygen Therapy): room air (07/25/22 0920)   Vital Signs (24h Range):  Temp:  [97.8 °F (36.6 °C)-98.3 °F (36.8 °C)] 97.9 °F (36.6 °C)  Pulse:  [69-89] 82  Resp:  [14-18] 17  SpO2:  [93 %-98 %] 94 %  BP: (112-135)/(55-63) 127/60     Weight: 67.6 kg (149 lb) (07/24/22 1518)  Body mass index is 29.59 kg/m².  Body surface area is 1.68 meters squared.    I/O last 3 completed shifts:  In: 100 [IV Piggyback:100]  Out: 540 [Urine:300; Emesis/NG output:240]    Physical Exam  Constitutional:       General: She is not in acute distress.     Appearance: She is not ill-appearing.   HENT:      Head: Normocephalic and atraumatic.      Nose: Nose normal.      Mouth/Throat:      Mouth: Mucous membranes are moist.   Eyes:       Pupils: Pupils are equal, round, and reactive to light.   Pulmonary:      Effort: Pulmonary effort is normal. No respiratory distress.   Abdominal:      Palpations: Abdomen is soft.   Musculoskeletal:         General: No swelling.      Right lower leg: No edema.      Left lower leg: No edema.   Skin:     General: Skin is warm and dry.      Coloration: Skin is not jaundiced.   Neurological:      Mental Status: She is alert and oriented to person, place, and time.       Significant Labs:  CBC:   Recent Labs   Lab 07/26/22  0326   WBC 17.15*   RBC 3.32*   HGB 10.5*   HCT 31.4*      MCV 95   MCH 31.6*   MCHC 33.4     CMP:   Recent Labs   Lab 07/24/22  1704 07/25/22  0141 07/26/22  0825   *   < > 105   CALCIUM 9.5   < > 7.9*   ALBUMIN 3.8  --   --    PROT 7.5  --   --    *   < > 140   K <2.0*   < > 2.7*   CO2 12*   < > 14*   *   < > 115*   BUN 24*   < > 17   CREATININE 1.5*   < > 1.4   ALKPHOS 90  --   --    ALT 28  --   --    AST 44*  --   --    BILITOT 1.5*  --   --     < > = values in this interval not displayed.            Assessment/Plan:     * Hypokalemia  Severe hypokalemia with K < 2 on initial presentation   Started on aggressive supplementation and most recent labs at 2.8  Weakness significantly improved  Urine K < 10 consistent with extrarenal losses, in her case most likely GI tract loses due to chronic diarrhea (the pt reports 7-8 loose bowel movements per day)   CPK at 6K   Recommend replacement with oral KCL and IV K-phosph  Continue oral potassium bicarb for her metabolic acidosis    Pt is hyperchloremic; would recommend to change IVF to LR   Consider GI evaluation for options in management of her chronic diarrhea          aWyne Deras MD  Nephrology  [facility]    ATTENDING PHYSICIAN ATTESTATION  I have personally verified the history and examined the patient. I thoroughly reviewed the demographic, clinical, laboratorial and imaging information available in  medical records. I agree with the assessment and recommendations provided by the subspecialty resident who was under my supervision.

## 2022-07-26 NOTE — PLAN OF CARE
Problem: Adult Inpatient Plan of Care  Goal: Plan of Care Review  Outcome: Ongoing, Progressing  Goal: Patient-Specific Goal (Individualized)  Outcome: Ongoing, Progressing  Goal: Absence of Hospital-Acquired Illness or Injury  Outcome: Ongoing, Progressing  Goal: Optimal Comfort and Wellbeing  Outcome: Ongoing, Progressing  Goal: Readiness for Transition of Care  Outcome: Ongoing, Progressing     Problem: Fluid and Electrolyte Imbalance (Acute Kidney Injury/Impairment)  Goal: Fluid and Electrolyte Balance  Outcome: Ongoing, Progressing     Pt aa0x3. During shift MD was paged to pt room in regards to pt wanting to leave. KCL completed. Pt was calm after MD came to speak with her. Pt also c/o pain oxycodone per prn MAR. Beside commode. VSS stable. Bed in low position will continue to monitor.

## 2022-07-26 NOTE — PROGRESS NOTES
[facility] Medicine  Progress Note    Patient Name: Mariaelena Grubbs  MRN: [id number]  Patient Class: IP- Inpatient   Admission Date: 7/24/2022  Length of Stay: 1 days  Attending Physician: Theresa Dalal MD  Primary Care Provider: Primary Doctor No        Subjective:     Principal Problem:Hypokalemia        HPI:  Mariaelena Grubbs is a 77 y.o. female with cervical and lumbar stenosis, hx of multiple bowel resections, hx of chronic pyelopnephritis s/p R partial nephrectomy, asthma, HTN, HLD who presented to [facility] ED on 07/24/2022 with worsening neck pain and diffuse weakness since the previous morning 7/23. Patient was in her usual state of health prior to this time.  Patient states that she was folding laundry, when she felt a popping sensation in her neck when she turned it to the side. After this she felt neck pain which was remitted with recumbent positioning and exacerbated with movement. She is usually independent in her ADLs however since Saturday she has required significant assistance with ambulation and other basic tasks. They also report that she has chronic diarrhea, and that recently she has not been eating much and been vomiting. Denies fever/chills, HA, vision/hearing changes, dysphagia, dysarthria, new-onset sensory change, new bowel/bladder changes. She does also c/o diffuse abominal pain. Denies AC/AP. Patient states that she had gone to [facility] prior to [place] [facility] and she was only given pain medication as well as steroids and discharged but no tests were done.     On presentation to [facility] ED her BP was 209/96, but within several hours came down to normotensive levels.  Her abdomen was tender on exam, and there was drainage noted from a midline scar that looked concerning for potential fungal infection.  Her labs were significant for K<2.0, Mg 1.1, and CK 1500.  EKG: Frequent PVCs with QTc > 480.  MRI C-spine: moderate to severe spinal canal narrowing at the C3-C4 and  "C4-C5 levels without cord signal change.  IV and PO K repletion were given.  MICU was consulted to evaluate her for ICU admission due to her immeasurably low K in the setting of PVCs on EKG, but declined given the chronic nature of her hypokalemia.  Chart review indicates pt underwent a complicated operation in 2019 at [facility]: an Oct. 2019 discharge summary describes her as a pt with "chronic pyelonephritis and right renal calculi who presented on 10/23 for open right partial nephrectomy with pyelolithotomy. An enterotomy occurred on the initial access and the decision was made to precede with open surgery. General surgery was consulted intraoperatively and assisted with identification and repair of colon and small bowel enterotomies including right hemicolectomy."      Her K looks to have been > 3 until Feb 2022.  She was admitted for severe hypokalemia to [facility] in late April 2022.  That DC summary notes: "Hypokalemia K was 1.9 on admit. 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 Last two days patient is hyperkalemic. Spironolactone and potassium supplements are discontinued. Nephrology recommends follow up outpatient next for repeat blood work."  It appears this was not done.    She was admitted to [facility] for further evaluation and treatment.        Overview/Hospital Course:  Patient being treated for hypokalemia < 2.0 on K+ repletion therapy. Continuation of glaucoma medications including: brimonidine, dorzolamide, and travosprost. Neurosurgery planning to meet outpatient to discuss surgical decompression. MRI showed spinal canal stenosis at C3-C4 and C4-C5.      Interval History: Muscle weakness improving today. Patient is afebrile. Continuing potassium repletion. Patient reports arm pain at site of peripheral line and refuses further IV potassium. She is continuing on oral potassium.    Review of Systems   Constitutional:  " Negative for fever.   HENT:  Negative for congestion.    Respiratory:  Negative for shortness of breath.    Cardiovascular:  Negative for chest pain.   Gastrointestinal:  Negative for diarrhea.   Genitourinary:  Negative for dysuria.   Musculoskeletal:  Positive for back pain and neck stiffness.   Skin:  Positive for wound.   Neurological:  Positive for weakness. Negative for dizziness and numbness.   Objective:     Vital Signs (Most Recent):  Temp: 97 °F (36.1 °C) (07/26/22 1625)  Pulse: 68 (07/26/22 1625)  Resp: 18 (07/26/22 1625)  BP: (!) 104/56 (07/26/22 1625)  SpO2: 98 % (07/26/22 1625)   Vital Signs (24h Range):  Temp:  [97 °F (36.1 °C)-98.3 °F (36.8 °C)] 97 °F (36.1 °C)  Pulse:  [68-89] 68  Resp:  [14-18] 18  SpO2:  [93 %-98 %] 98 %  BP: (104-127)/(55-63) 104/56     Weight: 67.6 kg (149 lb)  Body mass index is 29.59 kg/m².  No intake or output data in the 24 hours ending 07/26/22 1734   Physical Exam  Constitutional:       Appearance: Normal appearance.   HENT:      Head: Normocephalic and atraumatic.      Nose: Nose normal.   Eyes:      Extraocular Movements: Extraocular movements intact.      Conjunctiva/sclera: Conjunctivae normal.   Cardiovascular:      Rate and Rhythm: Normal rate and regular rhythm.   Pulmonary:      Effort: Pulmonary effort is normal.   Abdominal:      General: Abdomen is flat.      Palpations: Abdomen is soft.   Musculoskeletal:         General: No swelling or tenderness.      Cervical back: Rigidity present.      Comments: Upper extremity pain at peripheral IV insertion site.   Neurological:      Mental Status: She is alert.      Motor: Weakness present.       Significant Labs: All pertinent labs within the past 24 hours have been reviewed.    Significant Imaging: I have reviewed all pertinent imaging results/findings within the past 24 hours.      Assessment/Plan:      * Hypokalemia  Metabolic acidosis  Chronic diarrhea  CKD3  Hypernatremia  Hyperchloremia    On admission, anion gap  16 with pH of 7.22. Patient endorsing abdominal tenderness with intermittent drainage from midline abdominal scar. Denies fevers, SOB, or HA. Abdomen is tender globally with suprapubic>right side>left side. Lactate and WBC normal.  Patient had complicated partial bowel and partial kidney resection in 2019 at [facility] and has been admitted a couple of times this year for hypokalemia, most recently in April to [facility].  Suspect this is likely 2/2 to her chronic diarrhea vs RTA type I or II. On chart review, it does not appear patient has been worked up and was not discharged with potassium or bicarb medications to mediate her now chronic electrolyte derangements. EKG unchanged from past 3 from previous admissions.      Latest CPK 6752     PLAN:   RTA workup pending, including Serum Osm and Urine 'lytes & Osm   Consider abdominal source if patient fevers or has further infectious concern    Pt can eat and drink and hypokalemia is chronic, so start potassium repletion with LR infusion and PO K-Cl tablets given pt's other electrolyte abnormalities.  K repletion rate not to exceed 20 mEq/hr   Max correction of Na in coming 24 hrs: no lower than 140.  However, using free water at this time is not possible given her hypokalemia.  Use LR infusion.   Maintain Mg>2   Telemetry    Nephrology consult placed   Continue KCL 30mEq q3h capsule    Per nephrology recommendations, plan for KCL 20 mEq in LR, however patient refuses IV potassium at this time due to pain    Chronic diarrhea  See hypokalemia.  - starting low sodium diet particularly with hx of htn.   - no diarrhea noted overnight      Cervical stenosis of spinal canal  MRI w/o contrast - C3-C4 spinal canal stenosis as well and C4-C5 stenosis.   -   NSGY following, recommend outpatient follow up and reassessment for surgical decompression   - PT/OT recommendations for HH      Glaucoma of right eye  Continue home dorzolamide, travoprost, and brimonidine per ophthalmology.  .     Surgical wound infection  ID and Wound Care following given that her 2019 surgical wound appears to be draining. Wound care       Hypernatremia  Resolved. Latest 140.      CKD (chronic kidney disease) stage 3, GFR 30-59 ml/min  Latest BUN 17, Cr 1.4.  - Continue to montior Scr      Anxiety  Xanax 2mg qhs PRN listed on home meds, pt cannot remember if she is taking this regularly.  Will continue to investigate and order if appropriate.    Asthma  - Continue home Advair   - Hold albuterol  Inhaler, primarily in the setting of hypokalemia because albuterol will drive potassium into cells and potentially worsen hypokalemia.    HTN (hypertension)  Continue home Hydralazine and Nicardipine.  Hold valsartan-HCTZ in setting of hypokalemia.        VTE Risk Mitigation (From admission, onward)         Ordered     heparin (porcine) injection 5,000 Units  Every 8 hours         07/25/22 0401     IP VTE HIGH RISK PATIENT  Once         07/25/22 0401     Place sequential compression device  Until discontinued         07/25/22 0348                Discharge Planning   SHERIDAN: 7/27/2022     Code Status: Full Code   Is the patient medically ready for discharge?: No    Reason for patient still in hospital (select all that apply): Treatment  Discharge Plan A: Home Health                  Markus Molina MD  Department of Hospital Medicine   [facility] Surg

## 2022-07-26 NOTE — TELEPHONE ENCOUNTER
----- Message from Chantelle Vincent PA-C sent at 7/26/2022  2:28 PM CDT -----  Hi,    This patient needs follow up with Dr. Miles in a few weeks to discuss surgery. No imaging needed.    Thanks!

## 2022-07-26 NOTE — PROGRESS NOTES
[facility] - [facility] Surg  Neurosurgery  Progress Note    Subjective:     History of Present Illness: Mariaelena Grubbs is a 77 y.o. female h/o cervical and lumbar stenosis, asthma, HTN, HLD, presents to ED c/o worsening neck pain and diffuse weakness since Saturday morning. Patient states that she had gone to [facility] today and she was only given pain medication as well as steroids and discharged but no tests were done. Patient was in her usual state of health prior to this time. She is accompanied by her son and his girlfriend who assist with the history. Patient states that she was folding laundry, when she felt a popping sensation in her neck when she turned it to the side. After this she felt neck pain which was remitted with recumbent positioning and exacerbated with movement. She is usually independent in her ADLs however since Saturday she has required significant assistance with ambulation and other basic tasks. They also report that she has not been eating much and that she has been vomiting recently. Denies fever/chills, HA, vision/hearing changes, dysphagia, dysarthria, nausea/vomiting, new-onset sensory change, bowel/bladder changes. She does also c/o diffuse abominal pain. Denies AC/AP. MRI Csp reviewed; there is moderate to severe spinal canal narrowing at the C3-C4 and C4-C5 levels without cord signal change. On presentation her labs are significant for K<2.0, Mg 1.1, and CK 1500.       Post-Op Info:  * No surgery found *         Interval History: Patient with significant improvement in strength today. Ambulating around the room with minimal assistance from walker. Denies any pain.     Medications:  Continuous Infusions:   0/9% NACL & POTASSIUM CHLORIDE 20 MEQ/L       Scheduled Meds:   aspirin  81 mg Oral Daily    brimonidine 0.15 % OPTH DROP  1 drop Both Eyes TID    cholestyramine-aspartame  1 packet Oral Daily    dorzolamide  1 drop Both Eyes TID    EScitalopram oxalate  10 mg Oral Daily     fluticasone furoate-vilanteroL  1 puff Inhalation Daily    fluticasone propionate  1 spray Each Nostril Daily    heparin (porcine)  5,000 Units Subcutaneous Q8H    hydrALAZINE  50 mg Oral TID WM    NIFEdipine  60 mg Oral Daily    potassium bicarbonate  20 mEq Oral TID    potassium chloride  30 mEq Oral Q3H    travoprost  1 drop Both Eyes QHS     PRN Meds:albuterol, dextrose 10%, dextrose 10%, diphenoxylate-atropine 2.5-0.025 mg, glucagon (human recombinant), glucose, glucose, melatonin, naloxone, oxyCODONE-acetaminophen, prochlorperazine, sodium chloride 0.9%, sodium chloride 0.9%     Review of Systems  Objective:     Weight: 67.6 kg (149 lb)  Body mass index is 29.59 kg/m².  Vital Signs (Most Recent):  Temp: 97.9 °F (36.6 °C) (07/26/22 1228)  Pulse: 82 (07/26/22 1228)  Resp: 17 (07/26/22 1228)  BP: 127/60 (07/26/22 1228)  SpO2: (!) 94 % (07/26/22 0826)   Vital Signs (24h Range):  Temp:  [97.8 °F (36.6 °C)-98.3 °F (36.8 °C)] 97.9 °F (36.6 °C)  Pulse:  [69-89] 82  Resp:  [14-17] 17  SpO2:  [93 %-98 %] 94 %  BP: (112-127)/(55-63) 127/60                     Female External Urinary Catheter 07/25/22 0612 (Active)     Neurosurgery Physical Exam  General: well developed, well nourished, no distress.   Head: normocephalic, atraumatic  Neurologic: Alert and oriented. Thought content appropriate.  Cranial nerves: face symmetric, tongue midline, CN II-XII grossly intact.   Eyes: pupils equal, round, reactive to light with accommodation, EOMI.   Pulmonary: normal respirations, no signs of respiratory distress  Skin: Skin is warm, dry and intact.  Sensory: intact to light touch throughout  Motor Strength:     Strength   Deltoids Triceps Biceps Wrist Extension Wrist Flexion Hand    Upper: R 4-/5 5/5 5/5 4/5 4/5 4/5     L 4-/5 5/5 5/5 4/5 4/5 4/5       Iliopsoas Quadriceps Knee  Flexion Tibialis  anterior Gastro- cnemius EHL   Lower: R 4-/5 4-/5 4-/5 5/5 5/5 5/5     L 4-/5 4/5 4/5 5/5 5/5 5/5         Balderas's: +R  davis's.  Clonus: Negative.    Significant Labs:  Recent Labs   Lab 07/25/22  0001 07/25/22  0141 07/25/22  0939 07/25/22  1507 07/25/22  1651 07/26/22  0326 07/26/22  0825   GLU  --    < > 92   < > 101 97 105   NA  --    < > 143   < > 143 143 140   K  --    < > 2.1*   < > 2.0* 2.8* 2.7*   CL  --    < > 114*   < > 114* 116* 115*   CO2  --    < > 20*   < > 17* 14* 14*   BUN  --    < > 26*   < > 23 19 17   CREATININE  --    < > 1.5*   < > 1.3 1.3 1.4   CALCIUM  --    < > 8.6*   < > 8.3* 8.1* 7.9*   MG 2.2  --  1.8  --   --  1.6  --     < > = values in this interval not displayed.     Recent Labs   Lab 07/24/22  1704 07/26/22  0326   WBC 10.18 17.15*   HGB 12.7 10.5*   HCT 39.5 31.4*    318     No results for input(s): LABPT, INR, APTT in the last 48 hours.  Microbiology Results (last 7 days)       Procedure Component Value Units Date/Time    Urine culture [[id number]]  (Abnormal) Collected: 07/24/22 1957    Order Status: Completed Specimen: Urine Updated: 07/25/22 1551     Urine Culture, Routine GRAM NEGATIVE FEDE  10,000 - 49,999 cfu/ml  Identification and susceptibility pending  No other significant isolate      Narrative:      ADD ON URINE OSMOLALITY, POTASSIUM, SODIUM AND PROTEIN/CREATININE   RATIO PER DR EMANUEL SWEENEY/ORDER# [id number], [id number], [id number] AND   [id number] @ 5:34AM       Specimen Source->Urine          All pertinent labs from the last 24 hours have been reviewed.    Significant Diagnostics:  I have reviewed and interpreted all pertinent imaging results/findings within the past 24 hours.    Assessment/Plan:     Cervical stenosis of spinal canal  Mariaelena Grubbs is a 77 y.o. female h/o cervical and lumbar stenosis, asthma, HTN, HLD, presents to ED c/o worsening neck pain and diffuse weakness since Saturday morning. MRI Csp reviewed; there is moderate to severe spinal canal narrowing at the C3-C4 and C4-C5 levels without cord signal change. On presentation her labs are significant for K<2.0,  Mg 1.1, and CK 1500.     --Patient with significant improvement in strength.   --MRI with myelomalacia and severe cervical stenosis.  --CT cervical spine and X-ray cervical spine flex/ex imaging reviewed.   --Will need surgical intervention in the future. Given significant improvement in strength, patient can follow up with neurosurgery as outpatient to address cervical spine stenosis.  --Discussed pathology of cervical stenosis with patient, as well as symptoms that require urgent intervention, and she voices understanding.   --Continue medical management per primary.  --Call neurosurgery with any neuro exam changes, we will sign off at this time. Thank you for the consult.     Discussed with Dr. Ginger Vincent, PA-C  Neurosurgery  [facility] Surg

## 2022-07-26 NOTE — ASSESSMENT & PLAN NOTE
Metabolic acidosis  Chronic diarrhea  CKD3  Hypernatremia  Hyperchloremia    On admission, anion gap 16 with pH of 7.22. Patient endorsing abdominal tenderness with intermittent drainage from midline abdominal scar. Denies fevers, SOB, or HA. Abdomen is tender globally with suprapubic>right side>left side. Lactate and WBC normal.  Patient had complicated partial bowel and partial kidney resection in 2019 at [facility] and has been admitted a couple of times this year for hypokalemia, most recently in April to [facility].  Suspect this is likely 2/2 to her chronic diarrhea vs RTA type I or II. On chart review, it does not appear patient has been worked up and was not discharged with potassium or bicarb medications to mediate her now chronic electrolyte derangements. EKG unchanged from past 3 from previous admissions.     PLAN:   RTA workup pending, including Serum Osm and Urine 'lytes & Osm   Consider abdominal source if patient fevers or has further infectious concern    Pt can eat and drink and hypokalemia is chronic, so start potassium repletion with LR infusion and PO K-Cl tablets given pt's other electrolyte abnormalities.  K repletion rate not to exceed 20 mEq/hr   Max correction of Na in coming 24 hrs: no lower than 140.  However, using free water at this time is not possible given her hypokalemia.  Use LR infusion.   Maintain Mg>2   Telemetry    Nephrology consult placed   Continue KCL 30mEq q3h capsule , 10meq IVPB every hour for 5 doses

## 2022-07-26 NOTE — SUBJECTIVE & OBJECTIVE
Interval History:   Potassium up to 2.8 this AM. Pt feeling much better. Weakness significantly improved.     Review of patient's allergies indicates:   Allergen Reactions    Bananas [banana] Anaphylaxis    Crayfish      Hard time breathing    Iodine and iodide containing products      Told to avoid due to crayfish allergy    Avocado (laurus persea) Swelling and Rash    Kiwi (actinidia chinensis) Itching, Swelling and Rash    Latex, natural rubber Itching, Swelling and Rash    Papaya Itching, Swelling and Rash     Current Facility-Administered Medications   Medication Frequency    0.9 % NaCl with KCl 20 mEq infusion Continuous    albuterol inhaler 2 puff Q4H PRN    aspirin EC tablet 81 mg Daily    brimonidine 0.15 % OPTH DROP ophthalmic solution 1 drop TID    cholestyramine-aspartame 4 gram packet 4 g Daily    dextrose 10% bolus 125 mL PRN    dextrose 10% bolus 250 mL PRN    diphenoxylate-atropine 2.5-0.025 mg per tablet 1 tablet QID PRN    dorzolamide 2 % ophthalmic solution 1 drop TID    EScitalopram oxalate tablet 10 mg Daily    fluticasone furoate-vilanteroL 100-25 mcg/dose diskus inhaler 1 puff Daily    fluticasone propionate 50 mcg/actuation nasal spray 50 mcg Daily    glucagon (human recombinant) injection 1 mg PRN    glucose chewable tablet 16 g PRN    glucose chewable tablet 24 g PRN    heparin (porcine) injection 5,000 Units Q8H    hydrALAZINE tablet 50 mg TID WM    melatonin tablet 6 mg Nightly PRN    naloxone 0.4 mg/mL injection 0.02 mg PRN    NIFEdipine 24 hr tablet 60 mg Daily    oxyCODONE-acetaminophen 5-325 mg per tablet 1 tablet Q6H PRN    potassium bicarbonate disintegrating tablet 20 mEq TID    potassium chloride CR capsule 30 mEq Q3H    prochlorperazine injection Soln 5 mg Q6H PRN    sodium chloride 0.9% flush 10 mL PRN    sodium chloride 0.9% flush 10 mL Q12H PRN    travoprost 0.004 % ophthalmic solution 1 drop QHS       Objective:     Vital Signs (Most Recent):  Temp: 97.9 °F (36.6 °C)  (07/26/22 1228)  Pulse: 82 (07/26/22 1228)  Resp: 17 (07/26/22 1228)  BP: 127/60 (07/26/22 1228)  SpO2: (!) 94 % (07/26/22 0826)  O2 Device (Oxygen Therapy): room air (07/25/22 0920)   Vital Signs (24h Range):  Temp:  [97.8 °F (36.6 °C)-98.3 °F (36.8 °C)] 97.9 °F (36.6 °C)  Pulse:  [69-89] 82  Resp:  [14-18] 17  SpO2:  [93 %-98 %] 94 %  BP: (112-135)/(55-63) 127/60     Weight: 67.6 kg (149 lb) (07/24/22 1518)  Body mass index is 29.59 kg/m².  Body surface area is 1.68 meters squared.    I/O last 3 completed shifts:  In: 100 [IV Piggyback:100]  Out: 540 [Urine:300; Emesis/NG output:240]    Physical Exam  Constitutional:       General: She is not in acute distress.     Appearance: She is not ill-appearing.   HENT:      Head: Normocephalic and atraumatic.      Nose: Nose normal.      Mouth/Throat:      Mouth: Mucous membranes are moist.   Eyes:      Pupils: Pupils are equal, round, and reactive to light.   Pulmonary:      Effort: Pulmonary effort is normal. No respiratory distress.   Abdominal:      Palpations: Abdomen is soft.   Musculoskeletal:         General: No swelling.      Right lower leg: No edema.      Left lower leg: No edema.   Skin:     General: Skin is warm and dry.      Coloration: Skin is not jaundiced.   Neurological:      Mental Status: She is alert and oriented to person, place, and time.       Significant Labs:  CBC:   Recent Labs   Lab 07/26/22  0326   WBC 17.15*   RBC 3.32*   HGB 10.5*   HCT 31.4*      MCV 95   MCH 31.6*   MCHC 33.4     CMP:   Recent Labs   Lab 07/24/22  1704 07/25/22  0141 07/26/22  0825   *   < > 105   CALCIUM 9.5   < > 7.9*   ALBUMIN 3.8  --   --    PROT 7.5  --   --    *   < > 140   K <2.0*   < > 2.7*   CO2 12*   < > 14*   *   < > 115*   BUN 24*   < > 17   CREATININE 1.5*   < > 1.4   ALKPHOS 90  --   --    ALT 28  --   --    AST 44*  --   --    BILITOT 1.5*  --   --     < > = values in this interval not displayed.

## 2022-07-26 NOTE — SUBJECTIVE & OBJECTIVE
Interval History: Patient with significant improvement in strength today. Ambulating around the room with minimal assistance from walker. Denies any pain.     Medications:  Continuous Infusions:   0/9% NACL & POTASSIUM CHLORIDE 20 MEQ/L       Scheduled Meds:   aspirin  81 mg Oral Daily    brimonidine 0.15 % OPTH DROP  1 drop Both Eyes TID    cholestyramine-aspartame  1 packet Oral Daily    dorzolamide  1 drop Both Eyes TID    EScitalopram oxalate  10 mg Oral Daily    fluticasone furoate-vilanteroL  1 puff Inhalation Daily    fluticasone propionate  1 spray Each Nostril Daily    heparin (porcine)  5,000 Units Subcutaneous Q8H    hydrALAZINE  50 mg Oral TID WM    NIFEdipine  60 mg Oral Daily    potassium bicarbonate  20 mEq Oral TID    potassium chloride  30 mEq Oral Q3H    travoprost  1 drop Both Eyes QHS     PRN Meds:albuterol, dextrose 10%, dextrose 10%, diphenoxylate-atropine 2.5-0.025 mg, glucagon (human recombinant), glucose, glucose, melatonin, naloxone, oxyCODONE-acetaminophen, prochlorperazine, sodium chloride 0.9%, sodium chloride 0.9%     Review of Systems  Objective:     Weight: 67.6 kg (149 lb)  Body mass index is 29.59 kg/m².  Vital Signs (Most Recent):  Temp: 97.9 °F (36.6 °C) (07/26/22 1228)  Pulse: 82 (07/26/22 1228)  Resp: 17 (07/26/22 1228)  BP: 127/60 (07/26/22 1228)  SpO2: (!) 94 % (07/26/22 0826)   Vital Signs (24h Range):  Temp:  [97.8 °F (36.6 °C)-98.3 °F (36.8 °C)] 97.9 °F (36.6 °C)  Pulse:  [69-89] 82  Resp:  [14-17] 17  SpO2:  [93 %-98 %] 94 %  BP: (112-127)/(55-63) 127/60                     Female External Urinary Catheter 07/25/22 0612 (Active)     Neurosurgery Physical Exam  General: well developed, well nourished, no distress.   Head: normocephalic, atraumatic  Neurologic: Alert and oriented. Thought content appropriate.  Cranial nerves: face symmetric, tongue midline, CN II-XII grossly intact.   Eyes: pupils equal, round, reactive to light with accommodation, EOMI.   Pulmonary: normal  respirations, no signs of respiratory distress  Skin: Skin is warm, dry and intact.  Sensory: intact to light touch throughout  Motor Strength:     Strength   Deltoids Triceps Biceps Wrist Extension Wrist Flexion Hand    Upper: R 4-/5 5/5 5/5 4/5 4/5 4/5     L 4-/5 5/5 5/5 4/5 4/5 4/5       Iliopsoas Quadriceps Knee  Flexion Tibialis  anterior Gastro- cnemius EHL   Lower: R 4-/5 4-/5 4-/5 5/5 5/5 5/5     L 4-/5 4/5 4/5 5/5 5/5 5/5         Balderas's: +R balderas's.  Clonus: Negative.    Significant Labs:  Recent Labs   Lab 07/25/22  0001 07/25/22  0141 07/25/22  0939 07/25/22  1507 07/25/22  1651 07/26/22  0326 07/26/22  0825   GLU  --    < > 92   < > 101 97 105   NA  --    < > 143   < > 143 143 140   K  --    < > 2.1*   < > 2.0* 2.8* 2.7*   CL  --    < > 114*   < > 114* 116* 115*   CO2  --    < > 20*   < > 17* 14* 14*   BUN  --    < > 26*   < > 23 19 17   CREATININE  --    < > 1.5*   < > 1.3 1.3 1.4   CALCIUM  --    < > 8.6*   < > 8.3* 8.1* 7.9*   MG 2.2  --  1.8  --   --  1.6  --     < > = values in this interval not displayed.     Recent Labs   Lab 07/24/22  1704 07/26/22  0326   WBC 10.18 17.15*   HGB 12.7 10.5*   HCT 39.5 31.4*    318     No results for input(s): LABPT, INR, APTT in the last 48 hours.  Microbiology Results (last 7 days)       Procedure Component Value Units Date/Time    Urine culture [[id number]]  (Abnormal) Collected: 07/24/22 1957    Order Status: Completed Specimen: Urine Updated: 07/25/22 1551     Urine Culture, Routine GRAM NEGATIVE FEDE  10,000 - 49,999 cfu/ml  Identification and susceptibility pending  No other significant isolate      Narrative:      ADD ON URINE OSMOLALITY, POTASSIUM, SODIUM AND PROTEIN/CREATININE   RATIO PER DR EMANUEL SWEENEY/ORDER# [id number], [id number], [id number] AND   [id number] @ 5:34AM       Specimen Source->Urine          All pertinent labs from the last 24 hours have been reviewed.    Significant Diagnostics:  I have reviewed and interpreted all pertinent  imaging results/findings within the past 24 hours.

## 2022-07-27 LAB
ALBUMIN SERPL BCP-MCNC: 3.1 G/DL (ref 3.5–5.2)
ALP SERPL-CCNC: 78 U/L (ref 55–135)
ALT SERPL W/O P-5'-P-CCNC: 93 U/L (ref 10–44)
ANION GAP SERPL CALC-SCNC: 9 MMOL/L (ref 8–16)
AST SERPL-CCNC: 233 U/L (ref 10–40)
BASOPHILS # BLD AUTO: 0.04 K/UL (ref 0–0.2)
BASOPHILS NFR BLD: 0.3 % (ref 0–1.9)
BILIRUB SERPL-MCNC: 0.9 MG/DL (ref 0.1–1)
BUN SERPL-MCNC: 11 MG/DL (ref 8–23)
BUN SERPL-MCNC: 11 MG/DL (ref 8–23)
BUN SERPL-MCNC: 12 MG/DL (ref 8–23)
CALCIUM SERPL-MCNC: 8.2 MG/DL (ref 8.7–10.5)
CALCIUM SERPL-MCNC: 8.2 MG/DL (ref 8.7–10.5)
CALCIUM SERPL-MCNC: 8.3 MG/DL (ref 8.7–10.5)
CHLORIDE SERPL-SCNC: 117 MMOL/L (ref 95–110)
CHLORIDE SERPL-SCNC: 118 MMOL/L (ref 95–110)
CHLORIDE SERPL-SCNC: 118 MMOL/L (ref 95–110)
CK SERPL-CCNC: ABNORMAL U/L (ref 20–180)
CO2 SERPL-SCNC: 15 MMOL/L (ref 23–29)
CO2 SERPL-SCNC: 16 MMOL/L (ref 23–29)
CO2 SERPL-SCNC: 18 MMOL/L (ref 23–29)
CREAT SERPL-MCNC: 0.9 MG/DL (ref 0.5–1.4)
CREAT SERPL-MCNC: 1.1 MG/DL (ref 0.5–1.4)
CREAT SERPL-MCNC: 1.1 MG/DL (ref 0.5–1.4)
DIFFERENTIAL METHOD: ABNORMAL
EOSINOPHIL # BLD AUTO: 0.1 K/UL (ref 0–0.5)
EOSINOPHIL NFR BLD: 1.1 % (ref 0–8)
ERYTHROCYTE [DISTWIDTH] IN BLOOD BY AUTOMATED COUNT: 15.8 % (ref 11.5–14.5)
EST. GFR  (AFRICAN AMERICAN): 56.4 ML/MIN/1.73 M^2
EST. GFR  (AFRICAN AMERICAN): 56.4 ML/MIN/1.73 M^2
EST. GFR  (AFRICAN AMERICAN): >60 ML/MIN/1.73 M^2
EST. GFR  (NON AFRICAN AMERICAN): 48.9 ML/MIN/1.73 M^2
EST. GFR  (NON AFRICAN AMERICAN): 48.9 ML/MIN/1.73 M^2
EST. GFR  (NON AFRICAN AMERICAN): >60 ML/MIN/1.73 M^2
GLUCOSE SERPL-MCNC: 102 MG/DL (ref 70–110)
GLUCOSE SERPL-MCNC: 102 MG/DL (ref 70–110)
GLUCOSE SERPL-MCNC: 104 MG/DL (ref 70–110)
HCT VFR BLD AUTO: 32.7 % (ref 37–48.5)
HGB BLD-MCNC: 10.9 G/DL (ref 12–16)
IMM GRANULOCYTES # BLD AUTO: 0.09 K/UL (ref 0–0.04)
IMM GRANULOCYTES NFR BLD AUTO: 0.7 % (ref 0–0.5)
LYMPHOCYTES # BLD AUTO: 4.2 K/UL (ref 1–4.8)
LYMPHOCYTES NFR BLD: 32.3 % (ref 18–48)
MAGNESIUM SERPL-MCNC: 1.5 MG/DL (ref 1.6–2.6)
MCH RBC QN AUTO: 31.1 PG (ref 27–31)
MCHC RBC AUTO-ENTMCNC: 33.3 G/DL (ref 32–36)
MCV RBC AUTO: 93 FL (ref 82–98)
MONOCYTES # BLD AUTO: 0.6 K/UL (ref 0.3–1)
MONOCYTES NFR BLD: 4.5 % (ref 4–15)
NEUTROPHILS # BLD AUTO: 7.9 K/UL (ref 1.8–7.7)
NEUTROPHILS NFR BLD: 61.1 % (ref 38–73)
NRBC BLD-RTO: 0 /100 WBC
PHOSPHATE SERPL-MCNC: 3.6 MG/DL (ref 2.7–4.5)
PLATELET # BLD AUTO: 365 K/UL (ref 150–450)
PMV BLD AUTO: 10.8 FL (ref 9.2–12.9)
POTASSIUM SERPL-SCNC: 3.2 MMOL/L (ref 3.5–5.1)
POTASSIUM SERPL-SCNC: 3.5 MMOL/L (ref 3.5–5.1)
POTASSIUM SERPL-SCNC: 3.5 MMOL/L (ref 3.5–5.1)
PROT SERPL-MCNC: 6.1 G/DL (ref 6–8.4)
RBC # BLD AUTO: 3.5 M/UL (ref 4–5.4)
SODIUM SERPL-SCNC: 142 MMOL/L (ref 136–145)
SODIUM SERPL-SCNC: 143 MMOL/L (ref 136–145)
SODIUM SERPL-SCNC: 144 MMOL/L (ref 136–145)
WBC # BLD AUTO: 12.85 K/UL (ref 3.9–12.7)

## 2022-07-27 PROCEDURE — 25000003 PHARM REV CODE 250: Performed by: HOSPITALIST

## 2022-07-27 PROCEDURE — 84100 ASSAY OF PHOSPHORUS: CPT

## 2022-07-27 PROCEDURE — 36415 COLL VENOUS BLD VENIPUNCTURE: CPT | Performed by: HOSPITALIST

## 2022-07-27 PROCEDURE — 83735 ASSAY OF MAGNESIUM: CPT

## 2022-07-27 PROCEDURE — 85025 COMPLETE CBC W/AUTO DIFF WBC: CPT

## 2022-07-27 PROCEDURE — 25000003 PHARM REV CODE 250: Performed by: INTERNAL MEDICINE

## 2022-07-27 PROCEDURE — 80053 COMPREHEN METABOLIC PANEL: CPT

## 2022-07-27 PROCEDURE — 63600175 PHARM REV CODE 636 W HCPCS

## 2022-07-27 PROCEDURE — 27000207 HC ISOLATION

## 2022-07-27 PROCEDURE — 99233 SBSQ HOSP IP/OBS HIGH 50: CPT | Mod: GC,,, | Performed by: HOSPITALIST

## 2022-07-27 PROCEDURE — 99233 PR SUBSEQUENT HOSPITAL CARE,LEVL III: ICD-10-PCS | Mod: GC,,, | Performed by: HOSPITALIST

## 2022-07-27 PROCEDURE — 25000003 PHARM REV CODE 250

## 2022-07-27 PROCEDURE — 25000003 PHARM REV CODE 250: Performed by: STUDENT IN AN ORGANIZED HEALTH CARE EDUCATION/TRAINING PROGRAM

## 2022-07-27 PROCEDURE — 63600175 PHARM REV CODE 636 W HCPCS: Performed by: HOSPITALIST

## 2022-07-27 PROCEDURE — 94640 AIRWAY INHALATION TREATMENT: CPT

## 2022-07-27 PROCEDURE — 82550 ASSAY OF CK (CPK): CPT

## 2022-07-27 PROCEDURE — 99232 SBSQ HOSP IP/OBS MODERATE 35: CPT | Mod: ,,, | Performed by: INTERNAL MEDICINE

## 2022-07-27 PROCEDURE — 94761 N-INVAS EAR/PLS OXIMETRY MLT: CPT

## 2022-07-27 PROCEDURE — 99232 PR SUBSEQUENT HOSPITAL CARE,LEVL II: ICD-10-PCS | Mod: ,,, | Performed by: INTERNAL MEDICINE

## 2022-07-27 PROCEDURE — 11000001 HC ACUTE MED/SURG PRIVATE ROOM

## 2022-07-27 PROCEDURE — 80048 BASIC METABOLIC PNL TOTAL CA: CPT | Performed by: HOSPITALIST

## 2022-07-27 RX ORDER — DIPHENOXYLATE HYDROCHLORIDE AND ATROPINE SULFATE 2.5; .025 MG/1; MG/1
1 TABLET ORAL 3 TIMES DAILY
Status: DISCONTINUED | OUTPATIENT
Start: 2022-07-27 | End: 2022-07-28 | Stop reason: HOSPADM

## 2022-07-27 RX ORDER — MAGNESIUM SULFATE HEPTAHYDRATE 40 MG/ML
2 INJECTION, SOLUTION INTRAVENOUS ONCE
Status: COMPLETED | OUTPATIENT
Start: 2022-07-27 | End: 2022-07-27

## 2022-07-27 RX ORDER — SODIUM BICARBONATE 650 MG/1
1300 TABLET ORAL 3 TIMES DAILY
Status: DISCONTINUED | OUTPATIENT
Start: 2022-07-27 | End: 2022-07-28 | Stop reason: HOSPADM

## 2022-07-27 RX ADMIN — SODIUM CHLORIDE, SODIUM LACTATE, POTASSIUM CHLORIDE, AND CALCIUM CHLORIDE: .6; .31; .03; .02 INJECTION, SOLUTION INTRAVENOUS at 10:07

## 2022-07-27 RX ADMIN — MAGNESIUM SULFATE HEPTAHYDRATE 2 G: 40 INJECTION, SOLUTION INTRAVENOUS at 04:07

## 2022-07-27 RX ADMIN — SODIUM BICARBONATE 650 MG TABLET 1300 MG: at 08:07

## 2022-07-27 RX ADMIN — DORZOLAMIDE HYDROCHLORIDE 1 DROP: 20 SOLUTION/ DROPS OPHTHALMIC at 04:07

## 2022-07-27 RX ADMIN — DIPHENOXYLATE HYDROCHLORIDE AND ATROPINE SULFATE 1 TABLET: 2.5; .025 TABLET ORAL at 08:07

## 2022-07-27 RX ADMIN — SODIUM CHLORIDE, SODIUM LACTATE, POTASSIUM CHLORIDE, AND CALCIUM CHLORIDE: .6; .31; .03; .02 INJECTION, SOLUTION INTRAVENOUS at 01:07

## 2022-07-27 RX ADMIN — DORZOLAMIDE HYDROCHLORIDE 1 DROP: 20 SOLUTION/ DROPS OPHTHALMIC at 09:07

## 2022-07-27 RX ADMIN — POTASSIUM BICARBONATE 20 MEQ: 391 TABLET, EFFERVESCENT ORAL at 04:07

## 2022-07-27 RX ADMIN — FLUTICASONE FUROATE AND VILANTEROL TRIFENATATE 1 PUFF: 100; 25 POWDER RESPIRATORY (INHALATION) at 09:07

## 2022-07-27 RX ADMIN — DORZOLAMIDE HYDROCHLORIDE 1 DROP: 20 SOLUTION/ DROPS OPHTHALMIC at 08:07

## 2022-07-27 RX ADMIN — HYDRALAZINE HYDROCHLORIDE 50 MG: 50 TABLET ORAL at 04:07

## 2022-07-27 RX ADMIN — DIPHENOXYLATE HYDROCHLORIDE AND ATROPINE SULFATE 1 TABLET: 2.5; .025 TABLET ORAL at 10:07

## 2022-07-27 RX ADMIN — POTASSIUM BICARBONATE 20 MEQ: 391 TABLET, EFFERVESCENT ORAL at 08:07

## 2022-07-27 RX ADMIN — ESCITALOPRAM OXALATE 10 MG: 10 TABLET ORAL at 09:07

## 2022-07-27 RX ADMIN — HYDRALAZINE HYDROCHLORIDE 50 MG: 50 TABLET ORAL at 09:07

## 2022-07-27 RX ADMIN — HEPARIN SODIUM 5000 UNITS: 5000 INJECTION INTRAVENOUS; SUBCUTANEOUS at 01:07

## 2022-07-27 RX ADMIN — TRAVOPROST 1 DROP: 0.04 SOLUTION/ DROPS OPHTHALMIC at 08:07

## 2022-07-27 RX ADMIN — CHOLESTYRAMINE 4 G: 4 POWDER, FOR SUSPENSION ORAL at 10:07

## 2022-07-27 RX ADMIN — DIPHENOXYLATE HYDROCHLORIDE AND ATROPINE SULFATE 1 TABLET: 2.5; .025 TABLET ORAL at 04:07

## 2022-07-27 RX ADMIN — ASPIRIN 81 MG: 81 TABLET, COATED ORAL at 09:07

## 2022-07-27 RX ADMIN — POTASSIUM BICARBONATE 20 MEQ: 391 TABLET, EFFERVESCENT ORAL at 09:07

## 2022-07-27 RX ADMIN — NIFEDIPINE 60 MG: 60 TABLET, FILM COATED, EXTENDED RELEASE ORAL at 09:07

## 2022-07-27 RX ADMIN — HEPARIN SODIUM 5000 UNITS: 5000 INJECTION INTRAVENOUS; SUBCUTANEOUS at 09:07

## 2022-07-27 RX ADMIN — BRIMONIDINE TARTRATE 1 DROP: 1.5 SOLUTION OPHTHALMIC at 09:07

## 2022-07-27 RX ADMIN — OXYCODONE HYDROCHLORIDE AND ACETAMINOPHEN 1 TABLET: 5; 325 TABLET ORAL at 08:07

## 2022-07-27 RX ADMIN — SODIUM BICARBONATE 650 MG TABLET 1300 MG: at 04:07

## 2022-07-27 NOTE — NURSING
"Pt has refused morning labs. She states "I dont want no blood drawn" "I dont want to be stuck" "yall not about to stick me" on call Baljit YU.   "

## 2022-07-27 NOTE — PLAN OF CARE
Pt's three peripheral IV access lines infiltrated today. She received about 10 bags of IV Potassium yesterday and also voiced to team that she has poor peripheral venous access. Bedside nurse offered pt options about placing new peripheral access but pt stated that she declines at this time b/c she does not want to get stuck again. Let pt know that we can put in for ultrasound placement since she has poor venous access. Pt stated that she will not agree with any plan of care until she speaks with primary attending about her plan of care before she will agree to any treatment. Pt requested to speak with primary attending directly about her plan of care before proceeding with any treatment plan.     Nurse attempted again at end of shift to ask about peripheral access and pt stated she would like to speak with doctor first.  Primary attending made aware of pt's request to meet with her about discussing plan of care. Midline consult was placed this evening by primary attending. Pt will most likely refuse midline consult/placement unless she speaks with primary attending first.       Problem: Adult Inpatient Plan of Care  Goal: Plan of Care Review  Outcome: Ongoing, Progressing  Goal: Patient-Specific Goal (Individualized)  Outcome: Ongoing, Progressing  Goal: Absence of Hospital-Acquired Illness or Injury  Outcome: Ongoing, Progressing  Goal: Optimal Comfort and Wellbeing  Outcome: Ongoing, Progressing  Goal: Readiness for Transition of Care  Outcome: Ongoing, Progressing     Problem: Renal Function Impairment (Acute Kidney Injury/Impairment)  Goal: Effective Renal Function  Outcome: Ongoing, Progressing     Problem: Oral Intake Inadequate (Acute Kidney Injury/Impairment)  Goal: Optimal Nutrition Intake  Outcome: Ongoing, Progressing

## 2022-07-27 NOTE — SUBJECTIVE & OBJECTIVE
Interval History:   Potassium up to 3.6 this AM. Pt continues feeling much better. Weakness significantly improved.     Review of patient's allergies indicates:   Allergen Reactions    Bananas [banana] Anaphylaxis    Crayfish      Hard time breathing    Iodine and iodide containing products      Told to avoid due to crayfish allergy    Avocado (laurus persea) Swelling and Rash    Kiwi (actinidia chinensis) Itching, Swelling and Rash    Latex, natural rubber Itching, Swelling and Rash    Papaya Itching, Swelling and Rash     Current Facility-Administered Medications   Medication Frequency    albuterol inhaler 2 puff Q4H PRN    aspirin EC tablet 81 mg Daily    brimonidine 0.15 % OPTH DROP ophthalmic solution 1 drop TID    cholestyramine-aspartame 4 gram packet 4 g Daily    dextrose 10% bolus 125 mL PRN    dextrose 10% bolus 250 mL PRN    diphenoxylate-atropine 2.5-0.025 mg per tablet 1 tablet TID    dorzolamide 2 % ophthalmic solution 1 drop TID    EScitalopram oxalate tablet 10 mg Daily    fluticasone furoate-vilanteroL 100-25 mcg/dose diskus inhaler 1 puff Daily    fluticasone propionate 50 mcg/actuation nasal spray 50 mcg Daily    glucagon (human recombinant) injection 1 mg PRN    glucose chewable tablet 16 g PRN    glucose chewable tablet 24 g PRN    heparin (porcine) injection 5,000 Units Q8H    hydrALAZINE tablet 50 mg TID WM    lactated ringers infusion Continuous    melatonin tablet 6 mg Nightly PRN    naloxone 0.4 mg/mL injection 0.02 mg PRN    NIFEdipine 24 hr tablet 60 mg Daily    oxyCODONE-acetaminophen 5-325 mg per tablet 1 tablet Q6H PRN    potassium bicarbonate disintegrating tablet 20 mEq TID    prochlorperazine injection Soln 5 mg Q6H PRN    sodium bicarbonate tablet 1,300 mg TID    sodium chloride 0.9% flush 10 mL PRN    sodium chloride 0.9% flush 10 mL Q12H PRN    travoprost 0.004 % ophthalmic solution 1 drop QHS       Objective:     Vital Signs (Most Recent):  Temp: 98.4 °F (36.9 °C) (07/27/22  1200)  Pulse: 80 (07/27/22 1200)  Resp: 17 (07/27/22 1200)  BP: 132/64 (07/27/22 1200)  SpO2: 97 % (07/27/22 1200)  O2 Device (Oxygen Therapy): room air (07/27/22 0908)   Vital Signs (24h Range):  Temp:  [97 °F (36.1 °C)-98.4 °F (36.9 °C)] 98.4 °F (36.9 °C)  Pulse:  [] 80  Resp:  [17-18] 17  SpO2:  [95 %-98 %] 97 %  BP: ()/(54-76) 132/64     Weight: 67.6 kg (149 lb) (07/24/22 1518)  Body mass index is 29.59 kg/m².  Body surface area is 1.68 meters squared.    I/O last 3 completed shifts:  In: 800 [P.O.:800]  Out: 900 [Urine:900]    Physical Exam  Constitutional:       General: She is not in acute distress.     Appearance: She is not ill-appearing.   HENT:      Head: Normocephalic and atraumatic.      Nose: Nose normal.      Mouth/Throat:      Mouth: Mucous membranes are moist.   Eyes:      Pupils: Pupils are equal, round, and reactive to light.   Pulmonary:      Effort: Pulmonary effort is normal. No respiratory distress.   Abdominal:      Palpations: Abdomen is soft.   Musculoskeletal:         General: No swelling.      Right lower leg: No edema.      Left lower leg: No edema.   Skin:     General: Skin is warm and dry.      Coloration: Skin is not jaundiced.   Neurological:      Mental Status: She is alert and oriented to person, place, and time.       Significant Labs:  CBC:   Recent Labs   Lab 07/27/22  0943   WBC 12.85*   RBC 3.50*   HGB 10.9*   HCT 32.7*      MCV 93   MCH 31.1*   MCHC 33.3       CMP:   Recent Labs   Lab 07/27/22  0943     102   CALCIUM 8.2*  8.3*   ALBUMIN 3.1*   PROT 6.1     142   K 3.5  3.5   CO2 16*  15*   *  118*   BUN 11  12   CREATININE 1.1  1.1   ALKPHOS 78   ALT 93*   *   BILITOT 0.9

## 2022-07-27 NOTE — PROGRESS NOTES
[facility]  Nephrology  Progress Note    Patient Name: Mariaelena Grubbs  MRN: [id number]  Admission Date: 7/24/2022  Hospital Length of Stay: 2 days  Attending Provider: Theresa Dalal MD   Primary Care Physician: Primary Doctor No  Principal Problem:Hypokalemia    Subjective:     HPI: Mariaelena Grubbs is a 77 y.o. female with HTN, HLD, Asthma, cervical and lumbar stenosis, chronic R sided pyelonephritis requiring R sided partial nephrectomy (2019), complicated by bowel perforation, required multiple bowel resection and R sided hemicolectomy and chronic diarrhea. Pt presents to the ED for evaluation of weakness after feeling a popping sensation in her neck. Upon initial evaluation she was noted to have severe hypokalemia (<2) and was started on replacement therapy. Neurosurgery was consulted for evaluation of her cervical spine stenosis however recommend no surgical intervention at this time. Pt refers she has 7-8 loose bowel movements per day. She denies any nausea, vomits, fevers, chest pain, palpitations or any other symptoms.              Interval History:   Potassium up to 3.6 this AM. Pt continues feeling much better. Weakness significantly improved.     Review of patient's allergies indicates:   Allergen Reactions    Bananas [banana] Anaphylaxis    Crayfish      Hard time breathing    Iodine and iodide containing products      Told to avoid due to crayfish allergy    Avocado (laurus persea) Swelling and Rash    Kiwi (actinidia chinensis) Itching, Swelling and Rash    Latex, natural rubber Itching, Swelling and Rash    Papaya Itching, Swelling and Rash     Current Facility-Administered Medications   Medication Frequency    albuterol inhaler 2 puff Q4H PRN    aspirin EC tablet 81 mg Daily    brimonidine 0.15 % OPTH DROP ophthalmic solution 1 drop TID    cholestyramine-aspartame 4 gram packet 4 g Daily    dextrose 10% bolus 125 mL PRN    dextrose 10% bolus 250 mL PRN    diphenoxylate-atropine  2.5-0.025 mg per tablet 1 tablet TID    dorzolamide 2 % ophthalmic solution 1 drop TID    EScitalopram oxalate tablet 10 mg Daily    fluticasone furoate-vilanteroL 100-25 mcg/dose diskus inhaler 1 puff Daily    fluticasone propionate 50 mcg/actuation nasal spray 50 mcg Daily    glucagon (human recombinant) injection 1 mg PRN    glucose chewable tablet 16 g PRN    glucose chewable tablet 24 g PRN    heparin (porcine) injection 5,000 Units Q8H    hydrALAZINE tablet 50 mg TID WM    lactated ringers infusion Continuous    melatonin tablet 6 mg Nightly PRN    naloxone 0.4 mg/mL injection 0.02 mg PRN    NIFEdipine 24 hr tablet 60 mg Daily    oxyCODONE-acetaminophen 5-325 mg per tablet 1 tablet Q6H PRN    potassium bicarbonate disintegrating tablet 20 mEq TID    prochlorperazine injection Soln 5 mg Q6H PRN    sodium bicarbonate tablet 1,300 mg TID    sodium chloride 0.9% flush 10 mL PRN    sodium chloride 0.9% flush 10 mL Q12H PRN    travoprost 0.004 % ophthalmic solution 1 drop QHS       Objective:     Vital Signs (Most Recent):  Temp: 98.4 °F (36.9 °C) (07/27/22 1200)  Pulse: 80 (07/27/22 1200)  Resp: 17 (07/27/22 1200)  BP: 132/64 (07/27/22 1200)  SpO2: 97 % (07/27/22 1200)  O2 Device (Oxygen Therapy): room air (07/27/22 0908)   Vital Signs (24h Range):  Temp:  [97 °F (36.1 °C)-98.4 °F (36.9 °C)] 98.4 °F (36.9 °C)  Pulse:  [] 80  Resp:  [17-18] 17  SpO2:  [95 %-98 %] 97 %  BP: ()/(54-76) 132/64     Weight: 67.6 kg (149 lb) (07/24/22 1518)  Body mass index is 29.59 kg/m².  Body surface area is 1.68 meters squared.    I/O last 3 completed shifts:  In: 800 [P.O.:800]  Out: 900 [Urine:900]    Physical Exam  Constitutional:       General: She is not in acute distress.     Appearance: She is not ill-appearing.   HENT:      Head: Normocephalic and atraumatic.      Nose: Nose normal.      Mouth/Throat:      Mouth: Mucous membranes are moist.   Eyes:      Pupils: Pupils are equal, round, and  reactive to light.   Pulmonary:      Effort: Pulmonary effort is normal. No respiratory distress.   Abdominal:      Palpations: Abdomen is soft.   Musculoskeletal:         General: No swelling.      Right lower leg: No edema.      Left lower leg: No edema.   Skin:     General: Skin is warm and dry.      Coloration: Skin is not jaundiced.   Neurological:      Mental Status: She is alert and oriented to person, place, and time.       Significant Labs:  CBC:   Recent Labs   Lab 07/27/22  0943   WBC 12.85*   RBC 3.50*   HGB 10.9*   HCT 32.7*      MCV 93   MCH 31.1*   MCHC 33.3       CMP:   Recent Labs   Lab 07/27/22  0943     102   CALCIUM 8.2*  8.3*   ALBUMIN 3.1*   PROT 6.1     142   K 3.5  3.5   CO2 16*  15*   *  118*   BUN 11  12   CREATININE 1.1  1.1   ALKPHOS 78   ALT 93*   *   BILITOT 0.9              Assessment/Plan:     * Hypokalemia  Severe hypokalemia with K < 2 on initial presentation   Started on aggressive supplementation and most recent labs at 3.6 today   Weakness significantly improved  Urine K < 10 consistent with extrarenal losses, in her case most likely GI tract loses due to chronic diarrhea (the pt reports 7-8 loose bowel movements per day)   CPK at 6K   Recommend replacement with oral KCL and IV K-phosph  Continue oral potassium bicarb for her metabolic acidosis; also start sodium bicarb 1300mg PO TID   Hydration with LR   Consider GI evaluation for options in management of her chronic diarrhea        Wayne Deras MD  Nephrology  [facility]    ATTENDING PHYSICIAN ATTESTATION  I have personally verified the history and examined the patient. I thoroughly reviewed the demographic, clinical, laboratorial and imaging information available in medical records. I agree with the assessment and recommendations provided by the subspecialty resident who was under my supervision.

## 2022-07-27 NOTE — ASSESSMENT & PLAN NOTE
Severe hypokalemia with K < 2 on initial presentation   Started on aggressive supplementation and most recent labs at 3.6 today   Weakness significantly improved  Urine K < 10 consistent with extrarenal losses, in her case most likely GI tract loses due to chronic diarrhea (the pt reports 7-8 loose bowel movements per day)   CPK at 6K   Recommend replacement with oral KCL and IV K-phosph  Continue oral potassium bicarb for her metabolic acidosis; also start sodium bicarb 1300mg PO TID   Hydration with LR   Consider GI evaluation for options in management of her chronic diarrhea

## 2022-07-27 NOTE — MEDICAL/APP STUDENT
"Progress Note  Hospital Medicine    Primary Team: [facility] [place] [facility]  Admit Date: 7/24/2022   Length of Stay:  LOS: 2 days   SUBJECTIVE:   Reason for Admission:  Hypokalemia    HPI  Patient is a 77 y.o. female w/ hx of asthma, HTN, HLD, multiple bowel resections, chronic pyelonephritis, who presents to the hospital for weakness. Patient states 2 days ago she began to feel extremely week. States that at baselines she is independent but now has difficulty moving arms. Furthermore, she states that she felt a weird sensation in her neck while folding the laundry. Patient denies any trauma. She notes of hx of cervical and lumbar stenosis and states she has some pain secondary to that.      Patient states that the day her weakness started, she had a few episodes of non-bloody vomiting. Unable to quantify how much. She also notes she has chronic diarrhea after last bowel resection 2018. She states she has an episode of diarrhea "basically any time after [she] eats". Pt states she has some abdominal pain near her surgical scar from 2018. Worried there is some discharge.      In the ED, patient was hypertensive at 209/86 and potassium <2.0. CK was 1558 and magnesium at 1.1. Her EKG showed prolonged QT. In the ED, her potassium was repleted and ICU was consulted.      Patient states that when she was treated for Hypokalemia in April 2022, she did not have as much weakness then.      Old chart was reviewed.    Hospital Course  Patient was admitted for hypokalemia. Put on replacement fluids and pills. Patient potassium continues to increase, at 3.5 today. Patient's initial weakness has improved with improvement of her potassium levels.  Patient also initially had a hypernatremia that has since resolved. Patient was a difficult stick, midline team had to be involved in placing midline.     Interval history  Patient states she refused labs last night secondary to IV access pain. States she also had 3 episodes of diarrhea this " morning.     Review of Systems:  Constitutional: no fever or chills  Eyes: no visual changes  Respiratory: no cough or shortness of breath  Cardiovascular: no chest pain or palpitations  Gastrointestinal: no nausea or vomiting, no abdominal pain or change in bowel habits  Musculoskeletal: no arthralgias or myalgias  Neurological: no seizures or tremors  Behavioral/Psych: no auditory or visual hallucinations     OBJECTIVE:     Temp:  [97.8 °F (36.6 °C)-98.4 °F (36.9 °C)]   Pulse:  []   Resp:  [16-18]   BP: ()/(54-76)   SpO2:  [95 %-99 %]  Body mass index is 29.59 kg/m².  Intake/Outake:  This Shift:  No intake/output data recorded.    Net I/O past 24h:     Intake/Output Summary (Last 24 hours) at 7/27/2022 1740  Last data filed at 7/27/2022 0300  Gross per 24 hour   Intake 300 ml   Output 400 ml   Net -100 ml             Physical Exam  HENT:      Head: Normocephalic and atraumatic.   Eyes:      Extraocular Movements: Extraocular movements intact.      Pupils: Pupils are equal, round, and reactive to light.   Cardiovascular:      Rate and Rhythm: Normal rate and regular rhythm.      Pulses: Normal pulses.      Heart sounds: Normal heart sounds.   Pulmonary:      Effort: Pulmonary effort is normal.      Breath sounds: Normal breath sounds.   Abdominal:      Tenderness: There is no abdominal tenderness. There is no guarding.   Musculoskeletal:         General: No tenderness or deformity.   Skin:     General: Skin is warm and dry.      Capillary Refill: Capillary refill takes less than 2 seconds.      Comments: bruising to BUE from IV access   Neurological:      General: No focal deficit present.      Mental Status: She is alert and oriented to person, place, and time.      Motor: No weakness.   Psychiatric:         Behavior: Behavior normal.         Judgment: Judgment normal.          Laboratory:  CBC/Anemia Labs: Coags:    Recent Labs   Lab 07/24/22  1704 07/26/22  0326 07/27/22  0943   WBC 10.18 17.15*  12.85*   HGB 12.7 10.5* 10.9*   HCT 39.5 31.4* 32.7*    318 365   MCV 95 95 93   RDW 15.2* 15.9* 15.8*    No results for input(s): PT, INR, APTT in the last 168 hours.     Chemistries:   Recent Labs   Lab 07/24/22  1704 07/25/22  0001 07/25/22  0939 07/25/22  1507 07/26/22  0326 07/26/22  0825 07/26/22  1654 07/27/22  0943   *   < > 143   < > 143 140  --  143  142   K <2.0*   < > 2.1*   < > 2.8* 2.7* 3.6 3.5  3.5   *   < > 114*   < > 116* 115*  --  118*  118*   CO2 12*   < > 20*   < > 14* 14*  --  16*  15*   BUN 24*   < > 26*   < > 19 17  --  11  12   CREATININE 1.5*   < > 1.5*   < > 1.3 1.4  --  1.1  1.1   CALCIUM 9.5   < > 8.6*   < > 8.1* 7.9*  --  8.2*  8.3*   PROT 7.5  --   --   --   --   --   --  6.1   BILITOT 1.5*  --   --   --   --   --   --  0.9   ALKPHOS 90  --   --   --   --   --   --  78   ALT 28  --   --   --   --   --   --  93*   AST 44*  --   --   --   --   --   --  233*   MG 1.1*   < > 1.8  --  1.6  --   --  1.5*   PHOS 1.8*  --  2.0*  --  3.2  --   --  3.6    < > = values in this interval not displayed.        Medications:  Scheduled Meds:   aspirin  81 mg Oral Daily    brimonidine 0.15 % OPTH DROP  1 drop Both Eyes TID    cholestyramine-aspartame  1 packet Oral Daily    diphenoxylate-atropine 2.5-0.025 mg  1 tablet Oral TID    dorzolamide  1 drop Both Eyes TID    EScitalopram oxalate  10 mg Oral Daily    fluticasone furoate-vilanteroL  1 puff Inhalation Daily    fluticasone propionate  1 spray Each Nostril Daily    heparin (porcine)  5,000 Units Subcutaneous Q8H    hydrALAZINE  50 mg Oral TID WM    magnesium sulfate IVPB  2 g Intravenous Once    NIFEdipine  60 mg Oral Daily    potassium bicarbonate  20 mEq Oral TID    sodium bicarbonate  1,300 mg Oral TID    travoprost  1 drop Both Eyes QHS                             Continuous Infusions:   lactated ringers 200 mL/hr at 07/27/22 1343     PRN Meds:.albuterol, dextrose 10%, dextrose 10%, glucagon (human  recombinant), glucose, glucose, melatonin, naloxone, oxyCODONE-acetaminophen, prochlorperazine, sodium chloride 0.9%, sodium chloride 0.9%    Imaging:        Imaging Results          X-Ray Cervical Spine AP Lat with Flexion Extension (Final result)  Result time 07/25/22 15:16:26    Final result by John Wu IV, MD (07/25/22 15:16:26)                 Impression:      No acute abnormality.  Cervical spondylosis as above.      Electronically signed by: John Wu  Date:    07/25/2022  Time:    15:16             Narrative:    EXAMINATION:  XR CERVICAL SPINE AP LAT WITH FLEX EXTEN    CLINICAL HISTORY:  cervical stenosis;    TECHNIQUE:  Five views of the cervical spine including flexion and extension views were.    COMPARISON:  CT cervical spine from earlier today.  MRI cervical spine from 10/24/2022.    FINDINGS:  C1-C2: Pre-dens space is maintained.  Dens and lateral masses of C1 appear within normal limits although poorly characterized.    Alignment: Stable stepwise grade 1 anterolisthesis of C5 on C6 and C6 on C7.  No evidence of dynamic instability on flexion/extension views.    Vertebrae: Vertebral body heights are maintained.  No suspicious appearing lytic or blastic lesions.    Discs and facets: Multilevel mild to moderate disc space narrowing most prominent at C3-C4 and C4-C5.  Multilevel anterior osteophyte formation.  Moderate facet arthropathy.    Miscellaneous: Prevertebral soft tissues are unremarkable.                               CT Cervical Spine Without Contrast (Final result)  Result time 07/25/22 13:20:46    Final result by Talat Zhao MD (07/25/22 13:20:46)                 Impression:      Degenerative changes resulting in high-grade canal and foraminal stenosis at multiple levels, better demonstrated on yesterday's cervical spine MRI.    No acute fractures or traumatic malalignment.      Electronically signed by: Talat Zhao  Date:    07/25/2022  Time:    13:20              Narrative:    EXAMINATION:  CT CERVICAL SPINE WITHOUT CONTRAST    CLINICAL HISTORY:  cervical stenosis;    TECHNIQUE:  Low dose axial images, sagittal and coronal reformations were performed though the cervical spine.  Contrast was not administered.    COMPARISON:  MRI cervical spine 07/24/2022; CT head 07/24/2022    FINDINGS:  Straightening of the normal cervical lordosis.  Stepwise grade 1 anterolisthesis spanning C5-T1.    No fractures.  No focal osseous lesions.    Multilevel disc, uncovertebral, and facet joint degeneration.  Degenerative changes result in high-grade canal and foraminal stenosis at multiple levels, better demonstrated on yesterday's cervical spine MRI.  Degenerative changes also involve the atlantodental joint.    Visualized structures in the posterior fossa are unremarkable. The cervical spinal cord is grossly unremarkable.    Vascular calcifications.  Paraspinal soft tissues are otherwise unremarkable.  Lung apices are clear.                               CT Head Without Contrast (Final result)  Result time 07/24/22 20:19:13    Final result by Baljit Weber MD (07/24/22 20:19:13)                 Impression:      No CT evidence of acute intracranial abnormality.  If the patient has an acute, focal neurological deficit, MRI of the brain may be indicated.      Electronically signed by: Baljit Weber MD  Date:    07/24/2022  Time:    20:19             Narrative:    EXAMINATION:  CT HEAD WITHOUT CONTRAST    CLINICAL HISTORY:  Neuro deficit, acute, stroke suspected;    TECHNIQUE:  Low dose axial images were obtained through the head.  Coronal and sagittal reformations were also performed. Contrast was not administered.    COMPARISON:  None.    FINDINGS:  Mild generalized cerebral volume loss and minimal periventricular white matter hypoattenuation suggestive of chronic microvascular ischemic change.  Punctate remote lacunar infarct in the left basal ganglia.    No evidence of acute  territorial infarct, hemorrhage, mass effect, or midline shift.    Ventricles are normal in size and configuration.    No displaced calvarial fracture.    Visualized paranasal sinuses and mastoid air cells are essentially clear.                                MRI Spine Cervical-Thoracic-Lumbar Without Contrast (XPD) (Final result)  Result time 07/24/22 20:01:59    Final result by Dimitrios Mckeon MD (07/24/22 20:01:59)                 Impression:      Cervical spine:    Disc osteophyte complexes with disc protrusions resulting in moderate to severe spinal canal narrowing at the C3-C4 and C4-C5 levels.  No cord signal abnormality.  Spine surgery consultation is recommended.    Thoracic spine:    Mild multilevel degenerative changes as above.  No significant spinal canal neural foraminal narrowing.  No cord signal abnormality.    Lumbar spine:    Mild multilevel degenerative changes as above.    No significant neural foraminal or spinal canal narrowing.    This report was flagged in Epic as abnormal.    Electronically signed by resident: Brittany Cooper  Date:    07/24/2022  Time:    18:22    Electronically signed by: Dimitrios Mckeon MD  Date:    07/24/2022  Time:    20:01             Narrative:    EXAMINATION:  MRI SPINE CERVICAL-THORACIC-LUMBAR WITHOUT CONTRAST (XPD)    CLINICAL HISTORY:  Myelopathy, acute;    TECHNIQUE:  Multiplanar, multisequence MR images of the cervical , thoracic and lumbar spine without IV contrast.    COMPARISON:  CT abdomen pelvis: 01/02/2020.    FINDINGS:  Skull base and craniocervical junction: Normal.    CERVICAL SPINE:    Alignment: There is minimal anterolisthesis of C5 on C6 and C6 on C7.    Vertebrae: Normal marrow signal with probable scattered intraosseous hemangioma.  No fracture.    Discs: Multilevel degenerative disease, most prominent at the level of C3-C4 and C4-C5.    Cord: No intramedullary signal abnormality.    Degenerative findings:    C2-C3: Posterior disc osteophyte complex,  bilateral uncovertebral spurring and bilateral facet joint arthropathy, resulting in moderate left-sided neural foraminal narrowing.  No spinal canal stenosis.    C3-C4: Posterior disc osteophyte complex, bilateral uncovertebral spurring and bilateral facet joint arthropathy, resulting in moderate to severe spinal canal stenosis.  No neural foraminal narrowing.    C4-C5: Posterior disc osteophyte complex, bilateral uncovertebral spurring and bilateral facet joint arthropathy, resulting in severe spinal canal stenosis and moderate left/mild right neural foraminal narrowing.    C5-C6: Posterior disc osteophyte complex and bilateral uncovertebral spurring abutting the ventral thecal sac.  No spinal canal stenosis or neural foraminal narrowing.    C6-C7: Posterior disc osteophyte complex and bilateral uncovertebral spurring, resulting in mild spinal canal stenosis.  No neural foraminal narrowing.    C7-T1: No spinal canal stenosis or neural foraminal narrowing.    T1-T2: Posterior disc osteophyte complex abutting the ventral thecal sac.  No spinal canal stenosis or neural foraminal narrowing.    THORACIC SPINE:    Alignment: Normal.    Vertebrae: Normal marrow signal. No fracture.    Discs: Multilevel degenerative disease, most prominent at the level of T6-T7.    Cord: No intramedullary signal abnormality.    No spinal canal stenosis or neural foraminal narrowing throughout the thoracic spine.    LUMBAR SPINE:    Alignment: Normal.    Vertebrae: Normal marrow signal. No fracture.    Discs: Multilevel degenerative disease, most prominent in the lower lumbar vertebral discs.    Cord: Unremarkable.  The conus terminates at the level of L1-L2.    Degenerative findings:    T12-L1: No spinal canal stenosis or neural foraminal narrowing.    L1-L2: No spinal canal stenosis or neural foraminal narrowing.    L2-L3: No spinal canal stenosis or neural foraminal narrowing.    L3-L4: Circumferential disc bulge, bilateral facet joint  arthropathy and ligamentum flavum thickening, resulting in moderate spinal canal stenosis.  No neural foraminal narrowing.    L4-L5: Circumferential disc bulge, bilateral facet joint arthropathy and ligamentum flavum thickening, resulting in moderate spinal canal stenosis.  No neural foraminal narrowing.    L5-S1: Circumferential disc bulge and bilateral facet joint arthropathy, resulting in moderate left and mild right neural foraminal narrowing.  No spinal canal stenosis.    MISCELLANEOUS:    Multiple bilateral thyroid nodules.    Multiple bilateral renal cysts.  There is probable left-sided nonobstructing nephrolithiasis.    Paraspinal muscles & soft tissues: Unremarkable.                               X-Ray Chest AP Portable (Final result)  Result time 07/24/22 16:43:28    Final result by Jose Celestin MD (07/24/22 16:43:28)                 Impression:      1. Bilateral basilar subsegmental atelectasis/scarring, no large focal consolidation.      Electronically signed by: Jose Celestin MD  Date:    07/24/2022  Time:    16:43             Narrative:    EXAMINATION:  XR CHEST AP PORTABLE    CLINICAL HISTORY:  Other fatigue    TECHNIQUE:  Single frontal view of the chest was performed.    COMPARISON:  11/13/2021    FINDINGS:  The cardiomediastinal silhouette is not enlarged noting calcification of the aorta..  There is no pleural effusion.  The trachea is midline.  The lungs are symmetrically expanded bilaterally with bilateral basilar subsegmental atelectasis/scarring..  No large focal consolidation seen.  There is no pneumothorax.  The osseous structures are remarkable for degenerative changes.  Surgical change noted of the proximal right humerus..                               ASSESSMENT/PLAN:     Active Hospital Problems    Diagnosis  POA    *Hypokalemia [E87.6]  Yes     Chronic    Metabolic acidosis [E87.2]  Yes    Hypernatremia [E87.0]  Yes    Hyperchloremia [E87.8]  Yes    Surgical wound infection  "[T81.49XA]  Yes    Glaucoma of right eye [H40.9]  Yes    Chronic diarrhea [K52.9]  Yes     Chronic    Cervical stenosis of spinal canal [M48.02]  Yes    CKD (chronic kidney disease) stage 3, GFR 30-59 ml/min [N18.30]  Yes    Anxiety [F41.9]  Yes    Asthma [J45.909]  Yes    HTN (hypertension) [I10]  Yes     /82.   Asymptomatic  Recommend to follow up with PCP         Resolved Hospital Problems   No resolved problems to display.      Ms. Grubbs is a 77 year old patient hx of cervical and lumbar stenosis, asthma, HTN, HLD and chronic diarrhea who presents to the ED for weakness. Weakness likely attributed to hypokalemia. Hypokalemia likely from GI losses, but can also include RTA, polydipsia, hypomagnesia and albuterol over use.      Electrolyte abnormalities   Patient labs showed initial elevated sodium at 148. Sodium now at 143. Potassium initially <2, now at 2.1. Magnesium initially at 1.1, now at 1.8. Phosphorus at 2.0 today. Likely cause to patient's weakness. Elevated creatine kinase likely from dehydration as well.  -BMP q8 hours to monitor levels  -Daily CK levels  -Lactated ringers infusion  -Potassium bicarbonate disintegrating tablet 20 mEq  -Goal Mg>2  -Diphenoxylate-atropine for diarrhea  -Prochlorperazine   -PT/OT  -Low salt diet      CADE  Patient with Cr of 1.5. Cr not improving.   -Continue to monitor Creatinine. Likely secondary for hypoperfusion and dehydration.   -Avoid nephrotoxic agents  -Hold valsartan/HCTZ   -low salt diet     Cervical Stenosis  MRI C-T-L spine shows "severe spinal canal narrowing at C3-C4 and C4-C5" and "mild multilevel degenerative changes in lumbar spine, no significant neural foraminal or spinal canal narrowing".  -Neurosurgery consulted   -No emergent surgery needed  -Will continue to follow, if weakness persists, may require inpatient surgery     HTN  Patient with HTN at home. Blood pressure initially hypertensive. Well managed now  -Continue home meds of " "hydralazine and nicardipine  -Hold valsartan/HCTZ in setting of CADE      Asthma  Patient with hx of asthma. Uses inhaler 2x a day, uses nebulizer 2x a day. CXR shows "bilateral basilar subsegmental atelectasis/scaring".   -Hold scheduled albuterol in setting of hypokalemia  -Albuterol PRN   -Incentive spirometer      Surgical wound pain  Pain at surgical site from 2018.   -Wound care consulted   -Oxycodone-acetaminophen for 7-10/10 pain     Glaucoma  -Continue home medications      DVT ppx-Heparin q8 5000 units  CODE status- Full    Manoj Gutierrez, MS-4  [employer]  "

## 2022-07-27 NOTE — NURSING
"Pt is sitting up at the edge of bed. Pt has removed tele leads and monitor box. Refused morning meds. I asked pt is there anything I can do for her. She states "no" "I'm ok".   "

## 2022-07-28 VITALS
HEIGHT: 60 IN | BODY MASS INDEX: 29.25 KG/M2 | WEIGHT: 149 LBS | OXYGEN SATURATION: 96 % | SYSTOLIC BLOOD PRESSURE: 128 MMHG | TEMPERATURE: 99 F | RESPIRATION RATE: 18 BRPM | HEART RATE: 80 BPM | DIASTOLIC BLOOD PRESSURE: 60 MMHG

## 2022-07-28 LAB
ANION GAP SERPL CALC-SCNC: 6 MMOL/L (ref 8–16)
ANION GAP SERPL CALC-SCNC: 6 MMOL/L (ref 8–16)
ANION GAP SERPL CALC-SCNC: 7 MMOL/L (ref 8–16)
BASOPHILS # BLD AUTO: 0.04 K/UL (ref 0–0.2)
BASOPHILS NFR BLD: 0.4 % (ref 0–1.9)
BUN SERPL-MCNC: 8 MG/DL (ref 8–23)
CALCIUM SERPL-MCNC: 7.4 MG/DL (ref 8.7–10.5)
CALCIUM SERPL-MCNC: 8 MG/DL (ref 8.7–10.5)
CALCIUM SERPL-MCNC: 8.1 MG/DL (ref 8.7–10.5)
CHLORIDE SERPL-SCNC: 116 MMOL/L (ref 95–110)
CHLORIDE SERPL-SCNC: 116 MMOL/L (ref 95–110)
CHLORIDE SERPL-SCNC: 117 MMOL/L (ref 95–110)
CK SERPL-CCNC: 7256 U/L (ref 20–180)
CO2 SERPL-SCNC: 19 MMOL/L (ref 23–29)
CO2 SERPL-SCNC: 21 MMOL/L (ref 23–29)
CO2 SERPL-SCNC: 22 MMOL/L (ref 23–29)
CREAT SERPL-MCNC: 0.8 MG/DL (ref 0.5–1.4)
CREAT SERPL-MCNC: 0.8 MG/DL (ref 0.5–1.4)
CREAT SERPL-MCNC: 0.9 MG/DL (ref 0.5–1.4)
DIFFERENTIAL METHOD: ABNORMAL
EOSINOPHIL # BLD AUTO: 0.3 K/UL (ref 0–0.5)
EOSINOPHIL NFR BLD: 2.8 % (ref 0–8)
ERYTHROCYTE [DISTWIDTH] IN BLOOD BY AUTOMATED COUNT: 15.6 % (ref 11.5–14.5)
EST. GFR  (AFRICAN AMERICAN): >60 ML/MIN/1.73 M^2
EST. GFR  (NON AFRICAN AMERICAN): >60 ML/MIN/1.73 M^2
GLUCOSE SERPL-MCNC: 80 MG/DL (ref 70–110)
GLUCOSE SERPL-MCNC: 87 MG/DL (ref 70–110)
GLUCOSE SERPL-MCNC: 93 MG/DL (ref 70–110)
HCT VFR BLD AUTO: 26.8 % (ref 37–48.5)
HGB BLD-MCNC: 8.7 G/DL (ref 12–16)
IMM GRANULOCYTES # BLD AUTO: 0.07 K/UL (ref 0–0.04)
IMM GRANULOCYTES NFR BLD AUTO: 0.7 % (ref 0–0.5)
LYMPHOCYTES # BLD AUTO: 4.6 K/UL (ref 1–4.8)
LYMPHOCYTES NFR BLD: 44.6 % (ref 18–48)
MAGNESIUM SERPL-MCNC: 1.8 MG/DL (ref 1.6–2.6)
MCH RBC QN AUTO: 30.4 PG (ref 27–31)
MCHC RBC AUTO-ENTMCNC: 32.5 G/DL (ref 32–36)
MCV RBC AUTO: 94 FL (ref 82–98)
MONOCYTES # BLD AUTO: 0.7 K/UL (ref 0.3–1)
MONOCYTES NFR BLD: 6.4 % (ref 4–15)
NEUTROPHILS # BLD AUTO: 4.7 K/UL (ref 1.8–7.7)
NEUTROPHILS NFR BLD: 45.1 % (ref 38–73)
NRBC BLD-RTO: 0 /100 WBC
PHOSPHATE SERPL-MCNC: 3 MG/DL (ref 2.7–4.5)
PLATELET # BLD AUTO: 311 K/UL (ref 150–450)
PMV BLD AUTO: 11.1 FL (ref 9.2–12.9)
POTASSIUM SERPL-SCNC: 3.5 MMOL/L (ref 3.5–5.1)
POTASSIUM SERPL-SCNC: 3.6 MMOL/L (ref 3.5–5.1)
POTASSIUM SERPL-SCNC: 3.6 MMOL/L (ref 3.5–5.1)
RBC # BLD AUTO: 2.86 M/UL (ref 4–5.4)
SODIUM SERPL-SCNC: 141 MMOL/L (ref 136–145)
SODIUM SERPL-SCNC: 144 MMOL/L (ref 136–145)
SODIUM SERPL-SCNC: 145 MMOL/L (ref 136–145)
WBC # BLD AUTO: 10.32 K/UL (ref 3.9–12.7)

## 2022-07-28 PROCEDURE — 25000003 PHARM REV CODE 250: Performed by: HOSPITALIST

## 2022-07-28 PROCEDURE — 94761 N-INVAS EAR/PLS OXIMETRY MLT: CPT

## 2022-07-28 PROCEDURE — 63600175 PHARM REV CODE 636 W HCPCS

## 2022-07-28 PROCEDURE — 99232 PR SUBSEQUENT HOSPITAL CARE,LEVL II: ICD-10-PCS | Mod: ,,, | Performed by: INTERNAL MEDICINE

## 2022-07-28 PROCEDURE — 99239 PR HOSPITAL DISCHARGE DAY,>30 MIN: ICD-10-PCS | Mod: ,,, | Performed by: INTERNAL MEDICINE

## 2022-07-28 PROCEDURE — 25000003 PHARM REV CODE 250

## 2022-07-28 PROCEDURE — 99232 SBSQ HOSP IP/OBS MODERATE 35: CPT | Mod: ,,, | Performed by: INTERNAL MEDICINE

## 2022-07-28 PROCEDURE — 25000242 PHARM REV CODE 250 ALT 637 W/ HCPCS

## 2022-07-28 PROCEDURE — 82550 ASSAY OF CK (CPK): CPT

## 2022-07-28 PROCEDURE — 84100 ASSAY OF PHOSPHORUS: CPT

## 2022-07-28 PROCEDURE — 1111F DSCHRG MED/CURRENT MED MERGE: CPT | Mod: CPTII,,, | Performed by: INTERNAL MEDICINE

## 2022-07-28 PROCEDURE — 99239 HOSP IP/OBS DSCHRG MGMT >30: CPT | Mod: ,,, | Performed by: INTERNAL MEDICINE

## 2022-07-28 PROCEDURE — 25000003 PHARM REV CODE 250: Performed by: INTERNAL MEDICINE

## 2022-07-28 PROCEDURE — 85025 COMPLETE CBC W/AUTO DIFF WBC: CPT

## 2022-07-28 PROCEDURE — 36415 COLL VENOUS BLD VENIPUNCTURE: CPT | Performed by: HOSPITALIST

## 2022-07-28 PROCEDURE — 83735 ASSAY OF MAGNESIUM: CPT

## 2022-07-28 PROCEDURE — 94640 AIRWAY INHALATION TREATMENT: CPT

## 2022-07-28 PROCEDURE — 25000003 PHARM REV CODE 250: Performed by: STUDENT IN AN ORGANIZED HEALTH CARE EDUCATION/TRAINING PROGRAM

## 2022-07-28 PROCEDURE — 80048 BASIC METABOLIC PNL TOTAL CA: CPT | Mod: 91 | Performed by: HOSPITALIST

## 2022-07-28 PROCEDURE — 1111F PR DISCHARGE MEDS RECONCILED W/ CURRENT OUTPATIENT MED LIST: ICD-10-PCS | Mod: CPTII,,, | Performed by: INTERNAL MEDICINE

## 2022-07-28 PROCEDURE — 36415 COLL VENOUS BLD VENIPUNCTURE: CPT

## 2022-07-28 RX ORDER — POTASSIUM CHLORIDE 20 MEQ/1
40 TABLET, EXTENDED RELEASE ORAL ONCE
Status: DISCONTINUED | OUTPATIENT
Start: 2022-07-28 | End: 2022-07-28

## 2022-07-28 RX ORDER — DORZOLAMIDE HCL 20 MG/ML
1 SOLUTION/ DROPS OPHTHALMIC 3 TIMES DAILY
Qty: 10 ML | Refills: 11 | Status: SHIPPED | OUTPATIENT
Start: 2022-07-28 | End: 2023-07-28

## 2022-07-28 RX ORDER — GABAPENTIN 300 MG/1
300 CAPSULE ORAL 3 TIMES DAILY
Status: DISCONTINUED | OUTPATIENT
Start: 2022-07-28 | End: 2022-07-28 | Stop reason: HOSPADM

## 2022-07-28 RX ORDER — SODIUM BICARBONATE 650 MG/1
650 TABLET ORAL 3 TIMES DAILY
Qty: 42 TABLET | Refills: 0 | Status: SHIPPED | OUTPATIENT
Start: 2022-07-28 | End: 2023-07-28

## 2022-07-28 RX ORDER — BRIMONIDINE TARTRATE 1.5 MG/ML
1 SOLUTION/ DROPS OPHTHALMIC 3 TIMES DAILY
Qty: 6 ML | Refills: 11 | Status: SHIPPED | OUTPATIENT
Start: 2022-07-28 | End: 2023-07-28

## 2022-07-28 RX ORDER — TRAVOPROST OPHTHALMIC SOLUTION 0.04 MG/ML
1 SOLUTION OPHTHALMIC NIGHTLY
Qty: 2.5 EACH | Refills: 11 | Status: SHIPPED | OUTPATIENT
Start: 2022-07-28 | End: 2023-07-28

## 2022-07-28 RX ORDER — HYDRALAZINE HYDROCHLORIDE 50 MG/1
50 TABLET, FILM COATED ORAL
Qty: 90 TABLET | Refills: 3 | Status: ON HOLD | OUTPATIENT
Start: 2022-07-28 | End: 2022-10-12 | Stop reason: HOSPADM

## 2022-07-28 RX ORDER — NIFEDIPINE 60 MG/1
60 TABLET, EXTENDED RELEASE ORAL DAILY
Qty: 30 TABLET | Refills: 3 | Status: ON HOLD | OUTPATIENT
Start: 2022-07-29 | End: 2022-10-12 | Stop reason: HOSPADM

## 2022-07-28 RX ADMIN — FLUTICASONE FUROATE AND VILANTEROL TRIFENATATE 1 PUFF: 100; 25 POWDER RESPIRATORY (INHALATION) at 08:07

## 2022-07-28 RX ADMIN — ASPIRIN 81 MG: 81 TABLET, COATED ORAL at 09:07

## 2022-07-28 RX ADMIN — CHOLESTYRAMINE 4 G: 4 POWDER, FOR SUSPENSION ORAL at 09:07

## 2022-07-28 RX ADMIN — DORZOLAMIDE HYDROCHLORIDE 1 DROP: 20 SOLUTION/ DROPS OPHTHALMIC at 09:07

## 2022-07-28 RX ADMIN — HEPARIN SODIUM 5000 UNITS: 5000 INJECTION INTRAVENOUS; SUBCUTANEOUS at 05:07

## 2022-07-28 RX ADMIN — SODIUM BICARBONATE 650 MG TABLET 1300 MG: at 03:07

## 2022-07-28 RX ADMIN — GABAPENTIN 300 MG: 300 CAPSULE ORAL at 03:07

## 2022-07-28 RX ADMIN — POTASSIUM BICARBONATE 25 MEQ: 978 TABLET, EFFERVESCENT ORAL at 09:07

## 2022-07-28 RX ADMIN — SODIUM BICARBONATE 650 MG TABLET 1300 MG: at 09:07

## 2022-07-28 RX ADMIN — DORZOLAMIDE HYDROCHLORIDE 1 DROP: 20 SOLUTION/ DROPS OPHTHALMIC at 02:07

## 2022-07-28 RX ADMIN — DIPHENOXYLATE HYDROCHLORIDE AND ATROPINE SULFATE 1 TABLET: 2.5; .025 TABLET ORAL at 09:07

## 2022-07-28 RX ADMIN — HYDRALAZINE HYDROCHLORIDE 50 MG: 50 TABLET ORAL at 09:07

## 2022-07-28 RX ADMIN — POTASSIUM BICARBONATE 25 MEQ: 978 TABLET, EFFERVESCENT ORAL at 03:07

## 2022-07-28 RX ADMIN — BRIMONIDINE TARTRATE 1 DROP: 1.5 SOLUTION OPHTHALMIC at 09:07

## 2022-07-28 RX ADMIN — NIFEDIPINE 60 MG: 60 TABLET, FILM COATED, EXTENDED RELEASE ORAL at 09:07

## 2022-07-28 RX ADMIN — FLUTICASONE PROPIONATE 50 MCG: 50 SPRAY, METERED NASAL at 09:07

## 2022-07-28 RX ADMIN — HEPARIN SODIUM 5000 UNITS: 5000 INJECTION INTRAVENOUS; SUBCUTANEOUS at 02:07

## 2022-07-28 RX ADMIN — ESCITALOPRAM OXALATE 10 MG: 10 TABLET ORAL at 09:07

## 2022-07-28 RX ADMIN — BRIMONIDINE TARTRATE 1 DROP: 1.5 SOLUTION OPHTHALMIC at 02:07

## 2022-07-28 RX ADMIN — HYDRALAZINE HYDROCHLORIDE 50 MG: 50 TABLET ORAL at 02:07

## 2022-07-28 NOTE — PLAN OF CARE
Problem: Adult Inpatient Plan of Care  Goal: Plan of Care Review  Outcome: Ongoing, Progressing  Goal: Patient-Specific Goal (Individualized)  Outcome: Ongoing, Progressing  Goal: Absence of Hospital-Acquired Illness or Injury  Outcome: Ongoing, Progressing  Goal: Optimal Comfort and Wellbeing  Outcome: Ongoing, Progressing  Goal: Readiness for Transition of Care  Outcome: Ongoing, Progressing     Problem: Fluid and Electrolyte Imbalance (Acute Kidney Injury/Impairment)  Goal: Fluid and Electrolyte Balance  Outcome: Ongoing, Progressing     Problem: Oral Intake Inadequate (Acute Kidney Injury/Impairment)  Goal: Optimal Nutrition Intake  Outcome: Ongoing, Progressing     Problem: Infection (Pneumonia)  Goal: Resolution of Infection Signs and Symptoms  Outcome: Ongoing, Progressing    Pt remained free from falls or injuries this shift. Pt independent in repositioning. No skin breakdown noticed. Pt had one loose stool this shift. Medicated x1 prn pain. SR on telemetry

## 2022-07-28 NOTE — SUBJECTIVE & OBJECTIVE
Interval History:   Potassium stable at 3.5 this AM. Pt continues feeling much better. Weakness significantly improved. CKP and serum creatinine both trending down.     Review of patient's allergies indicates:   Allergen Reactions    Bananas [banana] Anaphylaxis    Crayfish      Hard time breathing    Iodine and iodide containing products      Told to avoid due to crayfish allergy    Avocado (laurus persea) Swelling and Rash    Kiwi (actinidia chinensis) Itching, Swelling and Rash    Latex, natural rubber Itching, Swelling and Rash    Papaya Itching, Swelling and Rash     Current Facility-Administered Medications   Medication Frequency    albuterol inhaler 2 puff Q4H PRN    aspirin EC tablet 81 mg Daily    brimonidine 0.15 % OPTH DROP ophthalmic solution 1 drop TID    cholestyramine-aspartame 4 gram packet 4 g Daily    dextrose 10% bolus 125 mL PRN    dextrose 10% bolus 250 mL PRN    diphenoxylate-atropine 2.5-0.025 mg per tablet 1 tablet TID    dorzolamide 2 % ophthalmic solution 1 drop TID    EScitalopram oxalate tablet 10 mg Daily    fluticasone furoate-vilanteroL 100-25 mcg/dose diskus inhaler 1 puff Daily    fluticasone propionate 50 mcg/actuation nasal spray 50 mcg Daily    glucagon (human recombinant) injection 1 mg PRN    glucose chewable tablet 16 g PRN    glucose chewable tablet 24 g PRN    heparin (porcine) injection 5,000 Units Q8H    hydrALAZINE tablet 50 mg TID WM    lactated ringers infusion Continuous    melatonin tablet 6 mg Nightly PRN    naloxone 0.4 mg/mL injection 0.02 mg PRN    NIFEdipine 24 hr tablet 60 mg Daily    oxyCODONE-acetaminophen 5-325 mg per tablet 1 tablet Q6H PRN    potassium bicarbonate disintegrating tablet 20 mEq TID    prochlorperazine injection Soln 5 mg Q6H PRN    sodium bicarbonate tablet 1,300 mg TID    sodium chloride 0.9% flush 10 mL PRN    sodium chloride 0.9% flush 10 mL Q12H PRN    travoprost 0.004 % ophthalmic solution 1 drop QHS       Objective:     Vital Signs (Most  Recent):  Temp: 98.9 °F (37.2 °C) (07/28/22 0756)  Pulse: 68 (07/28/22 0756)  Resp: 18 (07/28/22 0756)  BP: 133/60 (07/28/22 0756)  SpO2: 97 % (07/28/22 0756)  O2 Device (Oxygen Therapy): room air (07/27/22 0908)   Vital Signs (24h Range):  Temp:  [96.4 °F (35.8 °C)-98.9 °F (37.2 °C)] 98.9 °F (37.2 °C)  Pulse:  [] 68  Resp:  [16-18] 18  SpO2:  [96 %-99 %] 97 %  BP: (116-165)/(55-76) 133/60     Weight: 67.6 kg (149 lb) (07/24/22 1518)  Body mass index is 29.59 kg/m².  Body surface area is 1.68 meters squared.    I/O last 3 completed shifts:  In: 3224.3 [P.O.:1140; I.V.:2084.3]  Out: 1300 [Urine:1300]    Physical Exam  Constitutional:       General: She is not in acute distress.     Appearance: She is not ill-appearing.   HENT:      Head: Normocephalic and atraumatic.      Nose: Nose normal.      Mouth/Throat:      Mouth: Mucous membranes are moist.   Eyes:      Pupils: Pupils are equal, round, and reactive to light.   Pulmonary:      Effort: Pulmonary effort is normal. No respiratory distress.   Abdominal:      Palpations: Abdomen is soft.   Musculoskeletal:         General: No swelling.      Right lower leg: No edema.      Left lower leg: No edema.   Skin:     General: Skin is warm and dry.      Coloration: Skin is not jaundiced.   Neurological:      Mental Status: She is alert and oriented to person, place, and time.       Significant Labs:  CBC:   Recent Labs   Lab 07/28/22  0310   WBC 10.32   RBC 2.86*   HGB 8.7*   HCT 26.8*      MCV 94   MCH 30.4   MCHC 32.5       CMP:   Recent Labs   Lab 07/27/22  0943 07/27/22  1709 07/27/22  2342     102   < > 87   CALCIUM 8.2*  8.3*   < > 7.4*   ALBUMIN 3.1*  --   --    PROT 6.1  --   --      142   < > 141   K 3.5  3.5   < > 3.5   CO2 16*  15*   < > 19*   *  118*   < > 116*   BUN 11  12   < > 8   CREATININE 1.1  1.1   < > 0.9   ALKPHOS 78  --   --    ALT 93*  --   --    *  --   --    BILITOT 0.9  --   --     < > = values in this  interval not displayed.

## 2022-07-28 NOTE — NURSING
Patient AOx4, room air, ambulates independently.  No adverse events this shift.  Discharge planning in progress.  Removed IVs.

## 2022-07-28 NOTE — PROGRESS NOTES
[facility] Medicine  Progress Note    Patient Name: Mariaelena Grubbs  MRN: [id number]  Patient Class: IP- Inpatient   Admission Date: 7/24/2022  Length of Stay: 2 days  Attending Physician: Theresa Dalal MD  Primary Care Provider: Primary Doctor No        Subjective:     Principal Problem:Hypokalemia        HPI:  Mariaelena Grubbs is a 77 y.o. female with cervical and lumbar stenosis, hx of multiple bowel resections, hx of chronic pyelopnephritis s/p R partial nephrectomy, asthma, HTN, HLD who presented to [facility] ED on 07/24/2022 with worsening neck pain and diffuse weakness since the previous morning 7/23. Patient was in her usual state of health prior to this time.  Patient states that she was folding laundry, when she felt a popping sensation in her neck when she turned it to the side. After this she felt neck pain which was remitted with recumbent positioning and exacerbated with movement. She is usually independent in her ADLs however since Saturday she has required significant assistance with ambulation and other basic tasks. They also report that she has chronic diarrhea, and that recently she has not been eating much and been vomiting. Denies fever/chills, HA, vision/hearing changes, dysphagia, dysarthria, new-onset sensory change, new bowel/bladder changes. She does also c/o diffuse abominal pain. Denies AC/AP. Patient states that she had gone to [facility] prior to coming to [place], and she was only given pain medication as well as steroids and discharged but no tests were done.     On presentation to [facility] ED her BP was 209/96, but within several hours came down to normotensive levels.  Her abdomen was tender on exam, and there was drainage noted from a midline scar that looked concerning for potential fungal infection.  Her labs were significant for K<2.0, Mg 1.1, and CK 1500.  EKG: Frequent PVCs with QTc > 480.  MRI C-spine: moderate to severe spinal canal narrowing at the C3-C4 and  "C4-C5 levels without cord signal change.  IV and PO K repletion were given.  MICU was consulted to evaluate her for ICU admission due to her immeasurably low K in the setting of PVCs on EKG, but declined given the chronic nature of her hypokalemia.  Chart review indicates pt underwent a complicated operation in 2019 at [facility]: an Oct. 2019 discharge summary describes her as a pt with "chronic pyelonephritis and right renal calculi who presented on 10/23 for open right partial nephrectomy with pyelolithotomy. An enterotomy occurred on the initial access and the decision was made to precede with open surgery. General surgery was consulted intraoperatively and assisted with identification and repair of colon and small bowel enterotomies including right hemicolectomy."      Her K looks to have been > 3 until Feb 2022.  She was admitted for severe hypokalemia to [facility] in late April 2022.  That DC summary notes: "Hypokalemia K was 1.9 on admit. 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 Last two days patient is hyperkalemic. Spironolactone and potassium supplements are discontinued. Nephrology recommends follow up outpatient next for repeat blood work."  It appears this was not done.    She was admitted to [facility] for further evaluation and treatment.        Overview/Hospital Course:  Patient being treated for hypokalemia < 2.0 on K+ repletion therapy. Continuation of glaucoma medications including: brimonidine, dorzolamide, and travosprost. Neurosurgery planning to meet outpatient to discuss surgical decompression. MRI showed spinal canal stenosis at C3-C4 and C4-C5.      Interval History: Patient was refusing lab draw in the morning. She was expressing her concern with peripher IV. She was in pain for the IV K+. Otherwise NAEON. Patient's weakness has improved. K+ is improving gradually.     Review of Systems   Constitutional:  Negative " for fever.   HENT:  Negative for congestion.    Respiratory:  Negative for shortness of breath.    Cardiovascular:  Negative for chest pain.   Gastrointestinal:  Negative for diarrhea.   Genitourinary:  Negative for dysuria.   Musculoskeletal:  Positive for back pain and neck stiffness.   Skin:  Positive for wound.   Neurological:  Positive for weakness. Negative for dizziness and numbness.   Objective:     Vital Signs (Most Recent):  Temp: 96.6 °F (35.9 °C) (07/27/22 2340)  Pulse: 71 (07/27/22 2340)  Resp: 17 (07/27/22 2340)  BP: 130/66 (07/27/22 2340)  SpO2: 98 % (07/27/22 2340) Vital Signs (24h Range):  Temp:  [96.4 °F (35.8 °C)-98.4 °F (36.9 °C)] 96.6 °F (35.9 °C)  Pulse:  [] 71  Resp:  [16-18] 17  SpO2:  [96 %-99 %] 98 %  BP: (116-165)/(55-76) 130/66     Weight: 67.6 kg (149 lb)  Body mass index is 29.59 kg/m².    Intake/Output Summary (Last 24 hours) at 7/27/2022 2345  Last data filed at 7/27/2022 1836  Gross per 24 hour   Intake 900 ml   Output 800 ml   Net 100 ml      Physical Exam  Constitutional:       Appearance: Normal appearance.   HENT:      Head: Normocephalic and atraumatic.      Nose: Nose normal.   Eyes:      Extraocular Movements: Extraocular movements intact.      Conjunctiva/sclera: Conjunctivae normal.   Cardiovascular:      Rate and Rhythm: Normal rate and regular rhythm.   Pulmonary:      Effort: Pulmonary effort is normal.   Abdominal:      General: Abdomen is flat.      Palpations: Abdomen is soft.   Musculoskeletal:         General: No swelling or tenderness.      Cervical back: Rigidity present.      Comments: Upper extremity pain at peripheral IV insertion site.   Neurological:      Mental Status: She is alert.      Motor: Weakness present.       Significant Labs: All pertinent labs within the past 24 hours have been reviewed.    Significant Imaging: I have reviewed all pertinent imaging results/findings within the past 24 hours.      Assessment/Plan:      * Hypokalemia  Metabolic  acidosis  Chronic diarrhea  CKD3  Hypernatremia  Hyperchloremia    On admission, anion gap 16 with pH of 7.22. Patient endorsing abdominal tenderness with intermittent drainage from midline abdominal scar. Denies fevers, SOB, or HA. Abdomen is tender globally with suprapubic>right side>left side. Lactate and WBC normal.  Patient had complicated partial bowel and partial kidney resection in 2019 at [facility] and has been admitted a couple of times this year for hypokalemia, most recently in April to [facility].  Suspect this is likely 2/2 to her chronic diarrhea vs RTA type I or II. On chart review, it does not appear patient has been worked up and was not discharged with potassium or bicarb medications to mediate her now chronic electrolyte derangements. EKG unchanged from past 3 from previous admissions.      Latest CPK 75634, trending up    PLAN:   RTA workup pending, including Serum Osm and Urine 'lytes & Osm   Consider abdominal source if patient fevers or has further infectious concern    Pt can eat and drink and hypokalemia is chronic, so start potassium repletion with LR infusion and PO K-Cl tablets given pt's other electrolyte abnormalities.  K repletion rate not to exceed 20 mEq/hr   Max correction of Na in coming 24 hrs: no lower than 140.  However, using free water at this time is not possible given her hypokalemia.  Use LR infusion.   Maintain Mg>2   Telemetry    Trouble with IV access this morning, order for midline access    Potassium bicarbonate 20mEq tid   Per nephrology recommendations, sodium bicarbonate 1300 PO tid with LR 200ml/hr   Continue CK monitoring     Chronic diarrhea  See hypokalemia.  - starting low sodium diet particularly with hx of htn.   - no diarrhea noted overnight      Cervical stenosis of spinal canal  MRI w/o contrast - C3-C4 spinal canal stenosis as well and C4-C5 stenosis.   -   NSGY following, recommend outpatient follow up and reassessment for surgical decompression        Glaucoma of right eye  Continue home dorzolamide, travoprost, and brimonidine per ophthalmology. .     Surgical wound infection  ID and Wound Care following given that her 2019 surgical wound appears to be draining. Wound care       Hypernatremia  Resolved. Latest 144.      CKD (chronic kidney disease) stage 3, GFR 30-59 ml/min  Latest BUN 11, Cr 0.9.  - Continue to montior Scr      Anxiety  Xanax 2mg qhs PRN listed on home meds, pt cannot remember if she is taking this regularly.  Will continue to investigate and order if appropriate.    Asthma  - Continue home Advair   - Hold albuterol  Inhaler, primarily in the setting of hypokalemia because albuterol will drive potassium into cells and potentially worsen hypokalemia.    HTN (hypertension)  Continue home Hydralazine and Nicardipine.  Hold valsartan-HCTZ in setting of hypokalemia.        VTE Risk Mitigation (From admission, onward)         Ordered     heparin (porcine) injection 5,000 Units  Every 8 hours         07/25/22 0401     IP VTE HIGH RISK PATIENT  Once         07/25/22 0401     Place sequential compression device  Until discontinued         07/25/22 0348                Discharge Planning   SHERIDAN: 7/29/2022     Code Status: Full Code   Is the patient medically ready for discharge?: No    Reason for patient still in hospital (select all that apply): Treatment  Discharge Plan A: Home Health                  Markus Molina MD  Department of Hospital Medicine   [facility]

## 2022-07-28 NOTE — ASSESSMENT & PLAN NOTE
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  Continue hydration with LR

## 2022-07-28 NOTE — NURSING
Reviewed C Diff Protocol with Charge Nurse.  Criteria was not met to send C Diff Sample as per Charge and this was communicated to primary team. Primary team still wanted sample to be sent to lab and this issue was escalated to Unit Director. Unit director made final decision that C Diff sample will not be sent.

## 2022-07-28 NOTE — MEDICAL/APP STUDENT
"Progress Note  Hospital Medicine    Primary Team: [facility] HOSP MED 4  Admit Date: 7/24/2022   Length of Stay:  LOS: 3 days   SUBJECTIVE:   Reason for Admission:  Hypokalemia    HPI  Patient is a 77 y.o. female w/ hx of asthma, HTN, HLD, multiple bowel resections, chronic pyelonephritis, who presents to the hospital for weakness. Patient states 2 days ago she began to feel extremely week. States that at baselines she is independent but now has difficulty moving arms. Furthermore, she states that she felt a weird sensation in her neck while folding the laundry. Patient denies any trauma. She notes of hx of cervical and lumbar stenosis and states she has some pain secondary to that.      Patient states that the day her weakness started, she had a few episodes of non-bloody vomiting. Unable to quantify how much. She also notes she has chronic diarrhea after last bowel resection 2018. She states she has an episode of diarrhea "basically any time after [she] eats". Pt states she has some abdominal pain near her surgical scar from 2018. Worried there is some discharge.      In the ED, patient was hypertensive at 209/86 and potassium <2.0. CK was 1558 and magnesium at 1.1. Her EKG showed prolonged QT. In the ED, her potassium was repleted and ICU was consulted.      Patient states that when she was treated for Hypokalemia in April 2022, she did not have as much weakness then.      Old chart was reviewed.    Hospital Course  Patient was admitted for hypokalemia. Put on replacement fluids and pills. Patient potassium continues to increase, at 3.6 today. Patient's initial weakness has improved with improvement of her potassium levels.  Patient also initially had a hypernatremia that has since resolved. Patient was a difficult stick, midline team had to be involved in placing midline. Patient also had JAMES. James has improved with hydration.     Interval history  NAEON. AF. HDS. Patient states she feels much better overall. States she " is able to walk herself to the bathroom now, feels much less weak.     Review of Systems:  Constitutional: no fever or chills  Eyes: no visual changes  Respiratory: no cough or shortness of breath  Cardiovascular: no chest pain or palpitations  Gastrointestinal: no nausea or vomiting, no abdominal pain or change in bowel habits  Musculoskeletal: positive for neck pain, negative for muscle weakness  Neurological: no seizures or tremors  Behavioral/Psych: no auditory or visual hallucinations     OBJECTIVE:     Temp:  [96.4 °F (35.8 °C)-98.9 °F (37.2 °C)]   Pulse:  [68-88]   Resp:  [16-18]   BP: (119-146)/(58-67)   SpO2:  [96 %-99 %]  Body mass index is 29.59 kg/m².  Intake/Outake:  This Shift:  I/O this shift:  In: 840 [P.O.:840]  Out: -     Net I/O past 24h:     Intake/Output Summary (Last 24 hours) at 7/28/2022 1410  Last data filed at 7/28/2022 0900  Gross per 24 hour   Intake 3464.27 ml   Output 900 ml   Net 2564.27 ml             Physical Exam  Cardiovascular:      Rate and Rhythm: Normal rate and regular rhythm.      Pulses: Normal pulses.      Heart sounds: Normal heart sounds.   Pulmonary:      Effort: Pulmonary effort is normal.      Breath sounds: Normal breath sounds.   Abdominal:      Tenderness: There is no abdominal tenderness. There is no guarding.   Musculoskeletal:         General: No tenderness or deformity.      Comments: Able to lift arm above head today   Skin:     Capillary Refill: Capillary refill takes less than 2 seconds.      Comments: bruising to BUE from IV access   Neurological:      General: No focal deficit present.      Mental Status: She is alert and oriented to person, place, and time.      Motor: No weakness.   Psychiatric:         Behavior: Behavior normal.         Judgment: Judgment normal.        Significant Labs:    CBC  Recent Labs   Lab 07/26/22  0326 07/27/22  0943 07/28/22  0310   WBC 17.15* 12.85* 10.32   HGB 10.5* 10.9* 8.7*   HCT 31.4* 32.7* 26.8*    365 311        CMP  Recent Labs   Lab 07/27/22  1709 07/27/22  2342 07/28/22  0310 07/28/22  0804    141  --  144   K 3.2* 3.5  --  3.6   * 116*  --  116*   CO2 18* 19*  --  21*   ANIONGAP 9 6*  --  7*   BUN 11 8  --  8   CREATININE 0.9 0.9  --  0.8    87  --  80   CALCIUM 8.2* 7.4*  --  8.1*   MG  --   --  1.8  --    PHOS  --   --  3.0  --    LIPASE  --   --   --   --    ALKPHOS  --   --   --   --    ALT  --   --   --   --    AST  --   --   --   --    ALBUMIN  --   --   --   --    PROT  --   --   --   --    BILITOT  --   --   --   --     < > = values in this interval not displayed.       Medications:  Scheduled Meds:   aspirin  81 mg Oral Daily    brimonidine 0.15 % OPTH DROP  1 drop Both Eyes TID    cholestyramine-aspartame  1 packet Oral Daily    diphenoxylate-atropine 2.5-0.025 mg  1 tablet Oral TID    dorzolamide  1 drop Both Eyes TID    EScitalopram oxalate  10 mg Oral Daily    fluticasone furoate-vilanteroL  1 puff Inhalation Daily    fluticasone propionate  1 spray Each Nostril Daily    heparin (porcine)  5,000 Units Subcutaneous Q8H    hydrALAZINE  50 mg Oral TID WM    NIFEdipine  60 mg Oral Daily    potassium bicarbonate  25 mEq Oral TID    sodium bicarbonate  1,300 mg Oral TID    travoprost  1 drop Both Eyes QHS                             Continuous Infusions:   lactated ringers 200 mL/hr at 07/28/22 0412     PRN Meds:.albuterol, dextrose 10%, dextrose 10%, glucagon (human recombinant), glucose, glucose, melatonin, naloxone, oxyCODONE-acetaminophen, prochlorperazine, sodium chloride 0.9%, sodium chloride 0.9%    Imaging:   No new imaging     ASSESSMENT/PLAN:     Active Hospital Problems    Diagnosis  POA    *Hypokalemia [E87.6]  Yes     Chronic    Metabolic acidosis [E87.2]  Yes    Hypernatremia [E87.0]  Yes    Hyperchloremia [E87.8]  Yes    Surgical wound infection [T81.49XA]  Yes    Glaucoma of right eye [H40.9]  Yes    Chronic diarrhea [K52.9]  Yes     Chronic    CADE  "(acute kidney injury) [N17.9]  Yes    Cervical stenosis of spinal canal [M48.02]  Yes    CKD (chronic kidney disease) stage 3, GFR 30-59 ml/min [N18.30]  Yes    Anxiety [F41.9]  Yes    Asthma [J45.909]  Yes    HTN (hypertension) [I10]  Yes     /82.   Asymptomatic  Recommend to follow up with PCP         Resolved Hospital Problems   No resolved problems to display.      Ms. Grubbs is a 77 year old patient hx of cervical and lumbar stenosis, asthma, HTN, HLD and chronic diarrhea who presents to the ED for weakness. Weakness likely attributed to hypokalemia. Hypokalemia likely from GI losses, but can also include RTA, polydipsia, hypomagnesia and albuterol over use.      Electrolyte abnormalities   Patient labs showed initial elevated sodium at 148. Sodium now at 143. Potassium initially <2, now at 3.5. Magnesium initially at 1.1, now at 1.8. Phosphorus at 3.0 today. Likely cause to patient's weakness was the hypokalemia given that as her potassium improved, she was able to lift arms higher. Elevated creatine kinase likely from dehydration as well since giving fluids, CK has decreased.   -BMP q8 hours to monitor levels  -Daily CK levels  -Lactated ringers infusion  -Potassium bicarbonate disintegrating tablet 20 mEq  -Goal Mg>2  -Diphenoxylate-atropine for diarrhea  -Prochlorperazine   -PT/OT  -Low salt diet      CADE  Patient with Cr of 0.8 Cr not improving.   -Continue to monitor Creatinine. Likely secondary for hypoperfusion and dehydration.   -Avoid nephrotoxic agents  -Hold valsartan/HCTZ   -low salt diet     Cervical Stenosis  MRI C-T-L spine shows "severe spinal canal narrowing at C3-C4 and C4-C5" and "mild multilevel degenerative changes in lumbar spine, no significant neural foraminal or spinal canal narrowing".  -Neurosurgery consulted   -No emergent surgery needed  -Will continue to follow, if weakness persists, may require inpatient surgery  -Oxy-acetaminophen for pain, gabapentin for neuropathy " "pain     HTN  Patient with HTN at home. Blood pressure initially hypertensive. Well managed now  -Continue home meds of hydralazine and nicardipine  -Hold valsartan/HCTZ in setting of CADE      Asthma  Patient with hx of asthma. Uses inhaler 2x a day, uses nebulizer 2x a day. CXR shows "bilateral basilar subsegmental atelectasis/scaring".   -Hold scheduled albuterol in setting of hypokalemia  -Albuterol PRN   -Incentive spirometer      Surgical wound pain  Pain at surgical site from 2018.   -Wound care consulted       Glaucoma  -Continue home medications      DVT ppx-Heparin q8 5000 units  CODE status- Full    Dispo: Given patient's improvement, will see if she can keep up hydration without IVs, if able to, patient can be D/C home    Manoj Gutierrez, MS-4  [employer]  "

## 2022-07-28 NOTE — PLAN OF CARE
Problem: Adult Inpatient Plan of Care  Goal: Plan of Care Review  Outcome: Ongoing, Progressing  Goal: Patient-Specific Goal (Individualized)  Outcome: Ongoing, Progressing  Goal: Absence of Hospital-Acquired Illness or Injury  Outcome: Ongoing, Progressing  Goal: Optimal Comfort and Wellbeing  Outcome: Ongoing, Progressing  Goal: Readiness for Transition of Care  Outcome: Ongoing, Progressing     Problem: Fluid and Electrolyte Imbalance (Acute Kidney Injury/Impairment)  Goal: Fluid and Electrolyte Balance  Outcome: Ongoing, Progressing     Problem: Oral Intake Inadequate (Acute Kidney Injury/Impairment)  Goal: Optimal Nutrition Intake  Outcome: Ongoing, Progressing     Problem: Renal Function Impairment (Acute Kidney Injury/Impairment)  Goal: Effective Renal Function  Outcome: Ongoing, Progressing     Problem: Infection (Pneumonia)  Goal: Resolution of Infection Signs and Symptoms  Outcome: Ongoing, Progressing     Problem: Respiratory Compromise (Pneumonia)  Goal: Effective Oxygenation and Ventilation  Outcome: Ongoing, Progressing     Problem: Infection  Goal: Absence of Infection Signs and Symptoms  Outcome: Ongoing, Progressing       Reviewed POC with emphasis on electrolyte balance.  Patient verbalized understanding and agreement with POC.

## 2022-07-28 NOTE — ASSESSMENT & PLAN NOTE
Metabolic acidosis  Chronic diarrhea  CKD3  Hypernatremia  Hyperchloremia    On admission, anion gap 16 with pH of 7.22. Patient endorsing abdominal tenderness with intermittent drainage from midline abdominal scar. Denies fevers, SOB, or HA. Abdomen is tender globally with suprapubic>right side>left side. Lactate and WBC normal.  Patient had complicated partial bowel and partial kidney resection in 2019 at [facility] and has been admitted a couple of times this year for hypokalemia, most recently in April to [facility].  Suspect this is likely 2/2 to her chronic diarrhea vs RTA type I or II. On chart review, it does not appear patient has been worked up and was not discharged with potassium or bicarb medications to mediate her now chronic electrolyte derangements. EKG unchanged from past 3 from previous admissions.      Latest CPK [id number], trending up    PLAN:   RTA workup pending, including Serum Osm and Urine 'lytes & Osm   Consider abdominal source if patient fevers or has further infectious concern    Pt can eat and drink and hypokalemia is chronic, so start potassium repletion with LR infusion and PO K-Cl tablets given pt's other electrolyte abnormalities.  K repletion rate not to exceed 20 mEq/hr   Max correction of Na in coming 24 hrs: no lower than 140.  However, using free water at this time is not possible given her hypokalemia.  Use LR infusion.   Maintain Mg>2   Telemetry    Trouble with IV access this morning, order for midline access    Potassium bicarbonate 20mEq tid   Per nephrology recommendations, sodium bicarbonate 1300 PO tid with LR 200ml/hr   Continue CK monitoring

## 2022-07-28 NOTE — PLAN OF CARE
On call CM contacted by nurse re:  Orders for HH.  Referral had previously been sent to [facility] who had accepted patient.  CM sent orders via [facility].

## 2022-07-28 NOTE — SUBJECTIVE & OBJECTIVE
Interval History: Patient was refusing lab draw in the morning. She was expressing her concern with peripher IV. She was in pain for the IV K+. Otherwise NAEON. Patient's weakness has improved. K+ is improving gradually.     Review of Systems   Constitutional:  Negative for fever.   HENT:  Negative for congestion.    Respiratory:  Negative for shortness of breath.    Cardiovascular:  Negative for chest pain.   Gastrointestinal:  Negative for diarrhea.   Genitourinary:  Negative for dysuria.   Musculoskeletal:  Positive for back pain and neck stiffness.   Skin:  Positive for wound.   Neurological:  Positive for weakness. Negative for dizziness and numbness.   Objective:     Vital Signs (Most Recent):  Temp: 96.6 °F (35.9 °C) (07/27/22 2340)  Pulse: 71 (07/27/22 2340)  Resp: 17 (07/27/22 2340)  BP: 130/66 (07/27/22 2340)  SpO2: 98 % (07/27/22 2340) Vital Signs (24h Range):  Temp:  [96.4 °F (35.8 °C)-98.4 °F (36.9 °C)] 96.6 °F (35.9 °C)  Pulse:  [] 71  Resp:  [16-18] 17  SpO2:  [96 %-99 %] 98 %  BP: (116-165)/(55-76) 130/66     Weight: 67.6 kg (149 lb)  Body mass index is 29.59 kg/m².    Intake/Output Summary (Last 24 hours) at 7/27/2022 2345  Last data filed at 7/27/2022 1836  Gross per 24 hour   Intake 900 ml   Output 800 ml   Net 100 ml      Physical Exam  Constitutional:       Appearance: Normal appearance.   HENT:      Head: Normocephalic and atraumatic.      Nose: Nose normal.   Eyes:      Extraocular Movements: Extraocular movements intact.      Conjunctiva/sclera: Conjunctivae normal.   Cardiovascular:      Rate and Rhythm: Normal rate and regular rhythm.   Pulmonary:      Effort: Pulmonary effort is normal.   Abdominal:      General: Abdomen is flat.      Palpations: Abdomen is soft.   Musculoskeletal:         General: No swelling or tenderness.      Cervical back: Rigidity present.      Comments: Upper extremity pain at peripheral IV insertion site.   Neurological:      Mental Status: She is alert.       Motor: Weakness present.       Significant Labs: All pertinent labs within the past 24 hours have been reviewed.    Significant Imaging: I have reviewed all pertinent imaging results/findings within the past 24 hours.

## 2022-07-28 NOTE — PLAN OF CARE
[facility]      HOME HEALTH ORDERS  FACE TO FACE ENCOUNTER    Patient Name: Mariaelena Grubbs  YOB: 1945    PCP: Primary Doctor No   PCP Address: None  PCP Phone Number: None  PCP Fax: None    Encounter Date: 7/24/22    Admit to Home Health    Diagnoses:  Active Hospital Problems    Diagnosis  POA    *Hypokalemia [E87.6]  Yes     Priority: 1 - High     Chronic    Chronic diarrhea [K52.9]  Yes     Priority: 2      Chronic    Cervical stenosis of spinal canal [M48.02]  Yes     Priority: 2     Metabolic acidosis [E87.2]  Yes    Hypernatremia [E87.0]  Yes    Hyperchloremia [E87.8]  Yes    Surgical wound infection [T81.49XA]  Yes    Glaucoma of right eye [H40.9]  Yes    CADE (acute kidney injury) [N17.9]  Yes    CKD (chronic kidney disease) stage 3, GFR 30-59 ml/min [N18.30]  Yes    Anxiety [F41.9]  Yes    Asthma [J45.909]  Yes    HTN (hypertension) [I10]  Yes     /82.   Asymptomatic  Recommend to follow up with PCP         Resolved Hospital Problems   No resolved problems to display.       Follow Up Appointments:  Future Appointments   Date Time Provider Department Center   8/18/2022 11:30 AM Freddy Miles, DO [facility]       Allergies:  Review of patient's allergies indicates:   Allergen Reactions    Bananas [banana] Anaphylaxis    Crayfish      Hard time breathing    Iodine and iodide containing products      Told to avoid due to crayfish allergy    Avocado (laurus persea) Swelling and Rash    Kiwi (actinidia chinensis) Itching, Swelling and Rash    Latex, natural rubber Itching, Swelling and Rash    Papaya Itching, Swelling and Rash       Medications: Review discharge medications with patient and family and provide education.    Current Facility-Administered Medications   Medication Dose Route Frequency Provider Last Rate Last Admin    albuterol inhaler 2 puff  2 puff Inhalation Q4H PRN Darius Bhandari MD        aspirin EC tablet 81 mg  81 mg Oral  Daily Darius Bhandari MD   81 mg at 07/28/22 0909    brimonidine 0.15 % OPTH DROP ophthalmic solution 1 drop  1 drop Both Eyes TID Cleo Morales MD   1 drop at 07/28/22 1408    cholestyramine-aspartame 4 gram packet 4 g  1 packet Oral Daily Darius Bhandari MD   4 g at 07/28/22 0915    dextrose 10% bolus 125 mL  12.5 g Intravenous PRN Darius Bhandari MD        dextrose 10% bolus 250 mL  25 g Intravenous PRN Darius Bhandari MD        diphenoxylate-atropine 2.5-0.025 mg per tablet 1 tablet  1 tablet Oral TID Theresa Dalal MD   1 tablet at 07/28/22 0910    dorzolamide 2 % ophthalmic solution 1 drop  1 drop Both Eyes TID Cleo Morales MD   1 drop at 07/28/22 1408    EScitalopram oxalate tablet 10 mg  10 mg Oral Daily Markus Molina MD   10 mg at 07/28/22 0909    fluticasone furoate-vilanteroL 100-25 mcg/dose diskus inhaler 1 puff  1 puff Inhalation Daily Darius Bhandari MD   1 puff at 07/28/22 0846    fluticasone propionate 50 mcg/actuation nasal spray 50 mcg  1 spray Each Nostril Daily Darius Bhandari MD   50 mcg at 07/28/22 0911    gabapentin capsule 300 mg  300 mg Oral TID Markus Molina MD   300 mg at 07/28/22 1551    glucagon (human recombinant) injection 1 mg  1 mg Intramuscular PRN Darius Bhandari MD        glucose chewable tablet 16 g  16 g Oral PRN Darius Bhandari MD        glucose chewable tablet 24 g  24 g Oral PRN Darius Bhandari MD        heparin (porcine) injection 5,000 Units  5,000 Units Subcutaneous Q8H Darius Bhandari MD   5,000 Units at 07/28/22 1402    hydrALAZINE tablet 50 mg  50 mg Oral TID WM Darius Bhandari MD   50 mg at 07/28/22 1403    lactated ringers infusion   Intravenous Continuous Markus Molina  mL/hr at 07/28/22 0412 Rate Verify at 07/28/22 0412    melatonin tablet 6 mg  6 mg Oral Nightly PRN Jimy Dalal MD   6 mg at 07/25/22 2326    naloxone 0.4 mg/mL injection 0.02 mg  0.02 mg Intravenous PRN Darius Bhandari MD        NIFEdipine 24 hr tablet 60 mg   60 mg Oral Daily Darius Bhandari MD   60 mg at 07/28/22 0909    oxyCODONE-acetaminophen 5-325 mg per tablet 1 tablet  1 tablet Oral Q6H PRN Darius Bhandari MD   1 tablet at 07/27/22 2024    potassium bicarbonate disintegrating tablet 25 mEq  25 mEq Oral TID Joselyn Wilburn MD   25 mEq at 07/28/22 1551    prochlorperazine injection Soln 5 mg  5 mg Intravenous Q6H PRN Darius Bhandari MD        sodium bicarbonate tablet 1,300 mg  1,300 mg Oral TID Wayne Deras MD   1,300 mg at 07/28/22 1551    sodium chloride 0.9% flush 10 mL  10 mL Intravenous PRN Jimy Dalal MD        sodium chloride 0.9% flush 10 mL  10 mL Intravenous Q12H PRN Darius Bhandari MD        travoprost 0.004 % ophthalmic solution 1 drop  1 drop Both Eyes QHS Cleo Morales MD   1 drop at 07/27/22 2027     Current Discharge Medication List      START taking these medications    Details   brimonidine 0.15 % OPTH DROP (ALPHAGAN) 0.15 % ophthalmic solution Place 1 drop into both eyes 3 (three) times daily.  Qty: 6 mL, Refills: 11      dorzolamide (TRUSOPT) 2 % ophthalmic solution Place 1 drop into both eyes 3 (three) times daily.  Qty: 10 mL, Refills: 11      NIFEdipine (PROCARDIA-XL) 60 MG (OSM) 24 hr tablet Take 1 tablet (60 mg total) by mouth once daily.  Qty: 30 tablet, Refills: 3    Comments: .      potassium bicarbonate (K-LYTE) disintegrating tablet Take 1 tablet (20 mEq total) by mouth 3 (three) times daily.  Qty: 21 tablet, Refills: 0      sodium bicarbonate 650 MG tablet Take 1 tablet (650 mg total) by mouth 3 (three) times daily.  Qty: 42 tablet, Refills: 0      travoprost (TRAVATAN Z) 0.004 % ophthalmic solution Place 1 drop into both eyes every evening.  Qty: 2.5 each, Refills: 11         CONTINUE these medications which have CHANGED    Details   hydrALAZINE (APRESOLINE) 50 MG tablet Take 1 tablet (50 mg total) by mouth 3 (three) times daily with meals.  Qty: 90 tablet, Refills: 3    Comments: .         CONTINUE these  medications which have NOT CHANGED    Details   acetaminophen (TYLENOL) 500 MG tablet Take 500 mg by mouth every 8 (eight) hours as needed for Pain.      albuterol (ACCUNEB) 1.25 mg/3 mL Nebu Take 1.25 mg by nebulization every 6 (six) hours as needed.      albuterol (PROVENTIL) 2.5 mg /3 mL (0.083 %) nebulizer solution Inhale 2.5 mg into the lungs daily as needed for Wheezing.      albuterol (PROVENTIL/VENTOLIN HFA) 90 mcg/actuation inhaler Inhale 1 puff into the lungs daily as needed for Wheezing.      aspirin (ECOTRIN) 81 MG EC tablet Take 81 mg by mouth once daily.      diphenoxylate-atropine 2.5-0.025 mg (LOMOTIL) 2.5-0.025 mg per tablet Take 1 tablet by mouth 4 (four) times daily as needed.      EScitalopram oxalate (LEXAPRO) 10 MG tablet Take 10 mg by mouth once daily.      fluticasone propionate (FLONASE) 50 mcg/actuation nasal spray 1 spray by Each Nostril route 3 (three) times daily as needed for Rhinitis.      gabapentin (NEURONTIN) 300 MG capsule Take 300 mg by mouth 3 (three) times daily.      hydrocortisone 2.5 % cream Apply topically 2 (two) times daily.  Qty: 20 g, Refills: 0      methyl salicylate/menthol (ICY HOT TOP) Apply topically daily as needed (Pain).      nystatin-triamcinolone (MYCOLOG) ointment SMARTSIG:Sparingly Topical Twice Daily      predniSONE (DELTASONE) 20 MG tablet Take 20 mg by mouth 2 (two) times daily as needed.      valsartan (DIOVAN) 320 MG tablet Take 320 mg by mouth once daily.      alendronate (FOSAMAX) 70 MG tablet Take 70 mg by mouth every 7 days.      EPINEPHRINE (EPIPEN INJ) Inject as directed.      ondansetron (ZOFRAN-ODT) 4 MG TbDL Take 4 mg by mouth. Dissolve 1 tablet on the tongue daily as needed for nausea.         STOP taking these medications       ALPRAZolam (XANAX) 0.5 MG tablet Comments:   Reason for Stopping:         cholestyramine-aspartame (QUESTRAN LIGHT) 4 gram PwPk Comments:   Reason for Stopping:         fluticasone-salmeterol 250-50 mcg/dose (ADVAIR)  250-50 mcg/dose diskus inhaler Comments:   Reason for Stopping:         NIFEdipine (ADALAT CC) 60 MG TbSR Comments:   Reason for Stopping:         oxyCODONE-acetaminophen (PERCOCET) 5-325 mg per tablet Comments:   Reason for Stopping:                 I have seen and examined this patient within the last 30 days. My clinical findings that support the need for the home health skilled services and home bound status are the following:no   Weakness/numbness causing balance and gait disturbance due to Weakness/Debility making it taxing to leave home.     Diet:   cardiac diet    Labs:  SN to perform labs:  CBC: Weekly; 1 week(s) and CMP: Weekly; 1 week(s)    Referrals/ Consults  Physical Therapy to evaluate and treat. Evaluate for home safety and equipment needs; Establish/upgrade home exercise program. Perform / instruct on therapeutic exercises, gait training, transfer training, and Range of Motion.  Occupational Therapy to evaluate and treat. Evaluate home environment for safety and equipment needs. Perform/Instruct on transfers, ADL training, ROM, and therapeutic exercises.    Activities:   activity as tolerated    Nursing:   Agency to admit patient within 24 hours of hospital discharge unless specified on physician order or at patient request    SN to complete comprehensive assessment including routine vital signs. Instruct on disease process and s/s of complications to report to MD. Review/verify medication list sent home with the patient at time of discharge  and instruct patient/caregiver as needed. Frequency may be adjusted depending on start of care date.     Skilled nurse to perform up to 3 visits PRN for symptoms related to diagnosis    Notify MD if SBP > 160 or < 90; DBP > 90 or < 50; HR > 120 or < 50; Temp > 101; O2 < 88%; Other:       Ok to schedule additional visits based on staff availability and patient request on consecutive days within the home health episode.    When multiple disciplines  ordered:    Start of Care occurs on Sunday - Wednesday schedule remaining discipline evaluations as ordered on separate consecutive days following the start of care.    Thursday SOC -schedule subsequent evaluations Friday and Monday the following week.     Friday - Saturday SOC - schedule subsequent discipline evaluations on consecutive days starting Monday of the following week.    For all post-discharge communication and subsequent orders please contact patient's primary care physician. If unable to reach primary care physician or do not receive response within 30 minutes, please contact [phone] for clinical staff order clarification    Miscellaneous   Routine Skin for Bedridden Patients: Instruct patient/caregiver to apply moisture barrier cream to all skin folds and wet areas in perineal area daily and after baths and all bowel movements.    Home Health Aide:  Nursing Weekly, Physical Therapy Weekly and Occupational Therapy Weekly    Wound Care Orders  yes:  Abdominal drainage    I certify that this patient is confined to her home and needs intermittent skilled nursing care, physical therapy and occupational therapy.

## 2022-07-28 NOTE — ASSESSMENT & PLAN NOTE
Severe hypokalemia with K < 2 on initial presentation   Started on aggressive supplementation and most recent labs at 3.6 today   Weakness significantly improved  Urine K < 10 consistent with extrarenal losses, in her case most likely GI tract loses due to chronic diarrhea (the pt reports 7-8 loose bowel movements per day)   Recommend replacement with oral KCL and IV K-phosph  Continue oral potassium bicarb for her metabolic acidosis; also start sodium bicarb 1300mg PO TID   Consider GI evaluation for options in management of her chronic diarrhea  Pt will need to be kept on potassium and bicarb supplements if unable to control her chronic diarrhea.

## 2022-07-28 NOTE — PROGRESS NOTES
[facility]  Nephrology  Progress Note    Patient Name: Mariaelena Grubbs  MRN: [id number]  Admission Date: 7/24/2022  Hospital Length of Stay: 3 days  Attending Provider: Holly Cooper MD   Primary Care Physician: Primary Doctor No  Principal Problem:Hypokalemia    Subjective:     HPI: Mariaelena Grubbs is a 77 y.o. female with HTN, HLD, Asthma, cervical and lumbar stenosis, chronic R sided pyelonephritis requiring R sided partial nephrectomy (2019), complicated by bowel perforation, required multiple bowel resection and R sided hemicolectomy and chronic diarrhea. Pt presents to the ED for evaluation of weakness after feeling a popping sensation in her neck. Upon initial evaluation she was noted to have severe hypokalemia (<2) and was started on replacement therapy. Neurosurgery was consulted for evaluation of her cervical spine stenosis however recommend no surgical intervention at this time. Pt refers she has 7-8 loose bowel movements per day. She denies any nausea, vomits, fevers, chest pain, palpitations or any other symptoms.              Interval History:   Potassium stable at 3.5 this AM. Pt continues feeling much better. Weakness significantly improved. CKP and serum creatinine both trending down.     Review of patient's allergies indicates:   Allergen Reactions    Bananas [banana] Anaphylaxis    Crayfish      Hard time breathing    Iodine and iodide containing products      Told to avoid due to crayfish allergy    Avocado (laurus persea) Swelling and Rash    Kiwi (actinidia chinensis) Itching, Swelling and Rash    Latex, natural rubber Itching, Swelling and Rash    Papaya Itching, Swelling and Rash     Current Facility-Administered Medications   Medication Frequency    albuterol inhaler 2 puff Q4H PRN    aspirin EC tablet 81 mg Daily    brimonidine 0.15 % OPTH DROP ophthalmic solution 1 drop TID    cholestyramine-aspartame 4 gram packet 4 g Daily    dextrose 10% bolus 125 mL PRN     dextrose 10% bolus 250 mL PRN    diphenoxylate-atropine 2.5-0.025 mg per tablet 1 tablet TID    dorzolamide 2 % ophthalmic solution 1 drop TID    EScitalopram oxalate tablet 10 mg Daily    fluticasone furoate-vilanteroL 100-25 mcg/dose diskus inhaler 1 puff Daily    fluticasone propionate 50 mcg/actuation nasal spray 50 mcg Daily    glucagon (human recombinant) injection 1 mg PRN    glucose chewable tablet 16 g PRN    glucose chewable tablet 24 g PRN    heparin (porcine) injection 5,000 Units Q8H    hydrALAZINE tablet 50 mg TID WM    lactated ringers infusion Continuous    melatonin tablet 6 mg Nightly PRN    naloxone 0.4 mg/mL injection 0.02 mg PRN    NIFEdipine 24 hr tablet 60 mg Daily    oxyCODONE-acetaminophen 5-325 mg per tablet 1 tablet Q6H PRN    potassium bicarbonate disintegrating tablet 20 mEq TID    prochlorperazine injection Soln 5 mg Q6H PRN    sodium bicarbonate tablet 1,300 mg TID    sodium chloride 0.9% flush 10 mL PRN    sodium chloride 0.9% flush 10 mL Q12H PRN    travoprost 0.004 % ophthalmic solution 1 drop QHS       Objective:     Vital Signs (Most Recent):  Temp: 98.9 °F (37.2 °C) (07/28/22 0756)  Pulse: 68 (07/28/22 0756)  Resp: 18 (07/28/22 0756)  BP: 133/60 (07/28/22 0756)  SpO2: 97 % (07/28/22 0756)  O2 Device (Oxygen Therapy): room air (07/27/22 0908)   Vital Signs (24h Range):  Temp:  [96.4 °F (35.8 °C)-98.9 °F (37.2 °C)] 98.9 °F (37.2 °C)  Pulse:  [] 68  Resp:  [16-18] 18  SpO2:  [96 %-99 %] 97 %  BP: (116-165)/(55-76) 133/60     Weight: 67.6 kg (149 lb) (07/24/22 1518)  Body mass index is 29.59 kg/m².  Body surface area is 1.68 meters squared.    I/O last 3 completed shifts:  In: 3224.3 [P.O.:1140; I.V.:2084.3]  Out: 1300 [Urine:1300]    Physical Exam  Constitutional:       General: She is not in acute distress.     Appearance: She is not ill-appearing.   HENT:      Head: Normocephalic and atraumatic.      Nose: Nose normal.      Mouth/Throat:      Mouth:  Mucous membranes are moist.   Eyes:      Pupils: Pupils are equal, round, and reactive to light.   Pulmonary:      Effort: Pulmonary effort is normal. No respiratory distress.   Abdominal:      Palpations: Abdomen is soft.   Musculoskeletal:         General: No swelling.      Right lower leg: No edema.      Left lower leg: No edema.   Skin:     General: Skin is warm and dry.      Coloration: Skin is not jaundiced.   Neurological:      Mental Status: She is alert and oriented to person, place, and time.       Significant Labs:  CBC:   Recent Labs   Lab 07/28/22  0310   WBC 10.32   RBC 2.86*   HGB 8.7*   HCT 26.8*      MCV 94   MCH 30.4   MCHC 32.5       CMP:   Recent Labs   Lab 07/27/22  0943 07/27/22  1709 07/27/22  2342     102   < > 87   CALCIUM 8.2*  8.3*   < > 7.4*   ALBUMIN 3.1*  --   --    PROT 6.1  --   --      142   < > 141   K 3.5  3.5   < > 3.5   CO2 16*  15*   < > 19*   *  118*   < > 116*   BUN 11  12   < > 8   CREATININE 1.1  1.1   < > 0.9   ALKPHOS 78  --   --    ALT 93*  --   --    *  --   --    BILITOT 0.9  --   --     < > = values in this interval not displayed.              Assessment/Plan:     * Hypokalemia  Severe hypokalemia with K < 2 on initial presentation   Started on aggressive supplementation and most recent labs at 3.6 today   Weakness significantly improved  Urine K < 10 consistent with extrarenal losses, in her case most likely GI tract loses due to chronic diarrhea (the pt reports 7-8 loose bowel movements per day)   Recommend replacement with oral KCL and IV K-phosph  Continue oral potassium bicarb for her metabolic acidosis; also start sodium bicarb 1300mg PO TID   Consider GI evaluation for options in management of her chronic diarrhea  Pt will need to be kept on potassium and bicarb supplements if unable to control her chronic diarrhea.     CADE (acute kidney injury)  Non oliguric CADE, likely toxic due to rhabomyolysis  CPK peaked at 12k and  currently trending down; at 7K on most recent labs  Serum creatinine peaked around 1.5 and is currently down to 0.9 this AM.  Continue hydration with DIANA Deras MD  Nephrology  [facility]    ATTENDING PHYSICIAN ATTESTATION  I have personally verified the history and examined the patient. I thoroughly reviewed the demographic, clinical, laboratorial and imaging information available in medical records. I agree with the assessment and recommendations provided by the subspecialty resident who was under my supervision.

## 2022-07-29 ENCOUNTER — TELEPHONE (OUTPATIENT)
Dept: OPHTHALMOLOGY | Facility: CLINIC | Age: 77
End: 2022-07-29
Payer: MEDICARE

## 2022-07-29 NOTE — TELEPHONE ENCOUNTER
"----- Message from Cleo Morales MD sent at 7/29/2022  8:18 AM CDT -----  Regarding: inpatient glaucoma  Hello,     This patient was seen inpatient for "glaucoma management" assistance. She has an ophthalmologist outpatient, but has not followed up in several months and did not know which drops she was supposed to be taking.While admitted, restarted her home medications which appeared to be dorzolamide, travatan, and brimonidine. She requested to be seen by one of our glaucoma specialists if possible    Thank you,   Cleo    "

## 2022-07-29 NOTE — PROGRESS NOTES
[facility]  Progress Note    Patient Name: Mariaelena Grubbs  MRN: [id number]  Patient Class: IP- Inpatient   Admission Date: 7/24/2022  Length of Stay: 3 days  Attending Physician: Holly Cooper MD  Primary Care Provider: Primary Doctor No        Subjective:     Principal Problem:Hypokalemia        HPI:  Mariaelena Grubbs is a 77 y.o. female with cervical and lumbar stenosis, hx of multiple bowel resections, hx of chronic pyelopnephritis s/p R partial nephrectomy, asthma, HTN, HLD who presented to [facility] ED on 07/24/2022 with worsening neck pain and diffuse weakness since the previous morning 7/23. Patient was in her usual state of health prior to this time.  Patient states that she was folding laundry, when she felt a popping sensation in her neck when she turned it to the side. After this she felt neck pain which was remitted with recumbent positioning and exacerbated with movement. She is usually independent in her ADLs however since Saturday she has required significant assistance with ambulation and other basic tasks. They also report that she has chronic diarrhea, and that recently she has not been eating much and been vomiting. Denies fever/chills, HA, vision/hearing changes, dysphagia, dysarthria, new-onset sensory change, new bowel/bladder changes. She does also c/o diffuse abominal pain. Denies AC/AP. Patient states that she had gone [facility] to [place] [facility] and she was only given pain medication as well as steroids and discharged but no tests were done.     On presentation to [facility] ED her BP was 209/96, but within several hours came down to normotensive levels.  Her abdomen was tender on exam, and there was drainage noted from a midline scar that looked concerning for potential fungal infection.  Her labs were significant for K<2.0, Mg 1.1, and CK 1500.  EKG: Frequent PVCs with QTc > 480.  MRI C-spine: moderate to severe spinal canal narrowing at the C3-C4 and  "C4-C5 levels without cord signal change.  IV and PO K repletion were given.  MICU was consulted to evaluate her for ICU admission due to her immeasurably low K in the setting of PVCs on EKG, but declined given the chronic nature of her hypokalemia.  Chart review indicates pt underwent a complicated operation in 2019 at [facility]: an Oct. 2019 discharge summary describes her as a pt with "chronic pyelonephritis and right renal calculi who presented on 10/23 for open right partial nephrectomy with pyelolithotomy. An enterotomy occurred on the initial access and the decision was made to precede with open surgery. General surgery was consulted intraoperatively and assisted with identification and repair of colon and small bowel enterotomies including right hemicolectomy."      Her K looks to have been > 3 until Feb 2022.  She was admitted for severe hypokalemia to [facility] in late April 2022.  That DC summary notes: "Hypokalemia K was 1.9 on admit. 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 Last two days patient is hyperkalemic. Spironolactone and potassium supplements are discontinued. Nephrology recommends follow up outpatient next for repeat blood work."  It appears this was not done.    She was admitted to [facility] for further evaluation and treatment.        Overview/Hospital Course:  Patient was treated for hypokalemia < 2.0 on K+ repletion therapy. She started glaucoma medications including: brimonidine, dorzolamide, and travosprost. Neurosurgery discussed meeting outpatient to discuss surgical decompression. MRI showed C3-C4 and C4-C5 cervical stenosis. Bicarbonate at 14, per nephrology repletion with 1300mg tid sodium bicarbonate showed much improvement. Potassium improved to 3.6 and patient was feeling better with less weakness in her arms.     Dispo - Continue oral potassium 20meq TID and sodium bicarb 650mg PO TID for one week and " recheck BMP. F/u with PCP, Neurosurgery, and Nephrology.       Interval History: JASMIN, VERONIKA. Patient improving today. Less weakness in her extremities. She denies any chest pain, sob, palpitations, or blurry vision.     Review of Systems   Constitutional:  Negative for chills and fever.   HENT:  Negative for congestion, rhinorrhea and trouble swallowing.    Eyes:  Negative for discharge.   Respiratory:  Negative for cough and shortness of breath.    Cardiovascular:  Negative for chest pain and leg swelling.   Gastrointestinal:  Negative for abdominal pain, diarrhea and nausea.   Genitourinary:  Negative for dysuria and hematuria.   Musculoskeletal:  Positive for back pain and neck stiffness. Negative for arthralgias and myalgias.   Skin:  Positive for wound.   Neurological:  Positive for weakness. Negative for dizziness, tremors, numbness and headaches.   Psychiatric/Behavioral:  Negative for agitation and confusion.    Objective:     Vital Signs (Most Recent):  Temp: 98.5 °F (36.9 °C) (07/28/22 1702)  Pulse: 80 (07/28/22 1702)  Resp: 18 (07/28/22 1702)  BP: 128/60 (07/28/22 1702)  SpO2: 96 % (07/28/22 1702) Vital Signs (24h Range):  Temp:  [96.4 °F (35.8 °C)-98.9 °F (37.2 °C)] 98.5 °F (36.9 °C)  Pulse:  [68-93] 80  Resp:  [16-18] 18  SpO2:  [96 %-98 %] 96 %  BP: (122-134)/(60-67) 128/60     Weight: 67.6 kg (149 lb)  Body mass index is 29.59 kg/m².    Intake/Output Summary (Last 24 hours) at 7/28/2022 1925  Last data filed at 7/28/2022 1300  Gross per 24 hour   Intake 3404.27 ml   Output 500 ml   Net 2904.27 ml      Physical Exam  Constitutional:       Appearance: Normal appearance.   HENT:      Head: Normocephalic and atraumatic.      Nose: Nose normal.   Eyes:      Extraocular Movements: Extraocular movements intact.      Conjunctiva/sclera: Conjunctivae normal.   Cardiovascular:      Rate and Rhythm: Normal rate and regular rhythm.   Pulmonary:      Effort: Pulmonary effort is normal.   Abdominal:      General:  Abdomen is flat.      Palpations: Abdomen is soft.   Musculoskeletal:         General: No swelling or tenderness.      Cervical back: Rigidity present.      Comments: Upper extremity pain at peripheral IV insertion site.   Neurological:      Mental Status: She is alert.      Motor: Weakness present.       Significant Labs: All pertinent labs within the past 24 hours have been reviewed.  Recent Lab Results         07/28/22  1535   07/28/22  0804   07/28/22  0310   07/27/22  2342        Anion Gap 6   7     6       Baso #     0.04         Basophil %     0.4         BUN 8   8     8       Calcium 8.0   8.1     7.4       Chloride 117   116     116       CO2 22   21     19       CPK     7256         Creatinine 0.8   0.8     0.9       Differential Method     Automated         eGFR if  >60.0   >60.0     >60.0       eGFR if non  >60.0  Comment: Calculation used to obtain the estimated glomerular filtration  rate (eGFR) is the CKD-EPI equation.      >60.0  Comment: Calculation used to obtain the estimated glomerular filtration  rate (eGFR) is the CKD-EPI equation.        >60.0  Comment: Calculation used to obtain the estimated glomerular filtration  rate (eGFR) is the CKD-EPI equation.          Eos #     0.3         Eosinophil %     2.8         Glucose 93   80     87       Gran # (ANC)     4.7         Gran %     45.1         Hematocrit     26.8         Hemoglobin     8.7         Immature Grans (Abs)     0.07  Comment: Mild elevation in immature granulocytes is non specific and   can be seen in a variety of conditions including stress response,   acute inflammation, trauma and pregnancy. Correlation with other   laboratory and clinical findings is essential.           Immature Granulocytes     0.7         Lymph #     4.6         Lymph %     44.6         Magnesium     1.8         MCH     30.4         MCHC     32.5         MCV     94         Mono #     0.7         Mono %     6.4         MPV      11.1         nRBC     0         Phosphorus     3.0         Platelets     311         Potassium 3.6   3.6     3.5       RBC     2.86         RDW     15.6         Sodium 145   144     141       WBC     10.32                 Significant Imaging: I have reviewed all pertinent imaging results/findings within the past 24 hours.      Assessment/Plan:      * Hypokalemia  Metabolic acidosis  Chronic diarrhea  CKD3  Hypernatremia  Hyperchloremia    On admission, anion gap 16 with pH of 7.22. Patient endorsing abdominal tenderness with intermittent drainage from midline abdominal scar. Denies fevers, SOB, or HA. Abdomen is tender globally with suprapubic>right side>left side. Lactate and WBC normal.  Patient had complicated partial bowel and partial kidney resection in 2019 at [facility] and has been admitted a couple of times this year for hypokalemia, most recently in April to [facility].  Suspect this is likely 2/2 to her chronic diarrhea vs RTA type I or II. On chart review, it does not appear patient has been worked up and was not discharged with potassium or bicarb medications to mediate her now chronic electrolyte derangements. EKG unchanged from past 3 from previous admissions.      Latest CPK 39518, trending up    PLAN:   RTA workup pending, including Serum Osm and Urine 'lytes & Osm   Consider abdominal source if patient fevers or has further infectious concern    Pt can eat and drink and hypokalemia is chronic, so start potassium repletion with LR infusion and PO K-Cl tablets given pt's other electrolyte abnormalities.  K repletion rate not to exceed 20 mEq/hr   Max correction of Na in coming 24 hrs: no lower than 140.  However, using free water at this time is not possible given her hypokalemia.  Use LR infusion.   Maintain Mg>2   Telemetry    Potassium bicarbonate 20mEq tid   Per nephrology recommendations, sodium bicarbonate 1300 PO tid with LR 200ml/hr. Upon discharge recommended 20meq potassium bicarb TID and  sodium bicarb 650mg PO TID.   Continue CK monitoring    Gabapentin 300 tid for muscle pain    Chronic diarrhea  See hypokalemia.  - starting low sodium diet particularly with hx of htn.   - no diarrhea noted overnight      Cervical stenosis of spinal canal  MRI w/o contrast - C3-C4 spinal canal stenosis as well and C4-C5 stenosis.   -   NSGY following, recommend outpatient follow up and reassessment for surgical decompression       Glaucoma of right eye  Continue home dorzolamide, travoprost, and brimonidine per ophthalmology. .     Surgical wound infection  ID and Wound Care following given that her 2019 surgical wound appears to be draining. Wound care       Hypernatremia  Resolved. Latest 140.      CKD (chronic kidney disease) stage 3, GFR 30-59 ml/min  Latest BUN 17, Cr 1.4.  - Continue to montior Scr      Anxiety  Xanax 2mg qhs PRN listed on home meds, pt cannot remember if she is taking this regularly.  Will continue to investigate and order if appropriate.    Asthma  - Continue home Advair   - Hold albuterol  Inhaler, primarily in the setting of hypokalemia because albuterol will drive potassium into cells and potentially worsen hypokalemia.    HTN (hypertension)  Continue home Hydralazine and Nicardipine.  Hold valsartan-HCTZ in setting of hypokalemia.        VTE Risk Mitigation (From admission, onward)         Ordered     heparin (porcine) injection 5,000 Units  Every 8 hours         07/25/22 0401     IP VTE HIGH RISK PATIENT  Once         07/25/22 0401     Place sequential compression device  Until discontinued         07/25/22 0348                Discharge Planning   SHERIDAN: 7/28/2022     Code Status: Full Code   Is the patient medically ready for discharge?: No    Reason for patient still in hospital (select all that apply): Treatment and Pending disposition  Discharge Plan A: Home Health                  Markus Molina MD  Department of Hospital Medicine   [facility]

## 2022-07-29 NOTE — ASSESSMENT & PLAN NOTE
Metabolic acidosis  Chronic diarrhea  CKD3  Hypernatremia  Hyperchloremia    On admission, anion gap 16 with pH of 7.22. Patient endorsing abdominal tenderness with intermittent drainage from midline abdominal scar. Denies fevers, SOB, or HA. Abdomen is tender globally with suprapubic>right side>left side. Lactate and WBC normal.  Patient had complicated partial bowel and partial kidney resection in 2019 at [facility] and has been admitted a couple of times this year for hypokalemia, most recently in April to [facility].  Suspect this is likely 2/2 to her chronic diarrhea vs RTA type I or II. On chart review, it does not appear patient has been worked up and was not discharged with potassium or bicarb medications to mediate her now chronic electrolyte derangements. EKG unchanged from past 3 from previous admissions.      Latest CPK 47711, trending up    PLAN:   RTA workup pending, including Serum Osm and Urine 'lytes & Osm   Consider abdominal source if patient fevers or has further infectious concern    Pt can eat and drink and hypokalemia is chronic, so start potassium repletion with LR infusion and PO K-Cl tablets given pt's other electrolyte abnormalities.  K repletion rate not to exceed 20 mEq/hr   Max correction of Na in coming 24 hrs: no lower than 140.  However, using free water at this time is not possible given her hypokalemia.  Use LR infusion.   Maintain Mg>2   Telemetry    Potassium bicarbonate 20mEq tid   Per nephrology recommendations, sodium bicarbonate 1300 PO tid with LR 200ml/hr. Upon discharge recommended 20meq potassium bicarb TID and sodium bicarb 650mg PO TID.   Continue CK monitoring    Gabapentin 300 tid for muscle pain

## 2022-07-29 NOTE — SUBJECTIVE & OBJECTIVE
Interval History: NAEON, AF. Patient improving today. Less weakness in her extremities. She denies any chest pain, sob, palpitations, or blurry vision.     Review of Systems   Constitutional:  Negative for chills and fever.   HENT:  Negative for congestion, rhinorrhea and trouble swallowing.    Eyes:  Negative for discharge.   Respiratory:  Negative for cough and shortness of breath.    Cardiovascular:  Negative for chest pain and leg swelling.   Gastrointestinal:  Negative for abdominal pain, diarrhea and nausea.   Genitourinary:  Negative for dysuria and hematuria.   Musculoskeletal:  Positive for back pain and neck stiffness. Negative for arthralgias and myalgias.   Skin:  Positive for wound.   Neurological:  Positive for weakness. Negative for dizziness, tremors, numbness and headaches.   Psychiatric/Behavioral:  Negative for agitation and confusion.    Objective:     Vital Signs (Most Recent):  Temp: 98.5 °F (36.9 °C) (07/28/22 1702)  Pulse: 80 (07/28/22 1702)  Resp: 18 (07/28/22 1702)  BP: 128/60 (07/28/22 1702)  SpO2: 96 % (07/28/22 1702) Vital Signs (24h Range):  Temp:  [96.4 °F (35.8 °C)-98.9 °F (37.2 °C)] 98.5 °F (36.9 °C)  Pulse:  [68-93] 80  Resp:  [16-18] 18  SpO2:  [96 %-98 %] 96 %  BP: (122-134)/(60-67) 128/60     Weight: 67.6 kg (149 lb)  Body mass index is 29.59 kg/m².    Intake/Output Summary (Last 24 hours) at 7/28/2022 1925  Last data filed at 7/28/2022 1300  Gross per 24 hour   Intake 3404.27 ml   Output 500 ml   Net 2904.27 ml      Physical Exam  Constitutional:       Appearance: Normal appearance.   HENT:      Head: Normocephalic and atraumatic.      Nose: Nose normal.   Eyes:      Extraocular Movements: Extraocular movements intact.      Conjunctiva/sclera: Conjunctivae normal.   Cardiovascular:      Rate and Rhythm: Normal rate and regular rhythm.   Pulmonary:      Effort: Pulmonary effort is normal.   Abdominal:      General: Abdomen is flat.      Palpations: Abdomen is soft.    Musculoskeletal:         General: No swelling or tenderness.      Cervical back: Rigidity present.      Comments: Upper extremity pain at peripheral IV insertion site.   Neurological:      Mental Status: She is alert.      Motor: Weakness present.       Significant Labs: All pertinent labs within the past 24 hours have been reviewed.  Recent Lab Results         07/28/22  1535   07/28/22  0804   07/28/22  0310   07/27/22  2342        Anion Gap 6   7     6       Baso #     0.04         Basophil %     0.4         BUN 8   8     8       Calcium 8.0   8.1     7.4       Chloride 117   116     116       CO2 22   21     19       CPK     7256         Creatinine 0.8   0.8     0.9       Differential Method     Automated         eGFR if  >60.0   >60.0     >60.0       eGFR if non  >60.0  Comment: Calculation used to obtain the estimated glomerular filtration  rate (eGFR) is the CKD-EPI equation.      >60.0  Comment: Calculation used to obtain the estimated glomerular filtration  rate (eGFR) is the CKD-EPI equation.        >60.0  Comment: Calculation used to obtain the estimated glomerular filtration  rate (eGFR) is the CKD-EPI equation.          Eos #     0.3         Eosinophil %     2.8         Glucose 93   80     87       Gran # (ANC)     4.7         Gran %     45.1         Hematocrit     26.8         Hemoglobin     8.7         Immature Grans (Abs)     0.07  Comment: Mild elevation in immature granulocytes is non specific and   can be seen in a variety of conditions including stress response,   acute inflammation, trauma and pregnancy. Correlation with other   laboratory and clinical findings is essential.           Immature Granulocytes     0.7         Lymph #     4.6         Lymph %     44.6         Magnesium     1.8         MCH     30.4         MCHC     32.5         MCV     94         Mono #     0.7         Mono %     6.4         MPV     11.1         nRBC     0         Phosphorus     3.0          Platelets     311         Potassium 3.6   3.6     3.5       RBC     2.86         RDW     15.6         Sodium 145   144     141       WBC     10.32                 Significant Imaging: I have reviewed all pertinent imaging results/findings within the past 24 hours.

## 2022-08-01 NOTE — PLAN OF CARE
[facility] Med S[facility]  Discharge Final Note    Primary Care Provider: Primary Doctor No    Expected Discharge Date: 7/28/2022    Final Discharge Note (most recent)     Final Note - 08/01/22 0802        Final Note    Assessment Type Final Discharge Note     Anticipated Discharge Disposition [place] [facility]     Hospital Resources/Appts/Education Provided Provided patient/caregiver with written discharge plan information;Appointments scheduled and added to AVS        Post-Acute Status    Discharge Delays None known at this time                 Important Message from Medicare  Important Message from Medicare regarding Discharge Appeal Rights: Given to patient/caregiver, Explained to patient/caregiver, Signed/date by patient/caregiver     Date IMM was signed: 07/28/22  Time IMM was signed: 0952    Contact Info     [facility]   Specialty: Home Health Services    [address]  [address]  [place] [address]   Phone: [phone]       Next Steps: Follow up    Instructions: home health: This is your home health agency. Please call them if you have not heard from them within 2 days of discharge.        Pt went home with home health [facility].  Follow-up appointment made.  Son took patient home upon dc.  No other needs at this time.     DCP: home health [facility] Sora Kim RN [facility] [phone]

## 2022-08-04 ENCOUNTER — LAB VISIT (OUTPATIENT)
Dept: LAB | Facility: HOSPITAL | Age: 77
End: 2022-08-04
Attending: HOSPITALIST
Payer: MEDICARE

## 2022-08-04 DIAGNOSIS — E87.6 HYPOPOTASSEMIA: Primary | ICD-10-CM

## 2022-08-04 LAB
BASOPHILS # BLD AUTO: 0.04 K/UL (ref 0–0.2)
BASOPHILS NFR BLD: 0.4 % (ref 0–1.9)
DIFFERENTIAL METHOD: ABNORMAL
EOSINOPHIL # BLD AUTO: 0.2 K/UL (ref 0–0.5)
EOSINOPHIL NFR BLD: 2.4 % (ref 0–8)
ERYTHROCYTE [DISTWIDTH] IN BLOOD BY AUTOMATED COUNT: 15.6 % (ref 11.5–14.5)
HCT VFR BLD AUTO: 34.7 % (ref 37–48.5)
HGB BLD-MCNC: 10.7 G/DL (ref 12–16)
IMM GRANULOCYTES # BLD AUTO: 0.05 K/UL (ref 0–0.04)
IMM GRANULOCYTES NFR BLD AUTO: 0.5 % (ref 0–0.5)
LYMPHOCYTES # BLD AUTO: 3.9 K/UL (ref 1–4.8)
LYMPHOCYTES NFR BLD: 41.9 % (ref 18–48)
MCH RBC QN AUTO: 30.8 PG (ref 27–31)
MCHC RBC AUTO-ENTMCNC: 30.8 G/DL (ref 32–36)
MCV RBC AUTO: 100 FL (ref 82–98)
MONOCYTES # BLD AUTO: 0.8 K/UL (ref 0.3–1)
MONOCYTES NFR BLD: 8 % (ref 4–15)
NEUTROPHILS # BLD AUTO: 4.4 K/UL (ref 1.8–7.7)
NEUTROPHILS NFR BLD: 46.8 % (ref 38–73)
NRBC BLD-RTO: 0 /100 WBC
PLATELET # BLD AUTO: 419 K/UL (ref 150–450)
PMV BLD AUTO: 10.4 FL (ref 9.2–12.9)
RBC # BLD AUTO: 3.47 M/UL (ref 4–5.4)
WBC # BLD AUTO: 9.4 K/UL (ref 3.9–12.7)

## 2022-08-04 PROCEDURE — G0180 MD CERTIFICATION HHA PATIENT: HCPCS | Mod: ,,, | Performed by: HOSPITALIST

## 2022-08-04 PROCEDURE — 85025 COMPLETE CBC W/AUTO DIFF WBC: CPT | Performed by: HOSPITALIST

## 2022-08-04 PROCEDURE — G0180 PR HOME HEALTH MD CERTIFICATION: ICD-10-PCS | Mod: ,,, | Performed by: HOSPITALIST

## 2022-08-04 PROCEDURE — 80053 COMPREHEN METABOLIC PANEL: CPT | Performed by: HOSPITALIST

## 2022-08-05 ENCOUNTER — TELEPHONE (OUTPATIENT)
Dept: INTERNAL MEDICINE | Facility: CLINIC | Age: 77
End: 2022-08-05
Payer: MEDICARE

## 2022-08-05 ENCOUNTER — TELEPHONE (OUTPATIENT)
Dept: INTERNAL MEDICINE | Facility: CLINIC | Age: 77
End: 2022-08-05

## 2022-08-05 LAB
ALBUMIN SERPL BCP-MCNC: 3.5 G/DL (ref 3.5–5.2)
ALP SERPL-CCNC: 65 U/L (ref 55–135)
ALT SERPL W/O P-5'-P-CCNC: 31 U/L (ref 10–44)
ANION GAP SERPL CALC-SCNC: 11 MMOL/L (ref 8–16)
AST SERPL-CCNC: 17 U/L (ref 10–40)
BILIRUB SERPL-MCNC: 1.2 MG/DL (ref 0.1–1)
BUN SERPL-MCNC: 9 MG/DL (ref 8–23)
CALCIUM SERPL-MCNC: 9.3 MG/DL (ref 8.7–10.5)
CHLORIDE SERPL-SCNC: 111 MMOL/L (ref 95–110)
CO2 SERPL-SCNC: 18 MMOL/L (ref 23–29)
CREAT SERPL-MCNC: 1.3 MG/DL (ref 0.5–1.4)
EST. GFR  (NO RACE VARIABLE): 42.6 ML/MIN/1.73 M^2
GLUCOSE SERPL-MCNC: 87 MG/DL (ref 70–110)
POTASSIUM SERPL-SCNC: 2.7 MMOL/L (ref 3.5–5.1)
PROT SERPL-MCNC: 6.9 G/DL (ref 6–8.4)
SODIUM SERPL-SCNC: 140 MMOL/L (ref 136–145)

## 2022-08-05 NOTE — TELEPHONE ENCOUNTER
Received call from Alcides at [facility] regarding critical lab, potassium 2.7, Alcides reports that ordering physician Theresa Dalal can not be reached    Spoke with  [facility] nurse to see if ordering physician is a current resident at the clinic    [facility] nurse Bridget spoke with Home Health Nurse Dora Arnold who provided phone number and pager number for Dr Theresa Dalal   [phone] and pager number [phone]     Spoke with Alcides at [facility], provided phone number and pager number for Dr Theresa Dalal sent a secure chat message at this time regarding patient and critical lab, awaiting response

## 2022-08-05 NOTE — TELEPHONE ENCOUNTER
Paged Dr. Dalal to notify of Critical Lab results called to [facility] and wellness, awaiting a call back. Thank you Dr. Dalal. Primary Care is . This is in regards to a critical value on this patient.

## 2022-08-05 NOTE — TELEPHONE ENCOUNTER
Attempted to call patient contacts Monalisa and Markus, patient did not answer, Vini number is not working thank you. Called to check on patient as her Potassium level is very low.

## 2022-08-05 NOTE — TELEPHONE ENCOUNTER
Dr Dalal I am attempting to notify you through paging and hospital medicine  as an urgent call. Thank you

## 2022-08-17 NOTE — DISCHARGE SUMMARY
[facility] Medicine  Discharge Summary      Patient Name: Mariaelena Grubbs  MRN: [id number]  Patient Class: IP- Inpatient  Admission Date: 7/24/2022  Hospital Length of Stay: 3 days  Discharge Date and Time: 7/28/2022  7:32 PM  Attending Physician: Alecia att. providers found   Discharging Provider: Markus Molina MD  Primary Care Provider: Primary Doctor [facility] Medicine Team: [facility] 4 Markus Molina MD    HPI:   Mariaelena Grubbs is a 77 y.o. female with cervical and lumbar stenosis, hx of multiple bowel resections, hx of chronic pyelopnephritis s/p R partial nephrectomy, asthma, HTN, HLD who presented to [facility] ED on 07/24/2022 with worsening neck pain and diffuse weakness since the previous morning 7/23. Patient was in her usual state of health prior to this time.  Patient states that she was folding laundry, when she felt a popping sensation in her neck when she turned it to the side. After this she felt neck pain which was remitted with recumbent positioning and exacerbated with movement. She is usually independent in her ADLs however since Saturday she has required significant assistance with ambulation and other basic tasks. They also report that she has chronic diarrhea, and that recently she has not been eating much and been vomiting. Denies fever/chills, HA, vision/hearing changes, dysphagia, dysarthria, new-onset sensory change, new bowel/bladder changes. She does also c/o diffuse abominal pain. Denies AC/AP. Patient states that she had gone [facility] [place] [facility] and she was only given pain medication as well as steroids and discharged but no tests were done.     On presentation to [facility] ED her BP was 209/96, but within several hours came down to normotensive levels.  Her abdomen was tender on exam, and there was drainage noted from a midline scar that looked concerning for potential fungal infection.  Her labs were significant for K<2.0, Mg 1.1, and CK 1500.   "EKG: Frequent PVCs with QTc > 480.  MRI C-spine: moderate to severe spinal canal narrowing at the C3-C4 and C4-C5 levels without cord signal change.  IV and PO K repletion were given.  MICU was consulted to evaluate her for ICU admission due to her immeasurably low K in the setting of PVCs on EKG, but declined given the chronic nature of her hypokalemia.  Chart review indicates pt underwent a complicated operation in 2019 at [facility]: an Oct. 2019 discharge summary describes her as a pt with "chronic pyelonephritis and right renal calculi who presented on 10/23 for open right partial nephrectomy with pyelolithotomy. An enterotomy occurred on the initial access and the decision was made to precede with open surgery. General surgery was consulted intraoperatively and assisted with identification and repair of colon and small bowel enterotomies including right hemicolectomy."      Her K looks to have been > 3 until Feb 2022.  She was admitted for severe hypokalemia to [facility] in late April 2022.  That DC summary notes: "Hypokalemia K was 1.9 on admit. 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 Last two days patient is hyperkalemic. Spironolactone and potassium supplements are discontinued. Nephrology recommends follow up outpatient next for repeat blood work."  It appears this was not done.    She was admitted to [facility] Medicine for further evaluation and treatment.        * No surgery found *      Hospital Course:   Patient was treated for hypokalemia < 2.0 on K+ repletion therapy. She started glaucoma medications including: brimonidine, dorzolamide, and travosprost. Neurosurgery discussed meeting outpatient to discuss surgical decompression. MRI showed C3-C4 and C4-C5 cervical stenosis. Bicarbonate at 14, per nephrology repletion with 1300mg tid sodium bicarbonate showed much improvement. Potassium improved to 3.6 and patient was feeling better " with less weakness in her arms.     Dispo - Continue oral potassium 20meq TID and sodium bicarb 650mg PO TID for one week and recheck BMP. F/u with PCP, Neurosurgery, and Nephrology.        Goals of Care Treatment Preferences:  Code Status: Full Code      Consults:   Consults (From admission, onward)        Status Ordering Provider     Inpatient consult to Midline team  Once        Provider:  (Not yet assigned)    Completed MENDEZ KIRK     Inpatient consult to Midline team  Once        Provider:  (Not yet assigned)    Completed EMANUEL SWEENEY     Inpatient consult to Ophthalmology  Once        Provider:  (Not yet assigned)    Completed AIDA FISHER     Inpatient consult to Nephrology  Once        Provider:  (Not yet assigned)    Completed AIDA FISHER     Inpatient consult to Critical Care Medicine  Once        Provider:  (Not yet assigned)    Completed LANDON SWEENEY     Inpatient consult to Neurosurgery  Once        Provider:  (Not yet assigned)    Completed GAMA RAMIREZ          No new Assessment & Plan notes have been filed under this hospital service since the last note was generated.  Service: Hospital Medicine    Final Active Diagnoses:    Diagnosis Date Noted POA    PRINCIPAL PROBLEM:  Hypokalemia [E87.6] 07/25/2022 Yes     Chronic    Chronic diarrhea [K52.9] 07/25/2022 Yes     Chronic    Cervical stenosis of spinal canal [M48.02] 07/24/2022 Yes    Metabolic acidosis [E87.2] 07/25/2022 Yes    Hypernatremia [E87.0] 07/25/2022 Yes    Hyperchloremia [E87.8] 07/25/2022 Yes    Surgical wound infection [T81.49XA] 07/25/2022 Yes    Glaucoma of right eye [H40.9] 07/25/2022 Yes    CADE (acute kidney injury) [N17.9]  Yes    CKD (chronic kidney disease) stage 3, GFR 30-59 ml/min [N18.30] 09/28/2014 Yes    Anxiety [F41.9] 04/13/2014 Yes    Asthma [J45.909] 04/13/2014 Yes    HTN (hypertension) [I10] 04/13/2014 Yes      Problems Resolved During this Admission:       Discharged Condition:  fair    Disposition: Home or Self Care    Follow Up:   Follow-up Information     [facility] [place] Follow up.    Specialty: Home Health Services  Why: home health: This is your home health agency. Please call them if you have not heard from them within 2 days of discharge.  Contact information:  [facility].  [address]  [place] [address]  [phone]                       Patient Instructions:      BASIC METABOLIC PANEL   Standing Status: Future Standing Exp. Date: 09/26/23     Ambulatory referral/consult to Nephrology   Standing Status: Future   Referral Priority: Routine Referral Type: Consultation   Referral Reason: Specialty Services Required   Requested Specialty: Nephrology   Number of Visits Requested: 1     Ambulatory referral/consult to Neurosurgery   Standing Status: Future   Referral Priority: Routine Referral Type: Consultation   Referral Reason: Specialty Services Required   Requested Specialty: Neurosurgery   Number of Visits Requested: 1     Ambulatory referral/consult to Internal Medicine   Standing Status: Future   Referral Priority: Routine Referral Type: Consultation   Referral Reason: Specialty Services Required   Requested Specialty: Internal Medicine   Number of Visits Requested: 1       Significant Diagnostic Studies: Labs: CMP No results for input(s): NA, K, CL, CO2, GLU, BUN, CREATININE, CALCIUM, PROT, ALBUMIN, BILITOT, ALKPHOS, AST, ALT, ANIONGAP, ESTGFRAFRICA, EGFRNONAA in the last 48 hours. and CBC No results for input(s): WBC, HGB, HCT, PLT in the last 48 hours.  Microbiology:   Urine Culture    Lab Results   Component Value Date    LABURIN (A) 07/24/2022     KLEBSIELLA PNEUMONIAE  10,000 - 49,999 cfu/ml  No other significant isolate       Radiology: X-Ray: Xray cervical spine  MRI: Cervical-Thoracic-Lumbar spine without contrast  CT scan: Cervical W/O, Head W/O    Pending Diagnostic Studies:     None         Medications:  Reconciled Home Medications:      Medication  List      START taking these medications    brimonidine 0.15 % OPTH DROP 0.15 % ophthalmic solution  Commonly known as: ALPHAGAN  Place 1 drop into both eyes 3 (three) times daily.     dorzolamide 2 % ophthalmic solution  Commonly known as: TRUSOPT  Place 1 drop into both eyes 3 (three) times daily.     NIFEdipine 60 MG (OSM) 24 hr tablet  Commonly known as: PROCARDIA-XL  Take 1 tablet (60 mg total) by mouth once daily.  Replaces: NIFEdipine 60 MG Tbsr     sodium bicarbonate 650 MG tablet  Take 1 tablet (650 mg total) by mouth 3 (three) times daily.     travoprost 0.004 % ophthalmic solution  Commonly known as: TRAVATAN Z  Place 1 drop into both eyes every evening.        CHANGE how you take these medications    hydrALAZINE 50 MG tablet  Commonly known as: APRESOLINE  Take 1 tablet (50 mg total) by mouth 3 (three) times daily with meals.  What changed: See the new instructions.        CONTINUE taking these medications    acetaminophen 500 MG tablet  Commonly known as: TYLENOL  Take 500 mg by mouth every 8 (eight) hours as needed for Pain.     * albuterol 1.25 mg/3 mL Nebu  Commonly known as: ACCUNEB  Take 1.25 mg by nebulization every 6 (six) hours as needed.     * albuterol 2.5 mg /3 mL (0.083 %) nebulizer solution  Commonly known as: PROVENTIL  Inhale 2.5 mg into the lungs daily as needed for Wheezing.     * albuterol 90 mcg/actuation inhaler  Commonly known as: PROVENTIL/VENTOLIN HFA  Inhale 1 puff into the lungs daily as needed for Wheezing.     alendronate 70 MG tablet  Commonly known as: FOSAMAX  Take 70 mg by mouth every 7 days.     aspirin 81 MG EC tablet  Commonly known as: ECOTRIN  Take 81 mg by mouth once daily.     diphenoxylate-atropine 2.5-0.025 mg 2.5-0.025 mg per tablet  Commonly known as: LOMOTIL  Take 1 tablet by mouth 4 (four) times daily as needed.     EPIPEN INJ  Inject as directed.     EScitalopram oxalate 10 MG tablet  Commonly known as: LEXAPRO  Take 10 mg by mouth once daily.     fluticasone  propionate 50 mcg/actuation nasal spray  Commonly known as: FLONASE  1 spray by Each Nostril route 3 (three) times daily as needed for Rhinitis.     gabapentin 300 MG capsule  Commonly known as: NEURONTIN  Take 300 mg by mouth 3 (three) times daily.     hydrocortisone 2.5 % cream  Apply topically 2 (two) times daily.     ICY HOT TOP  Apply topically daily as needed (Pain).     nystatin-triamcinolone ointment  Commonly known as: MYCOLOG  SMARTSIG:Sparingly Topical Twice Daily     ondansetron 4 MG Tbdl  Commonly known as: ZOFRAN-ODT  Take 4 mg by mouth. Dissolve 1 tablet on the tongue daily as needed for nausea.     predniSONE 20 MG tablet  Commonly known as: DELTASONE  Take 20 mg by mouth 2 (two) times daily as needed.     valsartan 320 MG tablet  Commonly known as: DIOVAN  Take 320 mg by mouth once daily.         * This list has 3 medication(s) that are the same as other medications prescribed for you. Read the directions carefully, and ask your doctor or other care provider to review them with you.            STOP taking these medications    ALPRAZolam 0.5 MG tablet  Commonly known as: XANAX     NIFEdipine 60 MG Tbsr  Commonly known as: ADALAT CC  Replaced by: NIFEdipine 60 MG (OSM) 24 hr tablet     oxyCODONE-acetaminophen 5-325 mg per tablet  Commonly known as: PERCOCET            Indwelling Lines/Drains at time of discharge:   Lines/Drains/Airways     Epidural Line  Duration                Perineural Analgesia/Anesthesia Assessment (using dermatomes) Epidural 04/10/17 0905 1955 days                Time spent on the discharge of patient: 45 minutes         Markus Molina MD  Department of Hospital Medicine  [facility]

## 2022-08-23 ENCOUNTER — OFFICE VISIT (OUTPATIENT)
Dept: NEUROSURGERY | Facility: CLINIC | Age: 77
End: 2022-08-23
Payer: MEDICARE

## 2022-08-23 ENCOUNTER — TELEPHONE (OUTPATIENT)
Dept: NEUROSURGERY | Facility: CLINIC | Age: 77
End: 2022-08-23

## 2022-08-23 VITALS — SYSTOLIC BLOOD PRESSURE: 141 MMHG | DIASTOLIC BLOOD PRESSURE: 91 MMHG | HEART RATE: 87 BPM

## 2022-08-23 DIAGNOSIS — M48.02 CERVICAL STENOSIS OF SPINAL CANAL: ICD-10-CM

## 2022-08-23 DIAGNOSIS — M47.12 CERVICAL SPONDYLOSIS WITH MYELOPATHY: Primary | ICD-10-CM

## 2022-08-23 PROCEDURE — 3288F PR FALLS RISK ASSESSMENT DOCUMENTED: ICD-10-PCS | Mod: CPTII,S$GLB,, | Performed by: STUDENT IN AN ORGANIZED HEALTH CARE EDUCATION/TRAINING PROGRAM

## 2022-08-23 PROCEDURE — 99999 PR PBB SHADOW E&M-EST. PATIENT-LVL II: ICD-10-PCS | Mod: PBBFAC,,, | Performed by: STUDENT IN AN ORGANIZED HEALTH CARE EDUCATION/TRAINING PROGRAM

## 2022-08-23 PROCEDURE — 3077F SYST BP >= 140 MM HG: CPT | Mod: CPTII,S$GLB,, | Performed by: STUDENT IN AN ORGANIZED HEALTH CARE EDUCATION/TRAINING PROGRAM

## 2022-08-23 PROCEDURE — 1111F PR DISCHARGE MEDS RECONCILED W/ CURRENT OUTPATIENT MED LIST: ICD-10-PCS | Mod: CPTII,S$GLB,, | Performed by: STUDENT IN AN ORGANIZED HEALTH CARE EDUCATION/TRAINING PROGRAM

## 2022-08-23 PROCEDURE — 3080F DIAST BP >= 90 MM HG: CPT | Mod: CPTII,S$GLB,, | Performed by: STUDENT IN AN ORGANIZED HEALTH CARE EDUCATION/TRAINING PROGRAM

## 2022-08-23 PROCEDURE — 99213 OFFICE O/P EST LOW 20 MIN: CPT | Mod: S$GLB,,, | Performed by: STUDENT IN AN ORGANIZED HEALTH CARE EDUCATION/TRAINING PROGRAM

## 2022-08-23 PROCEDURE — 1125F AMNT PAIN NOTED PAIN PRSNT: CPT | Mod: CPTII,S$GLB,, | Performed by: STUDENT IN AN ORGANIZED HEALTH CARE EDUCATION/TRAINING PROGRAM

## 2022-08-23 PROCEDURE — 3080F PR MOST RECENT DIASTOLIC BLOOD PRESSURE >= 90 MM HG: ICD-10-PCS | Mod: CPTII,S$GLB,, | Performed by: STUDENT IN AN ORGANIZED HEALTH CARE EDUCATION/TRAINING PROGRAM

## 2022-08-23 PROCEDURE — 99213 PR OFFICE/OUTPT VISIT, EST, LEVL III, 20-29 MIN: ICD-10-PCS | Mod: S$GLB,,, | Performed by: STUDENT IN AN ORGANIZED HEALTH CARE EDUCATION/TRAINING PROGRAM

## 2022-08-23 PROCEDURE — 3077F PR MOST RECENT SYSTOLIC BLOOD PRESSURE >= 140 MM HG: ICD-10-PCS | Mod: CPTII,S$GLB,, | Performed by: STUDENT IN AN ORGANIZED HEALTH CARE EDUCATION/TRAINING PROGRAM

## 2022-08-23 PROCEDURE — 1111F DSCHRG MED/CURRENT MED MERGE: CPT | Mod: CPTII,S$GLB,, | Performed by: STUDENT IN AN ORGANIZED HEALTH CARE EDUCATION/TRAINING PROGRAM

## 2022-08-23 PROCEDURE — 99999 PR PBB SHADOW E&M-EST. PATIENT-LVL II: CPT | Mod: PBBFAC,,, | Performed by: STUDENT IN AN ORGANIZED HEALTH CARE EDUCATION/TRAINING PROGRAM

## 2022-08-23 PROCEDURE — 1125F PR PAIN SEVERITY QUANTIFIED, PAIN PRESENT: ICD-10-PCS | Mod: CPTII,S$GLB,, | Performed by: STUDENT IN AN ORGANIZED HEALTH CARE EDUCATION/TRAINING PROGRAM

## 2022-08-23 PROCEDURE — 1101F PT FALLS ASSESS-DOCD LE1/YR: CPT | Mod: CPTII,S$GLB,, | Performed by: STUDENT IN AN ORGANIZED HEALTH CARE EDUCATION/TRAINING PROGRAM

## 2022-08-23 PROCEDURE — 1101F PR PT FALLS ASSESS DOC 0-1 FALLS W/OUT INJ PAST YR: ICD-10-PCS | Mod: CPTII,S$GLB,, | Performed by: STUDENT IN AN ORGANIZED HEALTH CARE EDUCATION/TRAINING PROGRAM

## 2022-08-23 PROCEDURE — 3288F FALL RISK ASSESSMENT DOCD: CPT | Mod: CPTII,S$GLB,, | Performed by: STUDENT IN AN ORGANIZED HEALTH CARE EDUCATION/TRAINING PROGRAM

## 2022-08-23 NOTE — PROGRESS NOTES
Neurosurgery  Established Patient    SUBJECTIVE:     History of Present Illness:  77 F with hx of cervical stenosis presents in fu.  She continues to endorse neck pain which radiates into her left shoulder and down to her left elbow.  She denies dropping items from her hands although she has had difficulty putting on jewelry.  She is unsteady when she walks without falls.  She is a nonsmoker.    Review of patient's allergies indicates:   Allergen Reactions    Bananas [banana] Anaphylaxis    Crayfish      Hard time breathing    Iodine and iodide containing products      Told to avoid due to crayfish allergy    Avocado (laurus persea) Swelling and Rash    Kiwi (actinidia chinensis) Itching, Swelling and Rash    Latex, natural rubber Itching, Swelling and Rash    Papaya Itching, Swelling and Rash       Current Outpatient Medications   Medication Sig Dispense Refill    acetaminophen (TYLENOL) 500 MG tablet Take 500 mg by mouth every 8 (eight) hours as needed for Pain.      albuterol (ACCUNEB) 1.25 mg/3 mL Nebu Take 1.25 mg by nebulization every 6 (six) hours as needed.      albuterol (PROVENTIL) 2.5 mg /3 mL (0.083 %) nebulizer solution Inhale 2.5 mg into the lungs daily as needed for Wheezing.      albuterol (PROVENTIL/VENTOLIN HFA) 90 mcg/actuation inhaler Inhale 1 puff into the lungs daily as needed for Wheezing.      alendronate (FOSAMAX) 70 MG tablet Take 70 mg by mouth every 7 days.      aspirin (ECOTRIN) 81 MG EC tablet Take 81 mg by mouth once daily.      brimonidine 0.15 % OPTH DROP (ALPHAGAN) 0.15 % ophthalmic solution Place 1 drop into both eyes 3 (three) times daily. 6 mL 11    diphenoxylate-atropine 2.5-0.025 mg (LOMOTIL) 2.5-0.025 mg per tablet Take 1 tablet by mouth 4 (four) times daily as needed.      dorzolamide (TRUSOPT) 2 % ophthalmic solution Place 1 drop into both eyes 3 (three) times daily. 10 mL 11    EPINEPHRINE (EPIPEN INJ) Inject as directed.      EScitalopram oxalate  (LEXAPRO) 10 MG tablet Take 10 mg by mouth once daily.      fluticasone propionate (FLONASE) 50 mcg/actuation nasal spray 1 spray by Each Nostril route 3 (three) times daily as needed for Rhinitis.      gabapentin (NEURONTIN) 300 MG capsule Take 300 mg by mouth 3 (three) times daily.      hydrALAZINE (APRESOLINE) 50 MG tablet Take 1 tablet (50 mg total) by mouth 3 (three) times daily with meals. 90 tablet 3    hydrocortisone 2.5 % cream Apply topically 2 (two) times daily. 20 g 0    methyl salicylate/menthol (ICY HOT TOP) Apply topically daily as needed (Pain).      NIFEdipine (PROCARDIA-XL) 60 MG (OSM) 24 hr tablet Take 1 tablet (60 mg total) by mouth once daily. 30 tablet 3    nystatin-triamcinolone (MYCOLOG) ointment SMARTSIG:Sparingly Topical Twice Daily      ondansetron (ZOFRAN-ODT) 4 MG TbDL Take 4 mg by mouth. Dissolve 1 tablet on the tongue daily as needed for nausea.      potassium bicarbonate (K-LYTE) disintegrating tablet Take 1 tablet (20 mEq total) by mouth 3 (three) times daily. 90 tablet 6    predniSONE (DELTASONE) 20 MG tablet Take 20 mg by mouth 2 (two) times daily as needed.      sodium bicarbonate 650 MG tablet Take 1 tablet (650 mg total) by mouth 3 (three) times daily. 42 tablet 0    travoprost (TRAVATAN Z) 0.004 % ophthalmic solution Place 1 drop into both eyes every evening. 2.5 each 11    valsartan (DIOVAN) 320 MG tablet Take 320 mg by mouth once daily.       No current facility-administered medications for this visit.       Past Medical History:   Diagnosis Date    Anxiety 4/13/2014    Arthritis     Asthma     Asthma 4/13/2014    Cancer     Breast    Gout     HTN (hypertension) 4/13/2014    Hyperlipidemia 4/13/2014    Hypertension      Past Surgical History:   Procedure Laterality Date    APPENDECTOMY      BREAST SURGERY      HYSTERECTOMY      Port a cath placement and removal      SHOULDER ARTHROSCOPY  2012    TONSILLECTOMY       Family History    None        Social History     Socioeconomic History    Marital status:    Tobacco Use    Smoking status: Former Smoker     Start date: 1966     Quit date: 2011     Years since quittin.6    Smokeless tobacco: Never Used   Substance and Sexual Activity    Alcohol use: No     Comment: socially    Drug use: No       Review of Systems   14 point ROS was negative    OBJECTIVE:     Vital Signs  Pulse: 87  BP: (!) 141/91  Pain Score:   5  There is no height or weight on file to calculate BMI.    Physical Exam:    Constitutional: She appears well-developed and well-nourished.     Eyes: Pupils are equal, round, and reactive to light.     Cardiovascular: Normal rate and regular rhythm.     Abdominal: Soft.     Psych/Behavior: She is alert. She is oriented to person, place, and time. She has a normal mood and affect.     Musculoskeletal: Gait is abnormal.        Neck: Range of motion is limited.        Back: Range of motion is limited.     Neurological:        Coordination: She has abnormal tandem walking coordination. She has a normal Romberg Test.        DTRs: Bicep reflexes are 3+ on the right side and 3+ on the left side. Patellar reflexes are 2+ on the right side and 2+ on the left side.        Cranial nerves: Cranial nerve(s) II, III, IV, V, VI, VII, VIII, IX, X, XI and XII are intact.     5/5 in BUE except 4/5 in left triceps.  4+/5 in right HI, 4/5 in left HI  4/5 in bilateral HF, 5/5 distally    Diminished sensation throughout LUE compared to right    +davis's bilaterally    Difficulty standing on heels/toes      Diagnostic Results:  MRI C spine: severe stenosis at C3/4, 4/5 secondary to posterior disc osteophytes and ligamentum flavum buckling.  CT c spine: calcified posterior disc osteophytes at C3/4, 4/5.  Flex/ex c spine: no dynamic instability.  [place]    ASSESSMENT/PLAN:     77 F with cervical myelopathy.  Her imaging is described above.  I have recommended a C3-T1 posterior cervical  decompression/fusion to treat her myelopathy.  -C3-T1 PCDF 9/21/22

## 2022-08-26 ENCOUNTER — EXTERNAL HOME HEALTH (OUTPATIENT)
Dept: HOME HEALTH SERVICES | Facility: HOSPITAL | Age: 77
End: 2022-08-26
Payer: MEDICARE

## 2022-08-30 DIAGNOSIS — Z01.818 PREOPERATIVE TESTING: Primary | ICD-10-CM

## 2022-08-30 NOTE — ANESTHESIA PAT ROS NOTE
08/30/2022  Mariaelena Grubbs is a 77 y.o., female.      Pre-op Assessment          Review of Systems       Anesthesia Assessment: Preoperative EQUATION    Planned Procedure: Procedure(s) (LRB):  DECOMPRESSION, SPINE, CERVICAL, POSTERIOR APPROACH C3-T1 (N/A)  Requested Anesthesia Type:General  Surgeon: Freddy Miles DO  Service: Neurosurgery  Known or anticipated Date of Surgery:9/21/2022, Date change 10/7/2022    Surgeon notes: reviewed    Electronic QUestionnaire Assessment completed via nurse interview with patient.        Triage considerations:         Previous anesthesia records:GETA and No problems  4/10/2017 EXCHANGE IMPLANT-BREAST Bilateral (Bilateral)   CAPSULECTOMY-BREAST Bilateral (Bilateral: Breast)   CAPSULORRHAPHY (Bilateral)  Airway/Jaw/Neck:  Airway Findings: Mouth Opening: Normal Tongue: Normal  General Airway Assessment: Adult  Mallampati: II  Improves to II with phonation.  TM Distance: Normal, at least 6 cm  Jaw/Neck Findings:  Neck ROM: Normal ROM     Airway Present Prior to Hospital Arrival?: No Placement Date: 04/10/17 Placement Time: 1045 Method of Intubation: Direct laryngoscopy Inserted by: CRNA Airway Device: Endotracheal Tube Mask Ventilation: Easy - oral Intubated: Postinduction Blade: Deepthi #3 Airway Device Size: 7.5 Style: Cuffed Cuff Inflation: Minimal occlusive pressure Inflation Amount (mL): 6 Placement Verified By: Auscultation;Capnometry;ETT Condensation Grade: Grade II Complicating Factors: None Findings Post-Intubation: Positive EtCO2;Bilateral breath sounds;Atraumatic/Condition of teeth unchanged Depth of Insertion (cm): 22 Secured at: Lips Complications: None Breath Sounds: Equal Bilateral Insertion attempts (enter comment if more than 2 attempts): 1 Removal Date: 04/10/17 Removal Time: 1206     Last PCP note: outside [facility]   Subspecialty notes: Neurology,  Neurosurgery, Pulmonary, OPHTHALMOLOGY    Other important co-morbidities: HLD, HTN, and CERVICAL SPONDYLOSIS, ASTHMA, CKD, ARTHRITIS, GOUT, H/O CANCER       Tests already available:  Available tests,  [facility] 3 months [facility] . 8/4/2022 CMP, CBC, 7/28/2022 MG, PHOS, 7/25/2022 UA, MICRO UA, C&S, EKG, 7/24/2022 TSH, FT4, MRI SPINE CERVICAL-THORACIC-LUMBAR W/O CONTRAST            Instructions given. (See in Nurse's note)    Optimization:  Anesthesia Preop Clinic Assessment  Indicated    Medical Opinion Indicated           Plan:    Testing:  PT/INR, PTT, T&S, and UA   Pre-anesthesia  visit       Visit focus: concerns in complex and/or prolonged anesthesia, position other than supine, COMORBIDITIES     Consultation:Patient's PCP for re-evaluation     Patient  has previously scheduled Medical Appointment:NONE    Navigation: Tests Scheduled. TBD             Consults scheduled.TBD             Results will be tracked by Preop Clinic.  9/15 T&S resulted.and noted by Dr. Leopold.  9/20 Medical clearance given by Dr. Yuliet Stapleton on 9/20.( Scanned to media)  9/29 T&S resulted.  9/30 Received PT/INR, PTT and UA from PCP's office.( All scanned to media).  Aditi Seo RN BSN

## 2022-08-30 NOTE — PRE-PROCEDURE INSTRUCTIONS
Patient stated has not had any problem with anesthesia in the past. Will need medical clearance from your PCP,Dr. Stapleton at [facility]. ( [phone])She has preop testing form from  to bring to PCP.Will need poc appt and T&S. Our  will call to set up these appts.She said she takes ASA 81 mg for prevention. Confirmed she is an X-smoker.  Preop instructions given. Hold aspirin, aspirin containing products, nsaids(aleve, advil, motrin, ibuprofen, naprosyn, naproxen, voltaren, diclofenac), vitamins and supplements one week prior to surgery.     May take Tylenol.     Shower the night before surgery and the morning of surgery with an antibacterial soap( hibiclens or dial antibacterial soap).  Nothing on the skin once shower. Do not apply any deodorant, lotion, powder, perfume,or aftershave.  No makeup or moisturizer.No fingernail polish or jewelry going to surgery.  Call your surgeon for any changes in your medical condition.    Nothing to eat or drink after midnight , the night before surgery. Nothing to drink 2 hours before arrival time for surgery/procedure. If you are told to take medication in the morning of surgery, it may be taken with a sip of water. If your doctor tells you something different pertaining to when to stop eating or drinking, follow your doctor's instructions.   Directions given to the surgery center. Verbalizes understanding. Mailed hold one week prior to surgery med instructions, preop instructions, and med instructions.

## 2022-08-31 ENCOUNTER — TELEPHONE (OUTPATIENT)
Dept: PREADMISSION TESTING | Facility: HOSPITAL | Age: 77
End: 2022-08-31
Payer: MEDICARE

## 2022-08-31 NOTE — TELEPHONE ENCOUNTER
----- Message from Aditi Seo RN sent at 8/30/2022 10:01 AM CDT -----  Surgery 9/21  Please schedule poc appt and T&S.   Thanks!

## 2022-09-14 ENCOUNTER — ANESTHESIA EVENT (OUTPATIENT)
Dept: SURGERY | Facility: HOSPITAL | Age: 77
DRG: 472 | End: 2022-09-14
Payer: MEDICARE

## 2022-09-14 ENCOUNTER — HOSPITAL ENCOUNTER (OUTPATIENT)
Dept: PREADMISSION TESTING | Facility: HOSPITAL | Age: 77
Discharge: HOME | End: 2022-09-14
Attending: STUDENT IN AN ORGANIZED HEALTH CARE EDUCATION/TRAINING PROGRAM
Payer: MEDICARE

## 2022-09-14 VITALS
HEART RATE: 76 BPM | DIASTOLIC BLOOD PRESSURE: 76 MMHG | HEIGHT: 59 IN | WEIGHT: 124 LBS | OXYGEN SATURATION: 98 % | BODY MASS INDEX: 25 KG/M2 | TEMPERATURE: 98 F | SYSTOLIC BLOOD PRESSURE: 154 MMHG

## 2022-09-14 NOTE — ANESTHESIA PREPROCEDURE EVALUATION
[facility]  Anesthesia Pre-Operative Evaluation         Patient Name: Mariaelena Grubbs  YOB: 1945  MRN: [id number]    SUBJECTIVE:     Pre-operative evaluation for Procedure(s) (LRB):  DECOMPRESSION, SPINE, CERVICAL, POSTERIOR APPROACH C3-T1 (N/A)     09/14/2022    Mariaelena Grubbs is a 77 y.o. female w/ a significant PMHx of cervical and lumbar stenosis, hx of multiple bowel resections, hx of chronic pyelopnephritis s/p R partial nephrectomy, asthma (inhaler use every other day), HTN, HLD.    Patient now presents for the above procedure(s).    MRI C spine: severe stenosis at C3/4, 4/5 secondary to posterior disc osteophytes and ligamentum flavum buckling.  CT c spine: calcified posterior disc osteophytes at C3/4, 4/5.  Flex/ex c spine: no dynamic instability.      LDA: None documented.    Prev airway: None documented.    Drips: None documented.    Patient Active Problem List   Diagnosis    HTN (hypertension)    Hyperlipidemia    Asthma    Anxiety    CKD (chronic kidney disease) stage 3, GFR 30-59 ml/min    Capsular contracture of breast implant    Pneumonia due to infectious organism    Cervical stenosis of spinal canal    Hypokalemia    Metabolic acidosis    Hypernatremia    Hyperchloremia    Surgical wound infection    Glaucoma of right eye    Chronic diarrhea    CADE (acute kidney injury)    Hypomagnesemia       Review of patient's allergies indicates:   Allergen Reactions    Bananas [banana] Anaphylaxis    Crayfish      Hard time breathing    Iodine and iodide containing products      Told to avoid due to crayfish allergy    Avocado (laurus persea) Swelling and Rash    Kiwi (actinidia chinensis) Itching, Swelling and Rash    Latex, natural rubber Itching, Swelling and Rash    Papaya Itching, Swelling and Rash       Current Outpatient Medications:    Current Outpatient Medications:     acetaminophen (TYLENOL) 500 MG tablet, Take 500 mg by mouth  every 8 (eight) hours as needed for Pain., Disp: , Rfl:     albuterol (ACCUNEB) 1.25 mg/3 mL Nebu, Take 1.25 mg by nebulization every 6 (six) hours as needed., Disp: , Rfl:     albuterol (PROVENTIL) 2.5 mg /3 mL (0.083 %) nebulizer solution, Inhale 2.5 mg into the lungs daily as needed for Wheezing., Disp: , Rfl:     albuterol (PROVENTIL/VENTOLIN HFA) 90 mcg/actuation inhaler, Inhale 1 puff into the lungs daily as needed for Wheezing., Disp: , Rfl:     alendronate (FOSAMAX) 70 MG tablet, Take 70 mg by mouth every 7 days., Disp: , Rfl:     aspirin (ECOTRIN) 81 MG EC tablet, Take 81 mg by mouth once daily., Disp: , Rfl:     brimonidine 0.15 % OPTH DROP (ALPHAGAN) 0.15 % ophthalmic solution, Place 1 drop into both eyes 3 (three) times daily., Disp: 6 mL, Rfl: 11    diphenoxylate-atropine 2.5-0.025 mg (LOMOTIL) 2.5-0.025 mg per tablet, Take 1 tablet by mouth 4 (four) times daily as needed., Disp: , Rfl:     dorzolamide (TRUSOPT) 2 % ophthalmic solution, Place 1 drop into both eyes 3 (three) times daily., Disp: 10 mL, Rfl: 11    EPINEPHRINE (EPIPEN INJ), Inject as directed., Disp: , Rfl:     EScitalopram oxalate (LEXAPRO) 10 MG tablet, Take 10 mg by mouth once daily., Disp: , Rfl:     fluticasone propionate (FLONASE) 50 mcg/actuation nasal spray, 1 spray by Each Nostril route 3 (three) times daily as needed for Rhinitis., Disp: , Rfl:     gabapentin (NEURONTIN) 300 MG capsule, Take 300 mg by mouth 3 (three) times daily., Disp: , Rfl:     hydrALAZINE (APRESOLINE) 50 MG tablet, Take 1 tablet (50 mg total) by mouth 3 (three) times daily with meals., Disp: 90 tablet, Rfl: 3    hydrocortisone 2.5 % cream, Apply topically 2 (two) times daily., Disp: 20 g, Rfl: 0    methyl salicylate/menthol (ICY HOT TOP), Apply topically daily as needed (Pain)., Disp: , Rfl:     NIFEdipine (PROCARDIA-XL) 60 MG (OSM) 24 hr tablet, Take 1 tablet (60 mg total) by mouth once daily., Disp: 30 tablet, Rfl: 3     nystatin-triamcinolone (MYCOLOG) ointment, SMARTSIG:Sparingly Topical Twice Daily, Disp: , Rfl:     ondansetron (ZOFRAN-ODT) 4 MG TbDL, Take 4 mg by mouth. Dissolve 1 tablet on the tongue daily as needed for nausea., Disp: , Rfl:     potassium bicarbonate (K-LYTE) disintegrating tablet, Take 1 tablet (20 mEq total) by mouth 3 (three) times daily., Disp: 90 tablet, Rfl: 6    predniSONE (DELTASONE) 20 MG tablet, Take 20 mg by mouth 2 (two) times daily as needed., Disp: , Rfl:     sodium bicarbonate 650 MG tablet, Take 1 tablet (650 mg total) by mouth 3 (three) times daily., Disp: 42 tablet, Rfl: 0    travoprost (TRAVATAN Z) 0.004 % ophthalmic solution, Place 1 drop into both eyes every evening., Disp: 2.5 each, Rfl: 11    valsartan (DIOVAN) 320 MG tablet, Take 320 mg by mouth once daily., Disp: , Rfl:     Past Surgical History:   Procedure Laterality Date    APPENDECTOMY      BREAST SURGERY      HYSTERECTOMY      Port a cath placement and removal      SHOULDER ARTHROSCOPY  2012    TONSILLECTOMY         Social History     Socioeconomic History    Marital status:    Tobacco Use    Smoking status: Former     Types: Cigarettes     Start date: 1966     Quit date: 2011     Years since quittin.7    Smokeless tobacco: Never   Substance and Sexual Activity    Alcohol use: No     Comment: socially    Drug use: No       OBJECTIVE:     Vital Signs Range (Last 24H):         Significant Labs:  Lab Results   Component Value Date    WBC 9.40 2022    HGB 10.7 (L) 2022    HCT 34.7 (L) 2022     2022    CHOL 206 (H) 2014    TRIG 180 (H) 2014    HDL 42 (L) 2014    ALT 31 2022    AST 17 2022     2022    K 2.7 (LL) 2022     (H) 2022    CREATININE 1.3 2022    BUN 9 2022    CO2 18 (L) 2022    TSH 0.118 (L) 2022    HGBA1C 4.8 2022       Diagnostic Studies: No relevant studies.    EKG:    Results for orders placed or performed during the hospital encounter of 07/24/22   EKG 12-lead    Collection Time: 07/25/22 11:42 PM    Narrative    Test Reason : E87.6,    Vent. Rate : 086 BPM     Atrial Rate : 086 BPM     P-R Int : 126 ms          QRS Dur : 126 ms      QT Int : 410 ms       P-R-T Axes : 058 -53 060 degrees     QTc Int : 490 ms    Normal sinus rhythm  Right bundle branch block  Left anterior fascicular block   Bifascicular block   Minimal voltage criteria for LVH, may be normal variant ( R in aVL )  Abnormal ECG  When compared with ECG of 25-JUL-2022 03:35,  Premature atrial complexes are no longer Present  Confirmed by Kelli Coronado MD ([id number]) on 7/26/2022 8:33:32 AM    Referred By: MEMO   SELF           Confirmed By:Kelli Coronado MD       2D ECHO:  TTE:  No results found for this or any previous visit.    PAUL:  No results found for this or any previous visit.    ASSESSMENT/PLAN:           Pre-op Assessment    I have reviewed the Patient Summary Reports.     I have reviewed the Nursing Notes.    I have reviewed the Medications.     Review of Systems  Anesthesia Hx:  No problems with previous Anesthesia  History of prior surgery of interest to airway management or planning: Previous anesthesia: General Denies Family Hx of Anesthesia complications.   Denies Personal Hx of Anesthesia complications.   Social:  Former Smoker    Cardiovascular:   Exercise tolerance: good Hypertension    Pulmonary:   Asthma Inhaler use every other day   Renal/:   Chronic Renal Disease, CRI    Psych:   anxiety          Physical Exam  General: Well nourished, Cooperative, Alert and Oriented    Airway:  Mallampati: II / I  Mouth Opening: Normal  Neck ROM: Normal ROM    Dental:        Anesthesia Plan  Type of Anesthesia, risks & benefits discussed:    Anesthesia Type: Gen ETT  Intra-op Monitoring Plan: Art Line and Standard ASA Monitors  Post Op Pain Control Plan: multimodal analgesia and IV/PO Opioids  PRN  Airway Plan: Video, Post-Induction  Informed Consent: Informed consent signed with the Patient and all parties understand the risks and agree with anesthesia plan.  All questions answered.   ASA Score: 3  Day of Surgery Review of History & Physical: H&P Update referred to the surgeon/provider.    Ready For Surgery From Anesthesia Perspective.     .

## 2022-09-20 ENCOUNTER — TELEPHONE (OUTPATIENT)
Dept: NEUROSURGERY | Facility: CLINIC | Age: 77
End: 2022-09-20
Payer: MEDICARE

## 2022-09-20 ENCOUNTER — TELEPHONE (OUTPATIENT)
Dept: PREADMISSION TESTING | Facility: HOSPITAL | Age: 77
End: 2022-09-20
Payer: MEDICARE

## 2022-09-20 DIAGNOSIS — M79.602 PAIN OF LEFT UPPER EXTREMITY: ICD-10-CM

## 2022-09-20 DIAGNOSIS — Z01.818 PREOPERATIVE TESTING: Primary | ICD-10-CM

## 2022-09-20 NOTE — TELEPHONE ENCOUNTER
----- Message from Ny Rey MA sent at 9/19/2022  4:10 PM CDT -----  Contact: [phone]    ----- Message -----  From: Tk Guthrie  Sent: 9/19/2022  12:02 PM CDT  To: Ginger HAMILTON Staff    Pt is calling to get rescheduled for a surgery . Needs to jeffrey surgery due to family not being able to be here with her     Pt would like a call back [phone]

## 2022-09-29 ENCOUNTER — LAB VISIT (OUTPATIENT)
Dept: LAB | Facility: HOSPITAL | Age: 77
End: 2022-09-29
Attending: ANESTHESIOLOGY
Payer: MEDICARE

## 2022-09-29 DIAGNOSIS — Z01.818 PREOPERATIVE TESTING: ICD-10-CM

## 2022-09-29 LAB
ABO + RH BLD: NORMAL
BLD GP AB SCN CELLS X3 SERPL QL: NORMAL

## 2022-09-29 PROCEDURE — 36415 COLL VENOUS BLD VENIPUNCTURE: CPT | Performed by: ANESTHESIOLOGY

## 2022-09-29 PROCEDURE — 86850 RBC ANTIBODY SCREEN: CPT | Performed by: ANESTHESIOLOGY

## 2022-09-30 ENCOUNTER — TELEPHONE (OUTPATIENT)
Dept: NEUROSURGERY | Facility: CLINIC | Age: 77
End: 2022-09-30
Payer: MEDICARE

## 2022-09-30 DIAGNOSIS — M79.602 PAIN OF LEFT UPPER EXTREMITY: ICD-10-CM

## 2022-09-30 DIAGNOSIS — Z01.818 PREOPERATIVE TESTING: Primary | ICD-10-CM

## 2022-09-30 NOTE — TELEPHONE ENCOUNTER
----- Message from Charles Tolbert sent at 9/30/2022 12:27 PM CDT -----  Regarding: advise-asking to speak to Dr. Miles  Contact: PT @ [phone]  Pt is calling to speak to someone in the office to discuss her upcoming surgery. Pt is very upset and has spoken to Janette in the officeand pt is complaining about the service she received from both ladies; pt states that she is being harassed.     Pt states that [facility] has already sent her labs over to [facility]. Pt does not feel comfortable to have surgery. Pt went on and on about Janette Pt is asking to speak to Dr. Miles only. Thanks.

## 2022-10-06 ENCOUNTER — TELEPHONE (OUTPATIENT)
Dept: SPINE | Facility: CLINIC | Age: 77
End: 2022-10-06
Payer: MEDICARE

## 2022-10-06 NOTE — TELEPHONE ENCOUNTER
----- Message from Alyson Titus RN sent at 10/5/2022  1:09 PM CDT -----  Regardin week followup  Ny  I changed her post op appt from today to 10/24- since her surgery was moved to friday. She'll need her 6 week appts moved out further.    Alyson

## 2022-10-06 NOTE — TELEPHONE ENCOUNTER
Called patient with arrival time for surgery. Answered all of patient questions and concerns patient verbalized understanding.

## 2022-10-07 ENCOUNTER — HOSPITAL ENCOUNTER (INPATIENT)
Facility: HOSPITAL | Age: 77
LOS: 5 days | Discharge: HOME HEALTH | DRG: 472 | End: 2022-10-12
Attending: STUDENT IN AN ORGANIZED HEALTH CARE EDUCATION/TRAINING PROGRAM | Admitting: STUDENT IN AN ORGANIZED HEALTH CARE EDUCATION/TRAINING PROGRAM
Payer: MEDICARE

## 2022-10-07 ENCOUNTER — ANESTHESIA (OUTPATIENT)
Dept: SURGERY | Facility: HOSPITAL | Age: 77
DRG: 472 | End: 2022-10-07
Payer: MEDICARE

## 2022-10-07 DIAGNOSIS — E87.20 METABOLIC ACIDOSIS: ICD-10-CM

## 2022-10-07 DIAGNOSIS — G95.9 CERVICAL MYELOPATHY: ICD-10-CM

## 2022-10-07 DIAGNOSIS — M48.02 CERVICAL STENOSIS OF SPINAL CANAL: Primary | ICD-10-CM

## 2022-10-07 DIAGNOSIS — E87.6 HYPOKALEMIA: ICD-10-CM

## 2022-10-07 LAB
ALBUMIN SERPL BCP-MCNC: 3 G/DL (ref 3.5–5.2)
ALBUMIN SERPL BCP-MCNC: 3.4 G/DL (ref 3.5–5.2)
ALP SERPL-CCNC: 62 U/L (ref 55–135)
ALP SERPL-CCNC: 70 U/L (ref 55–135)
ALT SERPL W/O P-5'-P-CCNC: 11 U/L (ref 10–44)
ALT SERPL W/O P-5'-P-CCNC: 18 U/L (ref 10–44)
ANION GAP SERPL CALC-SCNC: 14 MMOL/L (ref 8–16)
ANION GAP SERPL CALC-SCNC: 16 MMOL/L (ref 8–16)
ANISOCYTOSIS BLD QL SMEAR: SLIGHT
APTT BLDCRRT: 26.6 SEC (ref 21–32)
AST SERPL-CCNC: 22 U/L (ref 10–40)
AST SERPL-CCNC: 41 U/L (ref 10–40)
BASOPHILS # BLD AUTO: 0.02 K/UL (ref 0–0.2)
BASOPHILS NFR BLD: 0.1 % (ref 0–1.9)
BILIRUB SERPL-MCNC: 0.6 MG/DL (ref 0.1–1)
BILIRUB SERPL-MCNC: 0.7 MG/DL (ref 0.1–1)
BUN SERPL-MCNC: 10 MG/DL (ref 8–23)
BUN SERPL-MCNC: 10 MG/DL (ref 8–23)
CALCIUM SERPL-MCNC: 8.3 MG/DL (ref 8.7–10.5)
CALCIUM SERPL-MCNC: 8.8 MG/DL (ref 8.7–10.5)
CHLORIDE SERPL-SCNC: 111 MMOL/L (ref 95–110)
CHLORIDE SERPL-SCNC: 119 MMOL/L (ref 95–110)
CO2 SERPL-SCNC: 15 MMOL/L (ref 23–29)
CO2 SERPL-SCNC: 18 MMOL/L (ref 23–29)
CREAT SERPL-MCNC: 1.2 MG/DL (ref 0.5–1.4)
CREAT SERPL-MCNC: 1.3 MG/DL (ref 0.5–1.4)
DIFFERENTIAL METHOD: ABNORMAL
EOSINOPHIL # BLD AUTO: 0 K/UL (ref 0–0.5)
EOSINOPHIL NFR BLD: 0 % (ref 0–8)
ERYTHROCYTE [DISTWIDTH] IN BLOOD BY AUTOMATED COUNT: 14.1 % (ref 11.5–14.5)
EST. GFR  (NO RACE VARIABLE): 42.6 ML/MIN/1.73 M^2
EST. GFR  (NO RACE VARIABLE): 46.9 ML/MIN/1.73 M^2
GIANT PLATELETS BLD QL SMEAR: PRESENT
GLUCOSE SERPL-MCNC: 104 MG/DL (ref 70–110)
GLUCOSE SERPL-MCNC: 138 MG/DL (ref 70–110)
GLUCOSE SERPL-MCNC: 139 MG/DL (ref 70–110)
HCO3 UR-SCNC: 17 MMOL/L (ref 24–28)
HCT VFR BLD AUTO: 28.2 % (ref 37–48.5)
HCT VFR BLD CALC: 22 %PCV (ref 36–54)
HGB BLD-MCNC: 9.5 G/DL (ref 12–16)
HYPOCHROMIA BLD QL SMEAR: ABNORMAL
IMM GRANULOCYTES # BLD AUTO: 0.1 K/UL (ref 0–0.04)
IMM GRANULOCYTES NFR BLD AUTO: 0.7 % (ref 0–0.5)
INR PPP: 1 (ref 0.8–1.2)
LYMPHOCYTES # BLD AUTO: 1.2 K/UL (ref 1–4.8)
LYMPHOCYTES NFR BLD: 8.3 % (ref 18–48)
MCH RBC QN AUTO: 31.8 PG (ref 27–31)
MCHC RBC AUTO-ENTMCNC: 33.7 G/DL (ref 32–36)
MCV RBC AUTO: 94 FL (ref 82–98)
MONOCYTES # BLD AUTO: 0.6 K/UL (ref 0.3–1)
MONOCYTES NFR BLD: 4 % (ref 4–15)
NEUTROPHILS # BLD AUTO: 12.7 K/UL (ref 1.8–7.7)
NEUTROPHILS NFR BLD: 86.9 % (ref 38–73)
NRBC BLD-RTO: 0 /100 WBC
OVALOCYTES BLD QL SMEAR: ABNORMAL
PCO2 BLDA: 36.7 MMHG (ref 35–45)
PH SMN: 7.27 [PH] (ref 7.35–7.45)
PLATELET # BLD AUTO: 256 K/UL (ref 150–450)
PLATELET BLD QL SMEAR: ABNORMAL
PMV BLD AUTO: 11 FL (ref 9.2–12.9)
PO2 BLDA: 311 MMHG (ref 80–100)
POC BE: -10 MMOL/L
POC IONIZED CALCIUM: 1.18 MMOL/L (ref 1.06–1.42)
POC SATURATED O2: 100 % (ref 95–100)
POC TCO2: 18 MMOL/L (ref 23–27)
POIKILOCYTOSIS BLD QL SMEAR: SLIGHT
POTASSIUM BLD-SCNC: 2.3 MMOL/L (ref 3.5–5.1)
POTASSIUM SERPL-SCNC: 2.2 MMOL/L (ref 3.5–5.1)
POTASSIUM SERPL-SCNC: 2.5 MMOL/L (ref 3.5–5.1)
PROT SERPL-MCNC: 5.2 G/DL (ref 6–8.4)
PROT SERPL-MCNC: 6.2 G/DL (ref 6–8.4)
PROTHROMBIN TIME: 10.3 SEC (ref 9–12.5)
RBC # BLD AUTO: 2.99 M/UL (ref 4–5.4)
SAMPLE: ABNORMAL
SODIUM BLD-SCNC: 148 MMOL/L (ref 136–145)
SODIUM SERPL-SCNC: 145 MMOL/L (ref 136–145)
SODIUM SERPL-SCNC: 148 MMOL/L (ref 136–145)
WBC # BLD AUTO: 14.58 K/UL (ref 3.9–12.7)

## 2022-10-07 PROCEDURE — C1729 CATH, DRAINAGE: HCPCS | Performed by: STUDENT IN AN ORGANIZED HEALTH CARE EDUCATION/TRAINING PROGRAM

## 2022-10-07 PROCEDURE — 37000008 HC ANESTHESIA 1ST 15 MINUTES: Performed by: STUDENT IN AN ORGANIZED HEALTH CARE EDUCATION/TRAINING PROGRAM

## 2022-10-07 PROCEDURE — 25000003 PHARM REV CODE 250: Performed by: STUDENT IN AN ORGANIZED HEALTH CARE EDUCATION/TRAINING PROGRAM

## 2022-10-07 PROCEDURE — D9220A PRA ANESTHESIA: Mod: ANES,,, | Performed by: ANESTHESIOLOGY

## 2022-10-07 PROCEDURE — 94640 AIRWAY INHALATION TREATMENT: CPT

## 2022-10-07 PROCEDURE — 22842 INSERT SPINE FIXATION DEVICE: CPT | Mod: ,,, | Performed by: STUDENT IN AN ORGANIZED HEALTH CARE EDUCATION/TRAINING PROGRAM

## 2022-10-07 PROCEDURE — 63600175 PHARM REV CODE 636 W HCPCS: Performed by: NURSE ANESTHETIST, CERTIFIED REGISTERED

## 2022-10-07 PROCEDURE — 25000003 PHARM REV CODE 250

## 2022-10-07 PROCEDURE — 27201037 HC PRESSURE MONITORING SET UP

## 2022-10-07 PROCEDURE — 25000242 PHARM REV CODE 250 ALT 637 W/ HCPCS

## 2022-10-07 PROCEDURE — 27201423 OPTIME MED/SURG SUP & DEVICES STERILE SUPPLY: Performed by: STUDENT IN AN ORGANIZED HEALTH CARE EDUCATION/TRAINING PROGRAM

## 2022-10-07 PROCEDURE — 99900035 HC TECH TIME PER 15 MIN (STAT)

## 2022-10-07 PROCEDURE — 36000710: Performed by: STUDENT IN AN ORGANIZED HEALTH CARE EDUCATION/TRAINING PROGRAM

## 2022-10-07 PROCEDURE — 37000009 HC ANESTHESIA EA ADD 15 MINS: Performed by: STUDENT IN AN ORGANIZED HEALTH CARE EDUCATION/TRAINING PROGRAM

## 2022-10-07 PROCEDURE — 63600175 PHARM REV CODE 636 W HCPCS: Performed by: STUDENT IN AN ORGANIZED HEALTH CARE EDUCATION/TRAINING PROGRAM

## 2022-10-07 PROCEDURE — 63015 REMOVE SPINE LAMINA >2 CRVCL: CPT | Mod: ,,, | Performed by: STUDENT IN AN ORGANIZED HEALTH CARE EDUCATION/TRAINING PROGRAM

## 2022-10-07 PROCEDURE — 22614 PR ARTHRODESIS, POST/POSTLAT, SNGL INTERSPACE, EA ADDTL: ICD-10-PCS | Mod: ,,, | Performed by: STUDENT IN AN ORGANIZED HEALTH CARE EDUCATION/TRAINING PROGRAM

## 2022-10-07 PROCEDURE — 36000711: Performed by: STUDENT IN AN ORGANIZED HEALTH CARE EDUCATION/TRAINING PROGRAM

## 2022-10-07 PROCEDURE — 20930 PR ALLOGRAFT FOR SPINE SURGERY ONLY MORSELIZED: ICD-10-PCS | Mod: ,,, | Performed by: STUDENT IN AN ORGANIZED HEALTH CARE EDUCATION/TRAINING PROGRAM

## 2022-10-07 PROCEDURE — 27000221 HC OXYGEN, UP TO 24 HOURS

## 2022-10-07 PROCEDURE — 85025 COMPLETE CBC W/AUTO DIFF WBC: CPT

## 2022-10-07 PROCEDURE — 71000015 HC POSTOP RECOV 1ST HR: Performed by: STUDENT IN AN ORGANIZED HEALTH CARE EDUCATION/TRAINING PROGRAM

## 2022-10-07 PROCEDURE — 71000016 HC POSTOP RECOV ADDL HR: Performed by: STUDENT IN AN ORGANIZED HEALTH CARE EDUCATION/TRAINING PROGRAM

## 2022-10-07 PROCEDURE — 63600175 PHARM REV CODE 636 W HCPCS: Performed by: PHYSICIAN ASSISTANT

## 2022-10-07 PROCEDURE — 80053 COMPREHEN METABOLIC PANEL: CPT | Mod: 91

## 2022-10-07 PROCEDURE — 22842 PR POSTERIOR SEGMENTAL INSTRUMENTATION 3-6 VRT SEG: ICD-10-PCS | Mod: ,,, | Performed by: STUDENT IN AN ORGANIZED HEALTH CARE EDUCATION/TRAINING PROGRAM

## 2022-10-07 PROCEDURE — 36620 INSERTION CATHETER ARTERY: CPT | Mod: 59,,, | Performed by: ANESTHESIOLOGY

## 2022-10-07 PROCEDURE — 94761 N-INVAS EAR/PLS OXIMETRY MLT: CPT

## 2022-10-07 PROCEDURE — 63600175 PHARM REV CODE 636 W HCPCS: Performed by: ANESTHESIOLOGY

## 2022-10-07 PROCEDURE — 27800903 OPTIME MED/SURG SUP & DEVICES OTHER IMPLANTS: Performed by: STUDENT IN AN ORGANIZED HEALTH CARE EDUCATION/TRAINING PROGRAM

## 2022-10-07 PROCEDURE — D9220A PRA ANESTHESIA: ICD-10-PCS | Mod: ANES,,, | Performed by: ANESTHESIOLOGY

## 2022-10-07 PROCEDURE — 20936 PR AUTOGRAFT SPINE SURGERY LOCAL FROM SAME INCISION: ICD-10-PCS | Mod: ,,, | Performed by: STUDENT IN AN ORGANIZED HEALTH CARE EDUCATION/TRAINING PROGRAM

## 2022-10-07 PROCEDURE — 22614 ARTHRD PST TQ 1NTRSPC EA ADD: CPT | Mod: ,,, | Performed by: STUDENT IN AN ORGANIZED HEALTH CARE EDUCATION/TRAINING PROGRAM

## 2022-10-07 PROCEDURE — C1713 ANCHOR/SCREW BN/BN,TIS/BN: HCPCS | Performed by: STUDENT IN AN ORGANIZED HEALTH CARE EDUCATION/TRAINING PROGRAM

## 2022-10-07 PROCEDURE — 85610 PROTHROMBIN TIME: CPT | Performed by: STUDENT IN AN ORGANIZED HEALTH CARE EDUCATION/TRAINING PROGRAM

## 2022-10-07 PROCEDURE — 85730 THROMBOPLASTIN TIME PARTIAL: CPT | Performed by: STUDENT IN AN ORGANIZED HEALTH CARE EDUCATION/TRAINING PROGRAM

## 2022-10-07 PROCEDURE — 11000001 HC ACUTE MED/SURG PRIVATE ROOM

## 2022-10-07 PROCEDURE — 63600175 PHARM REV CODE 636 W HCPCS

## 2022-10-07 PROCEDURE — D9220A PRA ANESTHESIA: ICD-10-PCS | Mod: CRNA,,, | Performed by: NURSE ANESTHETIST, CERTIFIED REGISTERED

## 2022-10-07 PROCEDURE — 80053 COMPREHEN METABOLIC PANEL: CPT | Performed by: ANESTHESIOLOGY

## 2022-10-07 PROCEDURE — 63015 PR LAMINECTOMY,>2 SGMT,CERVICAL: ICD-10-PCS | Mod: ,,, | Performed by: STUDENT IN AN ORGANIZED HEALTH CARE EDUCATION/TRAINING PROGRAM

## 2022-10-07 PROCEDURE — 71000033 HC RECOVERY, INTIAL HOUR: Performed by: STUDENT IN AN ORGANIZED HEALTH CARE EDUCATION/TRAINING PROGRAM

## 2022-10-07 PROCEDURE — 22600 PR ARTHRODESIS, POST/POSTLAT, SNGL INTERSPACE, CERVICAL BELOW C2: ICD-10-PCS | Mod: 51,,, | Performed by: STUDENT IN AN ORGANIZED HEALTH CARE EDUCATION/TRAINING PROGRAM

## 2022-10-07 PROCEDURE — 22600 ARTHRD PST TQ 1NTRSPC CRV: CPT | Mod: 51,,, | Performed by: STUDENT IN AN ORGANIZED HEALTH CARE EDUCATION/TRAINING PROGRAM

## 2022-10-07 PROCEDURE — 20936 SP BONE AGRFT LOCAL ADD-ON: CPT | Mod: ,,, | Performed by: STUDENT IN AN ORGANIZED HEALTH CARE EDUCATION/TRAINING PROGRAM

## 2022-10-07 PROCEDURE — 20930 SP BONE ALGRFT MORSEL ADD-ON: CPT | Mod: ,,, | Performed by: STUDENT IN AN ORGANIZED HEALTH CARE EDUCATION/TRAINING PROGRAM

## 2022-10-07 PROCEDURE — 36620 PR INSERT CATH,ART,PERCUT,SHORTTERM: ICD-10-PCS | Mod: 59,,, | Performed by: ANESTHESIOLOGY

## 2022-10-07 PROCEDURE — D9220A PRA ANESTHESIA: Mod: CRNA,,, | Performed by: NURSE ANESTHETIST, CERTIFIED REGISTERED

## 2022-10-07 PROCEDURE — 25000003 PHARM REV CODE 250: Performed by: NURSE ANESTHETIST, CERTIFIED REGISTERED

## 2022-10-07 DEVICE — NUT SYMPHONY OUTER: Type: IMPLANTABLE DEVICE | Site: NECK | Status: FUNCTIONAL

## 2022-10-07 DEVICE — SCREW SYMPHONY SET TALL: Type: IMPLANTABLE DEVICE | Site: NECK | Status: FUNCTIONAL

## 2022-10-07 DEVICE — SET SYMPHONY SCREW NS: Type: IMPLANTABLE DEVICE | Site: NECK | Status: FUNCTIONAL

## 2022-10-07 DEVICE — CONNECTOR SYMPHONY 30-42MM: Type: IMPLANTABLE DEVICE | Site: NECK | Status: FUNCTIONAL

## 2022-10-07 DEVICE — SCREW SYMPHONY PA 3.5X12MM: Type: IMPLANTABLE DEVICE | Site: NECK | Status: FUNCTIONAL

## 2022-10-07 DEVICE — GRAFT ALTAPORE LARGE CYL 8ML: Type: IMPLANTABLE DEVICE | Site: NECK | Status: FUNCTIONAL

## 2022-10-07 RX ORDER — CEFAZOLIN SODIUM/WATER 2 G/20 ML
2 SYRINGE (ML) INTRAVENOUS
Status: COMPLETED | OUTPATIENT
Start: 2022-10-07 | End: 2022-10-07

## 2022-10-07 RX ORDER — MUPIROCIN 20 MG/G
OINTMENT TOPICAL
Status: DISCONTINUED | OUTPATIENT
Start: 2022-10-07 | End: 2022-10-07 | Stop reason: HOSPADM

## 2022-10-07 RX ORDER — ONDANSETRON 4 MG/1
4 TABLET, ORALLY DISINTEGRATING ORAL EVERY 6 HOURS PRN
Status: DISCONTINUED | OUTPATIENT
Start: 2022-10-07 | End: 2022-10-12 | Stop reason: HOSPADM

## 2022-10-07 RX ORDER — ALBUTEROL SULFATE 2.5 MG/.5ML
2.5 SOLUTION RESPIRATORY (INHALATION) EVERY 4 HOURS
Status: DISCONTINUED | OUTPATIENT
Start: 2022-10-07 | End: 2022-10-08

## 2022-10-07 RX ORDER — SODIUM CHLORIDE 9 MG/ML
INJECTION, SOLUTION INTRAVENOUS CONTINUOUS
Status: DISCONTINUED | OUTPATIENT
Start: 2022-10-07 | End: 2022-10-07

## 2022-10-07 RX ORDER — MUPIROCIN 20 MG/G
1 OINTMENT TOPICAL 2 TIMES DAILY
Status: DISCONTINUED | OUTPATIENT
Start: 2022-10-07 | End: 2022-10-07 | Stop reason: HOSPADM

## 2022-10-07 RX ORDER — HYDRALAZINE HYDROCHLORIDE 50 MG/1
50 TABLET, FILM COATED ORAL
Status: DISCONTINUED | OUTPATIENT
Start: 2022-10-07 | End: 2022-10-09

## 2022-10-07 RX ORDER — PROPOFOL 10 MG/ML
VIAL (ML) INTRAVENOUS
Status: DISCONTINUED | OUTPATIENT
Start: 2022-10-07 | End: 2022-10-07

## 2022-10-07 RX ORDER — NEOSTIGMINE METHYLSULFATE 0.5 MG/ML
INJECTION, SOLUTION INTRAVENOUS
Status: DISCONTINUED | OUTPATIENT
Start: 2022-10-07 | End: 2022-10-07

## 2022-10-07 RX ORDER — ONDANSETRON 2 MG/ML
INJECTION INTRAMUSCULAR; INTRAVENOUS
Status: DISCONTINUED | OUTPATIENT
Start: 2022-10-07 | End: 2022-10-07

## 2022-10-07 RX ORDER — HYDROMORPHONE HYDROCHLORIDE 1 MG/ML
0.2 INJECTION, SOLUTION INTRAMUSCULAR; INTRAVENOUS; SUBCUTANEOUS EVERY 10 MIN PRN
Status: DISCONTINUED | OUTPATIENT
Start: 2022-10-07 | End: 2022-10-07 | Stop reason: HOSPADM

## 2022-10-07 RX ORDER — VANCOMYCIN HYDROCHLORIDE 1 G/20ML
INJECTION, POWDER, LYOPHILIZED, FOR SOLUTION INTRAVENOUS
Status: DISCONTINUED | OUTPATIENT
Start: 2022-10-07 | End: 2022-10-07 | Stop reason: HOSPADM

## 2022-10-07 RX ORDER — ROCURONIUM BROMIDE 10 MG/ML
INJECTION, SOLUTION INTRAVENOUS
Status: DISCONTINUED | OUTPATIENT
Start: 2022-10-07 | End: 2022-10-07

## 2022-10-07 RX ORDER — VALSARTAN 160 MG/1
320 TABLET ORAL DAILY
Status: DISCONTINUED | OUTPATIENT
Start: 2022-10-07 | End: 2022-10-09

## 2022-10-07 RX ORDER — LIDOCAINE HYDROCHLORIDE AND EPINEPHRINE 10; 10 MG/ML; UG/ML
INJECTION, SOLUTION INFILTRATION; PERINEURAL
Status: DISCONTINUED | OUTPATIENT
Start: 2022-10-07 | End: 2022-10-07 | Stop reason: HOSPADM

## 2022-10-07 RX ORDER — LIDOCAINE HYDROCHLORIDE 20 MG/ML
INJECTION INTRAVENOUS
Status: DISCONTINUED | OUTPATIENT
Start: 2022-10-07 | End: 2022-10-07

## 2022-10-07 RX ORDER — SODIUM BICARBONATE 650 MG/1
650 TABLET ORAL 3 TIMES DAILY
Status: DISCONTINUED | OUTPATIENT
Start: 2022-10-07 | End: 2022-10-12 | Stop reason: HOSPADM

## 2022-10-07 RX ORDER — POTASSIUM CHLORIDE 14.9 MG/ML
INJECTION INTRAVENOUS CONTINUOUS PRN
Status: DISCONTINUED | OUTPATIENT
Start: 2022-10-07 | End: 2022-10-07

## 2022-10-07 RX ORDER — OXYCODONE HYDROCHLORIDE 5 MG/1
5 TABLET ORAL EVERY 4 HOURS PRN
Status: DISCONTINUED | OUTPATIENT
Start: 2022-10-07 | End: 2022-10-12 | Stop reason: HOSPADM

## 2022-10-07 RX ORDER — KETOROLAC TROMETHAMINE 30 MG/ML
15 INJECTION, SOLUTION INTRAMUSCULAR; INTRAVENOUS EVERY 8 HOURS PRN
Status: DISCONTINUED | OUTPATIENT
Start: 2022-10-07 | End: 2022-10-07 | Stop reason: HOSPADM

## 2022-10-07 RX ORDER — ACETAMINOPHEN 500 MG
1000 TABLET ORAL EVERY 6 HOURS
Status: DISCONTINUED | OUTPATIENT
Start: 2022-10-07 | End: 2022-10-12 | Stop reason: HOSPADM

## 2022-10-07 RX ORDER — HEPARIN SODIUM 5000 [USP'U]/ML
5000 INJECTION, SOLUTION INTRAVENOUS; SUBCUTANEOUS EVERY 8 HOURS
Status: DISCONTINUED | OUTPATIENT
Start: 2022-10-08 | End: 2022-10-12 | Stop reason: HOSPADM

## 2022-10-07 RX ORDER — EPHEDRINE SULFATE 50 MG/ML
INJECTION, SOLUTION INTRAVENOUS
Status: DISCONTINUED | OUTPATIENT
Start: 2022-10-07 | End: 2022-10-07

## 2022-10-07 RX ORDER — MUPIROCIN 20 MG/G
OINTMENT TOPICAL 2 TIMES DAILY
Status: DISCONTINUED | OUTPATIENT
Start: 2022-10-07 | End: 2022-10-07

## 2022-10-07 RX ORDER — ONDANSETRON 8 MG/1
8 TABLET, ORALLY DISINTEGRATING ORAL EVERY 6 HOURS PRN
Status: DISCONTINUED | OUTPATIENT
Start: 2022-10-07 | End: 2022-10-12 | Stop reason: HOSPADM

## 2022-10-07 RX ORDER — HALOPERIDOL 5 MG/ML
0.5 INJECTION INTRAMUSCULAR EVERY 10 MIN PRN
Status: DISCONTINUED | OUTPATIENT
Start: 2022-10-07 | End: 2022-10-07 | Stop reason: HOSPADM

## 2022-10-07 RX ORDER — DEXMEDETOMIDINE HYDROCHLORIDE 100 UG/ML
INJECTION, SOLUTION INTRAVENOUS
Status: DISCONTINUED | OUTPATIENT
Start: 2022-10-07 | End: 2022-10-07

## 2022-10-07 RX ORDER — HYDROMORPHONE HYDROCHLORIDE 1 MG/ML
2 INJECTION, SOLUTION INTRAMUSCULAR; INTRAVENOUS; SUBCUTANEOUS
Status: DISCONTINUED | OUTPATIENT
Start: 2022-10-07 | End: 2022-10-08

## 2022-10-07 RX ORDER — BISACODYL 10 MG
10 SUPPOSITORY, RECTAL RECTAL DAILY
Status: DISCONTINUED | OUTPATIENT
Start: 2022-10-07 | End: 2022-10-09

## 2022-10-07 RX ORDER — KETAMINE HCL IN 0.9 % NACL 50 MG/5 ML
SYRINGE (ML) INTRAVENOUS
Status: DISCONTINUED | OUTPATIENT
Start: 2022-10-07 | End: 2022-10-07

## 2022-10-07 RX ORDER — NIFEDIPINE 30 MG/1
60 TABLET, EXTENDED RELEASE ORAL DAILY
Status: DISCONTINUED | OUTPATIENT
Start: 2022-10-07 | End: 2022-10-09

## 2022-10-07 RX ORDER — OXYCODONE AND ACETAMINOPHEN 5; 325 MG/1; MG/1
1 TABLET ORAL
Status: DISCONTINUED | OUTPATIENT
Start: 2022-10-07 | End: 2022-10-07 | Stop reason: HOSPADM

## 2022-10-07 RX ORDER — PROCHLORPERAZINE EDISYLATE 5 MG/ML
5 INJECTION INTRAMUSCULAR; INTRAVENOUS EVERY 6 HOURS PRN
Status: DISCONTINUED | OUTPATIENT
Start: 2022-10-07 | End: 2022-10-12 | Stop reason: HOSPADM

## 2022-10-07 RX ORDER — AMOXICILLIN 250 MG
2 CAPSULE ORAL NIGHTLY PRN
Status: DISCONTINUED | OUTPATIENT
Start: 2022-10-07 | End: 2022-10-12 | Stop reason: HOSPADM

## 2022-10-07 RX ORDER — OXYCODONE HYDROCHLORIDE 10 MG/1
10 TABLET ORAL EVERY 4 HOURS PRN
Status: DISCONTINUED | OUTPATIENT
Start: 2022-10-07 | End: 2022-10-12 | Stop reason: HOSPADM

## 2022-10-07 RX ORDER — SODIUM CHLORIDE, SODIUM LACTATE, POTASSIUM CHLORIDE, CALCIUM CHLORIDE 600; 310; 30; 20 MG/100ML; MG/100ML; MG/100ML; MG/100ML
INJECTION, SOLUTION INTRAVENOUS CONTINUOUS
Status: DISCONTINUED | OUTPATIENT
Start: 2022-10-07 | End: 2022-10-08

## 2022-10-07 RX ORDER — ESCITALOPRAM OXALATE 10 MG/1
10 TABLET ORAL DAILY
Status: DISCONTINUED | OUTPATIENT
Start: 2022-10-07 | End: 2022-10-12 | Stop reason: HOSPADM

## 2022-10-07 RX ORDER — GABAPENTIN 300 MG/1
300 CAPSULE ORAL 3 TIMES DAILY
Status: DISCONTINUED | OUTPATIENT
Start: 2022-10-07 | End: 2022-10-12 | Stop reason: HOSPADM

## 2022-10-07 RX ORDER — PHENYLEPHRINE HYDROCHLORIDE 10 MG/ML
INJECTION INTRAVENOUS CONTINUOUS PRN
Status: DISCONTINUED | OUTPATIENT
Start: 2022-10-07 | End: 2022-10-07

## 2022-10-07 RX ORDER — DEXAMETHASONE SODIUM PHOSPHATE 4 MG/ML
INJECTION, SOLUTION INTRA-ARTICULAR; INTRALESIONAL; INTRAMUSCULAR; INTRAVENOUS; SOFT TISSUE
Status: DISCONTINUED | OUTPATIENT
Start: 2022-10-07 | End: 2022-10-07

## 2022-10-07 RX ORDER — POTASSIUM CHLORIDE 20 MEQ/1
40 TABLET, EXTENDED RELEASE ORAL DAILY
Status: DISCONTINUED | OUTPATIENT
Start: 2022-10-08 | End: 2022-10-08

## 2022-10-07 RX ORDER — FENTANYL CITRATE 50 UG/ML
INJECTION, SOLUTION INTRAMUSCULAR; INTRAVENOUS
Status: DISCONTINUED | OUTPATIENT
Start: 2022-10-07 | End: 2022-10-07

## 2022-10-07 RX ORDER — METHOCARBAMOL 750 MG/1
750 TABLET, FILM COATED ORAL 4 TIMES DAILY
Status: DISCONTINUED | OUTPATIENT
Start: 2022-10-07 | End: 2022-10-10

## 2022-10-07 RX ORDER — POTASSIUM CHLORIDE 7.45 MG/ML
10 INJECTION INTRAVENOUS ONCE
Status: COMPLETED | OUTPATIENT
Start: 2022-10-07 | End: 2022-10-07

## 2022-10-07 RX ORDER — SUCCINYLCHOLINE CHLORIDE 20 MG/ML
INJECTION INTRAMUSCULAR; INTRAVENOUS
Status: DISCONTINUED | OUTPATIENT
Start: 2022-10-07 | End: 2022-10-07

## 2022-10-07 RX ORDER — MAG HYDROX/ALUMINUM HYD/SIMETH 200-200-20
30 SUSPENSION, ORAL (FINAL DOSE FORM) ORAL EVERY 4 HOURS PRN
Status: DISCONTINUED | OUTPATIENT
Start: 2022-10-07 | End: 2022-10-12 | Stop reason: HOSPADM

## 2022-10-07 RX ORDER — POTASSIUM CHLORIDE 7.45 MG/ML
10 INJECTION INTRAVENOUS
Status: COMPLETED | OUTPATIENT
Start: 2022-10-07 | End: 2022-10-08

## 2022-10-07 RX ADMIN — PROPOFOL 100 MG: 10 INJECTION, EMULSION INTRAVENOUS at 07:10

## 2022-10-07 RX ADMIN — HYDROMORPHONE HYDROCHLORIDE 0.2 MG: 1 INJECTION, SOLUTION INTRAMUSCULAR; INTRAVENOUS; SUBCUTANEOUS at 12:10

## 2022-10-07 RX ADMIN — EPHEDRINE SULFATE 5 MG: 50 INJECTION INTRAVENOUS at 09:10

## 2022-10-07 RX ADMIN — CEFTRIAXONE 2 G: 2 INJECTION, SOLUTION INTRAVENOUS at 04:10

## 2022-10-07 RX ADMIN — CALCIUM CHLORIDE 0.2 G: 100 INJECTION, SOLUTION INTRAVENOUS at 09:10

## 2022-10-07 RX ADMIN — HYDROMORPHONE HYDROCHLORIDE 0.2 MG: 1 INJECTION, SOLUTION INTRAMUSCULAR; INTRAVENOUS; SUBCUTANEOUS at 01:10

## 2022-10-07 RX ADMIN — Medication 25 MG: at 08:10

## 2022-10-07 RX ADMIN — ROCURONIUM BROMIDE 30 MG: 10 INJECTION INTRAVENOUS at 08:10

## 2022-10-07 RX ADMIN — SODIUM BICARBONATE 650 MG TABLET 650 MG: at 09:10

## 2022-10-07 RX ADMIN — DEXMEDETOMIDINE HYDROCHLORIDE 12 MCG: 100 INJECTION, SOLUTION INTRAVENOUS at 11:10

## 2022-10-07 RX ADMIN — ALBUTEROL SULFATE 2.5 MG: 2.5 SOLUTION RESPIRATORY (INHALATION) at 08:10

## 2022-10-07 RX ADMIN — ACETAMINOPHEN 1000 MG: 500 TABLET ORAL at 04:10

## 2022-10-07 RX ADMIN — ONDANSETRON 4 MG: 2 INJECTION INTRAMUSCULAR; INTRAVENOUS at 11:10

## 2022-10-07 RX ADMIN — Medication 10 MG: at 09:10

## 2022-10-07 RX ADMIN — Medication 2 G: at 08:10

## 2022-10-07 RX ADMIN — REMIFENTANIL HYDROCHLORIDE 0.05 MCG/KG/MIN: 1 INJECTION, POWDER, LYOPHILIZED, FOR SOLUTION INTRAVENOUS at 07:10

## 2022-10-07 RX ADMIN — POTASSIUM CHLORIDE 10 MEQ: 7.46 INJECTION, SOLUTION INTRAVENOUS at 09:10

## 2022-10-07 RX ADMIN — POTASSIUM CHLORIDE 10 MEQ: 7.46 INJECTION, SOLUTION INTRAVENOUS at 04:10

## 2022-10-07 RX ADMIN — METHOCARBAMOL 750 MG: 750 TABLET ORAL at 09:10

## 2022-10-07 RX ADMIN — SODIUM CHLORIDE: 0.9 INJECTION, SOLUTION INTRAVENOUS at 07:10

## 2022-10-07 RX ADMIN — CALCIUM CHLORIDE 0.3 G: 100 INJECTION, SOLUTION INTRAVENOUS at 09:10

## 2022-10-07 RX ADMIN — HYDROMORPHONE HYDROCHLORIDE 2 MG: 1 INJECTION, SOLUTION INTRAMUSCULAR; INTRAVENOUS; SUBCUTANEOUS at 05:10

## 2022-10-07 RX ADMIN — POTASSIUM CHLORIDE 10 MEQ: 7.46 INJECTION, SOLUTION INTRAVENOUS at 10:10

## 2022-10-07 RX ADMIN — FENTANYL CITRATE 100 MCG: 50 INJECTION, SOLUTION INTRAMUSCULAR; INTRAVENOUS at 07:10

## 2022-10-07 RX ADMIN — SUCCINYLCHOLINE CHLORIDE 80 MG: 20 INJECTION, SOLUTION INTRAMUSCULAR; INTRAVENOUS at 07:10

## 2022-10-07 RX ADMIN — GABAPENTIN 300 MG: 300 CAPSULE ORAL at 02:10

## 2022-10-07 RX ADMIN — SODIUM CHLORIDE, SODIUM LACTATE, POTASSIUM CHLORIDE, AND CALCIUM CHLORIDE: .6; .31; .03; .02 INJECTION, SOLUTION INTRAVENOUS at 10:10

## 2022-10-07 RX ADMIN — SODIUM CHLORIDE, SODIUM GLUCONATE, SODIUM ACETATE, POTASSIUM CHLORIDE, MAGNESIUM CHLORIDE, SODIUM PHOSPHATE, DIBASIC, AND POTASSIUM PHOSPHATE: .53; .5; .37; .037; .03; .012; .00082 INJECTION, SOLUTION INTRAVENOUS at 07:10

## 2022-10-07 RX ADMIN — MUPIROCIN: 20 OINTMENT TOPICAL at 06:10

## 2022-10-07 RX ADMIN — GLYCOPYRROLATE 0.2 MG: 0.2 INJECTION, SOLUTION INTRAMUSCULAR; INTRAVITREAL at 07:10

## 2022-10-07 RX ADMIN — ACETAMINOPHEN 1000 MG: 500 TABLET ORAL at 11:10

## 2022-10-07 RX ADMIN — NEOSTIGMINE METHYLSULFATE 3 MG: 0.5 INJECTION, SOLUTION INTRAVENOUS at 09:10

## 2022-10-07 RX ADMIN — METHOCARBAMOL 750 MG: 750 TABLET ORAL at 12:10

## 2022-10-07 RX ADMIN — LIDOCAINE HYDROCHLORIDE 80 MG: 20 INJECTION INTRAVENOUS at 07:10

## 2022-10-07 RX ADMIN — SUGAMMADEX 120 MG: 100 INJECTION, SOLUTION INTRAVENOUS at 09:10

## 2022-10-07 RX ADMIN — METHOCARBAMOL 750 MG: 750 TABLET ORAL at 04:10

## 2022-10-07 RX ADMIN — SODIUM BICARBONATE 650 MG TABLET 650 MG: at 04:10

## 2022-10-07 RX ADMIN — PROPOFOL 50 MCG/KG/MIN: 10 INJECTION, EMULSION INTRAVENOUS at 07:10

## 2022-10-07 RX ADMIN — GLYCOPYRROLATE 0.4 MG: 0.2 INJECTION, SOLUTION INTRAMUSCULAR; INTRAVITREAL at 09:10

## 2022-10-07 RX ADMIN — SODIUM CHLORIDE 0.25 MCG/KG/MIN: 9 INJECTION, SOLUTION INTRAVENOUS at 07:10

## 2022-10-07 RX ADMIN — ROCURONIUM BROMIDE 5 MG: 10 INJECTION INTRAVENOUS at 07:10

## 2022-10-07 RX ADMIN — POTASSIUM CHLORIDE 10 MEQ: 7.46 INJECTION, SOLUTION INTRAVENOUS at 11:10

## 2022-10-07 RX ADMIN — HYDRALAZINE HYDROCHLORIDE 50 MG: 50 TABLET ORAL at 04:10

## 2022-10-07 RX ADMIN — POTASSIUM CHLORIDE: 14.9 INJECTION INTRAVENOUS at 09:10

## 2022-10-07 RX ADMIN — ALBUTEROL SULFATE 2.5 MG: 2.5 SOLUTION RESPIRATORY (INHALATION) at 12:10

## 2022-10-07 RX ADMIN — CALCIUM CHLORIDE 0.5 G: 100 INJECTION, SOLUTION INTRAVENOUS at 09:10

## 2022-10-07 RX ADMIN — DEXAMETHASONE SODIUM PHOSPHATE 10 MG: 4 INJECTION, SOLUTION INTRAMUSCULAR; INTRAVENOUS at 08:10

## 2022-10-07 RX ADMIN — KETOROLAC TROMETHAMINE 15 MG: 30 INJECTION, SOLUTION INTRAMUSCULAR; INTRAVENOUS at 12:10

## 2022-10-07 RX ADMIN — GABAPENTIN 300 MG: 300 CAPSULE ORAL at 09:10

## 2022-10-07 RX ADMIN — ACETAMINOPHEN 1000 MG: 500 TABLET ORAL at 01:10

## 2022-10-07 RX ADMIN — OXYCODONE 5 MG: 5 TABLET ORAL at 09:10

## 2022-10-07 NOTE — H&P
H&P completed and reviewed, the patient has been examined and:  I concur with the findings and no changes have occurred since H&P was written.    Active Hospital Problems    Diagnosis  POA    *Cervical stenosis of spinal canal [M48.02]  Yes    Cervical myelopathy [G95.9]  Unknown      Resolved Hospital Problems   No resolved problems to display.         To the OR as planned  Postop orders to follow            Neurosurgery  Established Patient     SUBJECTIVE:      History of Present Illness:  77 F with hx of cervical stenosis presents in fu.  She continues to endorse neck pain which radiates into her left shoulder and down to her left elbow.  She denies dropping items from her hands although she has had difficulty putting on jewelry.  She is unsteady when she walks without falls.  She is a nonsmoker.           Review of patient's allergies indicates:   Allergen Reactions    Bananas [banana] Anaphylaxis    Crayfish         Hard time breathing    Iodine and iodide containing products         Told to avoid due to crayfish allergy    Avocado (laurus persea) Swelling and Rash    Kiwi (actinidia chinensis) Itching, Swelling and Rash    Latex, natural rubber Itching, Swelling and Rash    Papaya Itching, Swelling and Rash         Current Medications          Current Outpatient Medications   Medication Sig Dispense Refill    acetaminophen (TYLENOL) 500 MG tablet Take 500 mg by mouth every 8 (eight) hours as needed for Pain.        albuterol (ACCUNEB) 1.25 mg/3 mL Nebu Take 1.25 mg by nebulization every 6 (six) hours as needed.        albuterol (PROVENTIL) 2.5 mg /3 mL (0.083 %) nebulizer solution Inhale 2.5 mg into the lungs daily as needed for Wheezing.        albuterol (PROVENTIL/VENTOLIN HFA) 90 mcg/actuation inhaler Inhale 1 puff into the lungs daily as needed for Wheezing.        alendronate (FOSAMAX) 70 MG tablet Take 70 mg by mouth every 7 days.        aspirin (ECOTRIN) 81 MG EC tablet Take 81 mg by mouth  once daily.        brimonidine 0.15 % OPTH DROP (ALPHAGAN) 0.15 % ophthalmic solution Place 1 drop into both eyes 3 (three) times daily. 6 mL 11    diphenoxylate-atropine 2.5-0.025 mg (LOMOTIL) 2.5-0.025 mg per tablet Take 1 tablet by mouth 4 (four) times daily as needed.        dorzolamide (TRUSOPT) 2 % ophthalmic solution Place 1 drop into both eyes 3 (three) times daily. 10 mL 11    EPINEPHRINE (EPIPEN INJ) Inject as directed.        EScitalopram oxalate (LEXAPRO) 10 MG tablet Take 10 mg by mouth once daily.        fluticasone propionate (FLONASE) 50 mcg/actuation nasal spray 1 spray by Each Nostril route 3 (three) times daily as needed for Rhinitis.        gabapentin (NEURONTIN) 300 MG capsule Take 300 mg by mouth 3 (three) times daily.        hydrALAZINE (APRESOLINE) 50 MG tablet Take 1 tablet (50 mg total) by mouth 3 (three) times daily with meals. 90 tablet 3    hydrocortisone 2.5 % cream Apply topically 2 (two) times daily. 20 g 0    methyl salicylate/menthol (ICY HOT TOP) Apply topically daily as needed (Pain).        NIFEdipine (PROCARDIA-XL) 60 MG (OSM) 24 hr tablet Take 1 tablet (60 mg total) by mouth once daily. 30 tablet 3    nystatin-triamcinolone (MYCOLOG) ointment SMARTSIG:Sparingly Topical Twice Daily        ondansetron (ZOFRAN-ODT) 4 MG TbDL Take 4 mg by mouth. Dissolve 1 tablet on the tongue daily as needed for nausea.        potassium bicarbonate (K-LYTE) disintegrating tablet Take 1 tablet (20 mEq total) by mouth 3 (three) times daily. 90 tablet 6    predniSONE (DELTASONE) 20 MG tablet Take 20 mg by mouth 2 (two) times daily as needed.        sodium bicarbonate 650 MG tablet Take 1 tablet (650 mg total) by mouth 3 (three) times daily. 42 tablet 0    travoprost (TRAVATAN Z) 0.004 % ophthalmic solution Place 1 drop into both eyes every evening. 2.5 each 11    valsartan (DIOVAN) 320 MG tablet Take 320 mg by mouth once daily.          No current facility-administered medications for this visit.                  Past Medical History:   Diagnosis Date    Anxiety 2014    Arthritis      Asthma      Asthma 2014    Cancer       Breast    Gout      HTN (hypertension) 2014    Hyperlipidemia 2014    Hypertension              Past Surgical History:   Procedure Laterality Date    APPENDECTOMY        BREAST SURGERY        HYSTERECTOMY        Port a cath placement and removal        SHOULDER ARTHROSCOPY   2012    TONSILLECTOMY          Family History    None         Social History            Socioeconomic History    Marital status:    Tobacco Use    Smoking status: Former Smoker       Start date: 1966       Quit date: 2011       Years since quittin.6    Smokeless tobacco: Never Used   Substance and Sexual Activity    Alcohol use: No       Comment: socially    Drug use: No         Review of Systems   14 point ROS was negative     OBJECTIVE:      Vital Signs  Pulse: 87  BP: (!) 141/91  Pain Score:   5  There is no height or weight on file to calculate BMI.     Physical Exam:     Constitutional: She appears well-developed and well-nourished.      Eyes: Pupils are equal, round, and reactive to light.      Cardiovascular: Normal rate and regular rhythm.      Abdominal: Soft.     Psych/Behavior: She is alert. She is oriented to person, place, and time. She has a normal mood and affect.     Musculoskeletal: Gait is abnormal.        Neck: Range of motion is limited.        Back: Range of motion is limited.     Neurological:        Coordination: She has abnormal tandem walking coordination. She has a normal Romberg Test.        DTRs: Bicep reflexes are 3+ on the right side and 3+ on the left side. Patellar reflexes are 2+ on the right side and 2+ on the left side.        Cranial nerves: Cranial nerve(s) II, III, IV, V, VI, VII, VIII, IX, X, XI and XII are intact.      5/5 in BUE except 4/5 in left triceps.  4+/5 in right HI, 4/5 in left HI  4/5 in bilateral HF, 5/5 distally     Diminished  sensation throughout LUE compared to right     +davis's bilaterally     Difficulty standing on heels/toes        Diagnostic Results:  MRI C spine: severe stenosis at C3/4, 4/5 secondary to posterior disc osteophytes and ligamentum flavum buckling.  CT c spine: calcified posterior disc osteophytes at C3/4, 4/5.  Flex/ex c spine: no dynamic instability.  [place]     ASSESSMENT/PLAN:      77 F with cervical myelopathy.  Her imaging is described above.  I have recommended a C3-T1 posterior cervical decompression/fusion to treat her myelopathy.  -C3-T1 PCDF 9/21/22

## 2022-10-07 NOTE — TRANSFER OF CARE
"Anesthesia Transfer of Care Note    Patient: Mariaelena Grubbs    Procedure(s) Performed: Procedure(s) (LRB):  DECOMPRESSION, SPINE, CERVICAL, POSTERIOR APPROACH C3-T1 (N/A)    Patient location: PACU    Anesthesia Type: general    Transport from OR: Transported from OR on 6-10 L/min O2 by face mask with adequate spontaneous ventilation    Post pain: adequate analgesia    Post assessment: no apparent anesthetic complications and tolerated procedure well    Post vital signs: stable    Level of consciousness: awake    Nausea/Vomiting: no nausea/vomiting    Complications: none    Transfer of care protocol was followed      Last vitals:   Visit Vitals  BP (!) 117/58   Pulse 77   Temp 36.2 °C (97.2 °F) (Temporal)   Resp 20   Ht 4' 11" (1.499 m)   Wt 54.9 kg (121 lb)   SpO2 100%   Breastfeeding No   BMI 24.44 kg/m²     "

## 2022-10-07 NOTE — ANESTHESIA POSTPROCEDURE EVALUATION
Anesthesia Post Evaluation    Patient: Mariaelena Grubbs    Procedure(s) Performed: Procedure(s) (LRB):  DECOMPRESSION, SPINE, CERVICAL, POSTERIOR APPROACH C3-T1 (N/A)    Final Anesthesia Type: general      Patient location during evaluation: PACU  Patient participation: Yes- Able to Participate  Level of consciousness: awake and alert  Post-procedure vital signs: reviewed and stable  Pain management: adequate  Airway patency: patent    PONV status at discharge: No PONV  Anesthetic complications: no      Cardiovascular status: blood pressure returned to baseline  Respiratory status: unassisted  Hydration status: euvolemic  Follow-up not needed.          Vitals Value Taken Time   /75 10/07/22 1506   Temp 36.5 °C (97.7 °F) 10/07/22 1315   Pulse 68 10/07/22 1520   Resp 17 10/07/22 1511   SpO2 56 % 10/07/22 1520   Vitals shown include unvalidated device data.      Event Time   Out of Recovery 12:30:00         Pain/Eleazar Score: Pain Rating Prior to Med Admin: 7 (10/7/2022  1:17 PM)  Pain Rating Post Med Admin: 6 (10/7/2022  1:25 PM)  Eleazar Score: 10 (10/7/2022  1:15 PM)

## 2022-10-07 NOTE — PLAN OF CARE
Certification of Assistant at Surgery       Surgery Date: 10/7/2022     Participating Surgeons:  Surgeon(s) and Role:     * Freddy Miles, DO - Primary  Assisting:     * Chantelle Vincent PA-C    Procedures:  Procedure(s) (LRB):  DECOMPRESSION, SPINE, CERVICAL, POSTERIOR APPROACH C3-T1 (N/A)    Assistant Surgeon's Certification of Necessity:  I understand that section 1842 (b) (6) (d) of the Social Security Act generally prohibits Medicare Part B reasonable charge payment for the services of assistants at surgery in teaching hospitals when qualified residents are available to furnish such services. I certify that the services for which payment is claimed were medically necessary, and that no qualified resident was available to perform the services. I further understand that these services are subject to post-payment review by the Medicare carrier.      Chantelle Vincent PA-C    10/07/2022  12:18 PM

## 2022-10-07 NOTE — PROGRESS NOTES
Critical lab result from lab, Potassium level of 2.5. Neuro surgery resident paged, Dr. Sonia Ornelas returned the page, she will order K replacement. Patient already left PACU on the way to Xray then to her room. BALA Schreiber in NPU informed.

## 2022-10-07 NOTE — PLAN OF CARE
Cut   Problem: Pain Acute  Goal: Acceptable Pain Control and Functional Ability  Outcome: Ongoing, Progressing     Problem: Fluid and Electrolyte Imbalance (Acute Kidney Injury/Impairment)  Goal: Fluid and Electrolyte Balance  Outcome: Ongoing, Progressing     POC reviewed with the patient and they verbalized understanding. All comments and concerns addressed. Bed locked in lowest position with bed alarm set, call light within reach. Safety precautions maintained. VSS, see flowsheets. Family at bedside , no acute distress at this time  Will continue to monitor for changes to POC and clinical condition.

## 2022-10-07 NOTE — NURSING TRANSFER
Nursing Transfer Note      10/7/2022     Reason patient is being transferred: Post Op    Transfer To: 910, report given to BALA Cardenas. Patient will get spine Xray on her way to her room.     Transfer via bed    Transfer with : HILARY drain, jj catheter    Transported by Patient transport x2    Medicines sent: sodium bicarbonate tablet and nifedipine 60 mg tab    Any special needs or follow-up needed: none    Chart send with patient: Yes    Notified: son visited at bedside, room number given    Patient reassessed at: 10-7-22 at 14:30

## 2022-10-07 NOTE — BRIEF OP NOTE
[facility] - Surgery ([facility])  Brief Operative Note    SUMMARY     Surgery Date: 10/7/2022     Surgeon(s) and Role:     * Freddy Miles DO - Primary    Assisting Surgeon:Chantelle Vincent PA-C, [place]    Pre-op Diagnosis:  Cervical spondylosis with myelopathy [M47.12]    Post-op Diagnosis:  Post-Op Diagnosis Codes:     * Cervical spondylosis with myelopathy [M47.12]    Procedure(s) (LRB):  DECOMPRESSION, SPINE, CERVICAL, POSTERIOR APPROACH C3-T1 (N/A)    Anesthesia: General    Operative Findings: C3-T1 PCF. C3-C6 decompression     Estimated Blood Loss: 200 mL         Specimens:   Specimen (24h ago, onward)      None            [id number]

## 2022-10-07 NOTE — OP NOTE
DATE OF PROCEDURE: 10/7/2022      PREOPERATIVE DIAGNOSIS:  1. Cervical myelopathy  2. Severe cervical stenosis     POSTOPERATIVE DIAGNOSIS:  Same.     PROCEDURE PERFORMED:  1. Posterior spinal fusion, C3-T1  2. Posterior segmental spinal fixation, C3-T1 (Depuy)  3. Decompression of thecal sac and nerve roots via laminectomy, C3-6  4. Use of intraoperative flouroscopy  5. Use of intraoperative neuromonitoring with MEPs  6. Local and synthetic bone grafting     PRIMARY SURGEON: Freddy Miles DO    ASSISTANT: Behzad Alarcon MD; JEFF Johnson     ANESTHESIA: GETA     ESTIMATED BLOOD LOSS: 200mL     COMPLICATIONS: None     DRAINS: One deep.     SPECIMENS SENT: None     FINDINGS: None     INDICATIONS:     77 F presented to clinic for evaluation of cervical stenosis.  She continues to endorse neck pain which radiates into her left shoulder and down to her left elbow.  She denies dropping items from her hands although she has had difficulty putting on jewelry.  She is unsteady when she walks without falls.  She is a nonsmoker.    Imaging demonstrated multilevel severe cervical stenosis.  She was offered a C3-T1 posterior decompression/fusion in order to decompress her neural elements and to stabilize her spine.     I explained the risks, benefits, alternatives, indications and methods of the procedure in detail. The patient voiced understanding and all questions were answered. No guarantees were made. The patient agreed to proceed as planned.    PROCEDURE:     The patient was brought into the operating room where she was intubated and placed under general anesthesia without difficulty. All lines were placed.  A Courtney clamp was placed bitemporally and she was repositioned prone onto the hospital bed with the head fixed to the table in capital flexion and neck extension. Appropriate padding of all pressure points was placed.  Xrays were used to localize the region of interest. It also showed adequate spinal  alignment in the sagittal plane. The posterior cervical spine was marked prepped and draped in the usual sterile fashion.  A timeout was performed prior to the procedure.  10mL of Lidocaine with epinephrine were injected in the skin.     A linear incision was made with a 10 blade from approximately C3-T1.  Supra and subfascial dissection was carried out in the midline with Bovie electrocautery. Subfascial dissection was carried out with Bovie electrocautery and Nelson elevators to expose the posterior elements from C3-C6. Levels were confirmed with lateral xray.    First, inferior facetectomies of C7 were performed bilaterally with osteotomes to expose our bilateral T1 pedicle screw entry points.  We then placed bilateral 26mm T1 pedicle screws using anatomic landmarks and free hand technique.  Fluoro showed excellent placement of hardware and motors were consistent with baseline.     Next, lateral mass screw tracts were prepared bilaterally from C3-C6 using freehand technique and anatomic landmarks. A decompressive laminectomy was then performed from C3-C6 using the [place] rongeur, high speed drill and Kerrison punches. Adequate decompression was confirmed with the [place] probe rostrally and caudally.     Lateral mass screws were then placed in the previously prepared tracts. AP and lateral xray showed excellent position of all screws. Titanium rods were sized, contoured and reduced into the tulip heads. Set screws were finally tightened. A crosslink was placed and tightened at C5. Final xrays showed excellent spinal alignment and hardware position.  Motors were consistent with baseline.     The wound was copiously irrigated with a saline solution.   Exposed bony surfaces from C3-T1 were decorticated with a high-speed drill.  Local bone from the laminectomy was morcellized and placed bilaterally for arthrodesis from C3-T1 in addition to synthetic bone.  One gram of vancomycin powder was placed into the wound. A  subfascial man drain was placed and tunneled through a separate incision, where it was secured with Vicryl sutures.  A watertight fascial closure was achieved with interrupted 0 vicryl sutures and a running Stratafix suture.  The soft tissues were closed in layers and the skin was closed with running monocryl.  A preneo dressing with dermabond was then placed over the incision.     The patient appeared to tolerate the procedure well from a hemodynamic and neuromonitoring standpoint.  Motor evoked potentials were present and stable in all extremities throughout the case. I was present for all critical portions of the case, and at the end of the case all counts were correct. She was removed from the Courtney clamp and repositioned supine onto the hospital bed where she was extubated and allowed to emerge from anesthesia without difficulty.  She was sent to the PACU in stable condition for recovery.

## 2022-10-07 NOTE — ANESTHESIA PROCEDURE NOTES
Intubation    Date/Time: 10/7/2022 7:25 AM  Performed by: Jose Robles CRNA  Authorized by: Aleta Oliver MD     Intubation:     Induction:  Intravenous    Intubated:  Postinduction    Mask Ventilation:  Easy with oral airway    Attempts:  1    Attempted By:  Student    Method of Intubation:  Video laryngoscopy    Blade:  Mcclelland 3    Laryngeal View Grade: Grade I - full view of cords      Difficult Airway Encountered?: No      Complications:  None    Airway Device:  Oral endotracheal tube    Airway Device Size:  7.0    Style/Cuff Inflation:  Cuffed (inflated to minimal occlusive pressure)    Inflation Amount (mL):  5    Tube secured:  21    Secured at:  The lips    Placement Verified By:  Capnometry    Complicating Factors:  None    Findings Post-Intubation:  BS equal bilateral and atraumatic/condition of teeth unchanged

## 2022-10-07 NOTE — PLAN OF CARE
[facility] - Surgery ([facility])  Brief Operative Note    SUMMARY     Surgery Date: 10/7/2022     Surgeon(s) and Role:     * Freddy Miles DO - Primary    Assisting:     * Chantelle Vincent PA-C    Pre-op Diagnosis:  Cervical spondylosis with myelopathy [M47.12]    Post-op Diagnosis:  Post-Op Diagnosis Codes:     * Cervical spondylosis with myelopathy [M47.12]    Procedure(s) (LRB):  DECOMPRESSION, SPINE, CERVICAL, POSTERIOR APPROACH C3-T1 (N/A)    Anesthesia: General    Operative Findings: C3-T1 posterior cervical fusion     Estimated Blood Loss: 200 mL    Estimated Blood Loss has been documented.         Specimens:   Specimen (24h ago, onward)      None            [id number]

## 2022-10-07 NOTE — ANESTHESIA PROCEDURE NOTES
Arterial    Diagnosis: neck pain    Patient location during procedure: done in OR    Staffing  Authorizing Provider: Aleta Oliver MD  Performing Provider: Aleta Oliver MD    Anesthesiologist was present at the time of the procedure.    Preanesthetic Checklist  Completed: patient identified, IV checked, site marked, risks and benefits discussed, surgical consent, monitors and equipment checked, pre-op evaluation, timeout performed and anesthesia consent givenArterial  Skin Prep: chlorhexidine gluconate  Orientation: right  Location: radial    Catheter placement by Anatomical landmarks. Heme positive aspiration all ports. Insertion Attempts: 1  Assessment  Dressing: secured with tape and tegaderm  Patient: Tolerated well

## 2022-10-08 PROBLEM — D64.9 NORMOCYTIC ANEMIA: Status: ACTIVE | Noted: 2022-10-08

## 2022-10-08 LAB
ANION GAP SERPL CALC-SCNC: 12 MMOL/L (ref 8–16)
ANION GAP SERPL CALC-SCNC: 13 MMOL/L (ref 8–16)
BUN SERPL-MCNC: 11 MG/DL (ref 8–23)
BUN SERPL-MCNC: 13 MG/DL (ref 8–23)
CALCIUM SERPL-MCNC: 8 MG/DL (ref 8.7–10.5)
CALCIUM SERPL-MCNC: 8.1 MG/DL (ref 8.7–10.5)
CHLORIDE SERPL-SCNC: 110 MMOL/L (ref 95–110)
CHLORIDE SERPL-SCNC: 115 MMOL/L (ref 95–110)
CO2 SERPL-SCNC: 14 MMOL/L (ref 23–29)
CO2 SERPL-SCNC: 17 MMOL/L (ref 23–29)
CREAT SERPL-MCNC: 1.4 MG/DL (ref 0.5–1.4)
CREAT SERPL-MCNC: 1.4 MG/DL (ref 0.5–1.4)
CREAT UR-MCNC: 50 MG/DL (ref 15–325)
EST. GFR  (NO RACE VARIABLE): 39 ML/MIN/1.73 M^2
EST. GFR  (NO RACE VARIABLE): 39 ML/MIN/1.73 M^2
GLUCOSE SERPL-MCNC: 108 MG/DL (ref 70–110)
GLUCOSE SERPL-MCNC: 96 MG/DL (ref 70–110)
POTASSIUM SERPL-SCNC: 2.6 MMOL/L (ref 3.5–5.1)
POTASSIUM SERPL-SCNC: 2.7 MMOL/L (ref 3.5–5.1)
POTASSIUM UR-SCNC: 13 MMOL/L (ref 15–95)
SODIUM SERPL-SCNC: 139 MMOL/L (ref 136–145)
SODIUM SERPL-SCNC: 142 MMOL/L (ref 136–145)

## 2022-10-08 PROCEDURE — 36415 COLL VENOUS BLD VENIPUNCTURE: CPT | Performed by: STUDENT IN AN ORGANIZED HEALTH CARE EDUCATION/TRAINING PROGRAM

## 2022-10-08 PROCEDURE — 94761 N-INVAS EAR/PLS OXIMETRY MLT: CPT

## 2022-10-08 PROCEDURE — 25000242 PHARM REV CODE 250 ALT 637 W/ HCPCS: Performed by: STUDENT IN AN ORGANIZED HEALTH CARE EDUCATION/TRAINING PROGRAM

## 2022-10-08 PROCEDURE — 84133 ASSAY OF URINE POTASSIUM: CPT | Performed by: STUDENT IN AN ORGANIZED HEALTH CARE EDUCATION/TRAINING PROGRAM

## 2022-10-08 PROCEDURE — 97161 PT EVAL LOW COMPLEX 20 MIN: CPT

## 2022-10-08 PROCEDURE — 97116 GAIT TRAINING THERAPY: CPT

## 2022-10-08 PROCEDURE — 99222 PR INITIAL HOSPITAL CARE,LEVL II: ICD-10-PCS | Mod: GC,,, | Performed by: HOSPITALIST

## 2022-10-08 PROCEDURE — 80048 BASIC METABOLIC PNL TOTAL CA: CPT | Mod: 91 | Performed by: STUDENT IN AN ORGANIZED HEALTH CARE EDUCATION/TRAINING PROGRAM

## 2022-10-08 PROCEDURE — 63600175 PHARM REV CODE 636 W HCPCS: Performed by: STUDENT IN AN ORGANIZED HEALTH CARE EDUCATION/TRAINING PROGRAM

## 2022-10-08 PROCEDURE — 25000242 PHARM REV CODE 250 ALT 637 W/ HCPCS

## 2022-10-08 PROCEDURE — 63600175 PHARM REV CODE 636 W HCPCS

## 2022-10-08 PROCEDURE — 93010 EKG 12-LEAD: ICD-10-PCS | Mod: ,,, | Performed by: INTERNAL MEDICINE

## 2022-10-08 PROCEDURE — 97165 OT EVAL LOW COMPLEX 30 MIN: CPT

## 2022-10-08 PROCEDURE — 94640 AIRWAY INHALATION TREATMENT: CPT

## 2022-10-08 PROCEDURE — 82570 ASSAY OF URINE CREATININE: CPT | Performed by: STUDENT IN AN ORGANIZED HEALTH CARE EDUCATION/TRAINING PROGRAM

## 2022-10-08 PROCEDURE — 99222 1ST HOSP IP/OBS MODERATE 55: CPT | Mod: GC,,, | Performed by: HOSPITALIST

## 2022-10-08 PROCEDURE — 93010 ELECTROCARDIOGRAM REPORT: CPT | Mod: ,,, | Performed by: INTERNAL MEDICINE

## 2022-10-08 PROCEDURE — 93005 ELECTROCARDIOGRAM TRACING: CPT

## 2022-10-08 PROCEDURE — 25000003 PHARM REV CODE 250: Performed by: STUDENT IN AN ORGANIZED HEALTH CARE EDUCATION/TRAINING PROGRAM

## 2022-10-08 PROCEDURE — 25000003 PHARM REV CODE 250

## 2022-10-08 PROCEDURE — 25000003 PHARM REV CODE 250: Performed by: PHYSICIAN ASSISTANT

## 2022-10-08 PROCEDURE — 99900035 HC TECH TIME PER 15 MIN (STAT)

## 2022-10-08 PROCEDURE — 51798 US URINE CAPACITY MEASURE: CPT

## 2022-10-08 PROCEDURE — 11000001 HC ACUTE MED/SURG PRIVATE ROOM

## 2022-10-08 PROCEDURE — 27000221 HC OXYGEN, UP TO 24 HOURS

## 2022-10-08 PROCEDURE — 97535 SELF CARE MNGMENT TRAINING: CPT

## 2022-10-08 RX ORDER — TRAVOPROST OPHTHALMIC SOLUTION 0.04 MG/ML
1 SOLUTION OPHTHALMIC NIGHTLY
Status: DISCONTINUED | OUTPATIENT
Start: 2022-10-08 | End: 2022-10-12 | Stop reason: HOSPADM

## 2022-10-08 RX ORDER — POLYETHYLENE GLYCOL 3350 17 G/17G
17 POWDER, FOR SOLUTION ORAL 2 TIMES DAILY
Status: DISCONTINUED | OUTPATIENT
Start: 2022-10-08 | End: 2022-10-09

## 2022-10-08 RX ORDER — POTASSIUM CHLORIDE 20 MEQ/1
20 TABLET, EXTENDED RELEASE ORAL ONCE
Status: COMPLETED | OUTPATIENT
Start: 2022-10-08 | End: 2022-10-08

## 2022-10-08 RX ORDER — ALBUTEROL SULFATE 2.5 MG/.5ML
2.5 SOLUTION RESPIRATORY (INHALATION) EVERY 4 HOURS PRN
Status: DISCONTINUED | OUTPATIENT
Start: 2022-10-08 | End: 2022-10-12 | Stop reason: HOSPADM

## 2022-10-08 RX ORDER — POTASSIUM CHLORIDE 7.45 MG/ML
10 INJECTION INTRAVENOUS
Status: COMPLETED | OUTPATIENT
Start: 2022-10-08 | End: 2022-10-09

## 2022-10-08 RX ORDER — AMOXICILLIN 250 MG
2 CAPSULE ORAL DAILY
Status: DISCONTINUED | OUTPATIENT
Start: 2022-10-08 | End: 2022-10-12 | Stop reason: HOSPADM

## 2022-10-08 RX ORDER — HYDROMORPHONE HYDROCHLORIDE 1 MG/ML
1 INJECTION, SOLUTION INTRAMUSCULAR; INTRAVENOUS; SUBCUTANEOUS
Status: DISCONTINUED | OUTPATIENT
Start: 2022-10-08 | End: 2022-10-12 | Stop reason: HOSPADM

## 2022-10-08 RX ORDER — DORZOLAMIDE HCL 20 MG/ML
1 SOLUTION/ DROPS OPHTHALMIC 3 TIMES DAILY
Status: DISCONTINUED | OUTPATIENT
Start: 2022-10-08 | End: 2022-10-12 | Stop reason: HOSPADM

## 2022-10-08 RX ORDER — POTASSIUM CHLORIDE 20 MEQ/1
40 TABLET, EXTENDED RELEASE ORAL ONCE
Status: COMPLETED | OUTPATIENT
Start: 2022-10-08 | End: 2022-10-08

## 2022-10-08 RX ORDER — BRIMONIDINE TARTRATE 1.5 MG/ML
1 SOLUTION/ DROPS OPHTHALMIC 3 TIMES DAILY
Status: DISCONTINUED | OUTPATIENT
Start: 2022-10-08 | End: 2022-10-12 | Stop reason: HOSPADM

## 2022-10-08 RX ORDER — POTASSIUM CHLORIDE 7.45 MG/ML
10 INJECTION INTRAVENOUS
Status: COMPLETED | OUTPATIENT
Start: 2022-10-08 | End: 2022-10-08

## 2022-10-08 RX ADMIN — BISACODYL 10 MG: 10 SUPPOSITORY RECTAL at 08:10

## 2022-10-08 RX ADMIN — POTASSIUM CHLORIDE 40 MEQ: 1500 TABLET, EXTENDED RELEASE ORAL at 07:10

## 2022-10-08 RX ADMIN — SODIUM BICARBONATE 650 MG TABLET 650 MG: at 08:10

## 2022-10-08 RX ADMIN — HYDRALAZINE HYDROCHLORIDE 50 MG: 50 TABLET ORAL at 11:10

## 2022-10-08 RX ADMIN — GABAPENTIN 300 MG: 300 CAPSULE ORAL at 09:10

## 2022-10-08 RX ADMIN — POTASSIUM CHLORIDE 10 MEQ: 7.46 INJECTION, SOLUTION INTRAVENOUS at 11:10

## 2022-10-08 RX ADMIN — POTASSIUM CHLORIDE 40 MEQ: 1500 TABLET, EXTENDED RELEASE ORAL at 08:10

## 2022-10-08 RX ADMIN — POTASSIUM CHLORIDE 10 MEQ: 7.46 INJECTION, SOLUTION INTRAVENOUS at 10:10

## 2022-10-08 RX ADMIN — METHOCARBAMOL 750 MG: 750 TABLET ORAL at 09:10

## 2022-10-08 RX ADMIN — DORZOLAMIDE HYDROCHLORIDE 1 DROP: 20 SOLUTION/ DROPS OPHTHALMIC at 09:10

## 2022-10-08 RX ADMIN — HYDROMORPHONE HYDROCHLORIDE 2 MG: 1 INJECTION, SOLUTION INTRAMUSCULAR; INTRAVENOUS; SUBCUTANEOUS at 05:10

## 2022-10-08 RX ADMIN — OXYCODONE HYDROCHLORIDE 10 MG: 10 TABLET ORAL at 10:10

## 2022-10-08 RX ADMIN — ESCITALOPRAM OXALATE 10 MG: 10 TABLET ORAL at 08:10

## 2022-10-08 RX ADMIN — VALSARTAN 320 MG: 160 TABLET, FILM COATED ORAL at 08:10

## 2022-10-08 RX ADMIN — POTASSIUM CHLORIDE 10 MEQ: 7.46 INJECTION, SOLUTION INTRAVENOUS at 08:10

## 2022-10-08 RX ADMIN — SODIUM CHLORIDE, SODIUM LACTATE, POTASSIUM CHLORIDE, AND CALCIUM CHLORIDE: .6; .31; .03; .02 INJECTION, SOLUTION INTRAVENOUS at 10:10

## 2022-10-08 RX ADMIN — POTASSIUM CHLORIDE 10 MEQ: 7.46 INJECTION, SOLUTION INTRAVENOUS at 12:10

## 2022-10-08 RX ADMIN — OXYCODONE HYDROCHLORIDE 10 MG: 10 TABLET ORAL at 11:10

## 2022-10-08 RX ADMIN — METHOCARBAMOL 750 MG: 750 TABLET ORAL at 11:10

## 2022-10-08 RX ADMIN — ALBUTEROL SULFATE 2.5 MG: 2.5 SOLUTION RESPIRATORY (INHALATION) at 04:10

## 2022-10-08 RX ADMIN — ALBUTEROL SULFATE 2.5 MG: 2.5 SOLUTION RESPIRATORY (INHALATION) at 11:10

## 2022-10-08 RX ADMIN — POLYETHYLENE GLYCOL 3350 17 G: 17 POWDER, FOR SOLUTION ORAL at 09:10

## 2022-10-08 RX ADMIN — POTASSIUM CHLORIDE 20 MEQ: 1500 TABLET, EXTENDED RELEASE ORAL at 10:10

## 2022-10-08 RX ADMIN — DORZOLAMIDE HYDROCHLORIDE 1 DROP: 20 SOLUTION/ DROPS OPHTHALMIC at 03:10

## 2022-10-08 RX ADMIN — SENNOSIDES AND DOCUSATE SODIUM 2 TABLET: 50; 8.6 TABLET ORAL at 10:10

## 2022-10-08 RX ADMIN — HYDRALAZINE HYDROCHLORIDE 50 MG: 50 TABLET ORAL at 08:10

## 2022-10-08 RX ADMIN — SODIUM BICARBONATE 650 MG TABLET 650 MG: at 03:10

## 2022-10-08 RX ADMIN — POLYETHYLENE GLYCOL 3350 17 G: 17 POWDER, FOR SOLUTION ORAL at 10:10

## 2022-10-08 RX ADMIN — BRIMONIDINE TARTRATE 1 DROP: 1.5 SOLUTION OPHTHALMIC at 03:10

## 2022-10-08 RX ADMIN — HEPARIN SODIUM 5000 UNITS: 5000 INJECTION INTRAVENOUS; SUBCUTANEOUS at 05:10

## 2022-10-08 RX ADMIN — HYDRALAZINE HYDROCHLORIDE 50 MG: 50 TABLET ORAL at 05:10

## 2022-10-08 RX ADMIN — POTASSIUM CHLORIDE 10 MEQ: 7.46 INJECTION, SOLUTION INTRAVENOUS at 07:10

## 2022-10-08 RX ADMIN — BRIMONIDINE TARTRATE 1 DROP: 1.5 SOLUTION OPHTHALMIC at 09:10

## 2022-10-08 RX ADMIN — ACETAMINOPHEN 1000 MG: 500 TABLET ORAL at 05:10

## 2022-10-08 RX ADMIN — GABAPENTIN 300 MG: 300 CAPSULE ORAL at 03:10

## 2022-10-08 RX ADMIN — ALBUTEROL SULFATE 2.5 MG: 2.5 SOLUTION RESPIRATORY (INHALATION) at 12:10

## 2022-10-08 RX ADMIN — HEPARIN SODIUM 5000 UNITS: 5000 INJECTION INTRAVENOUS; SUBCUTANEOUS at 09:10

## 2022-10-08 RX ADMIN — SODIUM BICARBONATE 650 MG TABLET 650 MG: at 09:10

## 2022-10-08 RX ADMIN — ACETAMINOPHEN 1000 MG: 500 TABLET ORAL at 04:10

## 2022-10-08 RX ADMIN — METHOCARBAMOL 750 MG: 750 TABLET ORAL at 05:10

## 2022-10-08 RX ADMIN — GABAPENTIN 300 MG: 300 CAPSULE ORAL at 08:10

## 2022-10-08 RX ADMIN — ACETAMINOPHEN 1000 MG: 500 TABLET ORAL at 11:10

## 2022-10-08 RX ADMIN — HEPARIN SODIUM 5000 UNITS: 5000 INJECTION INTRAVENOUS; SUBCUTANEOUS at 03:10

## 2022-10-08 RX ADMIN — POTASSIUM CHLORIDE 10 MEQ: 7.46 INJECTION, SOLUTION INTRAVENOUS at 09:10

## 2022-10-08 RX ADMIN — TRAVOPROST 1 DROP: 0.04 SOLUTION/ DROPS OPHTHALMIC at 09:10

## 2022-10-08 RX ADMIN — NIFEDIPINE 60 MG: 30 TABLET, FILM COATED, EXTENDED RELEASE ORAL at 08:10

## 2022-10-08 RX ADMIN — METHOCARBAMOL 750 MG: 750 TABLET ORAL at 08:10

## 2022-10-08 NOTE — CONSULTS
[facility] - Neurosurgery ([facility])  [facility] Medicine  Consult Note    Patient Name: Mariaelena Grubbs  MRN: [id number]  Admission Date: 10/7/2022  Hospital Length of Stay: 1 days  Attending Physician: Freddy Miles DO   Primary Care Provider: Primary Doctor No           Patient information was obtained from patient, past medical records and ER records.     Inpatient consult to [facility] Medicine-General  Consult performed by: Connor M Gillies, MD  Consult ordered by: Chantelle Vincent PA-C        Subjective:     Principal Problem: Cervical stenosis of spinal canal    Chief Complaint: No chief complaint on file.       HPI: Mariaelena Grubbs is a 77yoF with HTN, HLD, CKD3, distant hx of Br CA (b/l mastectomy 1984 w/o recurrence), gout, asthma, chronic R sided pyelonephritis requiring R sided partial nephrectomy (2019) complicated by bowel perforation and requiring multiple bowel resections with R sided hemicolectomy and chronic diarrhea, and cervical spinal/foraminal stenosis with radiculopathy into the left arm who presented for elective C3-C6 spinal decompression as well as C3-T1 posterior spinal fusion which was done on 10/7/22. She doesn't have any other active problems on presentation. The patient states she is doing well but has some neck pain as well as still having some radicular left shoulder/arm pain. She states her appetite has been decreased over the past 2-3 months, she states due to her pain. She has been stable since her operation without significant complications and is requiring significant potassium repletion. Hospital medicine was consulted for medical comanagement.       Past Medical History:   Diagnosis Date    Anxiety 4/13/2014    Arthritis     Asthma     Asthma 4/13/2014    Cancer     Breast    Gout     HTN (hypertension) 4/13/2014    Hyperlipidemia 4/13/2014    Hypertension        Past Surgical History:   Procedure Laterality Date    APPENDECTOMY      BREAST SURGERY      HYSTERECTOMY       Port a cath placement and removal      SHOULDER ARTHROSCOPY  2012    TONSILLECTOMY         Review of patient's allergies indicates:   Allergen Reactions    Bananas [banana] Anaphylaxis    Crayfish      Hard time breathing    Iodine and iodide containing products      Told to avoid due to crayfish allergy    Avocado (laurus persea) Swelling and Rash    Kiwi (actinidia chinensis) Itching, Swelling and Rash    Latex, natural rubber Itching, Swelling and Rash    Papaya Itching, Swelling and Rash       No current facility-administered medications on file prior to encounter.     Current Outpatient Medications on File Prior to Encounter   Medication Sig    acetaminophen (TYLENOL) 500 MG tablet Take 500 mg by mouth every 8 (eight) hours as needed for Pain.    albuterol (ACCUNEB) 1.25 mg/3 mL Nebu Take 1.25 mg by nebulization every 6 (six) hours as needed.    albuterol (PROVENTIL) 2.5 mg /3 mL (0.083 %) nebulizer solution Inhale 2.5 mg into the lungs daily as needed for Wheezing.    albuterol (PROVENTIL/VENTOLIN HFA) 90 mcg/actuation inhaler Inhale 1 puff into the lungs daily as needed for Wheezing.    brimonidine 0.15 % OPTH DROP (ALPHAGAN) 0.15 % ophthalmic solution Place 1 drop into both eyes 3 (three) times daily.    diphenoxylate-atropine 2.5-0.025 mg (LOMOTIL) 2.5-0.025 mg per tablet Take 1 tablet by mouth 4 (four) times daily as needed.    dorzolamide (TRUSOPT) 2 % ophthalmic solution Place 1 drop into both eyes 3 (three) times daily.    EScitalopram oxalate (LEXAPRO) 10 MG tablet Take 10 mg by mouth once daily.    fluticasone propionate (FLONASE) 50 mcg/actuation nasal spray 1 spray by Each Nostril route 3 (three) times daily as needed for Rhinitis.    gabapentin (NEURONTIN) 300 MG capsule Take 300 mg by mouth 3 (three) times daily.    hydrALAZINE (APRESOLINE) 50 MG tablet Take 1 tablet (50 mg total) by mouth 3 (three) times daily with meals.    hydrocortisone 2.5 % cream Apply topically 2  (two) times daily.    methyl salicylate/menthol (ICY HOT TOP) Apply topically daily as needed (Pain).    potassium bicarbonate (K-LYTE) disintegrating tablet Take 1 tablet (20 mEq total) by mouth 3 (three) times daily.    sodium bicarbonate 650 MG tablet Take 1 tablet (650 mg total) by mouth 3 (three) times daily.    travoprost (TRAVATAN Z) 0.004 % ophthalmic solution Place 1 drop into both eyes every evening.    valsartan (DIOVAN) 320 MG tablet Take 320 mg by mouth once daily.    alendronate (FOSAMAX) 70 MG tablet Take 70 mg by mouth every 7 days.    aspirin (ECOTRIN) 81 MG EC tablet Take 81 mg by mouth once daily.    EPINEPHRINE (EPIPEN INJ) Inject as directed.    NIFEdipine (PROCARDIA-XL) 60 MG (OSM) 24 hr tablet Take 1 tablet (60 mg total) by mouth once daily.    nystatin-triamcinolone (MYCOLOG) ointment SMARTSIG:Sparingly Topical Twice Daily    ondansetron (ZOFRAN-ODT) 4 MG TbDL Take 4 mg by mouth. Dissolve 1 tablet on the tongue daily as needed for nausea.    predniSONE (DELTASONE) 20 MG tablet Take 20 mg by mouth 2 (two) times daily as needed.    [DISCONTINUED] ALPRAZolam (XANAX) 0.5 MG tablet Take 0.5 mg by mouth nightly as needed.     Family History    None       Tobacco Use    Smoking status: Former     Types: Cigarettes     Start date: 1966     Quit date: 2011     Years since quittin.7    Smokeless tobacco: Never   Substance and Sexual Activity    Alcohol use: No     Comment: socially    Drug use: No    Sexual activity: Not on file     Review of Systems   Constitutional:  Positive for appetite change. Negative for fever.   HENT:  Negative for congestion and sinus pain.    Eyes:  Negative for discharge and redness.   Respiratory:  Negative for cough and shortness of breath.    Cardiovascular:  Negative for chest pain and leg swelling.   Gastrointestinal:  Negative for diarrhea and nausea.   Endocrine: Negative for polydipsia and polyuria.   Genitourinary:  Negative for  dysuria and hematuria.   Musculoskeletal:  Negative for arthralgias and myalgias.   Skin:  Negative for color change and rash.   Neurological:  Negative for weakness and numbness.   Psychiatric/Behavioral:  Negative for agitation and confusion.    Objective:     Vital Signs (Most Recent):  Temp: 98.1 °F (36.7 °C) (10/08/22 0900)  Pulse: 66 (10/08/22 0933)  Resp: 16 (10/08/22 0900)  BP: 136/74 (10/08/22 0900)  SpO2: 99 % (10/08/22 0900) Vital Signs (24h Range):  Temp:  [96.8 °F (36 °C)-98.6 °F (37 °C)] 98.1 °F (36.7 °C)  Pulse:  [65-91] 66  Resp:  [8-27] 16  SpO2:  [95 %-100 %] 99 %  BP: (114-176)/() 136/74     Weight: 54.9 kg (120 lb 15.1 oz)  Body mass index is 24.43 kg/m².    Physical Exam  Constitutional:       General: She is not in acute distress.     Appearance: Normal appearance.   HENT:      Head: Normocephalic and atraumatic.      Mouth/Throat:      Mouth: Mucous membranes are moist.      Pharynx: Oropharynx is clear.   Eyes:      Conjunctiva/sclera: Conjunctivae normal.      Pupils: Pupils are equal, round, and reactive to light.   Cardiovascular:      Rate and Rhythm: Normal rate and regular rhythm.      Pulses: Normal pulses.      Heart sounds: Normal heart sounds.   Pulmonary:      Effort: Pulmonary effort is normal.      Breath sounds: Normal breath sounds.   Abdominal:      General: Abdomen is flat.      Palpations: Abdomen is soft.   Musculoskeletal:         General: No swelling or deformity.      Cervical back: Normal range of motion and neck supple. No tenderness.   Skin:     General: Skin is warm and dry.   Neurological:      General: No focal deficit present.      Mental Status: She is alert and oriented to person, place, and time.   Psychiatric:         Mood and Affect: Mood normal.         Behavior: Behavior normal.       Significant Labs: All pertinent labs within the past 24 hours have been reviewed.    Significant Imaging: I have reviewed all pertinent imaging results/findings within  the past 24 hours.    Assessment/Plan:     * Cervical stenosis of spinal canal  S/p C3-6 decompression, C3-T1 posterior fusion 10/7/22  Post surgical care per primary  Bowel regimen    Cervical myelopathy  As above      Hypokalemia  There is a chronic component (prior admission with K<2) likely 2/2 her diarrhea. Also poor PO intake likely contributing. Less likely renal component. On presentation, K 2.2. No BM since surgery. Will give bowel regimen until she is regular and reassess needs for chronic therapies (diarrhea and K) prior to discharge   - Aggressive repletion  - Telemetry  - Rule out urine losses with Urine K/Cr  - PM BMP today to continue repleting    Metabolic acidosis  Chronically low bicarb on metabolic panel. Likely secondary to chronic diarrhea. No changes for now, will watch as she starts having bowel movements and might need chronic treatment    Normocytic anemia  Chronic anemia with Hgb 9.5 close to baseline in post-op patient. Normal MCV.  Likely mild blood loss anemia post-operatively  Watch for bleeding, daily CBC    Glaucoma of right eye  On prior admission she was seen by opho. She was previously prescribed eye gtts but wasn't sure what she was supposed to be taking so opho saw her and recommended the gtts below.   - Continue dorzolamide, travatan, and brimonidine, as well as on discharge  - Follow up with opho outpatient    CKD (chronic kidney disease) stage 3, GFR 30-59 ml/min  Stable  Continue home ARB  Acidosis likely secondary to diarrhea, will not start bicarb at this time    Asthma  Stable, prn bronchodilators      HTN (hypertension)  Home nifedipine 60mg qd, valsartan 320mg qd, hydralazine 50mg q8h  - Stable, continue      VTE Risk Mitigation (From admission, onward)         Ordered     heparin (porcine) injection 5,000 Units  Every 8 hours         10/07/22 1220     IP VTE HIGH RISK PATIENT  Once         10/07/22 1214     Place sequential compression device  Until discontinued          10/07/22 1214     Place PRERNA hose  Until discontinued         10/07/22 0527     Place sequential compression device  Until discontinued         10/07/22 0527                    Thank you for your consult. I will follow-up with patient. Please contact us if you have any additional questions.    Connor M Gillies, MD  Department of Hospital Medicine   [facility] - Neurosurgery ([facility])

## 2022-10-08 NOTE — ASSESSMENT & PLAN NOTE
Chronic anemia with Hgb 9.5 close to baseline in post-op patient. Normal MCV.  Likely mild blood loss anemia post-operatively  Watch for bleeding, daily CBC

## 2022-10-08 NOTE — ASSESSMENT & PLAN NOTE
S/p C3-6 decompression, C3-T1 posterior fusion 10/7/22  Post surgical care per primary  Bowel regimen

## 2022-10-08 NOTE — ASSESSMENT & PLAN NOTE
There is a chronic component (prior admission with K<2) likely 2/2 her diarrhea. Also poor PO intake likely contributing. Less likely renal component. On presentation, K 2.2. No BM since surgery. Will give bowel regimen until she is regular and reassess needs for chronic therapies (diarrhea and K) prior to discharge   - Aggressive repletion  - Telemetry  - Rule out urine losses with Urine K/Cr  - PM BMP today to continue repleting

## 2022-10-08 NOTE — ASSESSMENT & PLAN NOTE
On prior admission she was seen by ophtho. She was previously prescribed eye gtts but wasn't sure what she was supposed to be taking so ophtho saw her and recommended the gtts below.   - Continue dorzolamide, travatan, and brimonidine, as well as on discharge  - Follow up with ophtho outpatient

## 2022-10-08 NOTE — ASSESSMENT & PLAN NOTE
Chronically low bicarb on metabolic panel. Likely secondary to chronic diarrhea. No changes for now, will watch as she starts having bowel movements and might need chronic treatment

## 2022-10-08 NOTE — PT/OT/SLP EVAL
Physical Therapy Evaluation    Patient Name:  Mariaelena Grubbs   MRN:  [id number]    Recommendations:     Discharge Recommendations:  rehabilitation facility   Discharge Equipment Recommendations:     Barriers to discharge: fall risk    Assessment:     Mariaelena Grubbs is a 77 y.o. female admitted with a medical diagnosis of Cervical stenosis of spinal canal.  She presents with the following impairments/functional limitations:  weakness, impaired endurance, impaired functional mobility, gait instability, decreased safety awareness Pt tolerated treatment well as she was able to gait train for 120ft with CGA and HHA. Pt will benefit from skilled Pt 3x/wk in order to increase functional mobility. Pt when medically stable should discharge to rehab facility.    Rehab Prognosis: Good; patient would benefit from acute skilled PT services to address these deficits and reach maximum level of function.    Recent Surgery: Procedure(s) (LRB):  DECOMPRESSION, SPINE, CERVICAL, POSTERIOR APPROACH C3-T1 (N/A) 1 Day Post-Op    Plan:     During this hospitalization, patient to be seen 3 x/week to address the identified rehab impairments via gait training, therapeutic activities, therapeutic exercises and progress toward the following goals:    Plan of Care Expires:  11/06/22    Subjective     Chief Complaint: neck pain  Patient/Family Comments/goals: to get back home with grandchildren  Pain/Comfort:  Pain Rating 1: 10/10 (right neck)  Pain Addressed 1: Distraction, Reposition  Pain Rating Post-Intervention 1: 10/10    Patients cultural, spiritual, Muslim conflicts given the current situation: no    Living Environment:  Pt lives with her daughter and her daughters  in a [facility] with a 4 inch curb to enter. Pt has a bath tub.  Prior to admission, patients level of function was independent in all ADLs and mobility.  Equipment used at home: none.  DME owned (not currently used):  rollator .  Upon discharge, patient will have  assistance from daughter and her .    Objective:     Communicated with nurse prior to session.  Patient found supine with peripheral IV, telemetry, HILARY drain, PureWick (VISI monitor)  upon PT entry to room.    General Precautions: Standard, fall   Orthopedic Precautions:    Braces:    Respiratory Status: Room air    Exams:  Cognitive Exam:  Patient is oriented to Person, Place, Time, and Situation  RLE ROM: WFL  RLE Strength: WFL  LLE ROM: WFL  LLE Strength: WFL    Functional Mobility:  Bed Mobility:     Supine to Sit: stand by assistance    Transfers:     Sit to Stand:  contact guard assistance     Gait: Pt was able to gait train for 120 ft with CGA and HHA.    Balance: Pt was able to sit EOB for 5 minutes with SBA.     Therapeutic Activities and Exercises:   Pt was verbally educated on PT role and PT POC. Pt verbally expressed understanding.    AM-PAC 6 CLICK MOBILITY  Total Score:19     Patient left up in chair with all lines intact, call button in reach, and chair alarm on.    GOALS:   Multidisciplinary Problems       Physical Therapy Goals          Problem: Physical Therapy    Goal Priority Disciplines Outcome Goal Variances Interventions   Physical Therapy Goal     PT, PT/OT Ongoing, Progressing     Description: Goals to be met by: 10/31/22     Patient will increase functional independence with mobility by performin. Supine to sit with modified independence  2. Sit to stand transfer with Modified [place]  3. Gait  x 250 feet with Modified [place] using Rolling Walker.   4. Ascend/descend 3 stair with right Handrails Minimal Assistance using LRAD.                          History:     Past Medical History:   Diagnosis Date    Anxiety 2014    Arthritis     Asthma     Asthma 2014    Cancer     Breast    Gout     HTN (hypertension) 2014    Hyperlipidemia 2014    Hypertension        Past Surgical History:   Procedure Laterality Date    APPENDECTOMY      BREAST SURGERY       HYSTERECTOMY      Port a cath placement and removal      SHOULDER ARTHROSCOPY  2012    TONSILLECTOMY         Time Tracking:     PT Received On: 10/08/22  PT Start Time: 1036     PT Stop Time: 1059  PT Total Time (min): 23 min     Billable Minutes: Evaluation 8 minutes and Gait Training 15 minutes      10/08/2022

## 2022-10-08 NOTE — HPI
Mariaelena Grubbs is a 77yoF with HTN, HLD, CKD3, distant hx of Br CA (b/l mastectomy 1984 w/o recurrence), gout, asthma, chronic R sided pyelonephritis requiring R sided partial nephrectomy (2019) complicated by bowel perforation and requiring multiple bowel resections with R sided hemicolectomy and chronic diarrhea, and cervical spinal/foraminal stenosis with radiculopathy into the left arm who presented for elective C3-C6 spinal decompression as well as C3-T1 posterior spinal fusion which was done on 10/7/22. She doesn't have any other active problems on presentation. The patient states she is doing well but has some neck pain as well as still having some radicular left shoulder/arm pain. She states her appetite has been decreased over the past 2-3 months, she states due to her pain. She has been stable since her operation without significant complications and is requiring significant potassium repletion. Hospital medicine was consulted for medical comanagement.

## 2022-10-08 NOTE — ASSESSMENT & PLAN NOTE
Stable  Continue home ARB  Acidosis likely secondary to diarrhea, will not start bicarb at this time

## 2022-10-08 NOTE — PLAN OF CARE
Problem: Physical Therapy  Goal: Physical Therapy Goal  Description: Goals to be met by: 10/31/22     Patient will increase functional independence with mobility by performin. Supine to sit with modified independence  2. Sit to stand transfer with Modified [place]  3. Gait  x 250 feet with Modified [place] using Rolling Walker.   4. Ascend/descend 3 stair with right Handrails Minimal Assistance using LRAD.     Outcome: Ongoing, Progressing   Eval and goal appropriate 10/8/2022

## 2022-10-08 NOTE — PT/OT/SLP EVAL
Occupational Therapy   Evaluation    Name: Mariaelena Grubbs  MRN: [id number]  Admitting Diagnosis:  Cervical stenosis of spinal canal  Recent Surgery: Procedure(s) (LRB):  DECOMPRESSION, SPINE, CERVICAL, POSTERIOR APPROACH C3-T1 (N/A) 1 Day Post-Op    Recommendations:     Discharge Recommendations: rehabilitation facility  Discharge Equipment Recommendations:   (TBD)  Barriers to discharge:  None    Assessment:     Mariaelena Grubbs is a 77 y.o. female with a medical diagnosis of Cervical stenosis of spinal canal.  She presents with decreased independence with ADL's. Performance deficits affecting function: weakness, impaired endurance, impaired self care skills, impaired functional mobility, impaired balance, decreased upper extremity function, decreased lower extremity function, decreased safety awareness, orthopedic precautions.      Rehab Prognosis: Good; patient would benefit from acute skilled OT services to address these deficits and reach maximum level of function.       Plan:     Patient to be seen 3 x/week to address the above listed problems via self-care/home management, therapeutic activities, therapeutic exercises, neuromuscular re-education  Plan of Care Expires: 11/07/22  Plan of Care Reviewed with: patient, son    Subjective   Pt reported that some medications may her very sleepy. (OT noted that pt was fairly lethargic off & on during session).  Chief Complaint: pain in neck  Patient/Family Comments/goals: to have decreased pain    Occupational Profile:  Living Environment: Pt resides with daughter in 1 story house with 1 step to enter.  Pt was independent with ADL's, cooking, some housework activities, & ambulation. 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  Pt enjoys playing games on the phone (namely [employer]). Pt has a bathtub for bathing.  Equipment Used at Home:  none  Assistance upon Discharge: daughter    Pain/Comfort:  Pain Rating 1:  (did not rate)  Location 1: neck  Pain  Addressed 1: Reposition, Distraction, Cessation of Activity, Nurse notified  Pain Rating Post-Intervention 1:  (did not rate)    Patients cultural, spiritual, Mandaen conflicts given the current situation: no    Objective:     Communicated with: RN prior to session.  Patient found up in chair with telemetry, PureWick, peripheral IV, cervical collar, HILARY drain (visi monitor, chair alarm) upon OT entry to room.    General Precautions: Standard, fall   Orthopedic Precautions:spinal precautions   Braces: Cervical collar  Respiratory Status: Room air    Occupational Performance:    Functional Mobility/Transfers:  Patient completed Sit <> Stand Transfer with contact guard assistance  with  no assistive device   Patient completed Toilet Transfer Step Transfer technique with contact guard assistance with  no AD  Functional Mobility: CGA-Min (A) with functional mobility to bathroom & back during ADL's (pt with 2-3 mild LOB requiring Min (A) to safely correct during turns in bathroom & in room)    Activities of Daily Living:  Upper Body Dressing: stand by assistance donning gown around back while seated in chair  Lower Body Dressing: stand by assistance donning socks while seated in chair  Toileting: contact guard assistance with clothing management before & after toileting attempt (pt attempted BM however unsuccessful)    Cognitive/Visual Perceptual:  Cognitive/Psychosocial Skills:     -       Oriented to: Person, Place, Time, and Situation   -       Follows Commands/attention:Follows one-step commands  -       Safety awareness/insight to disability: impaired     Physical Exam:  Sensation:    -       LUE numbness (x 2 years)  Dominant hand:    -       right  Upper Extremity Range of Motion:  BUE WFL  Upper Extremity Strength: Not formally tested due to spinal precautions   Strength: BUE WFL    AMPAC 6 Click ADL:  AMPAC Total Score: 19    Treatment & Education:  Pt required CGA-Min (A) with standing balance during  dynamic activities in standing.  Pt with noted lethargy during session. Provided education on safety (need for (A) for all OOB activities) as well as spinal precautions.  Provided education regarding role of OT, POC, & discharge recommendations with pt & son verbalizing understanding.  Pt had no further questions & when asked whether there were any concerns pt reported none.      Patient left up in chair with all lines intact, call button in reach, chair alarm on, and RN notified    GOALS:   Multidisciplinary Problems       Occupational Therapy Goals          Problem: Occupational Therapy    Goal Priority Disciplines Outcome Interventions   Occupational Therapy Goal     OT, PT/OT Ongoing, Progressing    Description: Goals to be met by: 10/28     Patient will increase functional independence with ADLs by performing:    UE Dressing with Supervision.  LE Dressing with Supervision.  Grooming while standing with Supervision.  Toileting from toilet with Supervision for hygiene and clothing management.   Supine to sit with Modified [place].  Step transfer with Supervision  Toilet transfer to toilet with Supervision.                         History:     Past Medical History:   Diagnosis Date    Anxiety 4/13/2014    Arthritis     Asthma     Asthma 4/13/2014    Cancer     Breast    Gout     HTN (hypertension) 4/13/2014    Hyperlipidemia 4/13/2014    Hypertension          Past Surgical History:   Procedure Laterality Date    APPENDECTOMY      BREAST SURGERY      HYSTERECTOMY      Port a cath placement and removal      SHOULDER ARTHROSCOPY  2012    TONSILLECTOMY         Time Tracking:     OT Date of Treatment: 10/08/22  OT Start Time: 1121  OT Stop Time: 1139  OT Total Time (min): 18 min    Billable Minutes:Evaluation 10  Self Care/Home Management 8    10/8/2022

## 2022-10-08 NOTE — SUBJECTIVE & OBJECTIVE
Past Medical History:   Diagnosis Date    Anxiety 4/13/2014    Arthritis     Asthma     Asthma 4/13/2014    Cancer     Breast    Gout     HTN (hypertension) 4/13/2014    Hyperlipidemia 4/13/2014    Hypertension        Past Surgical History:   Procedure Laterality Date    APPENDECTOMY      BREAST SURGERY      HYSTERECTOMY      Port a cath placement and removal      SHOULDER ARTHROSCOPY  2012    TONSILLECTOMY         Review of patient's allergies indicates:   Allergen Reactions    Bananas [banana] Anaphylaxis    Crayfish      Hard time breathing    Iodine and iodide containing products      Told to avoid due to crayfish allergy    Avocado (laurus persea) Swelling and Rash    Kiwi (actinidia chinensis) Itching, Swelling and Rash    Latex, natural rubber Itching, Swelling and Rash    Papaya Itching, Swelling and Rash       No current facility-administered medications on file prior to encounter.     Current Outpatient Medications on File Prior to Encounter   Medication Sig    acetaminophen (TYLENOL) 500 MG tablet Take 500 mg by mouth every 8 (eight) hours as needed for Pain.    albuterol (ACCUNEB) 1.25 mg/3 mL Nebu Take 1.25 mg by nebulization every 6 (six) hours as needed.    albuterol (PROVENTIL) 2.5 mg /3 mL (0.083 %) nebulizer solution Inhale 2.5 mg into the lungs daily as needed for Wheezing.    albuterol (PROVENTIL/VENTOLIN HFA) 90 mcg/actuation inhaler Inhale 1 puff into the lungs daily as needed for Wheezing.    brimonidine 0.15 % OPTH DROP (ALPHAGAN) 0.15 % ophthalmic solution Place 1 drop into both eyes 3 (three) times daily.    diphenoxylate-atropine 2.5-0.025 mg (LOMOTIL) 2.5-0.025 mg per tablet Take 1 tablet by mouth 4 (four) times daily as needed.    dorzolamide (TRUSOPT) 2 % ophthalmic solution Place 1 drop into both eyes 3 (three) times daily.    EScitalopram oxalate (LEXAPRO) 10 MG tablet Take 10 mg by mouth once daily.    fluticasone propionate (FLONASE) 50 mcg/actuation nasal spray 1 spray by Each  Nostril route 3 (three) times daily as needed for Rhinitis.    gabapentin (NEURONTIN) 300 MG capsule Take 300 mg by mouth 3 (three) times daily.    hydrALAZINE (APRESOLINE) 50 MG tablet Take 1 tablet (50 mg total) by mouth 3 (three) times daily with meals.    hydrocortisone 2.5 % cream Apply topically 2 (two) times daily.    methyl salicylate/menthol (ICY HOT TOP) Apply topically daily as needed (Pain).    potassium bicarbonate (K-LYTE) disintegrating tablet Take 1 tablet (20 mEq total) by mouth 3 (three) times daily.    sodium bicarbonate 650 MG tablet Take 1 tablet (650 mg total) by mouth 3 (three) times daily.    travoprost (TRAVATAN Z) 0.004 % ophthalmic solution Place 1 drop into both eyes every evening.    valsartan (DIOVAN) 320 MG tablet Take 320 mg by mouth once daily.    alendronate (FOSAMAX) 70 MG tablet Take 70 mg by mouth every 7 days.    aspirin (ECOTRIN) 81 MG EC tablet Take 81 mg by mouth once daily.    EPINEPHRINE (EPIPEN INJ) Inject as directed.    NIFEdipine (PROCARDIA-XL) 60 MG (OSM) 24 hr tablet Take 1 tablet (60 mg total) by mouth once daily.    nystatin-triamcinolone (MYCOLOG) ointment SMARTSIG:Sparingly Topical Twice Daily    ondansetron (ZOFRAN-ODT) 4 MG TbDL Take 4 mg by mouth. Dissolve 1 tablet on the tongue daily as needed for nausea.    predniSONE (DELTASONE) 20 MG tablet Take 20 mg by mouth 2 (two) times daily as needed.    [DISCONTINUED] ALPRAZolam (XANAX) 0.5 MG tablet Take 0.5 mg by mouth nightly as needed.     Family History    None       Tobacco Use    Smoking status: Former     Types: Cigarettes     Start date: 1966     Quit date: 2011     Years since quittin.7    Smokeless tobacco: Never   Substance and Sexual Activity    Alcohol use: No     Comment: socially    Drug use: No    Sexual activity: Not on file     Review of Systems   Constitutional:  Positive for appetite change. Negative for fever.   HENT:  Negative for congestion and sinus pain.    Eyes:  Negative for  discharge and redness.   Respiratory:  Negative for cough and shortness of breath.    Cardiovascular:  Negative for chest pain and leg swelling.   Gastrointestinal:  Negative for diarrhea and nausea.   Endocrine: Negative for polydipsia and polyuria.   Genitourinary:  Negative for dysuria and hematuria.   Musculoskeletal:  Negative for arthralgias and myalgias.   Skin:  Negative for color change and rash.   Neurological:  Negative for weakness and numbness.   Psychiatric/Behavioral:  Negative for agitation and confusion.    Objective:     Vital Signs (Most Recent):  Temp: 98.1 °F (36.7 °C) (10/08/22 0900)  Pulse: 66 (10/08/22 0933)  Resp: 16 (10/08/22 0900)  BP: 136/74 (10/08/22 0900)  SpO2: 99 % (10/08/22 0900) Vital Signs (24h Range):  Temp:  [96.8 °F (36 °C)-98.6 °F (37 °C)] 98.1 °F (36.7 °C)  Pulse:  [65-91] 66  Resp:  [8-27] 16  SpO2:  [95 %-100 %] 99 %  BP: (114-176)/() 136/74     Weight: 54.9 kg (120 lb 15.1 oz)  Body mass index is 24.43 kg/m².    Physical Exam  Constitutional:       General: She is not in acute distress.     Appearance: Normal appearance.   HENT:      Head: Normocephalic and atraumatic.      Mouth/Throat:      Mouth: Mucous membranes are moist.      Pharynx: Oropharynx is clear.   Eyes:      Conjunctiva/sclera: Conjunctivae normal.      Pupils: Pupils are equal, round, and reactive to light.   Cardiovascular:      Rate and Rhythm: Normal rate and regular rhythm.      Pulses: Normal pulses.      Heart sounds: Normal heart sounds.   Pulmonary:      Effort: Pulmonary effort is normal.      Breath sounds: Normal breath sounds.   Abdominal:      General: Abdomen is flat.      Palpations: Abdomen is soft.   Musculoskeletal:         General: No swelling or deformity.      Cervical back: Normal range of motion and neck supple. No tenderness.   Skin:     General: Skin is warm and dry.   Neurological:      General: No focal deficit present.      Mental Status: She is alert and oriented to  person, place, and time.   Psychiatric:         Mood and Affect: Mood normal.         Behavior: Behavior normal.       Significant Labs: All pertinent labs within the past 24 hours have been reviewed.    Significant Imaging: I have reviewed all pertinent imaging results/findings within the past 24 hours.

## 2022-10-08 NOTE — PLAN OF CARE
Problem: Occupational Therapy  Goal: Occupational Therapy Goal  Description: Goals to be met by: 10/28     Patient will increase functional independence with ADLs by performing:    UE Dressing with Supervision.  LE Dressing with Supervision.  Grooming while standing with Supervision.  Toileting from toilet with Supervision for hygiene and clothing management.   Supine to sit with Modified [place].  Step transfer with Supervision  Toilet transfer to toilet with Supervision.    Outcome: Ongoing, Progressing     OT eval completed.   Opt out

## 2022-10-08 NOTE — PLAN OF CARE
Problem: Adult Inpatient Plan of Care  Goal: Plan of Care Review  Outcome: Ongoing, Progressing     Problem: Pain Acute  Goal: Acceptable Pain Control and Functional Ability  Outcome: Ongoing, Progressing  Intervention: Prevent or Manage Pain  Flowsheets (Taken 10/8/2022 0421)  Sleep/Rest Enhancement: awakenings minimized  Sensory Stimulation Regulation:   quiet environment promoted   care clustered  Medication Review/Management: medications reviewed     POC reviewed this shift.  A/O x4.  Respirations unlabored.  Skin w/d.  Incision continues with x1 HILARY drain noted.  Pain continues to be managed with scheduled/PRN meds.  Critical K+=2.2 noted this shift despite x1 dose K+ given on previous shift.  Result called in to team with new orders noted.  IVF changed to LR @ 75mL/hr and supplemental K+/IV administered x4.  Tolerated all well.  VSS.  See flowsheet for full assessment.  Able to verbalize wants/needs.  No s/s of distress noted.  Fall/safety precautions maintained.

## 2022-10-08 NOTE — HOSPITAL COURSE
10/8: POD 1 s/p C3-T1 PCDF. NAEON. AFVSS. Exam stable. Pain controlled. Tolerating PO. Voiding spontaneously.   10/9: POD 2. NAEON. AFVSS. Exam stable. Pain controlled. Tolerating PO. HM following.   10/10: POD 3. NAEON. Patient neuro stable. Complains of muscle spasms, will increase robaxin. Hypokalemia continues, HM following. Drain with 85 cc output, will maintain today. Voiding spontaneously. IPR pending drain removal and authorization/placement.  10/11: POD 4. NAEON. AFVSS. Pt neurologically stable, pain relatively controlled. HILARY drain with 40 cc output, slight serous leakage at drain site, removed and sutured. Hgb trended down 7.5->6.5, transfusing 1u RBC. HM following, replacing K. Pending IPR placement.  10/12: POD5. NAEON. Patient endorses some muscle spasms, over all pain well controlled. Denies paresthesias or weakness. Voiding spontaneously. H/H stable s/p transfusion. PT/OT recs upgraded to HH. Stable for discharge from neurosurgical and medicine perspective. DC instructions discussed with patient and she voiced understanding. All of her questions were answered.     Exam day of discharge:   General: well developed, well nourished, no distress.   Head: normocephalic, atraumatic  Neck: c-collar in place, well fitted  Neurologic: Alert and oriented. Thought content appropriate.  Language: No aphasia  Speech: No dysarthria  Cranial nerves: face symmetric, tongue midline, CN II-XII grossly intact.   Eyes: pupils equal, round, reactive to light with accommodation, EOMI.   Pulmonary: normal respirations, no signs of respiratory distress  Abdomen: soft, non-distended, not tender to palpation  Skin: Skin is warm, dry and intact.  Sensory: intact to light touch throughout  Motor Strength:     Strength   Deltoids Triceps Biceps Wrist Extension Wrist Flexion Hand    Upper: R 5/5 5/5 5/5 5/5 4/5 5/5     L 4+/5 4/5 4/5 4/5 4/5 4/5       Iliopsoas Quadriceps Knee  Flexion Tibialis  anterior Gastro- cnemius EHL    Lower: R 5/5 5/5 5/5 5/5 5/5 5/5     L 5/5 5/5 5/5 5/5 5/5 5/5      Posterior cervical incision is C/D/I with Prineo tape in place

## 2022-10-08 NOTE — SUBJECTIVE & OBJECTIVE
Interval History: 10/9: POD 2. NAEON. AFVSS. Exam stable. Pain controlled. Tolerating PO. HM following.     Medications:  Continuous Infusions:   lactated ringers 75 mL/hr at 10/08/22 1004     Scheduled Meds:   acetaminophen  1,000 mg Oral Q6H    bisacodyL  10 mg Rectal Daily    EScitalopram oxalate  10 mg Oral Daily    gabapentin  300 mg Oral TID    heparin (porcine)  5,000 Units Subcutaneous Q8H    hydrALAZINE  50 mg Oral TID WM    methocarbamoL  750 mg Oral QID    NIFEdipine  60 mg Oral Daily    polyethylene glycol  17 g Oral BID    potassium chloride  10 mEq Intravenous Q1H    potassium chloride  20 mEq Oral Once    senna-docusate 8.6-50 mg  2 tablet Oral Daily    sodium bicarbonate  650 mg Oral TID    valsartan  320 mg Oral Daily     PRN Meds:albuterol sulfate, aluminum-magnesium hydroxide-simethicone, HYDROmorphone, ondansetron, ondansetron, oxyCODONE, oxyCODONE, prochlorperazine, senna-docusate 8.6-50 mg     Review of Systems  Objective:     Weight: 54.9 kg (120 lb 15.1 oz)  Body mass index is 24.43 kg/m².  Vital Signs (Most Recent):  Temp: 98.1 °F (36.7 °C) (10/08/22 0900)  Pulse: 66 (10/08/22 0933)  Resp: 16 (10/08/22 0900)  BP: 136/74 (10/08/22 0900)  SpO2: 99 % (10/08/22 0900) Vital Signs (24h Range):  Temp:  [96.8 °F (36 °C)-98.6 °F (37 °C)] 98.1 °F (36.7 °C)  Pulse:  [65-91] 66  Resp:  [8-27] 16  SpO2:  [95 %-100 %] 99 %  BP: (114-176)/() 136/74     Date 10/08/22 0700 - 10/09/22 0659   Shift 2581-8907 2226-1417 9477-8007 24 Hour Total   INTAKE   Shift Total(mL/kg)       OUTPUT   Drains 90   90   Shift Total(mL/kg) 90(1.6)   90(1.6)   Weight (kg) 54.9 54.9 54.9 54.9                        Closed/Suction Drain 10/07/22 1026 Posterior Neck 19 Fr. (Active)   Site Description Unable to view 10/07/22 1315   Dressing Type Other (Comment) 10/07/22 1315   Drainage Serosanguineous 10/07/22 1315   Status To bulb suction 10/07/22 1315   Output (mL) 90 mL 10/08/22 0859       Female External Urinary Catheter  10/08/22 0523 (Active)       Physical Exam    Neurosurgery Physical Exam    GENERAL: resting comfortably  HEENT: PERRL, mucous membranes moist  NECK: supple, trachea midline  CV: normal capillary refill  PULM: aerating well, symmetric expansion, no distress  ABD: soft, NT, ND  EXT: no c/c/e    NEURO:    AAO x 3  CN II-XII grossly intact  RUE - 4+ throughout  LUE - 4 throughout  BLE - 5/5 throughout  SILT    No hoffmans or clonus    Cervical incision well-approximated and without erythema, induration, or fluctuance      Significant Labs:  Recent Labs   Lab 10/07/22  1327 10/07/22  1940 10/08/22  0736   * 139* 96   * 145 142   K 2.5* 2.2* 2.6*   * 111* 115*   CO2 15* 18* 14*   BUN 10 10 11   CREATININE 1.2 1.3 1.4   CALCIUM 8.3* 8.8 8.1*     Recent Labs   Lab 10/07/22  1108 10/07/22  1940   WBC  --  14.58*   HGB  --  9.5*   HCT 22* 28.2*   PLT  --  256     Recent Labs   Lab 10/07/22  0646   INR 1.0   APTT 26.6     Microbiology Results (last 7 days)       ** No results found for the last 168 hours. **          All pertinent labs from the last 24 hours have been reviewed.    Significant Diagnostics:  I have reviewed and interpreted all pertinent imaging results/findings within the past 24 hours.  X-Ray Chest AP Portable    Result Date: 10/9/2022  As above Electronically signed by: Merari Michelle MD Date:    10/09/2022 Time:    10:20      Electrodesiccation And Curettage Text: The wound bed was treated with electrodesiccation and curettage after the biopsy was performed.

## 2022-10-09 PROBLEM — B99.9 INFECTION: Status: ACTIVE | Noted: 2022-10-09

## 2022-10-09 PROBLEM — A41.9 SEPSIS: Status: ACTIVE | Noted: 2022-10-09

## 2022-10-09 PROBLEM — A41.9 SEPSIS: Status: RESOLVED | Noted: 2022-10-09 | Resolved: 2022-10-09

## 2022-10-09 LAB
ALBUMIN SERPL BCP-MCNC: 2.6 G/DL (ref 3.5–5.2)
ALP SERPL-CCNC: 60 U/L (ref 55–135)
ALT SERPL W/O P-5'-P-CCNC: 14 U/L (ref 10–44)
ANION GAP SERPL CALC-SCNC: 9 MMOL/L (ref 8–16)
ANION GAP SERPL CALC-SCNC: 9 MMOL/L (ref 8–16)
AST SERPL-CCNC: 44 U/L (ref 10–40)
BACTERIA #/AREA URNS AUTO: ABNORMAL /HPF
BASOPHILS # BLD AUTO: 0.02 K/UL (ref 0–0.2)
BASOPHILS NFR BLD: 0.2 % (ref 0–1.9)
BILIRUB SERPL-MCNC: 0.5 MG/DL (ref 0.1–1)
BILIRUB UR QL STRIP: NEGATIVE
BUN SERPL-MCNC: 16 MG/DL (ref 8–23)
BUN SERPL-MCNC: 16 MG/DL (ref 8–23)
CALCIUM SERPL-MCNC: 7.5 MG/DL (ref 8.7–10.5)
CALCIUM SERPL-MCNC: 7.7 MG/DL (ref 8.7–10.5)
CHLORIDE SERPL-SCNC: 113 MMOL/L (ref 95–110)
CHLORIDE SERPL-SCNC: 114 MMOL/L (ref 95–110)
CLARITY UR REFRACT.AUTO: CLEAR
CO2 SERPL-SCNC: 17 MMOL/L (ref 23–29)
CO2 SERPL-SCNC: 17 MMOL/L (ref 23–29)
COLOR UR AUTO: YELLOW
CREAT SERPL-MCNC: 1.4 MG/DL (ref 0.5–1.4)
CREAT SERPL-MCNC: 1.4 MG/DL (ref 0.5–1.4)
DIFFERENTIAL METHOD: ABNORMAL
EOSINOPHIL # BLD AUTO: 0 K/UL (ref 0–0.5)
EOSINOPHIL NFR BLD: 0.3 % (ref 0–8)
ERYTHROCYTE [DISTWIDTH] IN BLOOD BY AUTOMATED COUNT: 14.4 % (ref 11.5–14.5)
EST. GFR  (NO RACE VARIABLE): 39 ML/MIN/1.73 M^2
EST. GFR  (NO RACE VARIABLE): 39 ML/MIN/1.73 M^2
GLUCOSE SERPL-MCNC: 108 MG/DL (ref 70–110)
GLUCOSE SERPL-MCNC: 112 MG/DL (ref 70–110)
GLUCOSE UR QL STRIP: NEGATIVE
HCT VFR BLD AUTO: 25.4 % (ref 37–48.5)
HGB BLD-MCNC: 7.8 G/DL (ref 12–16)
HGB UR QL STRIP: ABNORMAL
IMM GRANULOCYTES # BLD AUTO: 0.05 K/UL (ref 0–0.04)
IMM GRANULOCYTES NFR BLD AUTO: 0.4 % (ref 0–0.5)
KETONES UR QL STRIP: NEGATIVE
LEUKOCYTE ESTERASE UR QL STRIP: ABNORMAL
LYMPHOCYTES # BLD AUTO: 1.7 K/UL (ref 1–4.8)
LYMPHOCYTES NFR BLD: 13.1 % (ref 18–48)
MCH RBC QN AUTO: 30.5 PG (ref 27–31)
MCHC RBC AUTO-ENTMCNC: 30.7 G/DL (ref 32–36)
MCV RBC AUTO: 99 FL (ref 82–98)
MICROSCOPIC COMMENT: ABNORMAL
MONOCYTES # BLD AUTO: 1.1 K/UL (ref 0.3–1)
MONOCYTES NFR BLD: 8.3 % (ref 4–15)
NEUTROPHILS # BLD AUTO: 10.2 K/UL (ref 1.8–7.7)
NEUTROPHILS NFR BLD: 77.7 % (ref 38–73)
NITRITE UR QL STRIP: NEGATIVE
NRBC BLD-RTO: 0 /100 WBC
PH UR STRIP: 6 [PH] (ref 5–8)
PLATELET # BLD AUTO: 247 K/UL (ref 150–450)
PMV BLD AUTO: 10.5 FL (ref 9.2–12.9)
POTASSIUM SERPL-SCNC: 2.9 MMOL/L (ref 3.5–5.1)
POTASSIUM SERPL-SCNC: 3.2 MMOL/L (ref 3.5–5.1)
PROCALCITONIN SERPL IA-MCNC: 0.21 NG/ML
PROT SERPL-MCNC: 4.9 G/DL (ref 6–8.4)
PROT UR QL STRIP: ABNORMAL
RBC # BLD AUTO: 2.56 M/UL (ref 4–5.4)
RBC #/AREA URNS AUTO: 8 /HPF (ref 0–4)
SODIUM SERPL-SCNC: 139 MMOL/L (ref 136–145)
SODIUM SERPL-SCNC: 140 MMOL/L (ref 136–145)
SP GR UR STRIP: 1.01 (ref 1–1.03)
SQUAMOUS #/AREA URNS AUTO: 1 /HPF
URN SPEC COLLECT METH UR: ABNORMAL
WBC # BLD AUTO: 13.08 K/UL (ref 3.9–12.7)
WBC #/AREA URNS AUTO: 22 /HPF (ref 0–5)

## 2022-10-09 PROCEDURE — 63600175 PHARM REV CODE 636 W HCPCS: Performed by: STUDENT IN AN ORGANIZED HEALTH CARE EDUCATION/TRAINING PROGRAM

## 2022-10-09 PROCEDURE — 11000001 HC ACUTE MED/SURG PRIVATE ROOM

## 2022-10-09 PROCEDURE — 36415 COLL VENOUS BLD VENIPUNCTURE: CPT

## 2022-10-09 PROCEDURE — 25000003 PHARM REV CODE 250

## 2022-10-09 PROCEDURE — 36415 COLL VENOUS BLD VENIPUNCTURE: CPT | Performed by: HOSPITALIST

## 2022-10-09 PROCEDURE — 25000003 PHARM REV CODE 250: Performed by: HOSPITALIST

## 2022-10-09 PROCEDURE — 81001 URINALYSIS AUTO W/SCOPE: CPT

## 2022-10-09 PROCEDURE — 94799 UNLISTED PULMONARY SVC/PX: CPT

## 2022-10-09 PROCEDURE — 85025 COMPLETE CBC W/AUTO DIFF WBC: CPT | Performed by: HOSPITALIST

## 2022-10-09 PROCEDURE — 87086 URINE CULTURE/COLONY COUNT: CPT

## 2022-10-09 PROCEDURE — 84145 PROCALCITONIN (PCT): CPT

## 2022-10-09 PROCEDURE — 63600175 PHARM REV CODE 636 W HCPCS

## 2022-10-09 PROCEDURE — 80053 COMPREHEN METABOLIC PANEL: CPT | Performed by: HOSPITALIST

## 2022-10-09 PROCEDURE — 80048 BASIC METABOLIC PNL TOTAL CA: CPT | Mod: XB

## 2022-10-09 PROCEDURE — 87040 BLOOD CULTURE FOR BACTERIA: CPT | Mod: 59

## 2022-10-09 PROCEDURE — 94761 N-INVAS EAR/PLS OXIMETRY MLT: CPT

## 2022-10-09 RX ORDER — POLYETHYLENE GLYCOL 3350 17 G/17G
17 POWDER, FOR SOLUTION ORAL 2 TIMES DAILY PRN
Status: DISCONTINUED | OUTPATIENT
Start: 2022-10-09 | End: 2022-10-12 | Stop reason: HOSPADM

## 2022-10-09 RX ORDER — BISACODYL 10 MG
10 SUPPOSITORY, RECTAL RECTAL DAILY PRN
Status: DISCONTINUED | OUTPATIENT
Start: 2022-10-09 | End: 2022-10-12 | Stop reason: HOSPADM

## 2022-10-09 RX ORDER — POTASSIUM CHLORIDE 20 MEQ/1
40 TABLET, EXTENDED RELEASE ORAL ONCE
Status: DISCONTINUED | OUTPATIENT
Start: 2022-10-09 | End: 2022-10-09

## 2022-10-09 RX ADMIN — METHOCARBAMOL 750 MG: 750 TABLET ORAL at 01:10

## 2022-10-09 RX ADMIN — METHOCARBAMOL 750 MG: 750 TABLET ORAL at 04:10

## 2022-10-09 RX ADMIN — ACETAMINOPHEN 1000 MG: 500 TABLET ORAL at 05:10

## 2022-10-09 RX ADMIN — TRAVOPROST 1 DROP: 0.04 SOLUTION/ DROPS OPHTHALMIC at 10:10

## 2022-10-09 RX ADMIN — DORZOLAMIDE HYDROCHLORIDE 1 DROP: 20 SOLUTION/ DROPS OPHTHALMIC at 10:10

## 2022-10-09 RX ADMIN — METHOCARBAMOL 750 MG: 750 TABLET ORAL at 09:10

## 2022-10-09 RX ADMIN — GABAPENTIN 300 MG: 300 CAPSULE ORAL at 08:10

## 2022-10-09 RX ADMIN — BRIMONIDINE TARTRATE 1 DROP: 1.5 SOLUTION OPHTHALMIC at 09:10

## 2022-10-09 RX ADMIN — HEPARIN SODIUM 5000 UNITS: 5000 INJECTION INTRAVENOUS; SUBCUTANEOUS at 01:10

## 2022-10-09 RX ADMIN — ACETAMINOPHEN 1000 MG: 500 TABLET ORAL at 01:10

## 2022-10-09 RX ADMIN — SODIUM BICARBONATE 650 MG TABLET 650 MG: at 09:10

## 2022-10-09 RX ADMIN — SODIUM BICARBONATE 650 MG TABLET 650 MG: at 04:10

## 2022-10-09 RX ADMIN — POTASSIUM BICARBONATE 25 MEQ: 977.5 TABLET, EFFERVESCENT ORAL at 09:10

## 2022-10-09 RX ADMIN — HEPARIN SODIUM 5000 UNITS: 5000 INJECTION INTRAVENOUS; SUBCUTANEOUS at 05:10

## 2022-10-09 RX ADMIN — POTASSIUM CHLORIDE 10 MEQ: 7.46 INJECTION, SOLUTION INTRAVENOUS at 12:10

## 2022-10-09 RX ADMIN — POTASSIUM CHLORIDE 10 MEQ: 7.46 INJECTION, SOLUTION INTRAVENOUS at 02:10

## 2022-10-09 RX ADMIN — DORZOLAMIDE HYDROCHLORIDE 1 DROP: 20 SOLUTION/ DROPS OPHTHALMIC at 09:10

## 2022-10-09 RX ADMIN — OXYCODONE 5 MG: 5 TABLET ORAL at 11:10

## 2022-10-09 RX ADMIN — POTASSIUM CHLORIDE 10 MEQ: 7.46 INJECTION, SOLUTION INTRAVENOUS at 04:10

## 2022-10-09 RX ADMIN — BRIMONIDINE TARTRATE 1 DROP: 1.5 SOLUTION OPHTHALMIC at 10:10

## 2022-10-09 RX ADMIN — POTASSIUM CHLORIDE 10 MEQ: 7.46 INJECTION, SOLUTION INTRAVENOUS at 03:10

## 2022-10-09 RX ADMIN — SODIUM CHLORIDE 500 ML: 0.9 INJECTION, SOLUTION INTRAVENOUS at 01:10

## 2022-10-09 RX ADMIN — GABAPENTIN 300 MG: 300 CAPSULE ORAL at 09:10

## 2022-10-09 RX ADMIN — ACETAMINOPHEN 1000 MG: 500 TABLET ORAL at 11:10

## 2022-10-09 RX ADMIN — GABAPENTIN 300 MG: 300 CAPSULE ORAL at 04:10

## 2022-10-09 RX ADMIN — HEPARIN SODIUM 5000 UNITS: 5000 INJECTION INTRAVENOUS; SUBCUTANEOUS at 09:10

## 2022-10-09 RX ADMIN — ESCITALOPRAM OXALATE 10 MG: 10 TABLET ORAL at 08:10

## 2022-10-09 RX ADMIN — BRIMONIDINE TARTRATE 1 DROP: 1.5 SOLUTION OPHTHALMIC at 04:10

## 2022-10-09 RX ADMIN — DORZOLAMIDE HYDROCHLORIDE 1 DROP: 20 SOLUTION/ DROPS OPHTHALMIC at 04:10

## 2022-10-09 RX ADMIN — POTASSIUM BICARBONATE 25 MEQ: 977.5 TABLET, EFFERVESCENT ORAL at 04:10

## 2022-10-09 RX ADMIN — POTASSIUM BICARBONATE 25 MEQ: 977.5 TABLET, EFFERVESCENT ORAL at 06:10

## 2022-10-09 NOTE — PLAN OF CARE
Problem: Oral Intake Inadequate (Acute Kidney Injury/Impairment)  Goal: Optimal Nutrition Intake  Outcome: Ongoing, Progressing     Problem: Fluid and Electrolyte Imbalance (Acute Kidney Injury/Impairment)  Goal: Fluid and Electrolyte Balance  Outcome: Ongoing, Progressing     POC reviewed with the patient and they verbalized understanding. All comments and concerns addressed. Bed locked in lowest position with bed alarm set, call light within reach. Safety precautions maintained. VSS, see flow sheets educated to drink fluid . Will continue to monitor for changes to POC and clinical condition.

## 2022-10-09 NOTE — HPI
77 F with hx of cervical stenosis presents in fu.  She continues to endorse neck pain which radiates into her left shoulder and down to her left elbow.  She denies dropping items from her hands although she has had difficulty putting on jewelry.  She is unsteady when she walks without falls.  She is a nonsmoker.

## 2022-10-09 NOTE — ASSESSMENT & PLAN NOTE
Chronically low bicarb on metabolic panel. Likely secondary to chronic diarrhea. Was on bowel regimen post-op but now having diarrhea again. Laxatives PRN  Bicarb stable, continuing to monitor.

## 2022-10-09 NOTE — ASSESSMENT & PLAN NOTE
There is a chronic component (prior admission with K<2) likely 2/2 her diarrhea. Also poor PO intake likely contributing. Less likely renal component. On presentation, K 2.2. No BM since surgery. Will give bowel regimen until she is regular and reassess needs for chronic therapies (diarrhea and K) prior to discharge   - Aggressive repletion  - Telemetry  - Urine K/Cr not suggestive of renal potassium wasting.  - PM BMP today to continue repleting

## 2022-10-09 NOTE — ASSESSMENT & PLAN NOTE
S/p C3-6 decompression, C3-T1 posterior fusion 10/7/22  Post surgical care per primary  Bowel regimen PRN

## 2022-10-09 NOTE — ASSESSMENT & PLAN NOTE
77F w/ PMH of asthma, HTN, CKD 3,  who presents with cervical myelopathy now s/p C3-T1 PCDF on 10/7:    --Patient admitted to [facility] on telemetry      -q1h neurochecks in ICU, q2h neurochecks in stepdown, q4h neurochecks on floor  --All labs and diagnostics reviewed  --Postop XR with satisfactory placement  --Drains to remain at this time on full suction; please record hourly outputs  --HM consulted for medical management; appreciate input  --PT/OT/OOB  --ADAT  --Pain control with multimodal pain regimen   --TEDs/SCDs/SQH      -Reccomend weekly BLEUS for DVT surveillance  --Continue to monitor clinically, notify NSGY immediately with any changes in neuro status    Dispo: Ongoing

## 2022-10-09 NOTE — ASSESSMENT & PLAN NOTE
Stable  Holding home ARB  Acidosis likely secondary to diarrhea, will not start bicarb at this time

## 2022-10-09 NOTE — SUBJECTIVE & OBJECTIVE
Interval History: Hypotensive to 80/46 overnight, endorsed lightheadedness. Improved to 90s-110s/50s this AM. Was restarted on home HTN meds the prior 2 days but does not consistently take medications at home due to episodes of lightheadedness. Discontinued antihypertensives for now and monitoring BP.    Episode of wheezing + productive cough with clear sputum last night and this morning. Resolved with home albuterol. Denies fever, chills, dysphagia. Infectious workup initiated.    Pt had 1 BM last night and 2 this AM, soft somewhat watery stools. Made laxatives PRN. Received IV/PO potassium supplementation. Continuing and rechecking BMP this afternoon.    Review of Systems   Constitutional:  Positive for appetite change. Negative for fever.   HENT:  Negative for congestion and sinus pain.    Eyes:  Negative for discharge and redness.   Respiratory:  Negative for cough and shortness of breath.    Cardiovascular:  Negative for chest pain and leg swelling.   Gastrointestinal:  Negative for diarrhea and nausea.   Endocrine: Negative for polydipsia and polyuria.   Genitourinary:  Negative for dysuria and hematuria.   Musculoskeletal:  Negative for arthralgias and myalgias.   Skin:  Negative for color change and rash.   Neurological:  Negative for weakness and numbness.   Psychiatric/Behavioral:  Negative for agitation and confusion.    Objective:     Vital Signs (Most Recent):  Temp: 98.3 °F (36.8 °C) (10/09/22 1119)  Pulse: 66 (10/09/22 1119)  Resp: 17 (10/09/22 1119)  BP: (!) 94/52 (10/09/22 1119)  SpO2: 98 % (10/09/22 1119)   Vital Signs (24h Range):  Temp:  [97.2 °F (36.2 °C)-98.3 °F (36.8 °C)] 98.3 °F (36.8 °C)  Pulse:  [59-75] 66  Resp:  [16-20] 17  SpO2:  [92 %-100 %] 98 %  BP: ()/(46-87) 94/52     Weight: 54.9 kg (120 lb 15.1 oz)  Body mass index is 24.43 kg/m².    Intake/Output Summary (Last 24 hours) at 10/9/2022 1135  Last data filed at 10/9/2022 0685  Gross per 24 hour   Intake 1020.5 ml   Output 480  ml   Net 540.5 ml      Physical Exam  Constitutional:       General: She is not in acute distress.     Appearance: Normal appearance.   HENT:      Head: Normocephalic and atraumatic.      Mouth/Throat:      Mouth: Mucous membranes are moist.      Pharynx: Oropharynx is clear.   Eyes:      Conjunctiva/sclera: Conjunctivae normal.      Pupils: Pupils are equal, round, and reactive to light.   Cardiovascular:      Rate and Rhythm: Normal rate and regular rhythm.      Pulses: Normal pulses.      Heart sounds: Normal heart sounds.   Pulmonary:      Effort: Pulmonary effort is normal.      Breath sounds: Normal breath sounds.   Abdominal:      General: Abdomen is flat.      Palpations: Abdomen is soft.   Musculoskeletal:         General: No swelling or deformity.      Cervical back: Normal range of motion and neck supple. No tenderness.   Skin:     General: Skin is warm and dry.   Neurological:      General: No focal deficit present.      Mental Status: She is alert and oriented to person, place, and time.   Psychiatric:         Mood and Affect: Mood normal.         Behavior: Behavior normal.       Significant Labs: CBC:   Recent Labs   Lab 10/07/22  1940 10/09/22  0519   WBC 14.58* 13.08*   HGB 9.5* 7.8*   HCT 28.2* 25.4*    247     Procalcitonin: Pending    CMP:   Recent Labs   Lab 10/07/22  1327 10/07/22  1940 10/08/22  0736 10/08/22  1740 10/09/22  0519   * 145 142 139 140   K 2.5* 2.2* 2.6* 2.7* 3.2*   * 111* 115* 110 114*   CO2 15* 18* 14* 17* 17*   * 139* 96 108 108   BUN 10 10 11 13 16   CREATININE 1.2 1.3 1.4 1.4 1.4   CALCIUM 8.3* 8.8 8.1* 8.0* 7.7*   PROT 5.2* 6.2  --   --  4.9*   ALBUMIN 3.0* 3.4*  --   --  2.6*   BILITOT 0.6 0.7  --   --  0.5   ALKPHOS 62 70  --   --  60   AST 22 41*  --   --  44*   ALT 11 18  --   --  14   ANIONGAP 14 16 13 12 9       Significant Imaging: CXR: I have reviewed all pertinent results/findings within the past 24 hours and my personal findings are:   pending

## 2022-10-09 NOTE — PROGRESS NOTES
[facility] - Neurosurgery ([facility])  [facility] Medicine  Progress Note    Patient Name: Mariaelena Grubbs  MRN: [id number]  Patient Class: IP- Inpatient   Admission Date: 10/7/2022  Length of Stay: 2 days  Attending Physician: Freddy Miles DO  Primary Care Provider: Primary Doctor No        Subjective:     Principal Problem:Cervical stenosis of spinal canal        HPI:  Mariaelena Grubbs is a 77yoF with HTN, HLD, CKD3, distant hx of Br CA (b/l mastectomy 1984 w/o recurrence), gout, asthma, chronic R sided pyelonephritis requiring R sided partial nephrectomy (2019) complicated by bowel perforation and requiring multiple bowel resections with R sided hemicolectomy and chronic diarrhea, and cervical spinal/foraminal stenosis with radiculopathy into the left arm who presented for elective C3-C6 spinal decompression as well as C3-T1 posterior spinal fusion which was done on 10/7/22. She doesn't have any other active problems on presentation. The patient states she is doing well but has some neck pain as well as still having some radicular left shoulder/arm pain. She states her appetite has been decreased over the past 2-3 months, she states due to her pain. She has been stable since her operation without significant complications and is requiring significant potassium repletion. Hospital medicine was consulted for medical comanagement.     Interval History: Hypotensive to 80/46 overnight, endorsed lightheadedness. Improved to 90s-110s/50s this AM. Was restarted on home HTN meds the prior 2 days but does not consistently take medications at home due to episodes of lightheadedness. Discontinued antihypertensives for now and monitoring BP.    Episode of wheezing + productive cough with clear sputum last night and this morning. Resolved with home albuterol. Denies fever, chills, dysphagia. Infectious workup initiated.    Pt had 1 BM last night and 2 this AM, soft somewhat watery stools. Made laxatives PRN. Received IV/PO  potassium supplementation. Continuing and rechecking BMP this afternoon.    Review of Systems   Constitutional:  Positive for appetite change. Negative for fever.   HENT:  Negative for congestion and sinus pain.    Eyes:  Negative for discharge and redness.   Respiratory:  Negative for cough and shortness of breath.    Cardiovascular:  Negative for chest pain and leg swelling.   Gastrointestinal:  Negative for diarrhea and nausea.   Endocrine: Negative for polydipsia and polyuria.   Genitourinary:  Negative for dysuria and hematuria.   Musculoskeletal:  Negative for arthralgias and myalgias.   Skin:  Negative for color change and rash.   Neurological:  Negative for weakness and numbness.   Psychiatric/Behavioral:  Negative for agitation and confusion.    Objective:     Vital Signs (Most Recent):  Temp: 98.3 °F (36.8 °C) (10/09/22 1119)  Pulse: 66 (10/09/22 1119)  Resp: 17 (10/09/22 1119)  BP: (!) 94/52 (10/09/22 1119)  SpO2: 98 % (10/09/22 1119)   Vital Signs (24h Range):  Temp:  [97.2 °F (36.2 °C)-98.3 °F (36.8 °C)] 98.3 °F (36.8 °C)  Pulse:  [59-75] 66  Resp:  [16-20] 17  SpO2:  [92 %-100 %] 98 %  BP: ()/(46-87) 94/52     Weight: 54.9 kg (120 lb 15.1 oz)  Body mass index is 24.43 kg/m².    Intake/Output Summary (Last 24 hours) at 10/9/2022 1133  Last data filed at 10/9/2022 0638  Gross per 24 hour   Intake 1020.5 ml   Output 480 ml   Net 540.5 ml      Physical Exam  Constitutional:       General: She is not in acute distress.     Appearance: Normal appearance.   HENT:      Head: Normocephalic and atraumatic.      Mouth/Throat:      Mouth: Mucous membranes are moist.      Pharynx: Oropharynx is clear.   Eyes:      Conjunctiva/sclera: Conjunctivae normal.      Pupils: Pupils are equal, round, and reactive to light.   Cardiovascular:      Rate and Rhythm: Normal rate and regular rhythm.      Pulses: Normal pulses.      Heart sounds: Normal heart sounds.   Pulmonary:      Effort: Pulmonary effort is normal.       Breath sounds: Normal breath sounds.   Abdominal:      General: Abdomen is flat.      Palpations: Abdomen is soft.   Musculoskeletal:         General: No swelling or deformity.      Cervical back: Normal range of motion and neck supple. No tenderness.   Skin:     General: Skin is warm and dry.   Neurological:      General: No focal deficit present.      Mental Status: She is alert and oriented to person, place, and time.   Psychiatric:         Mood and Affect: Mood normal.         Behavior: Behavior normal.       Significant Labs: CBC:   Recent Labs   Lab 10/07/22  1940 10/09/22  0519   WBC 14.58* 13.08*   HGB 9.5* 7.8*   HCT 28.2* 25.4*    247     Procalcitonin: Pending    CMP:   Recent Labs   Lab 10/07/22  1327 10/07/22  1940 10/08/22  0736 10/08/22  1740 10/09/22  0519   * 145 142 139 140   K 2.5* 2.2* 2.6* 2.7* 3.2*   * 111* 115* 110 114*   CO2 15* 18* 14* 17* 17*   * 139* 96 108 108   BUN 10 10 11 13 16   CREATININE 1.2 1.3 1.4 1.4 1.4   CALCIUM 8.3* 8.8 8.1* 8.0* 7.7*   PROT 5.2* 6.2  --   --  4.9*   ALBUMIN 3.0* 3.4*  --   --  2.6*   BILITOT 0.6 0.7  --   --  0.5   ALKPHOS 62 70  --   --  60   AST 22 41*  --   --  44*   ALT 11 18  --   --  14   ANIONGAP 14 16 13 12 9       Significant Imaging: CXR: I have reviewed all pertinent results/findings within the past 24 hours and my personal findings are:  pending      Assessment/Plan:      * Cervical stenosis of spinal canal  S/p C3-6 decompression, C3-T1 posterior fusion 10/7/22  Post surgical care per primary  Bowel regimen PRN    Suspected Infection  Hypotensive (may be 2/2 HTN meds initiation) with productive cough and intermittent hypoxemia. Afebrile, denies fevers/chills.  - CXR, procal, UA, 2x blood cultures ordered.    Normocytic anemia  Chronic anemia with Hgb 9.5 close to baseline in post-op patient. Normal MCV.  Likely mild blood loss anemia post-operatively  Watch for bleeding, daily CBC    Cervical myelopathy  As  above      Glaucoma of right eye  On prior admission she was seen by ophtho. She was previously prescribed eye gtts but wasn't sure what she was supposed to be taking so ophtho saw her and recommended the gtts below.   - Continue dorzolamide, travatan, and brimonidine, as well as on discharge  - Follow up with opho outpatient    Metabolic acidosis  Chronically low bicarb on metabolic panel. Likely secondary to chronic diarrhea. Was on bowel regimen post-op but now having diarrhea again. Laxatives PRN  Bicarb stable, continuing to monitor.    Hypokalemia  There is a chronic component (prior admission with K<2) likely 2/2 her diarrhea. Also poor PO intake likely contributing. Less likely renal component. On presentation, K 2.2. No BM since surgery. Will give bowel regimen until she is regular and reassess needs for chronic therapies (diarrhea and K) prior to discharge   - Aggressive repletion  - Telemetry  - Urine K/Cr not suggestive of renal potassium wasting.  - PM BMP today to continue repleting    CKD (chronic kidney disease) stage 3, GFR 30-59 ml/min  Stable  Holding home ARB  Acidosis likely secondary to diarrhea, will not start bicarb at this time    Asthma  Stable, prn bronchodilators      HTN (hypertension)  Holding home nifedipine 60mg qd, valsartan 320mg qd, hydralazine 50mg q8h as she does not take consistently and now hypotensive  - Hypotension possibly 2/2 infection  - Monitoring BP for today, will restart BP meds individually if necessary.        VTE Risk Mitigation (From admission, onward)         Ordered     heparin (porcine) injection 5,000 Units  Every 8 hours         10/07/22 1220     IP VTE HIGH RISK PATIENT  Once         10/07/22 1214     Place sequential compression device  Until discontinued         10/07/22 1214     Place PRERNA hose  Until discontinued         10/07/22 0527     Place sequential compression device  Until discontinued         10/07/22 0527                Discharge Planning    SHERIDAN:      Code Status: Prior   Is the patient medically ready for discharge?:     Reason for patient still in hospital (select all that apply): Treatment             Avila Turner MD  Department of Hospital Medicine   [facility] Consults

## 2022-10-09 NOTE — PROGRESS NOTES
[facility] - Neurosurgery ([facility])  Neurosurgery  Progress Note    Subjective:     History of Present Illness: 77 F with hx of cervical stenosis presents in fu.  She continues to endorse neck pain which radiates into her left shoulder and down to her left elbow.  She denies dropping items from her hands although she has had difficulty putting on jewelry.  She is unsteady when she walks without falls.  She is a nonsmoker.      Post-Op Info:  Procedure(s) (LRB):  DECOMPRESSION, SPINE, CERVICAL, POSTERIOR APPROACH C3-T1 (N/A)   2 Days Post-Op     Interval History: 10/9: POD 2. NAEON. AFVSS. Exam stable. Pain controlled. Tolerating PO. HM following.     Medications:  Continuous Infusions:   lactated ringers 75 mL/hr at 10/08/22 1004     Scheduled Meds:   acetaminophen  1,000 mg Oral Q6H    bisacodyL  10 mg Rectal Daily    EScitalopram oxalate  10 mg Oral Daily    gabapentin  300 mg Oral TID    heparin (porcine)  5,000 Units Subcutaneous Q8H    hydrALAZINE  50 mg Oral TID WM    methocarbamoL  750 mg Oral QID    NIFEdipine  60 mg Oral Daily    polyethylene glycol  17 g Oral BID    potassium chloride  10 mEq Intravenous Q1H    potassium chloride  20 mEq Oral Once    senna-docusate 8.6-50 mg  2 tablet Oral Daily    sodium bicarbonate  650 mg Oral TID    valsartan  320 mg Oral Daily     PRN Meds:albuterol sulfate, aluminum-magnesium hydroxide-simethicone, HYDROmorphone, ondansetron, ondansetron, oxyCODONE, oxyCODONE, prochlorperazine, senna-docusate 8.6-50 mg     Review of Systems  Objective:     Weight: 54.9 kg (120 lb 15.1 oz)  Body mass index is 24.43 kg/m².  Vital Signs (Most Recent):  Temp: 98.1 °F (36.7 °C) (10/08/22 0900)  Pulse: 66 (10/08/22 0933)  Resp: 16 (10/08/22 0900)  BP: 136/74 (10/08/22 0900)  SpO2: 99 % (10/08/22 0900) Vital Signs (24h Range):  Temp:  [96.8 °F (36 °C)-98.6 °F (37 °C)] 98.1 °F (36.7 °C)  Pulse:  [65-91] 66  Resp:  [8-27] 16  SpO2:  [95 %-100 %] 99 %  BP: (114-176)/()  136/74     Date 10/08/22 0700 - 10/09/22 0659   Shift 6382-7210 9226-6710 1604-1233 24 Hour Total   INTAKE   Shift Total(mL/kg)       OUTPUT   Drains 90   90   Shift Total(mL/kg) 90(1.6)   90(1.6)   Weight (kg) 54.9 54.9 54.9 54.9                        Closed/Suction Drain 10/07/22 1026 Posterior Neck 19 Fr. (Active)   Site Description Unable to view 10/07/22 1315   Dressing Type Other (Comment) 10/07/22 1315   Drainage Serosanguineous 10/07/22 1315   Status To bulb suction 10/07/22 1315   Output (mL) 90 mL 10/08/22 0859       Female External Urinary Catheter 10/08/22 0523 (Active)       Physical Exam    Neurosurgery Physical Exam    GENERAL: resting comfortably  HEENT: PERRL, mucous membranes moist  NECK: supple, trachea midline  CV: normal capillary refill  PULM: aerating well, symmetric expansion, no distress  ABD: soft, NT, ND  EXT: no c/c/e    NEURO:    AAO x 3  CN II-XII grossly intact  RUE - 4+ throughout  LUE - 4 throughout  BLE - 5/5 throughout  SILT    No hoffmans or clonus    Cervical incision well-approximated and without erythema, induration, or fluctuance      Significant Labs:  Recent Labs   Lab 10/07/22  1327 10/07/22  1940 10/08/22  0736   * 139* 96   * 145 142   K 2.5* 2.2* 2.6*   * 111* 115*   CO2 15* 18* 14*   BUN 10 10 11   CREATININE 1.2 1.3 1.4   CALCIUM 8.3* 8.8 8.1*     Recent Labs   Lab 10/07/22  1108 10/07/22  1940   WBC  --  14.58*   HGB  --  9.5*   HCT 22* 28.2*   PLT  --  256     Recent Labs   Lab 10/07/22  0646   INR 1.0   APTT 26.6     Microbiology Results (last 7 days)       ** No results found for the last 168 hours. **          All pertinent labs from the last 24 hours have been reviewed.    Significant Diagnostics:  I have reviewed and interpreted all pertinent imaging results/findings within the past 24 hours.  X-Ray Chest AP Portable    Result Date: 10/9/2022  As above Electronically signed by: Merari Michelle MD Date:    10/09/2022 Time:    10:20        Assessment/Plan:     * Cervical stenosis of spinal canal  77F w/ PMH of asthma, HTN, CKD 3,  who presents with cervical myelopathy now s/p C3-T1 PCDF on 10/7:    --Patient admitted to [facility] on telemetry      -q1h neurochecks in ICU, q2h neurochecks in stepdown, q4h neurochecks on floor  --All labs and diagnostics reviewed  --Postop XR with satisfactory placement  --Drains to remain at this time on full suction; please record hourly outputs  --HM consulted for medical management; appreciate input  --PT/OT/OOB  --ADAT  --Pain control with multimodal pain regimen   --TEDs/SCDs/SQH      -Reccomend weekly BLEUS for DVT surveillance  --Continue to monitor clinically, notify NSGY immediately with any changes in neuro status    Dispo: Ongoing        Franck Razo MD  Neurosurgery  [facility] - Neurosurgery ([facility])

## 2022-10-09 NOTE — ASSESSMENT & PLAN NOTE
Hypotensive (may be 2/2 HTN meds initiation) with productive cough and intermittent hypoxemia. Afebrile, denies fevers/chills.  - CXR, procal, UA, 2x blood cultures ordered.

## 2022-10-09 NOTE — NURSING
Patient's /54 this AM, MAP 78. MD notified. Orders given to hold antihypertensives (hydralazine, nifedipine, valsartan). WCTM.

## 2022-10-09 NOTE — ASSESSMENT & PLAN NOTE
Holding home nifedipine 60mg qd, valsartan 320mg qd, hydralazine 50mg q8h as she does not take consistently and now hypotensive  - Hypotension possibly 2/2 infection  - Monitoring BP for today, will restart BP meds individually if necessary.

## 2022-10-09 NOTE — NURSING
Pt BP=86/51 via dynamap.  Manual BP obtained=82/46.  Pt also hasn't voided this shift Intake this xgizx=958wH despite encouragement of increased intake.  Bladder scan performed with 285mL noted.  Pt asymptomatic.  Team notified.  MD Mar advised to continue to monitor and notify if condition worsens or becomes symptomatic.

## 2022-10-09 NOTE — PROGRESS NOTES
[facility] - Neurosurgery ([facility])  Neurosurgery  Progress Note     Subjective:      History of Present Illness: 77 F with hx of cervical stenosis presents in fu.  She continues to endorse neck pain which radiates into her left shoulder and down to her left elbow.  She denies dropping items from her hands although she has had difficulty putting on jewelry.  She is unsteady when she walks without falls.  She is a nonsmoker.        Post-Op Info:  Procedure(s) (LRB):  DECOMPRESSION, SPINE, CERVICAL, POSTERIOR APPROACH C3-T1 (N/A)   1 Days Post-Op      Interval History: 10/8: POD 1 s/p C3-T1 PCDF. NAEON. AFVSS. Exam stable. Pain controlled. Tolerating PO. Voiding spontaneously.      Medications:  Continuous Infusions:   lactated ringers 75 mL/hr at 10/08/22 1004      Scheduled Meds:   acetaminophen  1,000 mg Oral Q6H    bisacodyL  10 mg Rectal Daily    EScitalopram oxalate  10 mg Oral Daily    gabapentin  300 mg Oral TID    heparin (porcine)  5,000 Units Subcutaneous Q8H    hydrALAZINE  50 mg Oral TID WM    methocarbamoL  750 mg Oral QID    NIFEdipine  60 mg Oral Daily    polyethylene glycol  17 g Oral BID    potassium chloride  10 mEq Intravenous Q1H    potassium chloride  20 mEq Oral Once    senna-docusate 8.6-50 mg  2 tablet Oral Daily    sodium bicarbonate  650 mg Oral TID    valsartan  320 mg Oral Daily      PRN Meds:albuterol sulfate, aluminum-magnesium hydroxide-simethicone, HYDROmorphone, ondansetron, ondansetron, oxyCODONE, oxyCODONE, prochlorperazine, senna-docusate 8.6-50 mg      Review of Systems  Objective:      Weight: 54.9 kg (120 lb 15.1 oz)  Body mass index is 24.43 kg/m².  Vital Signs (Most Recent):  Temp: 98.1 °F (36.7 °C) (10/08/22 0900)  Pulse: 66 (10/08/22 0933)  Resp: 16 (10/08/22 0900)  BP: 136/74 (10/08/22 0900)  SpO2: 99 % (10/08/22 0900) Vital Signs (24h Range):  Temp:  [96.8 °F (36 °C)-98.6 °F (37 °C)] 98.1 °F (36.7 °C)  Pulse:  [65-91] 66  Resp:  [8-27] 16  SpO2:  [95 %-100 %] 99 %  BP:  (114-176)/() 136/74             Date 10/08/22 0700 - 10/09/22 0659   Shift 4171-3482 8150-9821 8299-9983 24 Hour Total   INTAKE   Shift Total(mL/kg)           OUTPUT   Drains 90     90   Shift Total(mL/kg) 90(1.6)     90(1.6)   Weight (kg) 54.9 54.9 54.9 54.9                                Closed/Suction Drain 10/07/22 1026 Posterior Neck 19 Fr. (Active)   Site Description Unable to view 10/07/22 1315   Dressing Type Other (Comment) 10/07/22 1315   Drainage Serosanguineous 10/07/22 1315   Status To bulb suction 10/07/22 1315   Output (mL) 90 mL 10/08/22 0859       Female External Urinary Catheter 10/08/22 0523 (Active)         Physical Exam     Neurosurgery Physical Exam     GENERAL: resting comfortably  HEENT: PERRL, mucous membranes moist  NECK: supple, trachea midline  CV: normal capillary refill  PULM: aerating well, symmetric expansion, no distress  ABD: soft, NT, ND  EXT: no c/c/e     NEURO:     AAO x 3  CN II-XII grossly intact  RUE - 4+ throughout  LUE - 4 throughout  BLE - 5/5 throughout  SILT     No hoffmans or clonus     Cervical incision well-approximated and without erythema, induration, or fluctuance        Significant Labs:        Recent Labs   Lab 10/07/22  1327 10/07/22  1940 10/08/22  0736   * 139* 96   * 145 142   K 2.5* 2.2* 2.6*   * 111* 115*   CO2 15* 18* 14*   BUN 10 10 11   CREATININE 1.2 1.3 1.4   CALCIUM 8.3* 8.8 8.1*           Recent Labs   Lab 10/07/22  1108 10/07/22  1940   WBC  --  14.58*   HGB  --  9.5*   HCT 22* 28.2*   PLT  --  256          Recent Labs   Lab 10/07/22  0646   INR 1.0   APTT 26.6      Microbiology Results (last 7 days)         ** No results found for the last 168 hours. **             All pertinent labs from the last 24 hours have been reviewed.     Significant Diagnostics:  I have reviewed and interpreted all pertinent imaging results/findings within the past 24 hours.  X-Ray Chest AP Portable     Result Date: 10/9/2022  As above  Electronically signed by:     Merari Michelle MD Date:                                         10/09/2022 Time:                                      10:20         Assessment/Plan:      * Cervical stenosis of spinal canal  77F w/ PMH of asthma, HTN, CKD 3,  who presents with cervical myelopathy now s/p C3-T1 PCDF on 10/7:     --Patient admitted to [facility] on telemetry      -q1h neurochecks in ICU, q2h neurochecks in stepdown, q4h neurochecks on floor  --All labs and diagnostics reviewed  --Postop XR with satisfactory placement  --Drains to remain at this time on full suction; please record hourly outputs  --HM consulted for medical management; appreciate input  --PT/OT/OOB  --ADAT  --Pain control with multimodal pain regimen   --TEDs/SCDs/SQH      -Reccomend weekly BLEUS for DVT surveillance  --Continue to monitor clinically, notify NSGY immediately with any changes in neuro status     Dispo: Ongoing           Franck Razo MD  Neurosurgery  [facility] - Neurosurgery ([facility])

## 2022-10-10 LAB
ALBUMIN SERPL BCP-MCNC: 2.5 G/DL (ref 3.5–5.2)
ALP SERPL-CCNC: 63 U/L (ref 55–135)
ALT SERPL W/O P-5'-P-CCNC: 16 U/L (ref 10–44)
ANION GAP SERPL CALC-SCNC: 6 MMOL/L (ref 8–16)
AST SERPL-CCNC: 36 U/L (ref 10–40)
BACTERIA UR CULT: NORMAL
BACTERIA UR CULT: NORMAL
BASOPHILS # BLD AUTO: 0.03 K/UL (ref 0–0.2)
BASOPHILS NFR BLD: 0.3 % (ref 0–1.9)
BILIRUB SERPL-MCNC: 0.7 MG/DL (ref 0.1–1)
BUN SERPL-MCNC: 18 MG/DL (ref 8–23)
CALCIUM SERPL-MCNC: 7.9 MG/DL (ref 8.7–10.5)
CHLORIDE SERPL-SCNC: 115 MMOL/L (ref 95–110)
CO2 SERPL-SCNC: 19 MMOL/L (ref 23–29)
CREAT SERPL-MCNC: 1.1 MG/DL (ref 0.5–1.4)
DIFFERENTIAL METHOD: ABNORMAL
EOSINOPHIL # BLD AUTO: 0.2 K/UL (ref 0–0.5)
EOSINOPHIL NFR BLD: 2 % (ref 0–8)
ERYTHROCYTE [DISTWIDTH] IN BLOOD BY AUTOMATED COUNT: 13.9 % (ref 11.5–14.5)
EST. GFR  (NO RACE VARIABLE): 52.1 ML/MIN/1.73 M^2
GLUCOSE SERPL-MCNC: 86 MG/DL (ref 70–110)
HCT VFR BLD AUTO: 23.1 % (ref 37–48.5)
HGB BLD-MCNC: 7.5 G/DL (ref 12–16)
IMM GRANULOCYTES # BLD AUTO: 0.05 K/UL (ref 0–0.04)
IMM GRANULOCYTES NFR BLD AUTO: 0.4 % (ref 0–0.5)
LYMPHOCYTES # BLD AUTO: 3.3 K/UL (ref 1–4.8)
LYMPHOCYTES NFR BLD: 29.6 % (ref 18–48)
MCH RBC QN AUTO: 30.6 PG (ref 27–31)
MCHC RBC AUTO-ENTMCNC: 32.5 G/DL (ref 32–36)
MCV RBC AUTO: 94 FL (ref 82–98)
MONOCYTES # BLD AUTO: 1 K/UL (ref 0.3–1)
MONOCYTES NFR BLD: 9.1 % (ref 4–15)
NEUTROPHILS # BLD AUTO: 6.6 K/UL (ref 1.8–7.7)
NEUTROPHILS NFR BLD: 58.6 % (ref 38–73)
NRBC BLD-RTO: 0 /100 WBC
PLATELET # BLD AUTO: 301 K/UL (ref 150–450)
PMV BLD AUTO: 11.3 FL (ref 9.2–12.9)
POTASSIUM SERPL-SCNC: 2.9 MMOL/L (ref 3.5–5.1)
PROT SERPL-MCNC: 5 G/DL (ref 6–8.4)
RBC # BLD AUTO: 2.45 M/UL (ref 4–5.4)
SODIUM SERPL-SCNC: 140 MMOL/L (ref 136–145)
WBC # BLD AUTO: 11.22 K/UL (ref 3.9–12.7)

## 2022-10-10 PROCEDURE — 80053 COMPREHEN METABOLIC PANEL: CPT | Performed by: HOSPITALIST

## 2022-10-10 PROCEDURE — 63600175 PHARM REV CODE 636 W HCPCS

## 2022-10-10 PROCEDURE — 97535 SELF CARE MNGMENT TRAINING: CPT

## 2022-10-10 PROCEDURE — 94761 N-INVAS EAR/PLS OXIMETRY MLT: CPT

## 2022-10-10 PROCEDURE — 97116 GAIT TRAINING THERAPY: CPT | Mod: CQ

## 2022-10-10 PROCEDURE — 85025 COMPLETE CBC W/AUTO DIFF WBC: CPT | Performed by: HOSPITALIST

## 2022-10-10 PROCEDURE — 25000003 PHARM REV CODE 250: Performed by: STUDENT IN AN ORGANIZED HEALTH CARE EDUCATION/TRAINING PROGRAM

## 2022-10-10 PROCEDURE — 25000003 PHARM REV CODE 250: Performed by: PHYSICIAN ASSISTANT

## 2022-10-10 PROCEDURE — 99232 PR SUBSEQUENT HOSPITAL CARE,LEVL II: ICD-10-PCS | Mod: GC,,, | Performed by: HOSPITALIST

## 2022-10-10 PROCEDURE — 99900035 HC TECH TIME PER 15 MIN (STAT)

## 2022-10-10 PROCEDURE — 25000003 PHARM REV CODE 250

## 2022-10-10 PROCEDURE — 99024 PR POST-OP FOLLOW-UP VISIT: ICD-10-PCS | Mod: ,,, | Performed by: PHYSICIAN ASSISTANT

## 2022-10-10 PROCEDURE — 36415 COLL VENOUS BLD VENIPUNCTURE: CPT | Performed by: HOSPITALIST

## 2022-10-10 PROCEDURE — 11000001 HC ACUTE MED/SURG PRIVATE ROOM

## 2022-10-10 PROCEDURE — 99024 POSTOP FOLLOW-UP VISIT: CPT | Mod: ,,, | Performed by: PHYSICIAN ASSISTANT

## 2022-10-10 PROCEDURE — 99232 SBSQ HOSP IP/OBS MODERATE 35: CPT | Mod: GC,,, | Performed by: HOSPITALIST

## 2022-10-10 PROCEDURE — 97530 THERAPEUTIC ACTIVITIES: CPT

## 2022-10-10 RX ORDER — METHOCARBAMOL 500 MG/1
1000 TABLET, FILM COATED ORAL 4 TIMES DAILY
Status: DISCONTINUED | OUTPATIENT
Start: 2022-10-10 | End: 2022-10-12 | Stop reason: HOSPADM

## 2022-10-10 RX ADMIN — HEPARIN SODIUM 5000 UNITS: 5000 INJECTION INTRAVENOUS; SUBCUTANEOUS at 05:10

## 2022-10-10 RX ADMIN — BRIMONIDINE TARTRATE 1 DROP: 1.5 SOLUTION OPHTHALMIC at 08:10

## 2022-10-10 RX ADMIN — GABAPENTIN 300 MG: 300 CAPSULE ORAL at 08:10

## 2022-10-10 RX ADMIN — ESCITALOPRAM OXALATE 10 MG: 10 TABLET ORAL at 08:10

## 2022-10-10 RX ADMIN — OXYCODONE HYDROCHLORIDE 10 MG: 10 TABLET ORAL at 10:10

## 2022-10-10 RX ADMIN — ACETAMINOPHEN 1000 MG: 500 TABLET ORAL at 06:10

## 2022-10-10 RX ADMIN — HEPARIN SODIUM 5000 UNITS: 5000 INJECTION INTRAVENOUS; SUBCUTANEOUS at 11:10

## 2022-10-10 RX ADMIN — DORZOLAMIDE HYDROCHLORIDE 1 DROP: 20 SOLUTION/ DROPS OPHTHALMIC at 08:10

## 2022-10-10 RX ADMIN — BRIMONIDINE TARTRATE 1 DROP: 1.5 SOLUTION OPHTHALMIC at 02:10

## 2022-10-10 RX ADMIN — METHOCARBAMOL 1000 MG: 500 TABLET ORAL at 12:10

## 2022-10-10 RX ADMIN — SODIUM BICARBONATE 650 MG TABLET 650 MG: at 08:10

## 2022-10-10 RX ADMIN — GABAPENTIN 300 MG: 300 CAPSULE ORAL at 02:10

## 2022-10-10 RX ADMIN — DORZOLAMIDE HYDROCHLORIDE 1 DROP: 20 SOLUTION/ DROPS OPHTHALMIC at 02:10

## 2022-10-10 RX ADMIN — HEPARIN SODIUM 5000 UNITS: 5000 INJECTION INTRAVENOUS; SUBCUTANEOUS at 02:10

## 2022-10-10 RX ADMIN — SODIUM BICARBONATE 650 MG TABLET 650 MG: at 02:10

## 2022-10-10 RX ADMIN — POTASSIUM BICARBONATE 40 MEQ: 391 TABLET, EFFERVESCENT ORAL at 10:10

## 2022-10-10 RX ADMIN — METHOCARBAMOL 1000 MG: 500 TABLET ORAL at 07:10

## 2022-10-10 RX ADMIN — TRAVOPROST 1 DROP: 0.04 SOLUTION/ DROPS OPHTHALMIC at 08:10

## 2022-10-10 RX ADMIN — METHOCARBAMOL 750 MG: 750 TABLET ORAL at 08:10

## 2022-10-10 RX ADMIN — ACETAMINOPHEN 1000 MG: 500 TABLET ORAL at 05:10

## 2022-10-10 RX ADMIN — ACETAMINOPHEN 1000 MG: 500 TABLET ORAL at 11:10

## 2022-10-10 RX ADMIN — POTASSIUM BICARBONATE 40 MEQ: 391 TABLET, EFFERVESCENT ORAL at 02:10

## 2022-10-10 NOTE — PROGRESS NOTES
Law Leyva - Neurosurgery ([facility])  Neurosurgery  Progress Note    Subjective:     History of Present Illness: 77 F with hx of cervical stenosis presents in fu.  She continues to endorse neck pain which radiates into her left shoulder and down to her left elbow.  She denies dropping items from her hands although she has had difficulty putting on jewelry.  She is unsteady when she walks without falls.  She is a nonsmoker.      Post-Op Info:  Procedure(s) (LRB):  DECOMPRESSION, SPINE, CERVICAL, POSTERIOR APPROACH C3-T1 (N/A)   3 Days Post-Op     Interval History:  POD 3. NAEON. Patient neuro stable. Complains of muscle spasms, will increase robaxin. Hypokalemia continues, HM following. Drain with 85 cc output, will maintain today. Voiding spontaneously. IPR pending drain removal and authorization/placement.     Medications:  Continuous Infusions:  Scheduled Meds:   acetaminophen  1,000 mg Oral Q6H    brimonidine 0.15 % OPTH DROP  1 drop Both Eyes TID    dorzolamide  1 drop Both Eyes TID    EScitalopram oxalate  10 mg Oral Daily    gabapentin  300 mg Oral TID    heparin (porcine)  5,000 Units Subcutaneous Q8H    methocarbamoL  1,000 mg Oral QID    potassium bicarbonate  40 mEq Oral Q4H    senna-docusate 8.6-50 mg  2 tablet Oral Daily    sodium bicarbonate  650 mg Oral TID    travoprost  1 drop Both Eyes QHS     PRN Meds:albuterol sulfate, aluminum-magnesium hydroxide-simethicone, bisacodyL, HYDROmorphone, ondansetron, ondansetron, oxyCODONE, oxyCODONE, polyethylene glycol, prochlorperazine, senna-docusate 8.6-50 mg     Review of Systems  Objective:     Weight: 54.9 kg (120 lb 15.1 oz)  Body mass index is 24.43 kg/m².  Vital Signs (Most Recent):  Temp: 98.1 °F (36.7 °C) (10/10/22 0844)  Pulse: 93 (10/10/22 0844)  Resp: 16 (10/10/22 1002)  BP: 119/64 (10/10/22 0844)  SpO2: 97 % (10/10/22 0844) Vital Signs (24h Range):  Temp:  [98 °F (36.7 °C)-98.8 °F (37.1 °C)] 98.1 °F (36.7 °C)  Pulse:  [] 93  Resp:   [16-18] 16  SpO2:  [95 %-99 %] 97 %  BP: (116-124)/(56-66) 119/64                          Closed/Suction Drain 10/07/22 1026 Posterior Neck 19 Fr. (Active)   Site Description Healing 10/10/22 0800   Dressing Type Other (Comment) 10/09/22 2000   Dressing Status Other (Comment) 10/09/22 2000   Drainage Serosanguineous 10/10/22 0800   Status To bulb suction 10/10/22 0800   Output (mL) 30 mL 10/10/22 0406       Neurosurgery Physical Exam  General: well developed, well nourished, no distress.   Head: normocephalic, atraumatic  Neck: c-collar in place, well fitted  Neurologic: Alert and oriented. Thought content appropriate.  Language: No aphasia  Speech: No dysarthria  Cranial nerves: face symmetric, tongue midline, CN II-XII grossly intact.   Eyes: pupils equal, round, reactive to light with accommodation, EOMI.   Pulmonary: normal respirations, no signs of respiratory distress  Abdomen: soft, non-distended, not tender to palpation  Skin: Skin is warm, dry and intact.  Sensory: intact to light touch throughout  Motor Strength:    Strength  Deltoids Triceps Biceps Wrist Extension Wrist Flexion Hand    Upper: R 4/5 4/5 4/5 4/5 4/5 4/5    L 4/5 4/5 4/5 4/5 4/5 4/5     Iliopsoas Quadriceps Knee  Flexion Tibialis  anterior Gastro- cnemius EHL   Lower: R 5/5 5/5 5/5 5/5 5/5 5/5    L 5/5 5/5 5/5 5/5 5/5 5/5     Posterior cervical incision is C/D/I, drain intact with SS output      Significant Labs:  Recent Labs   Lab 10/09/22  0519 10/09/22  1344 10/10/22  0612    112* 86    139 140   K 3.2* 2.9* 2.9*   * 113* 115*   CO2 17* 17* 19*   BUN 16 16 18   CREATININE 1.4 1.4 1.1   CALCIUM 7.7* 7.5* 7.9*     Recent Labs   Lab 10/09/22  0519 10/10/22  0612   WBC 13.08* 11.22   HGB 7.8* 7.5*   HCT 25.4* 23.1*    301     No results for input(s): LABPT, INR, APTT in the last 48 hours.  Microbiology Results (last 7 days)       Procedure Component Value Units Date/Time    Blood culture [[id number]] Collected:  10/09/22 1345    Order Status: Completed Specimen: Blood Updated: 10/09/22 2115     Blood Culture, Routine No Growth to date    Narrative:      Collection has been rescheduled by [email or web address] at 10/09/2022 09:59 Reason:   Unable to collect. RN notified. Tried multiple times. Rescheduled  Collection has been rescheduled by [email or web address] at 10/09/2022 09:59 Reason:   Unable to collect. RN notified. Tried multiple times. Rescheduled    Blood culture [[id number]] Collected: 10/09/22 1345    Order Status: Completed Specimen: Blood Updated: 10/09/22 2115     Blood Culture, Routine No Growth to date    Narrative:      Collection has been rescheduled by [email or web address] at 10/09/2022 09:59 Reason:   Unable to collect. RN notified. Tried multiple times. Rescheduled  Collection has been rescheduled by [email or web address] at 10/09/2022 09:59 Reason:   Unable to collect. RN notified. Tried multiple times. Rescheduled    Urine culture [[id number]] Collected: 10/09/22 2011    Order Status: No result Specimen: Urine Updated: 10/09/22 2032          All pertinent labs from the last 24 hours have been reviewed.    Significant Diagnostics:  I have reviewed all pertinent imaging results/findings within the past 24 hours.    Assessment/Plan:     * Cervical stenosis of spinal canal  77F w/ PMH of asthma, HTN, CKD 3,  who presents with cervical myelopathy now s/p C3-T1 PCDF on 10/7:    --Patient admitted to [facility] on telemetry      -q4h neurochecks on floor  --All labs and diagnostics reviewed  --Postop XR with satisfactory placement  --Drain with 80 cc output in 24 hours. Drains to remain at this time on full suction; please record hourly outputs. Abx for ppx.  --C-Collar on with >15 degrees. May remove when bathing, eating, sleeping  --HM consulted for medical management; appreciate input  --PT/OT/OOB  --Pain control with multimodal pain regimen. Increase robaxin.   --TEDs/SCDs/SQH      -Reccomend weekly BLEUS for DVT surveillance  --Continue to monitor clinically, notify NSGY  immediately with any changes in neuro status    Dispo: PT/OT recs for IPR. Discharge pending drain removal and insurance authorization    Discussed with Dr. Ginger Champion PA-C  Neurosurgery  Law Leyva - Neurosurgery ([facility])

## 2022-10-10 NOTE — ASSESSMENT & PLAN NOTE
There is a chronic component (prior admission with K<2) likely 2/2 her diarrhea. Also poor PO intake likely contributing. Less likely renal component. On presentation, K 2.2. No BM since surgery. Will give bowel regimen until she is regular and reassess needs for chronic therapies (diarrhea and K) prior to discharge     - Aggressive repletion; may benefit from discharge home on K supplement  - Telemetry  - Urine K/Cr not suggestive of renal potassium wasting.

## 2022-10-10 NOTE — PT/OT/SLP PROGRESS
Occupational Therapy Treatment    Patient Name:  Mariaelena Grubbs   MRN:  [id number]  Admit Date: 10/7/2022  Admitting Diagnosis:  Cervical stenosis of spinal canal   Length of Stay: 3 days  Recent Surgery: Procedure(s) (LRB):  DECOMPRESSION, SPINE, CERVICAL, POSTERIOR APPROACH C3-T1 (N/A) 3 Days Post-Op    Recommendations:     Discharge Recommendations: rehabilitation facility  Discharge Equipment Recommendations:  other (see comments) (TBD)  Barriers to discharge:  Decreased caregiver support    Plan:     Patient to be seen 3 x/week to address the above listed problems via self-care/home management, therapeutic activities, therapeutic exercises, neuromuscular re-education  Plan of Care Expires: 11/07/22  Plan of Care Reviewed with: patient    Assessment:   Mariaelena Grubbs is a 77 y.o. female with a medical diagnosis of Cervical stenosis of spinal canal.  She presents with the following performance deficits affecting function: weakness, impaired endurance, impaired functional mobility, impaired balance, impaired self care skills, gait instability, pain, decreased safety awareness, decreased upper extremity function, decreased lower extremity function, decreased coordination, impaired coordination, decreased ROM, orthopedic precautions.      Pt tolerated session fairly this date and was willing to participate. Demonstrating continued increased pain, impaired endurance, decreased activity tolerance, impaired balance and decreased safety awareness, requiring increased time to complete functional tasks. Patient making great progress towards established goals, discussion this date of home support and/or assistance available. Patient expressed uncertainty on available family/friend support and that she cares for her young grand-children during the day. Patient is not safe to return home alone at this time and would still benefit from inpatient rehab to improve independence. Patient is progressing towards established goals,  "and continues to benefit from acute skilled OT services to increase functional performance and improve quality of life. OT to continue to recommend Rehab at discharge to improve pt functional independence and increase patient safety before returning home.      Rehab Prognosis: Good; patient would benefit from acute skilled OT services to address these deficits and reach maximum level of function.        Subjective   Communicated with: Nurse prior to session.  Patient found HOB elevated with bed alarm, telemetry, peripheral IV, cervical collar, HILARY drain upon OT entry to room. Pt agreeable to participate at this time.     Patient: " They keep telling me not to get up! " -re bed alarm use    Pain/Comfort:  Pain Rating 1: 10/10  Location - Orientation 1: generalized  Location 1: neck  Pain Addressed 1: Reposition, Distraction, Cessation of Activity  Pain Rating Post-Intervention 1: 0/10    Objective:   Patient found with: bed alarm, telemetry, peripheral IV, cervical collar, HILARY drain   General Precautions: Standard, Cardiac fall   Orthopedic Precautions:spinal precautions   Braces: Cervical collar   Oxygen Device: Room Air  Vitals: /62   Pulse 92   Temp 98.3 °F (36.8 °C) (Axillary)   Resp 16   Ht 4' 11" (1.499 m)   Wt 54.9 kg (120 lb 15.1 oz)   SpO2 99%   Breastfeeding No   BMI 24.43 kg/m²     Outcome Measures:  [facility] 6 Click ADL: 20    Cognition:   Alert and Cooperative  Command following: follows two-step commands  Communication: clear/fluent    Occupational Performance:  Bed Mobility:    Patient completed Rolling/Turning to Right with stand by assistance  Patient completed Supine to Sit with stand by assistance on R side of bed  Scooting anteriorly to EOB to have both feet planted on floor: stand by assistance    Functional Mobility/Transfers:  Static Sitting EOB: SBA  Patient completed Sit <> Stand Transfer from EOB with stand by assistance  with  no assistive device   Static Standing Balance: " SBA  Dynamic Standing Balance: SBA  Patient completed functional mobility of household distance ~40ft with CGA using no AD.  Patient completed functional mobility of community distance ~100ft with CGA using no AD; required increased time due to impaired balance and slow gait      Activities of Daily Living:  Grooming: stand by assistance to perform oral care and wash face in stance at sink  Upper Body Dressing: maximal assistance to don c-collar and gown as robe    AMPAC 6 Click ADL:  AMPAC Total Score: 20    Treatment & Education:  -Pt education on OT role and POC.  -Importance of E/OOB activity with staff assistance, emphasis on daily participation  -Safety during functional transfer and mobility ensured  -Patient provided with education on importance of Bilateral UB/LB integration during functional tasks for improvement in functional performance.   -Education provided/reviewed, questions answered within OT scope of practice.   -Patient demonstrates understanding and learning this date.         Patient left up in chair with all lines intact, call button in reach, chair alarm on, and nurse notified    GOALS:   Multidisciplinary Problems       Occupational Therapy Goals          Problem: Occupational Therapy    Goal Priority Disciplines Outcome Interventions   Occupational Therapy Goal     OT, PT/OT Ongoing, Progressing    Description: Goals to be met by: 10/28     Patient will increase functional independence with ADLs by performing:    UE Dressing with Supervision.  LE Dressing with Supervision.  Grooming while standing with Supervision.  Toileting from toilet with Supervision for hygiene and clothing management.   Supine to sit with Modified [place].  Step transfer with Supervision  Toilet transfer to toilet with Supervision.                         Time Tracking:     OT Date of Treatment: 10/10/22  OT Start Time: 0853  OT Stop Time: 0920  OT Total Time (min): 27 min    Billable Minutes:Self Care/Home  Management 13  Therapeutic Activity 14      10/10/2022

## 2022-10-10 NOTE — ASSESSMENT & PLAN NOTE
Holding home nifedipine 60mg qd, valsartan 320mg qd, hydralazine 50mg q8h as she does not take consistently and now hypotensive  - BP improved over last 24 hours  - Continue holding home regimen

## 2022-10-10 NOTE — SUBJECTIVE & OBJECTIVE
Interval History:  POD 3. NAEON. Patient neuro stable. Complains of muscle spasms, will increase robaxin. Hypokalemia continues, HM following. Drain with 85 cc output, will maintain today. Voiding spontaneously. IPR pending drain removal and authorization/placement.     Medications:  Continuous Infusions:  Scheduled Meds:   acetaminophen  1,000 mg Oral Q6H    brimonidine 0.15 % OPTH DROP  1 drop Both Eyes TID    dorzolamide  1 drop Both Eyes TID    EScitalopram oxalate  10 mg Oral Daily    gabapentin  300 mg Oral TID    heparin (porcine)  5,000 Units Subcutaneous Q8H    methocarbamoL  1,000 mg Oral QID    potassium bicarbonate  40 mEq Oral Q4H    senna-docusate 8.6-50 mg  2 tablet Oral Daily    sodium bicarbonate  650 mg Oral TID    travoprost  1 drop Both Eyes QHS     PRN Meds:albuterol sulfate, aluminum-magnesium hydroxide-simethicone, bisacodyL, HYDROmorphone, ondansetron, ondansetron, oxyCODONE, oxyCODONE, polyethylene glycol, prochlorperazine, senna-docusate 8.6-50 mg     Review of Systems  Objective:     Weight: 54.9 kg (120 lb 15.1 oz)  Body mass index is 24.43 kg/m².  Vital Signs (Most Recent):  Temp: 98.1 °F (36.7 °C) (10/10/22 0844)  Pulse: 93 (10/10/22 0844)  Resp: 16 (10/10/22 1002)  BP: 119/64 (10/10/22 0844)  SpO2: 97 % (10/10/22 0844) Vital Signs (24h Range):  Temp:  [98 °F (36.7 °C)-98.8 °F (37.1 °C)] 98.1 °F (36.7 °C)  Pulse:  [] 93  Resp:  [16-18] 16  SpO2:  [95 %-99 %] 97 %  BP: (116-124)/(56-66) 119/64                          Closed/Suction Drain 10/07/22 1026 Posterior Neck 19 Fr. (Active)   Site Description Healing 10/10/22 0800   Dressing Type Other (Comment) 10/09/22 2000   Dressing Status Other (Comment) 10/09/22 2000   Drainage Serosanguineous 10/10/22 0800   Status To bulb suction 10/10/22 0800   Output (mL) 30 mL 10/10/22 0406       Neurosurgery Physical Exam  General: well developed, well nourished, no distress.   Head: normocephalic, atraumatic  Neck: c-collar in place, well  fitted  Neurologic: Alert and oriented. Thought content appropriate.  Language: No aphasia  Speech: No dysarthria  Cranial nerves: face symmetric, tongue midline, CN II-XII grossly intact.   Eyes: pupils equal, round, reactive to light with accommodation, EOMI.   Pulmonary: normal respirations, no signs of respiratory distress  Abdomen: soft, non-distended, not tender to palpation  Skin: Skin is warm, dry and intact.  Sensory: intact to light touch throughout  Motor Strength:    Strength  Deltoids Triceps Biceps Wrist Extension Wrist Flexion Hand    Upper: R 4/5 4/5 4/5 4/5 4/5 4/5    L 4/5 4/5 4/5 4/5 4/5 4/5     Iliopsoas Quadriceps Knee  Flexion Tibialis  anterior Gastro- cnemius EHL   Lower: R 5/5 5/5 5/5 5/5 5/5 5/5    L 5/5 5/5 5/5 5/5 5/5 5/5     Posterior cervical incision is C/D/I, drain intact with SS output      Significant Labs:  Recent Labs   Lab 10/09/22  0519 10/09/22  1344 10/10/22  0612    112* 86    139 140   K 3.2* 2.9* 2.9*   * 113* 115*   CO2 17* 17* 19*   BUN 16 16 18   CREATININE 1.4 1.4 1.1   CALCIUM 7.7* 7.5* 7.9*     Recent Labs   Lab 10/09/22  0519 10/10/22  0612   WBC 13.08* 11.22   HGB 7.8* 7.5*   HCT 25.4* 23.1*    301     No results for input(s): LABPT, INR, APTT in the last 48 hours.  Microbiology Results (last 7 days)       Procedure Component Value Units Date/Time    Blood culture [[id number]] Collected: 10/09/22 1345    Order Status: Completed Specimen: Blood Updated: 10/09/22 2115     Blood Culture, Routine No Growth to date    Narrative:      Collection has been rescheduled by NATALYA at 10/09/2022 09:59 Reason:   Unable to collect. RN notified. Tried multiple times. Rescheduled  Collection has been rescheduled by [email or web address] at 10/09/2022 09:59 Reason:   Unable to collect. RN notified. Tried multiple times. Rescheduled    Blood culture [[id number]] Collected: 10/09/22 1345    Order Status: Completed Specimen: Blood Updated: 10/09/22 2115     Blood Culture,  Routine No Growth to date    Narrative:      Collection has been rescheduled by [email or web address] at 10/09/2022 09:59 Reason:   Unable to collect. RN notified. Tried multiple times. Rescheduled  Collection has been rescheduled by [email or web address] at 10/09/2022 09:59 Reason:   Unable to collect. RN notified. Tried multiple times. Rescheduled    Urine culture [[id number]] Collected: 10/09/22 2011    Order Status: No result Specimen: Urine Updated: 10/09/22 2032          All pertinent labs from the last 24 hours have been reviewed.    Significant Diagnostics:  I have reviewed all pertinent imaging results/findings within the past 24 hours.

## 2022-10-10 NOTE — ASSESSMENT & PLAN NOTE
77F w/ PMH of asthma, HTN, CKD 3,  who presents with cervical myelopathy now s/p C3-T1 PCDF on 10/7:    --Patient admitted to [facility] on telemetry      -q4h neurochecks on floor  --All labs and diagnostics reviewed  --Postop XR with satisfactory placement  --Drain with 80 cc output in 24 hours. Drains to remain at this time on full suction; please record hourly outputs. Abx for ppx.  --C-Collar on with >15 degrees. May remove when bathing, eating, sleeping  --HM consulted for medical management; appreciate input  --PT/OT/OOB  --Pain control with multimodal pain regimen. Increase robaxin.   --TEDs/SCDs/SQH      -Reccomend weekly BLEUS for DVT surveillance  --Continue to monitor clinically, notify NSGY immediately with any changes in neuro status    Dispo: PT/OT recs for IPR. Discharge pending drain removal and insurance authorization    Discussed with Dr. Miles

## 2022-10-10 NOTE — PLAN OF CARE
Problem: Adult Inpatient Plan of Care  Goal: Plan of Care Review  Outcome: Ongoing, Progressing     Problem: Pain Acute  Goal: Acceptable Pain Control and Functional Ability  Outcome: Ongoing, Progressing  Intervention: Prevent or Manage Pain  Flowsheets (Taken 10/10/2022 0305)  Sleep/Rest Enhancement: awakenings minimized  Sensory Stimulation Regulation:   quiet environment promoted   care clustered  Bowel Elimination Promotion: adequate fluid intake promoted  Medication Review/Management: medications reviewed     POC reviewed with patient this shift.  Remains A/O x4.  Respirations unlabored.  Skin w/d.  Incision to neck continues with x1 hemovac intact.  Pt up with x1 standby/min assist to restroom.  U/A collected this shift as ordered.  Strict I/O's continue as ordered with more adequate output noted than on previous night shift.  Voiding spontaneously without difficulty.  No loose stool noted this shift.  Neck pain continues to be managed with scheduled/PRN meds.  Tolerates meds whole with water without difficulty.  VSS.  See flowsheet for full assessment.  Able to verbalize wants/needs.  No s/s of distress noted.  Fall/safety precautions maintained.

## 2022-10-10 NOTE — PLAN OF CARE
Law Leyva - Neurosurgery (Hospital)  Initial Discharge Assessment       Primary Care Provider: Primary Doctor No    Admission Diagnosis: Cervical spondylosis with myelopathy [M47.12]  Cervical myelopathy [G95.9]    Admission Date: 10/7/2022  Expected Discharge Date: 10/12/2022    Discharge Barriers Identified: None    Payor: HU[employer] MANAGED MEDICARE / Plan: HU[employer]NA SNP (SPECIAL NEEDS PLAN) / Product Type: Medicare Advantage /     Extended Emergency Contact Information  Primary Emergency Contact: Monalisa Grubbs   [employer]  Home Phone: [phone]  Relation: Daughter  Secondary Emergency Contact: MENDEZ GRUBBS  Mobile Phone: [phone]  Relation: Son    Discharge Plan A: Rehab  Discharge Plan B: Skilled Nursing Facility      [facility] - [facility] - [address]  [address]  [place] [address]  Phone: [phone] Fax: [phone]       Discharge planning assessment obtained from patient.  Patient provided with discharge planning booklet.    Initial Assessment (most recent)       Adult Discharge Assessment - 10/10/22 1423          Discharge Assessment    Assessment Type Discharge Planning Assessment     Confirmed/corrected address, phone number and insurance Yes     Confirmed Demographics Correct on Facesheet     Source of Information patient     Communicated SHERIDAN with patient/caregiver Yes     Reason For Admission cervical stenosis of spinal canal     Lives With child(rossana), adult     Do you expect to return to your current living situation? Yes     Do you have help at home or someone to help you manage your care at home? Yes     Who are your caregiver(s) and their phone number(s)? Carla - daughter [phone]     Prior to hospitilization cognitive status: Alert/Oriented     Current cognitive status: Alert/Oriented     Walking or Climbing Stairs Difficulty none     Dressing/Bathing Difficulty none     Equipment Currently Used at Home none     Readmission  within 30 days? No     Patient currently being followed by outpatient case management? No     Do you currently have service(s) that help you manage your care at home? Yes     Name and Contact number of agency patient states [facility]     Is the pt/caregiver preference to resume services with current agency Yes     Do you take prescription medications? Yes     Do you have prescription coverage? Yes     Coverage HU[employer] MANAGED MEDICARE - HU[employer]NA SNP (SPECIAL NEEDS PLAN)     Do you have any problems affording any of your prescribed medications? No     Is the patient taking medications as prescribed? yes     Who is going to help you get home at discharge? family     How do you get to doctors appointments? family or friend will provide     Are you on dialysis? No     Do you take coumadin? No     Discharge Plan A Rehab     Discharge Plan B Skilled Nursing Facility     DME Needed Upon Discharge  other (see comments)   tbd    Discharge Plan discussed with: Patient     Discharge Barriers Identified None        Relationship/Environment    Name(s) of Who Lives With Patient Carla

## 2022-10-10 NOTE — ASSESSMENT & PLAN NOTE
Hypotensive (may be 2/2 HTN meds initiation) with productive cough and intermittent hypoxemia. Afebrile, denies fevers/chills.  - CXR negative for infection  - BCx NGTD  - UCx still in progress; low theshold to start rocephin if patient develops systemic signs of infection but for now, will hold while awaiting urine culture. She is not having any UTI symptoms.

## 2022-10-10 NOTE — PT/OT/SLP PROGRESS
Physical Therapy Treatment    Patient Name:  Mariaelena Grubbs   MRN:  [id number]    Recommendations:     Discharge Recommendations:  rehabilitation facility   Discharge Equipment Recommendations:     Barriers to discharge:  fall risk    Assessment:     Mariaelena Grubbs is a 77 y.o. female admitted with a medical diagnosis of Cervical stenosis of spinal canal.  She presents with the following impairments/functional limitations:  weakness, impaired endurance, impaired functional mobility, gait instability, impaired balance, decreased safety awareness. Pt participated, and tolerated treatment well. Pt will continue to benefit from skilled PT services to improve all deficits noted above. Resume PT POC as indicated.     Rehab Prognosis: Good; patient would benefit from acute skilled PT services to address these deficits and reach maximum level of function.    Recent Surgery: Procedure(s) (LRB):  DECOMPRESSION, SPINE, CERVICAL, POSTERIOR APPROACH C3-T1 (N/A) 3 Days Post-Op    Plan:     During this hospitalization, patient to be seen 3 x/week to address the identified rehab impairments via gait training, therapeutic activities, therapeutic exercises and progress toward the following goals:    Plan of Care Expires:  11/06/22    Subjective     Chief Complaint: none stated  Patient/Family Comments/goals: none stated  Pain/Comfort:  Pain Rating 1: 0/10  Pain Rating Post-Intervention 1: 0/10      Objective:     Communicated with nursing prior to session.  Patient found up in chair with  (all lines intact and cervical brace donned) upon PT entry to room.     General Precautions: Standard, fall   Orthopedic Precautions:N/A   Braces: Cervical collar  Respiratory Status: Room air     Functional Mobility:  Transfers:  Sit to Stand:  SBA with rolling walker  Gait: ~148ft with RW and CG-SBA for safety.       AM-PAC 6 CLICK MOBILITY   Total Score: 19       Therapeutic Activities and Exercises:   -Answered all questions/concerns within PTA  scope of practice.     Patient left up in chair with all lines intact, call button in reach, and nursing notified..    GOALS:   Multidisciplinary Problems       Physical Therapy Goals          Problem: Physical Therapy    Goal Priority Disciplines Outcome Goal Variances Interventions   Physical Therapy Goal     PT, PT/OT Ongoing, Progressing     Description: Goals to be met by: 10/31/22     Patient will increase functional independence with mobility by performin. Supine to sit with modified independence  2. Sit to stand transfer with Modified [place]  3. Gait  x 250 feet with Modified [place] using Rolling Walker.   4. Ascend/descend 3 stair with right Handrails Minimal Assistance using LRAD.                          Time Tracking:     PT Received On: 10/10/22  PT Start Time: 1104     PT Stop Time: 1116  PT Total Time (min): 12 min     Billable Minutes: Gait Training 12    Treatment Type: Treatment  PT/PTA: PTA     PTA Visit Number: 1     10/10/2022

## 2022-10-10 NOTE — PLAN OF CARE
CM met with patient who chose [facility] as preference for Rehab.  Referral sent and pmr consult placed.   10/10/22 9545   Post-Acute Status   Post-Acute Authorization Placement   Post-Acute Placement Status Referrals Sent

## 2022-10-10 NOTE — PROGRESS NOTES
[facility] - Neurosurgery ([facility])  [facility] Medicine  Progress Note    Patient Name: Mariaelena Grubbs  MRN: [id number]  Patient Class: IP- Inpatient   Admission Date: 10/7/2022  Length of Stay: 3 days  Attending Physician: Freddy Miles DO  Primary Care Provider: Primary Doctor No        Subjective:     Principal Problem:Cervical stenosis of spinal canal        HPI:  Mariaelena Grubbs is a 77yoF with HTN, HLD, CKD3, distant hx of Br CA (b/l mastectomy 1984 w/o recurrence), gout, asthma, chronic R sided pyelonephritis requiring R sided partial nephrectomy (2019) complicated by bowel perforation and requiring multiple bowel resections with R sided hemicolectomy and chronic diarrhea, and cervical spinal/foraminal stenosis with radiculopathy into the left arm who presented for elective C3-C6 spinal decompression as well as C3-T1 posterior spinal fusion which was done on 10/7/22. She doesn't have any other active problems on presentation. The patient states she is doing well but has some neck pain as well as still having some radicular left shoulder/arm pain. She states her appetite has been decreased over the past 2-3 months, she states due to her pain. She has been stable since her operation without significant complications and is requiring significant potassium repletion. Hospital medicine was consulted for medical comanagement.       Overview/Hospital Course:  No notes on file    Interval History: NAEON. BP in 100-110 overnight. Feeling well this morning, walked with OT    Review of Systems  Objective:     Vital Signs (Most Recent):  Temp: 98.3 °F (36.8 °C) (10/10/22 1203)  Pulse: 92 (10/10/22 1203)  Resp: 16 (10/10/22 1203)  BP: 115/62 (10/10/22 1203)  SpO2: 99 % (10/10/22 1203) Vital Signs (24h Range):  Temp:  [98 °F (36.7 °C)-98.8 °F (37.1 °C)] 98.3 °F (36.8 °C)  Pulse:  [] 92  Resp:  [16-18] 16  SpO2:  [95 %-99 %] 99 %  BP: (115-124)/(56-66) 115/62     Weight: 54.9 kg (120 lb 15.1 oz)  Body mass index is  24.43 kg/m².    Intake/Output Summary (Last 24 hours) at 10/10/2022 1434  Last data filed at 10/10/2022 0406  Gross per 24 hour   Intake 610 ml   Output 680 ml   Net -70 ml      Physical Exam  Vitals and nursing note reviewed.   Constitutional:       General: She is not in acute distress.     Appearance: She is not toxic-appearing or diaphoretic.   HENT:      Head: Normocephalic.      Comments: C collar in place  Cardiovascular:      Rate and Rhythm: Normal rate and regular rhythm.   Pulmonary:      Effort: Pulmonary effort is normal. No respiratory distress.   Abdominal:      Palpations: Abdomen is soft.      Tenderness: There is no guarding.   Skin:     General: Skin is warm and dry.   Neurological:      Mental Status: She is alert. Mental status is at baseline.      Cranial Nerves: No dysarthria.   Psychiatric:         Mood and Affect: Mood normal.         Behavior: Behavior normal.       Significant Labs: All pertinent labs within the past 24 hours have been reviewed.    Significant Imaging: I have reviewed all pertinent imaging results/findings within the past 24 hours.      Assessment/Plan:      * Cervical stenosis of spinal canal  S/p C3-6 decompression, C3-T1 posterior fusion 10/7/22  Post surgical care per primary  Bowel regimen PRN    Suspected Infection  Hypotensive (may be 2/2 HTN meds initiation) with productive cough and intermittent hypoxemia. Afebrile, denies fevers/chills.  - CXR negative for infection  - BCx NGTD  - UCx still in progress; low theshold to start rocephin if patient develops systemic signs of infection but for now, will hold while awaiting urine culture. She is not having any UTI symptoms.    Normocytic anemia  Chronic anemia with Hgb 9.5 close to baseline in post-op patient. Normal MCV.  Likely mild blood loss anemia post-operatively  Watch for bleeding, daily CBC    Cervical myelopathy  As above      Glaucoma of right eye  On prior admission she was seen by ophtho. She was  previously prescribed eye gtts but wasn't sure what she was supposed to be taking so [facility] saw her and recommended the gtts below.   - Continue dorzolamide, travatan, and brimonidine, as well as on discharge  - Follow up with [facility] outpatient    Metabolic acidosis  Chronically low bicarb on metabolic panel. Likely secondary to chronic diarrhea. Was on bowel regimen post-op but now having diarrhea again. Laxatives PRN  Bicarb stable, continuing to monitor.    Hypokalemia  There is a chronic component (prior admission with K<2) likely 2/2 her diarrhea. Also poor PO intake likely contributing. Less likely renal component. On presentation, K 2.2. No BM since surgery. Will give bowel regimen until she is regular and reassess needs for chronic therapies (diarrhea and K) prior to discharge     - Aggressive repletion; may benefit from discharge home on K supplement  - Telemetry  - Urine K/Cr not suggestive of renal potassium wasting.    CKD (chronic kidney disease) stage 3, GFR 30-59 ml/min  Stable  Holding home ARB  Acidosis likely secondary to diarrhea, will not start bicarb at this time    Asthma  Stable, prn bronchodilators      HTN (hypertension)  Holding home nifedipine 60mg qd, valsartan 320mg qd, hydralazine 50mg q8h as she does not take consistently and now hypotensive  - BP improved over last 24 hours  - Continue holding home regimen        VTE Risk Mitigation (From admission, onward)         Ordered     heparin (porcine) injection 5,000 Units  Every 8 hours         10/07/22 1220     IP VTE HIGH RISK PATIENT  Once         10/07/22 1214     Place sequential compression device  Until discontinued         10/07/22 1214     Place PRERNA hose  Until discontinued         10/07/22 0527     Place sequential compression device  Until discontinued         10/07/22 0527                Discharge Planning   SHERIDAN: 10/12/2022     Code Status: Full Code   Is the patient medically ready for discharge?: No    Reason for patient still  in hospital (select all that apply): Patient trending condition  Discharge Plan A: Rehab                  Agata Moreno MD  Department of Hospital Medicine   [facility] - Neurosurgery ([facility])

## 2022-10-10 NOTE — SUBJECTIVE & OBJECTIVE
Interval History: NAEON. BP in 100-110 overnight. Feeling well this morning, walked with OT    Review of Systems  Objective:     Vital Signs (Most Recent):  Temp: 98.3 °F (36.8 °C) (10/10/22 1203)  Pulse: 92 (10/10/22 1203)  Resp: 16 (10/10/22 1203)  BP: 115/62 (10/10/22 1203)  SpO2: 99 % (10/10/22 1203) Vital Signs (24h Range):  Temp:  [98 °F (36.7 °C)-98.8 °F (37.1 °C)] 98.3 °F (36.8 °C)  Pulse:  [] 92  Resp:  [16-18] 16  SpO2:  [95 %-99 %] 99 %  BP: (115-124)/(56-66) 115/62     Weight: 54.9 kg (120 lb 15.1 oz)  Body mass index is 24.43 kg/m².    Intake/Output Summary (Last 24 hours) at 10/10/2022 1434  Last data filed at 10/10/2022 0406  Gross per 24 hour   Intake 610 ml   Output 680 ml   Net -70 ml      Physical Exam  Vitals and nursing note reviewed.   Constitutional:       General: She is not in acute distress.     Appearance: She is not toxic-appearing or diaphoretic.   HENT:      Head: Normocephalic.      Comments: C collar in place  Cardiovascular:      Rate and Rhythm: Normal rate and regular rhythm.   Pulmonary:      Effort: Pulmonary effort is normal. No respiratory distress.   Abdominal:      Palpations: Abdomen is soft.      Tenderness: There is no guarding.   Skin:     General: Skin is warm and dry.   Neurological:      Mental Status: She is alert. Mental status is at baseline.      Cranial Nerves: No dysarthria.   Psychiatric:         Mood and Affect: Mood normal.         Behavior: Behavior normal.       Significant Labs: All pertinent labs within the past 24 hours have been reviewed.    Significant Imaging: I have reviewed all pertinent imaging results/findings within the past 24 hours.

## 2022-10-11 LAB
ABO + RH BLD: ABNORMAL
ALBUMIN SERPL BCP-MCNC: 2.2 G/DL (ref 3.5–5.2)
ALP SERPL-CCNC: 60 U/L (ref 55–135)
ALT SERPL W/O P-5'-P-CCNC: 14 U/L (ref 10–44)
ANION GAP SERPL CALC-SCNC: 7 MMOL/L (ref 8–16)
AST SERPL-CCNC: 21 U/L (ref 10–40)
BASOPHILS # BLD AUTO: 0.02 K/UL (ref 0–0.2)
BASOPHILS NFR BLD: 0.2 % (ref 0–1.9)
BILIRUB SERPL-MCNC: 0.6 MG/DL (ref 0.1–1)
BLD GP AB SCN CELLS X3 SERPL QL: ABNORMAL
BLD PROD TYP BPU: NORMAL
BLOOD GROUP ANTIBODIES SERPL: NORMAL
BLOOD UNIT EXPIRATION DATE: NORMAL
BLOOD UNIT TYPE CODE: 9500
BLOOD UNIT TYPE: NORMAL
BUN SERPL-MCNC: 20 MG/DL (ref 8–23)
CALCIUM SERPL-MCNC: 7.6 MG/DL (ref 8.7–10.5)
CHLORIDE SERPL-SCNC: 116 MMOL/L (ref 95–110)
CO2 SERPL-SCNC: 21 MMOL/L (ref 23–29)
CODING SYSTEM: NORMAL
CREAT SERPL-MCNC: 1 MG/DL (ref 0.5–1.4)
DIFFERENTIAL METHOD: ABNORMAL
DISPENSE STATUS: NORMAL
EOSINOPHIL # BLD AUTO: 0.3 K/UL (ref 0–0.5)
EOSINOPHIL NFR BLD: 2.4 % (ref 0–8)
ERYTHROCYTE [DISTWIDTH] IN BLOOD BY AUTOMATED COUNT: 14.2 % (ref 11.5–14.5)
EST. GFR  (NO RACE VARIABLE): 58.4 ML/MIN/1.73 M^2
GLUCOSE SERPL-MCNC: 88 MG/DL (ref 70–110)
HCT VFR BLD AUTO: 21.3 % (ref 37–48.5)
HGB BLD-MCNC: 6.5 G/DL (ref 12–16)
IMM GRANULOCYTES # BLD AUTO: 0.06 K/UL (ref 0–0.04)
IMM GRANULOCYTES NFR BLD AUTO: 0.6 % (ref 0–0.5)
LYMPHOCYTES # BLD AUTO: 4 K/UL (ref 1–4.8)
LYMPHOCYTES NFR BLD: 37.5 % (ref 18–48)
MAGNESIUM SERPL-MCNC: 1.4 MG/DL (ref 1.6–2.6)
MCH RBC QN AUTO: 30.1 PG (ref 27–31)
MCHC RBC AUTO-ENTMCNC: 30.5 G/DL (ref 32–36)
MCV RBC AUTO: 99 FL (ref 82–98)
MONOCYTES # BLD AUTO: 0.8 K/UL (ref 0.3–1)
MONOCYTES NFR BLD: 7.5 % (ref 4–15)
NEUTROPHILS # BLD AUTO: 5.5 K/UL (ref 1.8–7.7)
NEUTROPHILS NFR BLD: 51.8 % (ref 38–73)
NRBC BLD-RTO: 0 /100 WBC
NUM UNITS TRANS PACKED RBC: NORMAL
PLATELET # BLD AUTO: 296 K/UL (ref 150–450)
PMV BLD AUTO: 10.5 FL (ref 9.2–12.9)
POTASSIUM SERPL-SCNC: 3.2 MMOL/L (ref 3.5–5.1)
PROT SERPL-MCNC: 4.5 G/DL (ref 6–8.4)
RBC # BLD AUTO: 2.16 M/UL (ref 4–5.4)
SODIUM SERPL-SCNC: 144 MMOL/L (ref 136–145)
WBC # BLD AUTO: 10.61 K/UL (ref 3.9–12.7)

## 2022-10-11 PROCEDURE — 25000003 PHARM REV CODE 250: Performed by: PHYSICIAN ASSISTANT

## 2022-10-11 PROCEDURE — 36410 VNPNXR 3YR/> PHY/QHP DX/THER: CPT

## 2022-10-11 PROCEDURE — 25000003 PHARM REV CODE 250

## 2022-10-11 PROCEDURE — 83735 ASSAY OF MAGNESIUM: CPT | Performed by: HOSPITALIST

## 2022-10-11 PROCEDURE — 25000003 PHARM REV CODE 250: Performed by: STUDENT IN AN ORGANIZED HEALTH CARE EDUCATION/TRAINING PROGRAM

## 2022-10-11 PROCEDURE — 99232 PR SUBSEQUENT HOSPITAL CARE,LEVL II: ICD-10-PCS | Mod: GC,,, | Performed by: HOSPITALIST

## 2022-10-11 PROCEDURE — 76937 US GUIDE VASCULAR ACCESS: CPT

## 2022-10-11 PROCEDURE — 86870 RBC ANTIBODY IDENTIFICATION: CPT | Performed by: PHYSICIAN ASSISTANT

## 2022-10-11 PROCEDURE — 11000001 HC ACUTE MED/SURG PRIVATE ROOM

## 2022-10-11 PROCEDURE — 36430 TRANSFUSION BLD/BLD COMPNT: CPT

## 2022-10-11 PROCEDURE — 36415 COLL VENOUS BLD VENIPUNCTURE: CPT | Performed by: HOSPITALIST

## 2022-10-11 PROCEDURE — 99232 SBSQ HOSP IP/OBS MODERATE 35: CPT | Mod: GC,,, | Performed by: HOSPITALIST

## 2022-10-11 PROCEDURE — 99222 1ST HOSP IP/OBS MODERATE 55: CPT | Mod: ,,, | Performed by: NURSE PRACTITIONER

## 2022-10-11 PROCEDURE — P9016 RBC LEUKOCYTES REDUCED: HCPCS | Performed by: PHYSICIAN ASSISTANT

## 2022-10-11 PROCEDURE — C1751 CATH, INF, PER/CENT/MIDLINE: HCPCS

## 2022-10-11 PROCEDURE — 99024 PR POST-OP FOLLOW-UP VISIT: ICD-10-PCS | Mod: ,,, | Performed by: PHYSICIAN ASSISTANT

## 2022-10-11 PROCEDURE — 86922 COMPATIBILITY TEST ANTIGLOB: CPT | Performed by: PHYSICIAN ASSISTANT

## 2022-10-11 PROCEDURE — 36415 COLL VENOUS BLD VENIPUNCTURE: CPT | Performed by: PHYSICIAN ASSISTANT

## 2022-10-11 PROCEDURE — 63600175 PHARM REV CODE 636 W HCPCS: Performed by: PHYSICIAN ASSISTANT

## 2022-10-11 PROCEDURE — 99024 POSTOP FOLLOW-UP VISIT: CPT | Mod: ,,, | Performed by: PHYSICIAN ASSISTANT

## 2022-10-11 PROCEDURE — 80053 COMPREHEN METABOLIC PANEL: CPT | Performed by: HOSPITALIST

## 2022-10-11 PROCEDURE — 63600175 PHARM REV CODE 636 W HCPCS

## 2022-10-11 PROCEDURE — 85025 COMPLETE CBC W/AUTO DIFF WBC: CPT | Performed by: HOSPITALIST

## 2022-10-11 PROCEDURE — 99222 PR INITIAL HOSPITAL CARE,LEVL II: ICD-10-PCS | Mod: ,,, | Performed by: NURSE PRACTITIONER

## 2022-10-11 PROCEDURE — 86901 BLOOD TYPING SEROLOGIC RH(D): CPT | Performed by: PHYSICIAN ASSISTANT

## 2022-10-11 RX ORDER — AMOXICILLIN 250 MG
2 CAPSULE ORAL DAILY
Start: 2022-10-12

## 2022-10-11 RX ORDER — POTASSIUM CHLORIDE 20 MEQ/1
40 TABLET, EXTENDED RELEASE ORAL DAILY
Status: DISCONTINUED | OUTPATIENT
Start: 2022-10-12 | End: 2022-10-11

## 2022-10-11 RX ORDER — HYDROCODONE BITARTRATE AND ACETAMINOPHEN 500; 5 MG/1; MG/1
TABLET ORAL
Status: DISCONTINUED | OUTPATIENT
Start: 2022-10-11 | End: 2022-10-12 | Stop reason: HOSPADM

## 2022-10-11 RX ORDER — OXYCODONE HYDROCHLORIDE 10 MG/1
10 TABLET ORAL EVERY 4 HOURS PRN
Refills: 0
Start: 2022-10-11 | End: 2022-10-12 | Stop reason: SDUPTHER

## 2022-10-11 RX ORDER — METHOCARBAMOL 500 MG/1
1000 TABLET, FILM COATED ORAL 4 TIMES DAILY
Refills: 0
Start: 2022-10-11 | End: 2022-10-12 | Stop reason: SDUPTHER

## 2022-10-11 RX ADMIN — METHOCARBAMOL 1000 MG: 500 TABLET ORAL at 09:10

## 2022-10-11 RX ADMIN — SODIUM BICARBONATE 650 MG TABLET 650 MG: at 08:10

## 2022-10-11 RX ADMIN — SODIUM BICARBONATE 650 MG TABLET 650 MG: at 02:10

## 2022-10-11 RX ADMIN — ACETAMINOPHEN 1000 MG: 500 TABLET ORAL at 11:10

## 2022-10-11 RX ADMIN — HEPARIN SODIUM 5000 UNITS: 5000 INJECTION INTRAVENOUS; SUBCUTANEOUS at 09:10

## 2022-10-11 RX ADMIN — GABAPENTIN 300 MG: 300 CAPSULE ORAL at 08:10

## 2022-10-11 RX ADMIN — BRIMONIDINE TARTRATE 1 DROP: 1.5 SOLUTION OPHTHALMIC at 01:10

## 2022-10-11 RX ADMIN — METHOCARBAMOL 1000 MG: 500 TABLET ORAL at 01:10

## 2022-10-11 RX ADMIN — ESCITALOPRAM OXALATE 10 MG: 10 TABLET ORAL at 08:10

## 2022-10-11 RX ADMIN — OXYCODONE HYDROCHLORIDE 10 MG: 10 TABLET ORAL at 03:10

## 2022-10-11 RX ADMIN — SODIUM BICARBONATE 650 MG TABLET 650 MG: at 09:10

## 2022-10-11 RX ADMIN — HEPARIN SODIUM 5000 UNITS: 5000 INJECTION INTRAVENOUS; SUBCUTANEOUS at 05:10

## 2022-10-11 RX ADMIN — DORZOLAMIDE HYDROCHLORIDE 1 DROP: 20 SOLUTION/ DROPS OPHTHALMIC at 01:10

## 2022-10-11 RX ADMIN — TRAVOPROST 1 DROP: 0.04 SOLUTION/ DROPS OPHTHALMIC at 09:10

## 2022-10-11 RX ADMIN — DORZOLAMIDE HYDROCHLORIDE 1 DROP: 20 SOLUTION/ DROPS OPHTHALMIC at 08:10

## 2022-10-11 RX ADMIN — BRIMONIDINE TARTRATE 1 DROP: 1.5 SOLUTION OPHTHALMIC at 08:10

## 2022-10-11 RX ADMIN — POTASSIUM BICARBONATE 40 MEQ: 391 TABLET, EFFERVESCENT ORAL at 08:10

## 2022-10-11 RX ADMIN — OXYCODONE HYDROCHLORIDE 10 MG: 10 TABLET ORAL at 04:10

## 2022-10-11 RX ADMIN — BRIMONIDINE TARTRATE 1 DROP: 1.5 SOLUTION OPHTHALMIC at 09:10

## 2022-10-11 RX ADMIN — HEPARIN SODIUM 5000 UNITS: 5000 INJECTION INTRAVENOUS; SUBCUTANEOUS at 01:10

## 2022-10-11 RX ADMIN — POTASSIUM BICARBONATE 40 MEQ: 391 TABLET, EFFERVESCENT ORAL at 01:10

## 2022-10-11 RX ADMIN — HYDROMORPHONE HYDROCHLORIDE 1 MG: 1 INJECTION, SOLUTION INTRAMUSCULAR; INTRAVENOUS; SUBCUTANEOUS at 02:10

## 2022-10-11 RX ADMIN — OXYCODONE HYDROCHLORIDE 10 MG: 10 TABLET ORAL at 11:10

## 2022-10-11 RX ADMIN — METHOCARBAMOL 1000 MG: 500 TABLET ORAL at 08:10

## 2022-10-11 RX ADMIN — ACETAMINOPHEN 1000 MG: 500 TABLET ORAL at 05:10

## 2022-10-11 RX ADMIN — DORZOLAMIDE HYDROCHLORIDE 1 DROP: 20 SOLUTION/ DROPS OPHTHALMIC at 09:10

## 2022-10-11 RX ADMIN — GABAPENTIN 300 MG: 300 CAPSULE ORAL at 02:10

## 2022-10-11 NOTE — NURSING
Dr MONTANEZ notified HILARY site leaking serous fld . No other complaints. MD will send someone down to see.

## 2022-10-11 NOTE — PROGRESS NOTES
Law [facility]yva - Neurosurgery ([facility])  [facility] Medicine  Progress Note    Patient Name: Mariaelena Grubbs  MRN: [id number]  Patient Class: IP- Inpatient   Admission Date: 10/7/2022  Length of Stay: 4 days  Attending Physician: Freddy Miles DO  Primary Care Provider: Primary Doctor No        Subjective:     Principal Problem:Cervical stenosis of spinal canal        HPI:  Mariaelena Grubbs is a 77yoF with HTN, HLD, CKD3, distant hx of Br CA (b/l mastectomy 1984 w/o recurrence), gout, asthma, chronic R sided pyelonephritis requiring R sided partial nephrectomy (2019) complicated by bowel perforation and requiring multiple bowel resections with R sided hemicolectomy and chronic diarrhea, and cervical spinal/foraminal stenosis with radiculopathy into the left arm who presented for elective C3-C6 spinal decompression as well as C3-T1 posterior spinal fusion which was done on 10/7/22. She doesn't have any other active problems on presentation. The patient states she is doing well but has some neck pain as well as still having some radicular left shoulder/arm pain. She states her appetite has been decreased over the past 2-3 months, she states due to her pain. She has been stable since her operation without significant complications and is requiring significant potassium repletion. Hospital medicine was consulted for medical comanagement.       Overview/Hospital Course:  No notes on file    Interval History: NAEON. BP increasing to 100-160s last 24h. Continues to deny any UTI symptoms today. Feeling well. Back drain is leaking slightly.     Review of Systems  Objective:     Vital Signs (Most Recent):  Temp: 97.8 °F (36.6 °C) (10/11/22 0743)  Pulse: 92 (10/11/22 0753)  Resp: 18 (10/11/22 0743)  BP: 133/71 (10/11/22 0743)  SpO2: 98 % (10/11/22 0743) Vital Signs (24h Range):  Temp:  [97.8 °F (36.6 °C)-98.7 °F (37.1 °C)] 97.8 °F (36.6 °C)  Pulse:  [82-98] 92  Resp:  [14-18] 18  SpO2:  [96 %-99 %] 98 %  BP: (100-159)/(54-75)  133/71     Weight: 54.9 kg (120 lb 15.1 oz)  Body mass index is 24.43 kg/m².    Intake/Output Summary (Last 24 hours) at 10/11/2022 1016  Last data filed at 10/11/2022 0453  Gross per 24 hour   Intake --   Output 40 ml   Net -40 ml        Physical Exam  Vitals and nursing note reviewed.   Constitutional:       General: She is not in acute distress.     Appearance: She is not toxic-appearing or diaphoretic.   HENT:      Head: Normocephalic.      Comments: C collar in place  Cardiovascular:      Rate and Rhythm: Normal rate and regular rhythm.   Pulmonary:      Effort: Pulmonary effort is normal. No respiratory distress.   Abdominal:      Palpations: Abdomen is soft.      Tenderness: There is no guarding.   Musculoskeletal:      Cervical back: Signs of trauma (posterior cervical surgical drain in place, serosanguineous output. Draining slightly from around insertion site.) present.   Skin:     General: Skin is warm and dry.   Neurological:      Mental Status: She is alert. Mental status is at baseline.      Cranial Nerves: No dysarthria.   Psychiatric:         Mood and Affect: Mood normal.         Behavior: Behavior normal.       Significant Labs: All pertinent labs within the past 24 hours have been reviewed.    Significant Imaging: I have reviewed all pertinent imaging results/findings within the past 24 hours.      Assessment/Plan:      * Cervical stenosis of spinal canal  S/p C3-6 decompression, C3-T1 posterior fusion 10/7/22  Post surgical care per primary  Bowel regimen PRN    Suspected Infection  Hypotensive (may be 2/2 HTN meds initiation) with productive cough and intermittent hypoxemia. Afebrile, denies fevers/chills.  - CXR negative for infection  - BCx NGTD  - UCx with multiple organisms in predominance; no true positivity. Continues to deny UTI symptoms. Do not suspect UTI.     Normocytic anemia  Chronic anemia with Hgb 9.5 close to baseline in post-op patient. Normal MCV.  Likely mild blood loss anemia  post-operatively  Watch for bleeding, daily CBC    - Hgb 6.5 today; transfuse 1U  - surgical drain getting pulled today    Cervical myelopathy  As above      Glaucoma of right eye  On prior admission she was seen by ophtho. She was previously prescribed eye gtts but wasn't sure what she was supposed to be taking so ophtho saw her and recommended the gtts below.   - Continue dorzolamide, travatan, and brimonidine, as well as on discharge  - Follow up with ophtho outpatient    Metabolic acidosis  Chronically low bicarb on metabolic panel. Likely secondary to chronic diarrhea. Was on bowel regimen post-op but now having diarrhea again. Laxatives PRN  Bicarb stable, continuing to monitor.    Hypokalemia  There is a chronic component (prior admission with K<2) likely 2/2 her diarrhea. Also poor PO intake likely contributing. Less likely renal component. On presentation, K 2.2. No BM since surgery. Will give bowel regimen until she is regular and reassess needs for chronic therapies (diarrhea and K) prior to discharge   Urine K/Cr not suggestive of renal potassium wasting.    - Aggressive repletion; may benefit from discharge home on K supplement  - Telemetry    CKD (chronic kidney disease) stage 3, GFR 30-59 ml/min  Stable  Holding home ARB  Acidosis likely secondary to diarrhea, will not start bicarb at this time    Asthma  Stable, prn bronchodilators      HTN (hypertension)  Holding home nifedipine 60mg qd, valsartan 320mg qd, hydralazine 50mg q8h as she does not take consistently and now hypotensive    - BP improved over last 24 hours  - Continue holding home regimen since she is normotensive here        VTE Risk Mitigation (From admission, onward)         Ordered     heparin (porcine) injection 5,000 Units  Every 8 hours         10/07/22 1220     IP VTE HIGH RISK PATIENT  Once         10/07/22 1214     Place sequential compression device  Until discontinued         10/07/22 1214     Place PRERNA hose  Until discontinued          10/07/22 0527     Place sequential compression device  Until discontinued         10/07/22 0527                Discharge Planning   SHERIDAN: 10/12/2022     Code Status: Full Code   Is the patient medically ready for discharge?: No    Reason for patient still in hospital (select all that apply): Patient trending condition  Discharge Plan A: Rehab                  Agata Moreno MD  Department of Hospital Medicine   [facility] - Neurosurgery ([facility])

## 2022-10-11 NOTE — ASSESSMENT & PLAN NOTE
-Per chart review, pt had hypoxemia and cough and hypotension.  -CXR neg.  -Blood Cx neg.  -Urine Cx pending.  -Hypertension meds on hold as per HM.  -Remains afebrile.  -Recommend IS use.

## 2022-10-11 NOTE — SUBJECTIVE & OBJECTIVE
Past Medical History:   Diagnosis Date    Anxiety 4/13/2014    Arthritis     Asthma     Asthma 4/13/2014    Cancer     Breast    Gout     HTN (hypertension) 4/13/2014    Hyperlipidemia 4/13/2014    Hypertension      Past Surgical History:   Procedure Laterality Date    APPENDECTOMY      BREAST SURGERY      DECOMPRESSION OF CERVICAL SPINE BY POSTERIOR APPROACH N/A 10/7/2022    Procedure: DECOMPRESSION, SPINE, CERVICAL, POSTERIOR APPROACH C3-T1;  Surgeon: Freddy Miles DO;  Location: [facility] OR [address] [place] [address];  Service: Neurosurgery;  Laterality: N/A;  ANESTHESIA GENERAL BLOOD TYPE & SCREEN NEURO MONITORING EMG SEP MEP BR[facility] TABLE 4 POSTER HEADREST GW TONGS POSITION PRONE RADIOLOGY C-ARM SPECIAL EQUIPTMENT DEPUY POST OP DISPOSITION PACU     HYSTERECTOMY      Port a cath placement and removal      SHOULDER ARTHROSCOPY  2012    TONSILLECTOMY       Review of patient's allergies indicates:   Allergen Reactions    Bananas [banana] Anaphylaxis    Crayfish      Hard time breathing    Iodine and iodide containing products      Told to avoid due to crayfish allergy    Avocado (laurus persea) Swelling and Rash    Kiwi (actinidia chinensis) Itching, Swelling and Rash    Latex, natural rubber Itching, Swelling and Rash    Papaya Itching, Swelling and Rash       Scheduled Medications:    acetaminophen  1,000 mg Oral Q6H    brimonidine 0.15 % OPTH DROP  1 drop Both Eyes TID    dorzolamide  1 drop Both Eyes TID    EScitalopram oxalate  10 mg Oral Daily    gabapentin  300 mg Oral TID    heparin (porcine)  5,000 Units Subcutaneous Q8H    methocarbamoL  1,000 mg Oral QID    potassium bicarbonate  40 mEq Oral Q4H    senna-docusate 8.6-50 mg  2 tablet Oral Daily    sodium bicarbonate  650 mg Oral TID    travoprost  1 drop Both Eyes QHS       PRN Medications: sodium chloride, albuterol sulfate, aluminum-magnesium hydroxide-simethicone, bisacodyL, HYDROmorphone, ondansetron, ondansetron, oxyCODONE, oxyCODONE, polyethylene  glycol, prochlorperazine, senna-docusate 8.6-50 mg    Family History    None       Tobacco Use    Smoking status: Former     Types: Cigarettes     Start date: 1966     Quit date: 2011     Years since quittin.7    Smokeless tobacco: Never   Substance and Sexual Activity    Alcohol use: No     Comment: socially    Drug use: No    Sexual activity: Not on file     Review of Systems   Constitutional:  Positive for activity change.   Respiratory:  Negative for cough and shortness of breath.    Cardiovascular:  Negative for chest pain and leg swelling.   Gastrointestinal:  Positive for diarrhea.   Musculoskeletal:  Positive for arthralgias and gait problem.   Skin:         Cervical incision with HILARY drain in place.    Neurological:  Positive for weakness and numbness.   Psychiatric/Behavioral:  Negative for agitation and behavioral problems.    Objective:     Vital Signs (Most Recent):  Temp: 97.8 °F (36.6 °C) (10/11/22 0743)  Pulse: 92 (10/11/22 0753)  Resp: 18 (10/11/22 0743)  BP: 133/71 (10/11/22 0743)  SpO2: 98 % (10/11/22 0743)    Vital Signs (24h Range):  Temp:  [97.8 °F (36.6 °C)-98.7 °F (37.1 °C)] 97.8 °F (36.6 °C)  Pulse:  [82-98] 92  Resp:  [14-18] 18  SpO2:  [96 %-99 %] 98 %  BP: (100-159)/(54-75) 133/71     Body mass index is 24.43 kg/m².    Physical Exam  Constitutional:       General: She is awake.      Appearance: Normal appearance.   HENT:      Head: Normocephalic and atraumatic.   Neck:      Comments: Cervical incision in place. Pt t wear C-collar when OOB.   Pulmonary:      Effort: Pulmonary effort is normal. No respiratory distress.   Abdominal:      General: There is no distension.      Palpations: Abdomen is soft.   Musculoskeletal:      Cervical back: Neck supple.      Right lower leg: No edema.      Left lower leg: No edema.      Comments: Limited  ROM to LUE   Skin:     General: Skin is warm.   Neurological:      General: No focal deficit present.      Mental Status: She is alert and  oriented to person, place, and time. Mental status is at baseline.      GCS: GCS eye subscore is 4. GCS verbal subscore is 5. GCS motor subscore is 6.      Cranial Nerves: No dysarthria.      Motor: Weakness present.      Coordination: Coordination abnormal.      Gait: Gait abnormal.   Psychiatric:         Attention and Perception: Attention normal.         Mood and Affect: Mood and affect normal.         Speech: Speech normal.         Behavior: Behavior normal. Behavior is cooperative.         Thought Content: Thought content normal.     NEUROLOGICAL EXAMINATION:     MENTAL STATUS   Oriented to person, place, and time.   Speech: speech is normal     Diagnostic Results: Labs: Reviewed

## 2022-10-11 NOTE — NURSING
Called Neurosurgery  to make them aware of the delay in starting her unit of blood. Spoke with sparkle hess. She said ok, no new orders.

## 2022-10-11 NOTE — CONSULTS
Law Leyva - Neurosurgery ([facility])  Physical Medicine & Rehab  Consult Note    Patient Name: Mariaelena Grubbs  MRN: [id number]  Admission Date: 10/7/2022  Hospital Length of Stay: 4 days  Attending Physician: Freddy Miles DO.   Consults  Subjective:     Principal Problem: Cervical stenosis of spinal canal    HPI: Per chart review, Mariaelena Grubbs is a 77yoF with HTN, HLD, CKD3, distant hx of Br CA (b/l mastectomy 1984 w/o recurrence), gout, asthma, chronic R sided pyelonephritis requiring R sided partial nephrectomy (2019) complicated by bowel perforation and requiring multiple bowel resections with R sided hemicolectomy and chronic diarrhea, and cervical spinal/foraminal stenosis with radiculopathy into the left arm who presented for elective C3-C6 spinal decompression as well as C3-T1 posterior spinal fusion which was done on 10/7/22. PM & R was consulted to evaluate pt for IPR placement.     Functional History: Patient lives  with daughter  in a single  story home with one step  to enter.  Prior to admission, pt was I with ADLs and mobility.  DME: None.       Hospital Course: OT- 10/10    Bed Mobility:    Patient completed Rolling/Turning to Right with stand by assistance  Patient completed Supine to Sit with stand by assistance on R side of bed  Scooting anteriorly to EOB to have both feet planted on floor: stand by assistance     Functional Mobility/Transfers:  Static Sitting EOB: SBA  Patient completed Sit <> Stand Transfer from EOB with stand by assistance  with  no assistive device   Static Standing Balance: SBA  Dynamic Standing Balance: SBA  Patient completed functional mobility of household distance ~40ft with CGA using no AD.  Patient completed functional mobility of community distance ~100ft with CGA using no AD; required increased time due to impaired balance and slow gait.    PT- 10/10    Functional Mobility:  Transfers:  Sit to Stand:  SBA with rolling walker  Gait: ~148ft with RW and CG-SBA for  safety      Past Medical History:   Diagnosis Date    Anxiety 4/13/2014    Arthritis     Asthma     Asthma 4/13/2014    Cancer     Breast    Gout     HTN (hypertension) 4/13/2014    Hyperlipidemia 4/13/2014    Hypertension      Past Surgical History:   Procedure Laterality Date    APPENDECTOMY      BREAST SURGERY      DECOMPRESSION OF CERVICAL SPINE BY POSTERIOR APPROACH N/A 10/7/2022    Procedure: DECOMPRESSION, SPINE, CERVICAL, POSTERIOR APPROACH C3-T1;  Surgeon: Freddy Miles DO;  Location: [facility] OR [address] [place] [address];  Service: Neurosurgery;  Laterality: N/A;  ANESTHESIA GENERAL BLOOD TYPE & SCREEN NEURO MONITORING EMG SEP MEP BRACE [facility] TABLE 4 POSTER HEADREST GW TONGS POSITION PRONE RADIOLOGY C-ARM SPECIAL EQUIPTMENT DEPUY POST OP DISPOSITION PACU     HYSTERECTOMY      Port a cath placement and removal      SHOULDER ARTHROSCOPY  2012    TONSILLECTOMY       Review of patient's allergies indicates:   Allergen Reactions    Bananas [banana] Anaphylaxis    Crayfish      Hard time breathing    Iodine and iodide containing products      Told to avoid due to crayfish allergy    Avocado (laurus persea) Swelling and Rash    Kiwi (actinidia chinensis) Itching, Swelling and Rash    Latex, natural rubber Itching, Swelling and Rash    Papaya Itching, Swelling and Rash       Scheduled Medications:    acetaminophen  1,000 mg Oral Q6H    brimonidine 0.15 % OPTH DROP  1 drop Both Eyes TID    dorzolamide  1 drop Both Eyes TID    EScitalopram oxalate  10 mg Oral Daily    gabapentin  300 mg Oral TID    heparin (porcine)  5,000 Units Subcutaneous Q8H    methocarbamoL  1,000 mg Oral QID    potassium bicarbonate  40 mEq Oral Q4H    senna-docusate 8.6-50 mg  2 tablet Oral Daily    sodium bicarbonate  650 mg Oral TID    travoprost  1 drop Both Eyes QHS       PRN Medications: sodium chloride, albuterol sulfate, aluminum-magnesium hydroxide-simethicone, bisacodyL, HYDROmorphone, ondansetron, ondansetron, oxyCODONE, oxyCODONE,  polyethylene glycol, prochlorperazine, senna-docusate 8.6-50 mg    Family History    None       Tobacco Use    Smoking status: Former     Types: Cigarettes     Start date: 1966     Quit date: 2011     Years since quittin.7    Smokeless tobacco: Never   Substance and Sexual Activity    Alcohol use: No     Comment: socially    Drug use: No    Sexual activity: Not on file     Review of Systems   Constitutional:  Positive for activity change.   Respiratory:  Negative for cough and shortness of breath.    Cardiovascular:  Negative for chest pain and leg swelling.   Gastrointestinal:  Positive for diarrhea.   Musculoskeletal:  Positive for arthralgias and gait problem.   Skin:         Cervical incision with HILARY drain in place.    Neurological:  Positive for weakness and numbness.   Psychiatric/Behavioral:  Negative for agitation and behavioral problems.    Objective:     Vital Signs (Most Recent):  Temp: 97.8 °F (36.6 °C) (10/11/22 0743)  Pulse: 92 (10/11/22 0753)  Resp: 18 (10/11/22 0743)  BP: 133/71 (10/11/22 0743)  SpO2: 98 % (10/11/22 0743)    Vital Signs (24h Range):  Temp:  [97.8 °F (36.6 °C)-98.7 °F (37.1 °C)] 97.8 °F (36.6 °C)  Pulse:  [82-98] 92  Resp:  [14-18] 18  SpO2:  [96 %-99 %] 98 %  BP: (100-159)/(54-75) 133/71     Body mass index is 24.43 kg/m².    Physical Exam  Constitutional:       General: She is awake.      Appearance: Normal appearance.   HENT:      Head: Normocephalic and atraumatic.   Neck:      Comments: Cervical incision in place. Pt t wear C-collar when OOB.   Pulmonary:      Effort: Pulmonary effort is normal. No respiratory distress.   Abdominal:      General: There is no distension.      Palpations: Abdomen is soft.   Musculoskeletal:      Cervical back: Neck supple.      Right lower leg: No edema.      Left lower leg: No edema.      Comments: Limited  ROM to LUE   Skin:     General: Skin is warm.   Neurological:      General: No focal deficit present.      Mental Status: She is  alert and oriented to person, place, and time. Mental status is at baseline.      GCS: GCS eye subscore is 4. GCS verbal subscore is 5. GCS motor subscore is 6.      Cranial Nerves: No dysarthria.      Motor: Weakness present.      Coordination: Coordination abnormal.      Gait: Gait abnormal.   Psychiatric:         Attention and Perception: Attention normal.         Mood and Affect: Mood and affect normal.         Speech: Speech normal.         Behavior: Behavior normal. Behavior is cooperative.         Thought Content: Thought content normal.     NEUROLOGICAL EXAMINATION:     MENTAL STATUS   Oriented to person, place, and time.   Speech: speech is normal     Diagnostic Results: Labs: Reviewed    Assessment/Plan:     * Cervical stenosis of spinal canal  -s/p C3-T1 PCDF on 10/7.  -C-collar when OOB.  -HILARY drain in place.  -Drop in H and H today 10/11. Plan to transfuse per NSGY.  --Post Op x-ray stable as per NSGY.  -Neuro checks Q 4 H.  -PT/OT rec IPR. Pt interested in doing HH.       Suspected Infection  -Per chart review, pt had hypoxemia and cough and hypotension.  -CXR neg.  -Blood Cx neg.  -Urine Cx pending.  -Hypertension meds on hold as per HM.  -Remains afebrile.  -Recommend IS use.     Normocytic anemia  -Pt had a drop in H and H on 10/11/2022.  -Plan for transfusion with 1 U today.   -Monitor CBC daily.  -Monitor HILARY drain amount and consistency.      Cervical myelopathy  -See cervical stenosis.     Metabolic acidosis  -Pt with Hx of chronic diarrhea.  -Monitor CMP daily.  -Had Post Op BM 10/10.      Hypokalemia  -Monitor CMP daily.      CKD (chronic kidney disease) stage 3, GFR 30-59 ml/min  -Monitor CMP daily.  -Pt with hypokalemia.Pt has Hx of chronic diarrhea.      HTN (hypertension)  -Pt's home regimen on hold per HM.  -Monitor BP closely.    PM&R Recommendation:     At this time, the PM&R team has reviewed this patient's ongoing medical case including inpatient diagnosis, medical history, clinical  examination, labs, vitals, current social and functional history to provide the post-acute recommendation as follows:     RECOMMENDATIONS: inpatient rehabilitation due to good motivation/participation with therapies, has been determined to tolerate 3 hours of therapy and good potential for recovery, once medically stable. Pt does have goals for rehab. However, pt wants to opt out to going home with home health therapies. Discussed with pt the differences between in patient rehab and Home health therapies. Pt verbalized understanding.           The patient will be admitted for comprehensive interdisciplinary inpatient rehabilitation to address the impairments due to her medical diagnosis of  S/P C3-T1 PCDF 10/07/2022. The patient will benefit from an inpatient rehabilitation program to promote functional recovery, implement compensatory strategies and will undergo assessment for needs for durable medical equipment for safe discharge to the community. This patient will benefit from a coordinated interdisciplinary rehabilitation program services that require close monitoring and treatment with 24-hour rehabilitative nursing and physical/occupational therapies for 3 hours/day for 5 days/week. This interdisciplinary program will be performed under the direction of a physiatrist.            We will sign off.        Thank you for your consult.     Velma Simmons NP  Department of Physical Medicine & Rehab  [facility] Neurosurgery [facility])

## 2022-10-11 NOTE — ASSESSMENT & PLAN NOTE
Chronic anemia with Hgb 9.5 close to baseline in post-op patient. Normal MCV.  Likely mild blood loss anemia post-operatively  Watch for bleeding, daily CBC    - Hgb 6.5 today; transfuse 1U  - surgical drain getting pulled today

## 2022-10-11 NOTE — ASSESSMENT & PLAN NOTE
77F w/ PMH of asthma, HTN, CKD 3,  who presents with cervical myelopathy now s/p C3-T1 PCDF on 10/7:    --Patient admitted to [facility] on telemetry      -q4h neurochecks on floor  --All labs and diagnostics reviewed  --Postop XR with satisfactory placement  --HILARY drain removed 10/11, site sutured   --C-Collar on with >15 degrees. May remove when bathing, eating, sleeping  --HM consulted for medical management; appreciate input  --PT/OT/OOB  --Pain control: continue multimodal pain regimen  --Normocytic anemia: Chronic anemia, baseline Hgb around 9.5. Exacerbation of anemia postop, expected 2/2 blood loss post-operatively.   - Transfuse 1u pRBC today due to Hgb of 6.5   - Trend CBC daily, goal Hgb > 7  --Hypokalemia: Chronic, present on admission. Likely 2/2 chronic diarrhea. HM managing.   - Aggressive repletion, plan to discharge home on K supplements   - Will need close f/u with PCP upon discharge from rehab   --DVT PPx: TEDs/SCDs/SQH       -Recommend weekly BLEUS during admission for DVT surveillance  --Continue to monitor clinically, notify NSGY immediately with any changes in neuro status    Dispo: PT/OT recs for IPR. Expect medically ready for discharge on 10/12.    Discussed with Dr. Miles

## 2022-10-11 NOTE — NURSING
Left a message with the blood bank, looking for unit I released at 1121am. Will await return call. Then escalate to charge by 1230pm

## 2022-10-11 NOTE — PLAN OF CARE
.POC and meds reviewed with patient. Questions encouraged and answered. Patient verbalized understanding. V/S stable throughout shift; please see flowsheets for V/S information and full assessment data. NAEO. Siderails up x 3, bed locked in lowest position, call bell in reach, O2 & suction at bedside, WCTM.

## 2022-10-11 NOTE — DISCHARGE INSTRUCTIONS
We are starting you on a potassium supplement to take each  morning for 14 days. You will need to see your Primary Doctor (Boaz) soon so they can monitor your potassium levels and decide if you need to continue the supplement beyond 14 days. We also stopped your 3 blood pressure meds (hydralazine, nifedipine, valsartan) because your blood pressure was too low while you were in the hospital. Ask your primary doctor when you should restart these meds. He will need to write new prescriptions for them. You can even bring this paper so he can see what we did while here.       Post op Spine Patient Instructions    Activity Restrictions:  [x]  Return to work will be determined on an individual basis.  [x]  No lifting greater than 5-10 pounds.  [x]  Avoid bending and twisting the area of your surgery more than 45 degrees from neutral position in any direction.  [x]  No driving or operating machinery:  [x]  until cleared by your surgeon.  [x]  while taking narcotic pain medications or muscle relaxants.  [x]  Wear your brace at all times except when lying in bed, showering, using the restroom, or performing hygiene tasks.   [x]  Increase ambulation over the next 2 weeks so that you are walking 2 miles per day at 2 weeks post-operatively.  [x]  Walk on paved surfaces only. It is okay to walk up and down stairs while holding onto a side rail.  [x]  No sexual activity for 6 weeks.    Discharge Medication/Follow-up:  [x]  Please refer to discharge medication reconciliation form.  [x]  Do not take any OTC products containing acetaminophen (tylenol) at the same time as you take your narcotic pain medication. Medications that may contain acetaminophen include but are not limited to: Excedrin and other headache medications, arthritis medications, cold and sinus medications, etc. Please review the list of active ingredients in any OTC medication prior to taking it.  [x]  Take Tylenol (acetaminophen) 650 mg every 6 hours as needed  for additional pain control.  [x]  Do not take ANY Aspirin or Aspirin containing products for 2 weeks after surgery (unless otherwise directed in discharge medication list).    You may resume your home aspirin on 10/21/22.  [x]  Do not take ANY herbal supplements for 2 weeks after surgery.    [x]  Do not take ANY non-steroidal anti-inflammatory drugs (NSAIDS), including the following: ibuprofen, naprosyn, Aleve, Advil, Indocin, Mobic, or Celebrex for 12 weeks after surgery.   [x]  Prescriptions for appropriate medication will be given upon discharge.  [x]  Take the pain medication as prescribed. We recommend to wean use of your pain medication after 1 week of taking as prescribed (Ex: After 1 week of taking every 4 hours as needed, wean down to taking your pain medication every 6 hours as needed).  [x]  Take docusate (Colace 100 mg): take one capsule a day as needed for constipation. You can get this over the counter.  [x]  Follow-up appointment:  [x]  10-14 days post-op for wound check by physician assistant/nurse  [x]  4-6 weeks with MD:  [x]  with x-rays  [x]  An appointment will be mailed to you.    Wound Care:  [x]  No bandage required. Keep your incision open to the air. You have dermabond/Prineo (skin glue) covering your incision. This will begin to flake off over the next 2 weeks. Do not remove this on your own, allow it to peel off. Do not apply ointments or creams to your incision.  [x]  You may shower on the 2nd day after your surgery. Keep the incision clean and dry at all times. Do not allow the force of water to hit the incision. If the incision gets damp, pat it dry. Do not rub or scrub the incision.  [x]  You cannot take a bath until 8 weeks after surgery.      Call your doctor or go to the Emergency Room for any signs of infection, including: increased redness, drainage, pain, or fever (temperature ?101). Call your doctor or go to the Emergency Room if there are any localized neurological changes;  problems with speech, vision, numbness, tingling, weakness, or severe headache; inability to control urination or bowel movements; inability to urinate; or for other concerns.            Special Instructions:  [x]  No use of tobacco products.  [x]  Diet: Please eat a regular diet as tolerated.      Physicians need 3 days' notice for pain medicine to be refilled. Pain medicine will only be refilled between 8 AM and 5 PM, Monday through Friday, due to Food and Drug Administration regulation of documentation.    If you have any questions about this form, please call [phone].    Form No. 23439 (Revised 10/31/2013)

## 2022-10-11 NOTE — PROGRESS NOTES
Law Leyva - Neurosurgery ([facility])  Neurosurgery  Progress Note    Subjective:     History of Present Illness: 77 F with hx of cervical stenosis presents in fu.  She continues to endorse neck pain which radiates into her left shoulder and down to her left elbow.  She denies dropping items from her hands although she has had difficulty putting on jewelry.  She is unsteady when she walks without falls.  She is a nonsmoker.      Post-Op Info:  Procedure(s) (LRB):  DECOMPRESSION, SPINE, CERVICAL, POSTERIOR APPROACH C3-T1 (N/A)   4 Days Post-Op     Interval History: POD 4. NAEON. AFVSS. Pt neurologically stable, pain relatively controlled. HILARY drain with 40 cc output, slight serous leakage at drain site, removed and sutured. Hgb trended down 7.5->6.5, transfusing 1u RBC. HM following, replacing K. Pending IPR placement.    Medications:  Continuous Infusions:  Scheduled Meds:   acetaminophen  1,000 mg Oral Q6H    brimonidine 0.15 % OPTH DROP  1 drop Both Eyes TID    dorzolamide  1 drop Both Eyes TID    EScitalopram oxalate  10 mg Oral Daily    gabapentin  300 mg Oral TID    heparin (porcine)  5,000 Units Subcutaneous Q8H    methocarbamoL  1,000 mg Oral QID    senna-docusate 8.6-50 mg  2 tablet Oral Daily    sodium bicarbonate  650 mg Oral TID    travoprost  1 drop Both Eyes QHS     PRN Meds:sodium chloride, albuterol sulfate, aluminum-magnesium hydroxide-simethicone, bisacodyL, HYDROmorphone, ondansetron, ondansetron, oxyCODONE, oxyCODONE, polyethylene glycol, prochlorperazine, senna-docusate 8.6-50 mg     Review of Systems  Objective:     Weight: 54.9 kg (120 lb 15.1 oz)  Body mass index is 24.43 kg/m².  Vital Signs (Most Recent):  Temp: 98.4 °F (36.9 °C) (10/11/22 1358)  Pulse: 87 (10/11/22 1342)  Resp: 18 (10/11/22 1406)  BP: 136/79 (10/11/22 1358)  SpO2: 99 % (10/11/22 1121) Vital Signs (24h Range):  Temp:  [97.8 °F (36.6 °C)-98.8 °F (37.1 °C)] 98.4 °F (36.9 °C)  Pulse:  [84-98] 87  Resp:  [14-22] 18  SpO2:   [96 %-99 %] 99 %  BP: (100-165)/(54-79) 136/79                          Closed/Suction Drain 10/07/22 1026 Posterior Neck 19 Fr. (Active)   Site Description Leaking at site 10/11/22 0805   Dressing Type Other (Comment) 10/09/22 2000   Dressing Status Other (Comment) 10/09/22 2000   Drainage Serosanguineous 10/10/22 2000   Status To bulb suction 10/10/22 2000   Output (mL) 40 mL 10/11/22 0453       Physical Exam    Neurosurgery Physical Exam  General: well developed, well nourished, no distress.   Head: normocephalic, atraumatic  Neck: c-collar in place, well fitted  Neurologic: Alert and oriented. Thought content appropriate.  Language: No aphasia  Speech: No dysarthria  Cranial nerves: face symmetric, tongue midline, CN II-XII grossly intact.   Eyes: pupils equal, round, reactive to light with accommodation, EOMI.   Pulmonary: normal respirations, no signs of respiratory distress  Abdomen: soft, non-distended, not tender to palpation  Skin: Skin is warm, dry and intact.  Sensory: intact to light touch throughout  Motor Strength:     Strength   Deltoids Triceps Biceps Wrist Extension Wrist Flexion Hand    Upper: R 5/5 5/5 5/5 4+/5 4/5 4+/5     L 4+/5 4/5 4/5 4/5 4/5 4/5       Iliopsoas Quadriceps Knee  Flexion Tibialis  anterior Gastro- cnemius EHL   Lower: R 5/5 5/5 5/5 5/5 5/5 5/5     L 5/5 5/5 5/5 5/5 5/5 5/5      Posterior cervical incision is C/D/I with Prineo tape in place    Significant Labs:  Recent Labs   Lab 10/10/22  0612 10/11/22  0226   GLU 86 88    144   K 2.9* 3.2*   * 116*   CO2 19* 21*   BUN 18 20   CREATININE 1.1 1.0   CALCIUM 7.9* 7.6*   MG  --  1.4*     Recent Labs   Lab 10/10/22  0612 10/11/22  0226   WBC 11.22 10.61   HGB 7.5* 6.5*   HCT 23.1* 21.3*    296     No results for input(s): LABPT, INR, APTT in the last 48 hours.  Microbiology Results (last 7 days)       Procedure Component Value Units Date/Time    Urine culture [[id number]] Collected: 10/09/22 2011    Order  Status: Completed Specimen: Urine Updated: 10/10/22 1851     Urine Culture, Routine Multiple organisms isolated. None in predominance.  Repeat if      clinically necessary.    Narrative:      Specimen Source->Urine    Blood culture [[id number]] Collected: 10/09/22 1345    Order Status: Completed Specimen: Blood Updated: 10/10/22 1612     Blood Culture, Routine No Growth to date      No Growth to date    Narrative:      Collection has been rescheduled by [email or web address] at 10/09/2022 09:59 Reason:   Unable to collect. RN notified. Tried multiple times. Rescheduled  Collection has been rescheduled by [email or web address] at 10/09/2022 09:59 Reason:   Unable to collect. RN notified. Tried multiple times. Rescheduled    Blood culture [[id number]] Collected: 10/09/22 1345    Order Status: Completed Specimen: Blood Updated: 10/10/22 1612     Blood Culture, Routine No Growth to date      No Growth to date    Narrative:      Collection has been rescheduled by LK at 10/09/2022 09:59 Reason:   Unable to collect. RN notified. Tried multiple times. Rescheduled  Collection has been rescheduled by LK at 10/09/2022 09:59 Reason:   Unable to collect. RN notified. Tried multiple times. Rescheduled          All pertinent labs from the last 24 hours have been reviewed.    Significant Diagnostics:  I have reviewed and interpreted all pertinent imaging results/findings within the past 24 hours.    Assessment/Plan:     * Cervical stenosis of spinal canal  77F w/ PMH of asthma, HTN, CKD 3,  who presents with cervical myelopathy now s/p C3-T1 PCDF on 10/7:    --Patient admitted to [facility] on telemetry      -q4h neurochecks on floor  --All labs and diagnostics reviewed  --Postop XR with satisfactory placement  --HILARY drain removed 10/11, site sutured   --C-Collar on with >15 degrees. May remove when bathing, eating, sleeping  --HM consulted for medical management; appreciate input  --PT/OT/OOB  --Pain control: continue multimodal pain regimen  --Normocytic anemia:  Chronic anemia, baseline Hgb around 9.5. Exacerbation of anemia postop, expected 2/2 blood loss post-operatively.   - Transfuse 1u pRBC today due to Hgb of 6.5   - Trend CBC daily, goal Hgb > 7  --Hypokalemia: Chronic, present on admission. Likely 2/2 chronic diarrhea. HM managing.   - Aggressive repletion, plan to discharge home on K supplements   - Will need close f/u with PCP upon discharge from rehab   --DVT PPx: TEDs/SCDs/SQH       -Recommend weekly BLEUS during admission for DVT surveillance  --Continue to monitor clinically, notify NSGY immediately with any changes in neuro status    Dispo: PT/OT recs for IPR. Expect medically ready for discharge on 10/12.    Discussed with EVANGELINA OdenC  Neurosurgery  Law Leyva - Neurosurgery ([facility])

## 2022-10-11 NOTE — ASSESSMENT & PLAN NOTE
There is a chronic component (prior admission with K<2) likely 2/2 her diarrhea. Also poor PO intake likely contributing. Less likely renal component. On presentation, K 2.2. No BM since surgery. Will give bowel regimen until she is regular and reassess needs for chronic therapies (diarrhea and K) prior to discharge   Urine K/Cr not suggestive of renal potassium wasting.    - Aggressive repletion; may benefit from discharge home on K supplement  - Telemetry   detailed exam

## 2022-10-11 NOTE — HPI
Per chart review, Mariaelena Grubbs is a 77yoF with HTN, HLD, CKD3, distant hx of Br CA (b/l mastectomy 1984 w/o recurrence), gout, asthma, chronic R sided pyelonephritis requiring R sided partial nephrectomy (2019) complicated by bowel perforation and requiring multiple bowel resections with R sided hemicolectomy and chronic diarrhea, and cervical spinal/foraminal stenosis with radiculopathy into the left arm who presented for elective C3-C6 spinal decompression as well as C3-T1 posterior spinal fusion which was done on 10/7/22. PM & R was consulted to evaluate pt for IPR placement.     Functional History: Patient lives  with daughter  in a single  story home with one step  to enter.  Prior to admission, pt was I with ADLs and mobility.  DME: None.

## 2022-10-11 NOTE — PT/OT/SLP PROGRESS
Occupational Therapy      Patient Name:  Mariaelena Grubbs   MRN:  [id number]    Patient not seen today secondary to Pt with low H&H and waiting for a blood transfusion. Will follow-up on next scheduled treatment date.    10/11/2022

## 2022-10-11 NOTE — ASSESSMENT & PLAN NOTE
Hypotensive (may be 2/2 HTN meds initiation) with productive cough and intermittent hypoxemia. Afebrile, denies fevers/chills.  - CXR negative for infection  - BCx NGTD  - UCx with multiple organisms in predominance; no true positivity. Continues to deny UTI symptoms. Do not suspect UTI.

## 2022-10-11 NOTE — CONSULTS
Single lumen 20 x 8 cm midline placed L brachial vein. Max dwell date 11/9/22, Lot# [id number].  Needle advanced into the vessel under real time ultrasound guidance.  Image recorded and saved.

## 2022-10-11 NOTE — ASSESSMENT & PLAN NOTE
Holding home nifedipine 60mg qd, valsartan 320mg qd, hydralazine 50mg q8h as she does not take consistently and now hypotensive    - BP improved over last 24 hours  - Continue holding home regimen since she is normotensive here

## 2022-10-11 NOTE — CONSULTS
Inpatient consult to Physical Medicine Rehab  Consult performed by: Velma Simmons NP  Consult ordered by: Freddy Miles DO    Consult received.  Reviewed patient history and current admission.  PM&R following.    Velma Simmons NP  Physical Medicine & Rehabilitation   10/11/2022

## 2022-10-11 NOTE — SUBJECTIVE & OBJECTIVE
Interval History: POD 4. NAEON. AFVSS. Pt neurologically stable, pain relatively controlled. HILARY drain with 40 cc output, slight serous leakage at drain site, removed and sutured. Hgb trended down 7.5->6.5, transfusing 1u RBC. HM following, replacing K. Pending IPR placement.    Medications:  Continuous Infusions:  Scheduled Meds:   acetaminophen  1,000 mg Oral Q6H    brimonidine 0.15 % OPTH DROP  1 drop Both Eyes TID    dorzolamide  1 drop Both Eyes TID    EScitalopram oxalate  10 mg Oral Daily    gabapentin  300 mg Oral TID    heparin (porcine)  5,000 Units Subcutaneous Q8H    methocarbamoL  1,000 mg Oral QID    senna-docusate 8.6-50 mg  2 tablet Oral Daily    sodium bicarbonate  650 mg Oral TID    travoprost  1 drop Both Eyes QHS     PRN Meds:sodium chloride, albuterol sulfate, aluminum-magnesium hydroxide-simethicone, bisacodyL, HYDROmorphone, ondansetron, ondansetron, oxyCODONE, oxyCODONE, polyethylene glycol, prochlorperazine, senna-docusate 8.6-50 mg     Review of Systems  Objective:     Weight: 54.9 kg (120 lb 15.1 oz)  Body mass index is 24.43 kg/m².  Vital Signs (Most Recent):  Temp: 98.4 °F (36.9 °C) (10/11/22 1358)  Pulse: 87 (10/11/22 1342)  Resp: 18 (10/11/22 1406)  BP: 136/79 (10/11/22 1358)  SpO2: 99 % (10/11/22 1121) Vital Signs (24h Range):  Temp:  [97.8 °F (36.6 °C)-98.8 °F (37.1 °C)] 98.4 °F (36.9 °C)  Pulse:  [84-98] 87  Resp:  [14-22] 18  SpO2:  [96 %-99 %] 99 %  BP: (100-165)/(54-79) 136/79                          Closed/Suction Drain 10/07/22 1026 Posterior Neck 19 Fr. (Active)   Site Description Leaking at site 10/11/22 0805   Dressing Type Other (Comment) 10/09/22 2000   Dressing Status Other (Comment) 10/09/22 2000   Drainage Serosanguineous 10/10/22 2000   Status To bulb suction 10/10/22 2000   Output (mL) 40 mL 10/11/22 0453       Physical Exam    Neurosurgery Physical Exam  General: well developed, well nourished, no distress.   Head: normocephalic, atraumatic  Neck: c-collar in place,  well fitted  Neurologic: Alert and oriented. Thought content appropriate.  Language: No aphasia  Speech: No dysarthria  Cranial nerves: face symmetric, tongue midline, CN II-XII grossly intact.   Eyes: pupils equal, round, reactive to light with accommodation, EOMI.   Pulmonary: normal respirations, no signs of respiratory distress  Abdomen: soft, non-distended, not tender to palpation  Skin: Skin is warm, dry and intact.  Sensory: intact to light touch throughout  Motor Strength:     Strength   Deltoids Triceps Biceps Wrist Extension Wrist Flexion Hand    Upper: R 5/5 5/5 5/5 4+/5 4/5 4+/5     L 4+/5 4/5 4/5 4/5 4/5 4/5       Iliopsoas Quadriceps Knee  Flexion Tibialis  anterior Gastro- cnemius EHL   Lower: R 5/5 5/5 5/5 5/5 5/5 5/5     L 5/5 5/5 5/5 5/5 5/5 5/5      Posterior cervical incision is C/D/I with Prineo tape in place    Significant Labs:  Recent Labs   Lab 10/10/22  0612 10/11/22  0226   GLU 86 88    144   K 2.9* 3.2*   * 116*   CO2 19* 21*   BUN 18 20   CREATININE 1.1 1.0   CALCIUM 7.9* 7.6*   MG  --  1.4*     Recent Labs   Lab 10/10/22  0612 10/11/22  0226   WBC 11.22 10.61   HGB 7.5* 6.5*   HCT 23.1* 21.3*    296     No results for input(s): LABPT, INR, APTT in the last 48 hours.  Microbiology Results (last 7 days)       Procedure Component Value Units Date/Time    Urine culture [[id number]] Collected: 10/09/22 2011    Order Status: Completed Specimen: Urine Updated: 10/10/22 1851     Urine Culture, Routine Multiple organisms isolated. None in predominance.  Repeat if      clinically necessary.    Narrative:      Specimen Source->Urine    Blood culture [[id number]] Collected: 10/09/22 1345    Order Status: Completed Specimen: Blood Updated: 10/10/22 1612     Blood Culture, Routine No Growth to date      No Growth to date    Narrative:      Collection has been rescheduled by NATALYA at 10/09/2022 09:59 Reason:   Unable to collect. RN notified. Tried multiple times.  Rescheduled  Collection has been rescheduled by LK at 10/09/2022 09:59 Reason:   Unable to collect. RN notified. Tried multiple times. Rescheduled    Blood culture [[id number]] Collected: 10/09/22 1345    Order Status: Completed Specimen: Blood Updated: 10/10/22 1612     Blood Culture, Routine No Growth to date      No Growth to date    Narrative:      Collection has been rescheduled by LK at 10/09/2022 09:59 Reason:   Unable to collect. RN notified. Tried multiple times. Rescheduled  Collection has been rescheduled by LK at 10/09/2022 09:59 Reason:   Unable to collect. RN notified. Tried multiple times. Rescheduled          All pertinent labs from the last 24 hours have been reviewed.    Significant Diagnostics:  I have reviewed and interpreted all pertinent imaging results/findings within the past 24 hours.

## 2022-10-11 NOTE — NURSING
"Called lab again, spoke miriam braga, states "patient has antibody and we will call you when blood is ready it is going to be a while".  "

## 2022-10-11 NOTE — ASSESSMENT & PLAN NOTE
-Pt had a drop in H and H on 10/11/2022.  -Plan for transfusion with 1 U today.   -Monitor CBC daily.  -Monitor HILARY drain amount and consistency.

## 2022-10-11 NOTE — HOSPITAL COURSE
OT- 10/10    Bed Mobility:    Patient completed Rolling/Turning to Right with stand by assistance  Patient completed Supine to Sit with stand by assistance on R side of bed  Scooting anteriorly to EOB to have both feet planted on floor: stand by assistance     Functional Mobility/Transfers:  Static Sitting EOB: SBA  Patient completed Sit <> Stand Transfer from EOB with stand by assistance  with  no assistive device   Static Standing Balance: SBA  Dynamic Standing Balance: SBA  Patient completed functional mobility of household distance ~40ft with CGA using no AD.  Patient completed functional mobility of community distance ~100ft with CGA using no AD; required increased time due to impaired balance and slow gait.    PT- 10/10    Functional Mobility:  Transfers:  Sit to Stand:  SBA with rolling walker  Gait: ~148ft with RW and CG-SBA for safety

## 2022-10-11 NOTE — ASSESSMENT & PLAN NOTE
-s/p C3-T1 PCDF on 10/7.  -C-collar when OOB.  -HILARY drain in place.  -Drop in H and H today 10/11. Plan to transfuse per NSGY.  --Post Op x-ray stable as per NSGY.  -Neuro checks Q 4 H.  -PT/OT rec IPR. Pt interested in doing HH.      Transported with Mother

## 2022-10-11 NOTE — ASSESSMENT & PLAN NOTE
-Pt's home regimen on hold per HM.  -Monitor BP closely.   Anesthesia Type: 1% lidocaine with epinephrine

## 2022-10-12 VITALS
OXYGEN SATURATION: 98 % | HEART RATE: 90 BPM | TEMPERATURE: 99 F | SYSTOLIC BLOOD PRESSURE: 169 MMHG | HEIGHT: 59 IN | DIASTOLIC BLOOD PRESSURE: 90 MMHG | WEIGHT: 120.94 LBS | RESPIRATION RATE: 18 BRPM | BODY MASS INDEX: 24.38 KG/M2

## 2022-10-12 LAB
ALBUMIN SERPL BCP-MCNC: 2.3 G/DL (ref 3.5–5.2)
ALP SERPL-CCNC: 67 U/L (ref 55–135)
ALT SERPL W/O P-5'-P-CCNC: 12 U/L (ref 10–44)
ANION GAP SERPL CALC-SCNC: 5 MMOL/L (ref 8–16)
AST SERPL-CCNC: 12 U/L (ref 10–40)
BASOPHILS # BLD AUTO: 0.04 K/UL (ref 0–0.2)
BASOPHILS NFR BLD: 0.4 % (ref 0–1.9)
BILIRUB SERPL-MCNC: 0.7 MG/DL (ref 0.1–1)
BUN SERPL-MCNC: 21 MG/DL (ref 8–23)
CALCIUM SERPL-MCNC: 8.2 MG/DL (ref 8.7–10.5)
CHLORIDE SERPL-SCNC: 117 MMOL/L (ref 95–110)
CO2 SERPL-SCNC: 21 MMOL/L (ref 23–29)
CREAT SERPL-MCNC: 1 MG/DL (ref 0.5–1.4)
DIFFERENTIAL METHOD: ABNORMAL
EOSINOPHIL # BLD AUTO: 0.2 K/UL (ref 0–0.5)
EOSINOPHIL NFR BLD: 1.7 % (ref 0–8)
ERYTHROCYTE [DISTWIDTH] IN BLOOD BY AUTOMATED COUNT: 15.2 % (ref 11.5–14.5)
EST. GFR  (NO RACE VARIABLE): 58.4 ML/MIN/1.73 M^2
GLUCOSE SERPL-MCNC: 83 MG/DL (ref 70–110)
HCT VFR BLD AUTO: 26.2 % (ref 37–48.5)
HGB BLD-MCNC: 8.2 G/DL (ref 12–16)
IMM GRANULOCYTES # BLD AUTO: 0.09 K/UL (ref 0–0.04)
IMM GRANULOCYTES NFR BLD AUTO: 0.8 % (ref 0–0.5)
LYMPHOCYTES # BLD AUTO: 4.4 K/UL (ref 1–4.8)
LYMPHOCYTES NFR BLD: 39.7 % (ref 18–48)
MAGNESIUM SERPL-MCNC: 1.4 MG/DL (ref 1.6–2.6)
MCH RBC QN AUTO: 30.3 PG (ref 27–31)
MCHC RBC AUTO-ENTMCNC: 31.3 G/DL (ref 32–36)
MCV RBC AUTO: 97 FL (ref 82–98)
MONOCYTES # BLD AUTO: 0.8 K/UL (ref 0.3–1)
MONOCYTES NFR BLD: 7.6 % (ref 4–15)
NEUTROPHILS # BLD AUTO: 5.5 K/UL (ref 1.8–7.7)
NEUTROPHILS NFR BLD: 49.8 % (ref 38–73)
NRBC BLD-RTO: 0 /100 WBC
PLATELET # BLD AUTO: 310 K/UL (ref 150–450)
PMV BLD AUTO: 10.3 FL (ref 9.2–12.9)
POTASSIUM SERPL-SCNC: 3.2 MMOL/L (ref 3.5–5.1)
PROT SERPL-MCNC: 4.8 G/DL (ref 6–8.4)
RBC # BLD AUTO: 2.71 M/UL (ref 4–5.4)
SODIUM SERPL-SCNC: 143 MMOL/L (ref 136–145)
WBC # BLD AUTO: 11.03 K/UL (ref 3.9–12.7)

## 2022-10-12 PROCEDURE — 94761 N-INVAS EAR/PLS OXIMETRY MLT: CPT

## 2022-10-12 PROCEDURE — 80053 COMPREHEN METABOLIC PANEL: CPT | Performed by: HOSPITALIST

## 2022-10-12 PROCEDURE — 25000003 PHARM REV CODE 250

## 2022-10-12 PROCEDURE — 1111F DSCHRG MED/CURRENT MED MERGE: CPT | Mod: CPTII,,, | Performed by: PHYSICIAN ASSISTANT

## 2022-10-12 PROCEDURE — 36415 COLL VENOUS BLD VENIPUNCTURE: CPT | Performed by: HOSPITALIST

## 2022-10-12 PROCEDURE — 99024 PR POST-OP FOLLOW-UP VISIT: ICD-10-PCS | Mod: ,,, | Performed by: PHYSICIAN ASSISTANT

## 2022-10-12 PROCEDURE — 97535 SELF CARE MNGMENT TRAINING: CPT

## 2022-10-12 PROCEDURE — 99024 POSTOP FOLLOW-UP VISIT: CPT | Mod: ,,, | Performed by: PHYSICIAN ASSISTANT

## 2022-10-12 PROCEDURE — 83735 ASSAY OF MAGNESIUM: CPT | Performed by: HOSPITALIST

## 2022-10-12 PROCEDURE — 85025 COMPLETE CBC W/AUTO DIFF WBC: CPT | Performed by: HOSPITALIST

## 2022-10-12 PROCEDURE — 63600175 PHARM REV CODE 636 W HCPCS

## 2022-10-12 PROCEDURE — 25000003 PHARM REV CODE 250: Performed by: PHYSICIAN ASSISTANT

## 2022-10-12 PROCEDURE — 97116 GAIT TRAINING THERAPY: CPT

## 2022-10-12 PROCEDURE — 1111F PR DISCHARGE MEDS RECONCILED W/ CURRENT OUTPATIENT MED LIST: ICD-10-PCS | Mod: CPTII,,, | Performed by: PHYSICIAN ASSISTANT

## 2022-10-12 PROCEDURE — 25000003 PHARM REV CODE 250: Performed by: STUDENT IN AN ORGANIZED HEALTH CARE EDUCATION/TRAINING PROGRAM

## 2022-10-12 RX ORDER — POTASSIUM CHLORIDE 20 MEQ/1
20 TABLET, EXTENDED RELEASE ORAL DAILY
Qty: 14 TABLET | Refills: 0 | Status: SHIPPED | OUTPATIENT
Start: 2022-10-12 | End: 2022-10-26

## 2022-10-12 RX ORDER — OXYCODONE HYDROCHLORIDE 10 MG/1
10 TABLET ORAL EVERY 4 HOURS PRN
Qty: 56 TABLET | Refills: 0 | Status: SHIPPED | OUTPATIENT
Start: 2022-10-12

## 2022-10-12 RX ORDER — LANOLIN ALCOHOL/MO/W.PET/CERES
800 CREAM (GRAM) TOPICAL ONCE
Status: COMPLETED | OUTPATIENT
Start: 2022-10-12 | End: 2022-10-12

## 2022-10-12 RX ORDER — METHOCARBAMOL 500 MG/1
1000 TABLET, FILM COATED ORAL 3 TIMES DAILY PRN
Qty: 56 TABLET | Refills: 0 | Status: SHIPPED | OUTPATIENT
Start: 2022-10-12 | End: 2022-10-22

## 2022-10-12 RX ORDER — MAGNESIUM SULFATE HEPTAHYDRATE 40 MG/ML
2 INJECTION, SOLUTION INTRAVENOUS ONCE
Status: DISCONTINUED | OUTPATIENT
Start: 2022-10-12 | End: 2022-10-12

## 2022-10-12 RX ADMIN — Medication 800 MG: at 01:10

## 2022-10-12 RX ADMIN — BRIMONIDINE TARTRATE 1 DROP: 1.5 SOLUTION OPHTHALMIC at 09:10

## 2022-10-12 RX ADMIN — POTASSIUM BICARBONATE 20 MEQ: 391 TABLET, EFFERVESCENT ORAL at 09:10

## 2022-10-12 RX ADMIN — ESCITALOPRAM OXALATE 10 MG: 10 TABLET ORAL at 09:10

## 2022-10-12 RX ADMIN — METHOCARBAMOL 1000 MG: 500 TABLET ORAL at 09:10

## 2022-10-12 RX ADMIN — OXYCODONE HYDROCHLORIDE 10 MG: 10 TABLET ORAL at 09:10

## 2022-10-12 RX ADMIN — ACETAMINOPHEN 1000 MG: 500 TABLET ORAL at 11:10

## 2022-10-12 RX ADMIN — SODIUM BICARBONATE 650 MG TABLET 650 MG: at 09:10

## 2022-10-12 RX ADMIN — DORZOLAMIDE HYDROCHLORIDE 1 DROP: 20 SOLUTION/ DROPS OPHTHALMIC at 09:10

## 2022-10-12 RX ADMIN — HEPARIN SODIUM 5000 UNITS: 5000 INJECTION INTRAVENOUS; SUBCUTANEOUS at 05:10

## 2022-10-12 RX ADMIN — ACETAMINOPHEN 1000 MG: 500 TABLET ORAL at 05:10

## 2022-10-12 RX ADMIN — GABAPENTIN 300 MG: 300 CAPSULE ORAL at 09:10

## 2022-10-12 RX ADMIN — METHOCARBAMOL 1000 MG: 500 TABLET ORAL at 01:10

## 2022-10-12 NOTE — PT/OT/SLP PROGRESS
Physical Therapy Treatment    Patient Name:  Mariaelena Grubbs   MRN:  [id number]    Recommendations:     Discharge Recommendations:  home health PT   Discharge Equipment Recommendations: walker, rolling   Barriers to discharge: None    Assessment:     Mariaelena Grubbs is a 77 y.o. female admitted with a medical diagnosis of Cervical stenosis of spinal canal.  She presents with the following impairments/functional limitations:  weakness, impaired endurance, impaired self care skills, impaired functional mobility, gait instability, impaired balance, decreased safety awareness, pain, decreased ROM, orthopedic precautions. Pt progressing well with therapy, able to perform bed mobility independently, and transfers/ambulation w/ no AD and SBA-CGA. Pt ambulated 250' w/ CGA no AD and min postural sway. 2/2 pt's improvements with therapy, d/c recs updated to HHPT.    Rehab Prognosis: Good; patient would benefit from acute skilled PT services to address these deficits and reach maximum level of function.    Recent Surgery: Procedure(s) (LRB):  DECOMPRESSION, SPINE, CERVICAL, POSTERIOR APPROACH C3-T1 (N/A) 5 Days Post-Op    Plan:     During this hospitalization, patient to be seen 3 x/week to address the identified rehab impairments via gait training, therapeutic activities, therapeutic exercises and progress toward the following goals:    Plan of Care Expires:  11/06/22    Subjective     Chief Complaint: 10/10 neck pain/stiffness  Patient/Family Comments/goals: get back home  Pain/Comfort:  Pain Rating 1: 10/10  Location - Orientation 1: generalized  Location 1: neck  Pain Addressed 1: Reposition, Distraction  Pain Rating Post-Intervention 1: 10/10      Objective:     Communicated with RN prior to session.  Patient found sitting edge of bed with telemetry upon PT entry to room.     General Precautions: Standard, fall   Orthopedic Precautions:spinal precautions   Braces: Aspen collar  Respiratory Status: Room air     Functional  Mobility:  Bed Mobility:     Supine to Sit: independence  Sit to Supine: independence  Transfers:     Sit to Stand:  stand by assistance with no AD  Bed to Chair: stand by assistance with  no AD  using  Step Transfer  Gait: 250' w/ CGA no AD and min postural sway  Balance: independent sitting balance; SBA dynamic standing balance      AM-PAC 6 CLICK MOBILITY  Turning over in bed (including adjusting bedclothes, sheets and blankets)?: 4  Sitting down on and standing up from a chair with arms (e.g., wheelchair, bedside commode, etc.): 3  Moving from lying on back to sitting on the side of the bed?: 4  Moving to and from a bed to a chair (including a wheelchair)?: 3  Need to walk in hospital room?: 3  Climbing 3-5 steps with a railing?: 3  Basic Mobility Total Score: 20       Therapeutic Activities and Exercises:   Supine to sit EOB  Sitting balance I while PT assisted w/ donning c collar  Ambulated 250' CGA w/ no AD and able to maintain conversation and look around hallway w/out LOB  Bed to chair transfer SBA w/ step transfer  Pt educated on PT POC/goals, d/c recs, and continued treatment. All questions answered and pt in agreement w/ POC.  Pt's RN called to see if pt could sit in chair w/out chair alarm as pt is moving well and AO x4, RN in agreement that pt does not require chair alarm. Pt educated on fall risk and safety awareness, and using call bell if she needs help. Pt in agreement w/ plan.    Patient left up in chair with all lines intact, call button in reach, and RN notified..    GOALS:   Multidisciplinary Problems       Physical Therapy Goals          Problem: Physical Therapy    Goal Priority Disciplines Outcome Goal Variances Interventions   Physical Therapy Goal     PT, PT/OT Ongoing, Progressing     Description: Goals to be met by: 10/31/22     Patient will increase functional independence with mobility by performin. Supine to sit with modified independence  2. Sit to stand transfer with Modified  Spring  3. Gait  x 250 feet with Modified Spring using Rolling Walker.   4. Ascend/descend 3 stair with right Handrails Minimal Assistance using LRAD.                          Time Tracking:     PT Received On: 10/12/22  PT Start Time: 0826     PT Stop Time: 0842  PT Total Time (min): 16 min     Billable Minutes: Gait Training 16    Treatment Type: Treatment  PT/PTA: PT     PTA Visit Number: 0     10/12/2022

## 2022-10-12 NOTE — PLAN OF CARE
[facility] ([facility])  Discharge Final Note    Primary Care Provider: Primary Doctor No    Expected Discharge Date: 10/12/2022    Patient to be discharged home.  The patient will have home health with [facility], patient was current with that agency.  Family to provide transportation home.  Neurosurgery clinic to schedule follow up appointment.    Future Appointments   Date Time Provider Department Center   10/26/2022  1:00 PM Alyson Titus RN [facility]   12/1/2022  1:30 PM AJ XROP DR [facility] X[facility]   12/1/2022  2:30 PM Freddy Miles,  [facility]        Final Discharge Note (most recent)       Final Note - 10/12/22 1116          Final Note    Assessment Type Final Discharge Note     Anticipated Discharge Disposition Home-Health Care Svc        Post-Acute Status    Post-Acute Authorization Placement;Home Health     Post-Acute Placement Status Discharge Plan Changed     Home Health Status Set-up Complete/Auth obtained     Discharge Delays None known at this time                     Important Message from Medicare             Contact Info       [facility] [address]   Specialty: Neurosurgery    [address]  [facility] [address]   Phone: [phone]       Next Steps: Follow up on 10/26/2022    Instructions: For wound re-check at 1:00 pm

## 2022-10-12 NOTE — DISCHARGE SUMMARY
[facility] ([facility])  Neurosurgery  Discharge Summary      Patient Name: Mariaelena Grubbs  MRN: [id number]  Admission Date: 10/7/2022  Hospital Length of Stay: 5 days   Discharge Date and Time: 10/12/22  Attending Physician: Freddy Miles DO   Discharging Provider: Janay Chen PA-C  Primary Care Provider: Primary Doctor No    HPI:   77 F with hx of cervical stenosis presents in fu.  She continues to endorse neck pain which radiates into her left shoulder and down to her left elbow.  She denies dropping items from her hands although she has had difficulty putting on jewelry.  She is unsteady when she walks without falls.  She is a nonsmoker.      Procedure(s) (LRB):  DECOMPRESSION, SPINE, CERVICAL, POSTERIOR APPROACH C3-T1 (N/A)     Hospital Course:   10/8: POD 1 s/p C3-T1 PCDF. NAEON. AFVSS. Exam stable. Pain controlled. Tolerating PO. Voiding spontaneously.   10/9: POD 2. NAEON. AFVSS. Exam stable. Pain controlled. Tolerating PO. HM following.   10/10: POD 3. NAEON. Patient neuro stable. Complains of muscle spasms, will increase robaxin. Hypokalemia continues, HM following. Drain with 85 cc output, will maintain today. Voiding spontaneously. IPR pending drain removal and authorization/placement.  10/11: POD 4. NAEON. AFVSS. Pt neurologically stable, pain relatively controlled. HILARY drain with 40 cc output, slight serous leakage at drain site, removed and sutured. Hgb trended down 7.5->6.5, transfusing 1u RBC. HM following, replacing K. Pending IPR placement.  10/12: POD5. NAEON. Patient endorses some muscle spasms, over all pain well controlled. Denies paresthesias or weakness. Voiding spontaneously. H/H stable s/p transfusion. PT/OT recs upgraded to HH. Stable for discharge from neurosurgical and medicine perspective. DC instructions discussed with patient and she voiced understanding. All of her questions were answered.     Exam day of discharge:   General: well developed, well nourished,  no distress.   Head: normocephalic, atraumatic  Neck: c-collar in place, well fitted  Neurologic: Alert and oriented. Thought content appropriate.  Language: No aphasia  Speech: No dysarthria  Cranial nerves: face symmetric, tongue midline, CN II-XII grossly intact.   Eyes: pupils equal, round, reactive to light with accommodation, EOMI.   Pulmonary: normal respirations, no signs of respiratory distress  Abdomen: soft, non-distended, not tender to palpation  Skin: Skin is warm, dry and intact.  Sensory: intact to light touch throughout  Motor Strength:     Strength   Deltoids Triceps Biceps Wrist Extension Wrist Flexion Hand    Upper: R 5/5 5/5 5/5 5/5 4/5 5/5     L 4+/5 4/5 4/5 4/5 4/5 4/5       Iliopsoas Quadriceps Knee  Flexion Tibialis  anterior Gastro- cnemius EHL   Lower: R 5/5 5/5 5/5 5/5 5/5 5/5     L 5/5 5/5 5/5 5/5 5/5 5/5      Posterior cervical incision is C/D/I with Prineo tape in place         Goals of Care Treatment Preferences:  Code Status: Full Code      Consults: PT/OT  Consults (From admission, onward)          Status Ordering Provider     Inpatient consult to Midline team  Once        Provider:  (Not yet assigned)    Completed MAUDE BARRETT     Inpatient consult to Physical Medicine Rehab  Once        Provider:  (Not yet assigned)    Completed LANETTE UMANZOR     Inpatient consult to Social Work  Once        Provider:  (Not yet assigned)    Acknowledged SVETA CANALES     Inpatient consult to [facility] Medicine-General  Once        Provider:  (Not yet assigned)    Completed ANNAMARIA SKY            Significant Diagnostic Studies: XR cervical spine     Pending Diagnostic Studies:       None          Final Active Diagnoses:    Diagnosis Date Noted POA    PRINCIPAL PROBLEM:  Cervical stenosis of spinal canal [M48.02] 07/24/2022 Yes    Suspected Infection [B99.9] 10/09/2022 Unknown    Normocytic anemia [D64.9] 10/08/2022 Yes    Cervical myelopathy [G95.9] 10/07/2022 Yes     Hypokalemia [E87.6] 07/25/2022 Yes     Chronic    Metabolic acidosis [E87.20] 07/25/2022 Yes    Glaucoma of right eye [H40.9] 07/25/2022 Yes    CKD (chronic kidney disease) stage 3, GFR 30-59 ml/min [N18.30] 09/28/2014 Yes    Asthma [J45.909] 04/13/2014 Yes    HTN (hypertension) [I10] 04/13/2014 Yes      Problems Resolved During this Admission:    Diagnosis Date Noted Date Resolved POA    Sepsis [A41.9] 10/09/2022 10/09/2022 Unknown    Hyperlipidemia [E78.5] 04/13/2014 10/08/2022 Yes      Discharged Condition: good     Disposition: Home with Home Health Services    Follow Up:   Follow-up Information       [facility] 8th Fl Follow up on 10/26/2022.    Specialty: Neurosurgery  Why: For wound re-check at 1:00 pm  Contact information:  [address]  [facility] [address] [phone]  Additional information:  8th [facility]   Please park in [facility].   Check in desk is located in the lobby. Please take the C elevator to 8th floor which opens to the lobby.                         Patient Instructions:      Ambulatory referral/consult to Nephrology   Standing Status: Future   Referral Priority: Routine Referral Type: Consultation   Referral Reason: Specialty Services Required   Requested Specialty: Nephrology   Number of Visits Requested: 1     Medications:  Reconciled Home Medications:      Medication List        START taking these medications      methocarbamoL 500 MG Tab  Commonly known as: ROBAXIN  Take 2 tablets (1,000 mg total) by mouth 3 (three) times daily as needed (muscle spasm).     oxyCODONE 10 mg Tab immediate release tablet  Commonly known as: ROXICODONE  Take 1 tablet (10 mg total) by mouth every 4 (four) hours as needed for Pain.     potassium chloride SA 20 MEQ tablet  Commonly known as: K-DUR,KLOR-CON  Take 1 tablet (20 mEq total) by mouth once daily. for 14 days     senna-docusate 8.6-50 mg 8.6-50 mg per tablet  Commonly known as: PERICOLACE  Take 2 tablets by  mouth once daily.            CONTINUE taking these medications      * albuterol 1.25 mg/3 mL Nebu  Commonly known as: ACCUNEB  Take 1.25 mg by nebulization every 6 (six) hours as needed.     * albuterol 2.5 mg /3 mL (0.083 %) nebulizer solution  Commonly known as: PROVENTIL  Inhale 2.5 mg into the lungs daily as needed for Wheezing.     * albuterol 90 mcg/actuation inhaler  Commonly known as: PROVENTIL/VENTOLIN HFA  Inhale 1 puff into the lungs daily as needed for Wheezing.     alendronate 70 MG tablet  Commonly known as: FOSAMAX  Take 70 mg by mouth every 7 days.     aspirin 81 MG EC tablet  Commonly known as: ECOTRIN  Take 81 mg by mouth once daily.     brimonidine 0.15 % OPTH DROP 0.15 % ophthalmic solution  Commonly known as: ALPHAGAN  Place 1 drop into both eyes 3 (three) times daily.     diphenoxylate-atropine 2.5-0.025 mg 2.5-0.025 mg per tablet  Commonly known as: LOMOTIL  Take 1 tablet by mouth 4 (four) times daily as needed.     dorzolamide 2 % ophthalmic solution  Commonly known as: TRUSOPT  Place 1 drop into both eyes 3 (three) times daily.     EPIPEN INJ  Inject as directed.     EScitalopram oxalate 10 MG tablet  Commonly known as: LEXAPRO  Take 10 mg by mouth once daily.     fluticasone propionate 50 mcg/actuation nasal spray  Commonly known as: FLONASE  1 spray by Each Nostril route 3 (three) times daily as needed for Rhinitis.     gabapentin 300 MG capsule  Commonly known as: NEURONTIN  Take 300 mg by mouth 3 (three) times daily.     nystatin-triamcinolone ointment  Commonly known as: MYCOLOG  SMARTSIG:Sparingly Topical Twice Daily     ondansetron 4 MG Tbdl  Commonly known as: ZOFRAN-ODT  Take 4 mg by mouth. Dissolve 1 tablet on the tongue daily as needed for nausea.     sodium bicarbonate 650 MG tablet  Take 1 tablet (650 mg total) by mouth 3 (three) times daily.     travoprost 0.004 % ophthalmic solution  Commonly known as: TRAVATAN Z  Place 1 drop into both eyes every evening.           * This  list has 3 medication(s) that are the same as other medications prescribed for you. Read the directions carefully, and ask your doctor or other care provider to review them with you.                STOP taking these medications      acetaminophen 500 MG tablet  Commonly known as: TYLENOL     hydrALAZINE 50 MG tablet  Commonly known as: APRESOLINE     hydrocortisone 2.5 % cream     ICY HOT TOP     NIFEdipine 60 MG (OSM) 24 hr tablet  Commonly known as: PROCARDIA-XL     potassium bicarbonate disintegrating tablet  Commonly known as: K-LYTE     predniSONE 20 MG tablet  Commonly known as: DELTASONE     valsartan 320 MG tablet  Commonly known as: EVELIN Chen PA-C  Neurosurgery  [facility] - [facility] ([facility])

## 2022-10-12 NOTE — PLAN OF CARE
[facility] - Neurosurgery ([facility])      HOME HEALTH ORDERS  FACE TO FACE ENCOUNTER    Patient Name: Mariaelena Grubbs  YOB: 1945    PCP: Primary Doctor No   PCP Address: None  PCP Phone Number: None  PCP Fax: None    Encounter Date: 8/24/22    Admit to Home Health    Diagnoses:  Active Hospital Problems    Diagnosis  POA    *Cervical stenosis of spinal canal [M48.02]  Yes    Suspected Infection [B99.9]  Unknown    Normocytic anemia [D64.9]  Yes    Cervical myelopathy [G95.9]  Yes    Hypokalemia [E87.6]  Yes     Chronic    Metabolic acidosis [E87.20]  Yes    Glaucoma of right eye [H40.9]  Yes    CKD (chronic kidney disease) stage 3, GFR 30-59 ml/min [N18.30]  Yes    Asthma [J45.909]  Yes    HTN (hypertension) [I10]  Yes     /82.   Asymptomatic  Recommend to follow up with PCP         Resolved Hospital Problems    Diagnosis Date Resolved POA    Sepsis [A41.9] 10/09/2022 Unknown    Hyperlipidemia [E78.5] 10/08/2022 Yes       Follow Up Appointments:  Future Appointments   Date Time Provider Department Center   10/26/2022  1:00 PM lAyson Titus RN [facility] NEUROS8 [facility]   12/1/2022  1:30 PM [facility] X[facility]OP [facility]   12/1/2022  2:30 PM Freddy Miles,  [facility]       Allergies:  Review of patient's allergies indicates:   Allergen Reactions    Bananas [banana] Anaphylaxis    Crayfish      Hard time breathing    Iodine and iodide containing products      Told to avoid due to crayfish allergy    Avocado (laurus persea) Swelling and Rash    Kiwi (actinidia chinensis) Itching, Swelling and Rash    Latex, natural rubber Itching, Swelling and Rash    Papaya Itching, Swelling and Rash       Medications: Review discharge medications with patient and family and provide education.    Current Facility-Administered Medications   Medication Dose Route Frequency Provider Last Rate Last Admin    0.9%  NaCl infusion (for blood administration)   Intravenous Q24H PRN Sofia KRAUSE  KAELYN Stover        acetaminophen tablet 1,000 mg  1,000 mg Oral Q6H Chantelle Vincent PA-C   1,000 mg at 10/12/22 0533    albuterol sulfate nebulizer solution 2.5 mg  2.5 mg Nebulization Q4H PRN Franck Razo MD   2.5 mg at 10/08/22 2341    aluminum-magnesium hydroxide-simethicone 200-200-20 mg/5 mL suspension 30 mL  30 mL Oral Q4H PRN Chantelle Vincent PA-C        bisacodyL suppository 10 mg  10 mg Rectal Daily PRN Avila Turner MD        brimonidine 0.15 % OPTH DROP ophthalmic solution 1 drop  1 drop Both Eyes TID Connor M Gillies, MD   1 drop at 10/12/22 0942    dorzolamide 2 % ophthalmic solution 1 drop  1 drop Both Eyes TID Connor M Gillies, MD   1 drop at 10/12/22 0942    EScitalopram oxalate tablet 10 mg  10 mg Oral Daily Chantelle Vincent PA-C   10 mg at 10/12/22 0939    gabapentin capsule 300 mg  300 mg Oral TID Chantelle Vincent PA-C   300 mg at 10/12/22 0939    heparin (porcine) injection 5,000 Units  5,000 Units Subcutaneous Q8H Chantelle Vincent PA-C   5,000 Units at 10/12/22 0534    HYDROmorphone injection 1 mg  1 mg Intravenous Q3H PRN Sonia Ornelas PA-C   1 mg at 10/11/22 1406    magnesium sulfate 2g in water 50mL IVPB (premix)  2 g Intravenous Once Agata H MD Tiffanie        methocarbamoL tablet 1,000 mg  1,000 mg Oral QID Em Champion PA-C   1,000 mg at 10/12/22 0940    ondansetron disintegrating tablet 4 mg  4 mg Oral Q6H PRN Chantelle Vincent PA-C        ondansetron disintegrating tablet 8 mg  8 mg Oral Q6H PRN Chantelle Vincent PA-C        oxyCODONE immediate release tablet 5 mg  5 mg Oral Q4H PRN Chantelle Vincent PA-C   5 mg at 10/09/22 2343    oxyCODONE immediate release tablet Tab 10 mg  10 mg Oral Q4H PRN Chantelle Vincent PA-C   10 mg at 10/12/22 0938    polyethylene glycol packet 17 g  17 g Oral BID PRN Avila Turner MD        potassium bicarbonate disintegrating tablet 20 mEq  20 mEq Oral Daily Agata Moreno MD   20 mEq at 10/12/22 0941    potassium bicarbonate  disintegrating tablet 20 mEq  20 mEq Oral Once Agata Moreno MD        prochlorperazine injection Soln 5 mg  5 mg Intravenous Q6H PRN Chantelle Vincent PA-C        senna-docusate 8.6-50 mg per tablet 2 tablet  2 tablet Oral Nightly PRN Chantelle Vincent PA-C        senna-docusate 8.6-50 mg per tablet 2 tablet  2 tablet Oral Daily Sonia Ornelas PA-C   2 tablet at 10/08/22 1013    sodium bicarbonate tablet 650 mg  650 mg Oral TID Chantelle Vincent PA-C   650 mg at 10/12/22 0939    travoprost 0.004 % ophthalmic solution 1 drop  1 drop Both Eyes QHS Connor M Gillies, MD   1 drop at 10/11/22 2116     Current Discharge Medication List        START taking these medications    Details   methocarbamoL (ROBAXIN) 500 MG Tab Take 2 tablets (1,000 mg total) by mouth 3 (three) times daily as needed (muscle spasm).  Qty: 56 tablet, Refills: 0      oxyCODONE (ROXICODONE) 10 mg Tab immediate release tablet Take 1 tablet (10 mg total) by mouth every 4 (four) hours as needed for Pain.  Qty: 56 tablet, Refills: 0    Comments: Quantity prescribed more than 7 day supply? Yes, medically necessary      potassium chloride SA (K-DUR,KLOR-CON) 20 MEQ tablet Take 1 tablet (20 mEq total) by mouth once daily. for 14 days  Qty: 14 tablet, Refills: 0      senna-docusate 8.6-50 mg (PERICOLACE) 8.6-50 mg per tablet Take 2 tablets by mouth once daily.           CONTINUE these medications which have NOT CHANGED    Details   albuterol (ACCUNEB) 1.25 mg/3 mL Nebu Take 1.25 mg by nebulization every 6 (six) hours as needed.      albuterol (PROVENTIL) 2.5 mg /3 mL (0.083 %) nebulizer solution Inhale 2.5 mg into the lungs daily as needed for Wheezing.      albuterol (PROVENTIL/VENTOLIN HFA) 90 mcg/actuation inhaler Inhale 1 puff into the lungs daily as needed for Wheezing.      brimonidine 0.15 % OPTH DROP (ALPHAGAN) 0.15 % ophthalmic solution Place 1 drop into both eyes 3 (three) times daily.  Qty: 6 mL, Refills: 11      diphenoxylate-atropine  2.5-0.025 mg (LOMOTIL) 2.5-0.025 mg per tablet Take 1 tablet by mouth 4 (four) times daily as needed.      dorzolamide (TRUSOPT) 2 % ophthalmic solution Place 1 drop into both eyes 3 (three) times daily.  Qty: 10 mL, Refills: 11      EScitalopram oxalate (LEXAPRO) 10 MG tablet Take 10 mg by mouth once daily.      fluticasone propionate (FLONASE) 50 mcg/actuation nasal spray 1 spray by Each Nostril route 3 (three) times daily as needed for Rhinitis.      gabapentin (NEURONTIN) 300 MG capsule Take 300 mg by mouth 3 (three) times daily.      sodium bicarbonate 650 MG tablet Take 1 tablet (650 mg total) by mouth 3 (three) times daily.  Qty: 42 tablet, Refills: 0      travoprost (TRAVATAN Z) 0.004 % ophthalmic solution Place 1 drop into both eyes every evening.  Qty: 2.5 each, Refills: 11      alendronate (FOSAMAX) 70 MG tablet Take 70 mg by mouth every 7 days.      aspirin (ECOTRIN) 81 MG EC tablet Take 81 mg by mouth once daily.      EPINEPHRINE (EPIPEN INJ) Inject as directed.      nystatin-triamcinolone (MYCOLOG) ointment SMARTSIG:Sparingly Topical Twice Daily      ondansetron (ZOFRAN-ODT) 4 MG TbDL Take 4 mg by mouth. Dissolve 1 tablet on the tongue daily as needed for nausea.           STOP taking these medications       acetaminophen (TYLENOL) 500 MG tablet Comments:   Reason for Stopping:         hydrALAZINE (APRESOLINE) 50 MG tablet Comments:   Reason for Stopping:         hydrocortisone 2.5 % cream Comments:   Reason for Stopping:         methyl salicylate/menthol (ICY HOT TOP) Comments:   Reason for Stopping:         potassium bicarbonate (K-LYTE) disintegrating tablet Comments:   Reason for Stopping:         valsartan (DIOVAN) 320 MG tablet Comments:   Reason for Stopping:         NIFEdipine (PROCARDIA-XL) 60 MG (OSM) 24 hr tablet Comments:   Reason for Stopping:         predniSONE (DELTASONE) 20 MG tablet Comments:   Reason for Stopping:                 I have seen and examined this patient within the last  30 days. My clinical findings that support the need for the home health skilled services and home bound status are the following:no   Weakness/numbness causing balance and gait disturbance due to Surgery making it taxing to leave home.     Diet:   regular diet      Referrals/ Consults  Physical Therapy to evaluate and treat. Evaluate for home safety and equipment needs; Establish/upgrade home exercise program. Perform / instruct on therapeutic exercises, gait training, transfer training, and Range of Motion.  Occupational Therapy to evaluate and treat. Evaluate home environment for safety and equipment needs. Perform/Instruct on transfers, ADL training, ROM, and therapeutic exercises.    Activities:   activity as tolerated    Nursing:   Agency to admit patient within 24 hours of hospital discharge unless specified on physician order or at patient request    SN to complete comprehensive assessment including routine vital signs. Instruct on disease process and s/s of complications to report to MD. Review/verify medication list sent home with the patient at time of discharge  and instruct patient/caregiver as needed. Frequency may be adjusted depending on start of care date.     Skilled nurse to perform up to 3 visits PRN for symptoms related to diagnosis    Notify MD if SBP > 160 or < 90; DBP > 90 or < 50; HR > 120 or < 50; Temp > 101; O2 < 88%; Other:       Ok to schedule additional visits based on staff availability and patient request on consecutive days within the home health episode.    When multiple disciplines ordered:    Start of Care occurs on Sunday - Wednesday schedule remaining discipline evaluations as ordered on separate consecutive days following the start of care.    Thursday SOC -schedule subsequent evaluations Friday and Monday the following week.     Friday - Saturday SOC - schedule subsequent discipline evaluations on consecutive days starting Monday of the following week.    For all post-discharge  communication and subsequent orders please contact patient's primary care physician. If unable to reach primary care physician or do not receive response within 30 minutes, please contact [phone] for clinical staff order clarification        Home Health Aide:  Physical Therapy Three times weekly and Occupational Therapy Three times weekly        I certify that this patient is confined to her home and needs physical therapy and occupational therapy.

## 2022-10-12 NOTE — PT/OT/SLP PROGRESS
"Occupational Therapy   Treatment       Patient Name:  Mariaelena Grubbs   MRN:  [id number]  Admit Date: 10/7/2022  Admitting Diagnosis:  Cervical stenosis of spinal canal   Length of Stay: 5 days  Recent Surgery: Procedure(s) (LRB):  DECOMPRESSION, SPINE, CERVICAL, POSTERIOR APPROACH C3-T1 (N/A) 5 Days Post-Op    Recommendations:     Discharge Recommendations: home health PT, home  Discharge Equipment Recommendations:  none  Barriers to discharge:  None    Plan:     Patient to be seen 3 x/week to address the above listed problems via self-care/home management, therapeutic activities, therapeutic exercises  Plan of Care Expires: 11/07/22  Plan of Care Reviewed with: patient    Assessment:   Mariaelena Grubbs is a 77 y.o. female with a medical diagnosis of Cervical stenosis of spinal canal.  She presents with the following performance deficits affecting function: weakness, impaired endurance, decreased upper extremity function, decreased lower extremity function, pain, impaired cardiopulmonary response to activity, orthopedic precautions, impaired self care skills, impaired functional mobility.  Pt continues to benefit from a collaborative PT/OT/SLP program to improve quality of life and focus on recovery of impairments.     Pt safe to dc home with no OT needs.  Review done this date of home setup, spinal precautions, donning/doffing c-collar.    Rehab Prognosis: Good; patient would benefit from acute skilled OT services to address these deficits and reach maximum level of function.        Subjective   Communicated with: RN prior to session.  Patient found up in chair with telemetry, central line upon OT entry to room.    Patient: "i'm feeling good to get out of here, I don't think I need anything" re: DME    Pain/Comfort:  Pain Rating 1: other (see comments) (unrated neck pain)  Location - Orientation 1: generalized  Location 1: neck    Objective:   Patient found with: telemetry, central line     General Precautions: " "Standard, Cardiac fall   Orthopedic Precautions:spinal precautions   Braces: Aspen collar (when OOB)   Respiratory Status:   Room air  Vitals: BP (!) 169/90 (BP Location: Right arm, Patient Position: Sitting)   Pulse 92   Temp 98.6 °F (37 °C) (Oral)   Resp 18   Ht 4' 11" (1.499 m)   Wt 54.9 kg (120 lb 15.1 oz)   SpO2 95%   Breastfeeding No   BMI 24.43 kg/m²     Outcome Measures:  AMPAC 6 Click ADL: 21    Cognition:   Oriented X 4 and Alert  Command following: follows multi-step commands  Communication: clear/fluent    Occupational Performance:  Bed Mobility:    Not perfomed    Functional Mobility/Transfers:  Patient completed Sit <> Stand Transfer with contact guard assistance  with  no assistive device   Static Standing Balance: CGA no AD  Dynamic Standing Balance: CGA no AD       Activities of Daily Living:  Feeding:  modified independence set up seated in chair  Upper Body Dressing: contact guard assistance verbal instructions/review, pt adjusting c-collar seated in chair    AMPAC 6 Click ADL:  AMPAC Total Score: 21    Treatment & Education:  -OT POC, safety during ADLs and mobility   -Education on energy conservation and task modification to maximize safety and independence  -Questions answered within OT scope of practice.  -Review of home setup, spinal precautions, c-collar adjustments    Patient left up in chair with all lines intact, call button in reach, RN notified, and   present    GOALS:   Multidisciplinary Problems       Occupational Therapy Goals          Problem: Occupational Therapy    Goal Priority Disciplines Outcome Interventions   Occupational Therapy Goal     OT, PT/OT Ongoing, Progressing    Description: Goals to be met by: 10/28     Patient will increase functional independence with ADLs by performing:    UE Dressing with Supervision.  LE Dressing with Supervision.  Grooming while standing with Supervision.  Toileting from toilet with Supervision for hygiene and clothing management. "   Supine to sit with Modified [place].  Step transfer with Supervision  Toilet transfer to toilet with Supervision.                         Time Tracking:     OT Date of Treatment: 10/12/22  OT Start Time: 1124  OT Stop Time: 1132  OT Total Time (min): 8 min  Additional staff present: RN      Billable Minutes:Self Care/Home Management 8      10/12/2022

## 2022-10-12 NOTE — PLAN OF CARE
Problem: Adult Inpatient Plan of Care  Goal: Plan of Care Review  Outcome: Ongoing, Progressing     Problem: Adult Inpatient Plan of Care  Goal: Optimal Comfort and Wellbeing  Outcome: Ongoing, Progressing     Problem: Adult Inpatient Plan of Care  Goal: Readiness for Transition of Care  Outcome: Ongoing, Progressing     Problem: Infection  Goal: Absence of Infection Signs and Symptoms  Outcome: Ongoing, Progressing     Problem: Pain Acute  Goal: Acceptable Pain Control and Functional Ability  Outcome: Ongoing, Progressing  Patient has been awake off and on with no s/sx of distress noted. Denies pain, repositions self in bed, ambulates to bathroom on her own and gait is steady. Drain was removed and was having issues with drainage, several stitches in per MD and dressing DCI. VSS, flow sheets completed see for assessments. Bed in lowset position with SR's up times 2 call light in reach. Refusing SCD's,

## 2022-10-12 NOTE — NURSING
Initial rounds, bathroom up and ambulated to bathroom on her own. Reminded she needs to use call light for assistance, bed alarm was not activated. Assisted back to bed with SR's up times 3 and bed alarm in place for safety purposes. Denies pain, self positions in bed. Tray table in reach.

## 2022-10-12 NOTE — PLAN OF CARE
Chart reviewed this morning. Patient discharging today. Will prescribe 14 days of potassium chloride 20mEq tabs to be taken once daily to bridge her to her PCP appointment to check K+. Also held all blood pressure medications upon discharge since patient normotensive off these and states she does not take them at home. CM to assist with scheduling f/u PCP appointment at [facility] where she follows.

## 2022-10-13 ENCOUNTER — PATIENT OUTREACH (OUTPATIENT)
Dept: ADMINISTRATIVE | Facility: CLINIC | Age: 77
End: 2022-10-13
Payer: MEDICARE

## 2022-10-13 PROCEDURE — G0180 MD CERTIFICATION HHA PATIENT: HCPCS | Mod: ,,, | Performed by: STUDENT IN AN ORGANIZED HEALTH CARE EDUCATION/TRAINING PROGRAM

## 2022-10-13 PROCEDURE — G0180 PR HOME HEALTH MD CERTIFICATION: ICD-10-PCS | Mod: ,,, | Performed by: STUDENT IN AN ORGANIZED HEALTH CARE EDUCATION/TRAINING PROGRAM

## 2022-10-13 NOTE — PROGRESS NOTES
C3 nurse attempted to contact Mariaelena Grubbs  for a TCC post hospital discharge follow up call. No answer. The patient does not have a scheduled HOSFU appointment, and the pt does not have an [facility] PCP.

## 2022-10-13 NOTE — PROGRESS NOTES
C3 Nurse made second attempt to reach patient for TCC call. Left voicemail asking patient to please call [phone] and leave first name, last name, and , and Miley will return call.

## 2022-10-14 LAB
BACTERIA BLD CULT: NORMAL
BACTERIA BLD CULT: NORMAL

## 2022-10-14 NOTE — PROGRESS NOTES
C3 Nurse made third attempt to reach patient for TCC call. Left voicemail asking patient to please call [phone] and leave first name, last name, and , and Miley will return call.

## 2022-10-18 ENCOUNTER — EXTERNAL HOME HEALTH (OUTPATIENT)
Dept: HOME HEALTH SERVICES | Facility: HOSPITAL | Age: 77
End: 2022-10-18
Payer: MEDICARE

## 2022-10-18 NOTE — PHYSICIAN QUERY
PT Name: Mariaelena Grubbs  MR #: [id number]     DOCUMENTATION CLARIFICATION     CDS/: Norma MARTINES, RN             Contact information: [email or web address]  This form is a permanent document in the medical record.     Query Date: October 18, 2022    By submitting this query, we are merely seeking further clarification of documentation.  Please utilize your independent clinical judgment when addressing the question(s) below.  The Medical Record contains the following:  Indicators Supporting Clinical Findings Location in Medical Record   x HR         RR          BP        Temp soft BP's may be from overmedication with anti-hypertensives vs decreased oral intake    Vital Signs (Most Recent):  Temp: 98.3 °F (36.8 °C) (10/09/22 1119)  Pulse: 66 (10/09/22 1119)  Resp: 17 (10/09/22 1119)  BP: (!) 94/52 (10/09/22 1119)  SpO2: 98 % (10/09/22 1119)   Hospital Medicine progress note 10/9/22   x Lactic Acid          Procalcitonin Procalcitonin = 0.21       Metabolic acidosis  Chronically low bicarb on metabolic panel. Likely secondary to chronic diarrhea Lab 10/9/22      Hospital Medicine progress note 10/9/22   x WBC           Bands          CRP Leukocytosis may be post op-related Hospital Medicine progress note 10/9/22    Culture(s)      AMS, Confusion, LOC, etc.      Organ Dysfunction/Failure     x Bacteremia or Sepsis / Septic Problems Resolved During this Admission:   Diagnosis Date Noted Date Resolved POA     Sepsis              10/09/2022 10/09/2022 Unknown   Neurosurgery Discharge Summary 10/12/22   x Known or Suspected Source of Infection documented Doing well. UCx - mult org none in predominance, NO Si/Sx of UTI, no Tx warranted.    Suspected Infection  Hypotensive (may be 2/2 HTN meds initiation) with productive cough and intermittent hypoxemia. Afebrile, denies fevers/chills.  - CXR negative for infection  - BCx NGTD  - UCx with multiple organisms in predominance; no true positivity. Continues to deny  UTI symptoms. Do not suspect UTI.  Hospital Medicine progress note 10/11/22    (Failed) Outpatient Treatment      Medication     x Treatment Medicine consulted for co-management    complete infectious workup and give 500 cc NS bolus Hospital Medicine progress note 10/9/22    Other          Provider, please specify diagnosis associated with above clinical findings. Thank you.    [   ]  Sepsis Ruled Out     [   ]  Sepsis diagnosis is confirmed and additional clinical indicators include (please specify): ____________________     [   ]  Other Infectious Disease (please specify): __________     [  ]  Clinically Undetermined         Please document in your progress notes daily for the duration of treatment until resolved and include in your discharge summary.

## 2022-10-25 ENCOUNTER — TELEPHONE (OUTPATIENT)
Dept: NEUROSURGERY | Facility: CLINIC | Age: 77
End: 2022-10-25
Payer: MEDICARE

## 2022-10-25 RX ORDER — METHOCARBAMOL 500 MG/1
500 TABLET, FILM COATED ORAL 4 TIMES DAILY
Qty: 40 TABLET | Refills: 0 | Status: SHIPPED | OUTPATIENT
Start: 2022-10-25 | End: 2022-11-04

## 2022-10-25 NOTE — TELEPHONE ENCOUNTER
----- Message from Tk Guthrie sent at 10/25/2022  9:53 AM CDT -----  Contact: [phone]  Pt states she currently still in pain after surgery and the meds arent working -- pt is requesting a call back [phone]

## 2022-10-26 ENCOUNTER — TELEPHONE (OUTPATIENT)
Dept: NEUROSURGERY | Facility: CLINIC | Age: 77
End: 2022-10-26
Payer: MEDICARE

## 2022-10-28 ENCOUNTER — DOCUMENT SCAN (OUTPATIENT)
Dept: HOME HEALTH SERVICES | Facility: HOSPITAL | Age: 77
End: 2022-10-28
Payer: MEDICARE

## 2022-11-01 ENCOUNTER — TELEPHONE (OUTPATIENT)
Dept: NEUROSURGERY | Facility: CLINIC | Age: 77
End: 2022-11-01
Payer: MEDICARE

## 2022-11-01 NOTE — TELEPHONE ENCOUNTER
----- Message from Geneva Canela sent at 11/1/2022  2:31 PM CDT -----  Regarding: pt advice  Contact: Carla Grubbs, daughter @[phone]  Caller stated the pt was supposed to be receiving a prescription. I could not find any recent medications. Caller asked to be called to discuss.

## 2022-11-01 NOTE — TELEPHONE ENCOUNTER
Spoke with patient's daughter Carla pharmacy has been updated in Epic. Patient's daughter was advised that her mother's medication was at the [facility]. Patient's daughter agreed to keep script there this time as she will pick it up at her mother appointment at [facility] tomorrow.

## 2022-11-01 NOTE — TELEPHONE ENCOUNTER
----- Message from Ny Rey MA sent at 11/1/2022  3:37 PM CDT -----  Contact: Pt Daughter    ----- Message -----  From: Keara Whitlock  Sent: 11/1/2022   3:06 PM CDT  To: Ginger HAMILTON Staff    Pt is requesting a callback from provider staff. Pt stated she has been calling in regards to medication: oxyCODONE (ROXICODONE) 10 mg Tab immediate release tablet 56 tablet    The pt spoke with Ny, the pharmacy on file is incorrect. The Pt use [facility]. I did speak with Ny ans was informed that the Supervisor is going to reach out to Pt.        Confirmed contact below:   Contact Name:Mariaelena De Leonred  Phone Number: [phone]

## 2022-11-01 NOTE — TELEPHONE ENCOUNTER
Called patient in regards to a message that was sent,. Patient states that she has been out of her medication for a while and need a refill, she has been trying to contact me and I did not return her call she began to yell and use horrible language. Informed patient that I will not be able to help her if she continue yell and use that type of language patient continue to yell informed patient that I will have my supervisor give her a call then disconnected the call.

## 2022-11-02 ENCOUNTER — CLINICAL SUPPORT (OUTPATIENT)
Dept: NEUROSURGERY | Facility: CLINIC | Age: 77
End: 2022-11-02
Payer: MEDICARE

## 2022-11-02 VITALS — SYSTOLIC BLOOD PRESSURE: 175 MMHG | HEART RATE: 71 BPM | DIASTOLIC BLOOD PRESSURE: 92 MMHG

## 2022-11-02 PROCEDURE — 99999 PR PBB SHADOW E&M-EST. PATIENT-LVL III: ICD-10-PCS | Mod: PBBFAC,,,

## 2022-11-02 PROCEDURE — 99999 PR PBB SHADOW E&M-EST. PATIENT-LVL III: CPT | Mod: PBBFAC,,,

## 2022-11-02 NOTE — PROGRESS NOTES
Mariaelena Grubbs is a 77 y.o. female here for 2 week post op wound check.     Surgery: PCF    Symptoms:  pain back of neck towards left shoulder    DME: none    Brace: cervical brace- educated patient importance of compliance    Pain: 8    Medication Refills: pain medications sent to [facility] pharmacy per supervisor       Incision with dressing, MARY, well approximated, no redness, swelling or purulent drainage. Instructed patient to keep incision MARY, no lotions, creams or bandages. OK to shower without water pressure to area. PostOP written instructions given to patient.     Patient verbalizes understanding. Patient to followup with Dr. Miles for 6 week followup with imaging.    Future Appointments   Date Time Provider Department Center   11/21/2022  3:00 PM [facility]   11/21/2022  4:00 PM Freddy Miles DO [facility] NEUROS8 [facility]

## 2022-11-14 ENCOUNTER — EXTERNAL HOME HEALTH (OUTPATIENT)
Dept: HOME HEALTH SERVICES | Facility: HOSPITAL | Age: 77
End: 2022-11-14
Payer: MEDICARE

## 2022-12-02 ENCOUNTER — TELEPHONE (OUTPATIENT)
Dept: NEUROSURGERY | Facility: CLINIC | Age: 77
End: 2022-12-02
Payer: MEDICARE

## 2022-12-02 NOTE — TELEPHONE ENCOUNTER
----- Message from Delaney Matson sent at 12/2/2022  2:36 PM CST -----  Regarding: Appt  Contact: [phone]  Patient is calling to speak with someone in office for a sooner appointment having back pain and a lump on her back. Please contact pt

## 2022-12-05 ENCOUNTER — OFFICE VISIT (OUTPATIENT)
Dept: NEUROSURGERY | Facility: CLINIC | Age: 77
End: 2022-12-05
Payer: MEDICARE

## 2022-12-05 ENCOUNTER — HOSPITAL ENCOUNTER (OUTPATIENT)
Dept: RADIOLOGY | Facility: HOSPITAL | Age: 77
Discharge: HOME | End: 2022-12-05
Attending: STUDENT IN AN ORGANIZED HEALTH CARE EDUCATION/TRAINING PROGRAM
Payer: MEDICARE

## 2022-12-05 VITALS — HEART RATE: 79 BPM | SYSTOLIC BLOOD PRESSURE: 174 MMHG | DIASTOLIC BLOOD PRESSURE: 102 MMHG

## 2022-12-05 DIAGNOSIS — Z98.1 S/P CERVICAL SPINAL FUSION: Primary | ICD-10-CM

## 2022-12-05 DIAGNOSIS — M47.12 CERVICAL SPONDYLOSIS WITH MYELOPATHY: ICD-10-CM

## 2022-12-05 PROCEDURE — 3077F PR MOST RECENT SYSTOLIC BLOOD PRESSURE >= 140 MM HG: ICD-10-PCS | Mod: CPTII,S$GLB,, | Performed by: STUDENT IN AN ORGANIZED HEALTH CARE EDUCATION/TRAINING PROGRAM

## 2022-12-05 PROCEDURE — 1125F PR PAIN SEVERITY QUANTIFIED, PAIN PRESENT: ICD-10-PCS | Mod: CPTII,S$GLB,, | Performed by: STUDENT IN AN ORGANIZED HEALTH CARE EDUCATION/TRAINING PROGRAM

## 2022-12-05 PROCEDURE — 72040 XR CERVICAL SPINE AP LATERAL: ICD-10-PCS | Mod: 26,,, | Performed by: RADIOLOGY

## 2022-12-05 PROCEDURE — 3077F SYST BP >= 140 MM HG: CPT | Mod: CPTII,S$GLB,, | Performed by: STUDENT IN AN ORGANIZED HEALTH CARE EDUCATION/TRAINING PROGRAM

## 2022-12-05 PROCEDURE — 72040 X-RAY EXAM NECK SPINE 2-3 VW: CPT | Mod: 26,,, | Performed by: RADIOLOGY

## 2022-12-05 PROCEDURE — 72040 X-RAY EXAM NECK SPINE 2-3 VW: CPT | Mod: TC

## 2022-12-05 PROCEDURE — 99999 PR PBB SHADOW E&M-EST. PATIENT-LVL II: CPT | Mod: PBBFAC,,, | Performed by: STUDENT IN AN ORGANIZED HEALTH CARE EDUCATION/TRAINING PROGRAM

## 2022-12-05 PROCEDURE — 3080F DIAST BP >= 90 MM HG: CPT | Mod: CPTII,S$GLB,, | Performed by: STUDENT IN AN ORGANIZED HEALTH CARE EDUCATION/TRAINING PROGRAM

## 2022-12-05 PROCEDURE — 3080F PR MOST RECENT DIASTOLIC BLOOD PRESSURE >= 90 MM HG: ICD-10-PCS | Mod: CPTII,S$GLB,, | Performed by: STUDENT IN AN ORGANIZED HEALTH CARE EDUCATION/TRAINING PROGRAM

## 2022-12-05 PROCEDURE — 99024 POSTOP FOLLOW-UP VISIT: CPT | Mod: S$GLB,,, | Performed by: STUDENT IN AN ORGANIZED HEALTH CARE EDUCATION/TRAINING PROGRAM

## 2022-12-05 PROCEDURE — 1125F AMNT PAIN NOTED PAIN PRSNT: CPT | Mod: CPTII,S$GLB,, | Performed by: STUDENT IN AN ORGANIZED HEALTH CARE EDUCATION/TRAINING PROGRAM

## 2022-12-05 PROCEDURE — 99999 PR PBB SHADOW E&M-EST. PATIENT-LVL II: ICD-10-PCS | Mod: PBBFAC,,, | Performed by: STUDENT IN AN ORGANIZED HEALTH CARE EDUCATION/TRAINING PROGRAM

## 2022-12-05 PROCEDURE — 99024 PR POST-OP FOLLOW-UP VISIT: ICD-10-PCS | Mod: S$GLB,,, | Performed by: STUDENT IN AN ORGANIZED HEALTH CARE EDUCATION/TRAINING PROGRAM

## 2022-12-05 NOTE — PROGRESS NOTES
Neurosurgery  Established Patient    SUBJECTIVE:     History of Present Illness:  77 F with hx of cervical stenosis presents in fu.  She continues to endorse neck pain which radiates into her left shoulder and down to her left elbow.  She denies dropping items from her hands although she has had difficulty putting on jewelry.  She is unsteady when she walks without falls.  She is a nonsmoker.    Interval fu 12/5/22:  Pt presents in fu after C3-T1 post decompression/fusion on 10/7/22.  She endorses right sided neck pain which radiates into her occiput.  She has worn her collar and there are no issues with wound healing.  She still has LUE radicular pain into her left elbow however no distal LUE.  Her walking has improved and she has had no falls.  She denies any new numbness/weakness in her UE.  She also endorses chest pain that is worse with swallowing.    Review of patient's allergies indicates:   Allergen Reactions    Bananas [banana] Anaphylaxis    Crayfish      Hard time breathing    Iodine and iodide containing products      Told to avoid due to crayfish allergy    Avocado (laurus persea) Swelling and Rash    Kiwi (actinidia chinensis) Itching, Swelling and Rash    Latex, natural rubber Itching, Swelling and Rash    Papaya Itching, Swelling and Rash       Current Outpatient Medications   Medication Sig Dispense Refill    albuterol (ACCUNEB) 1.25 mg/3 mL Nebu Take 1.25 mg by nebulization every 6 (six) hours as needed.      albuterol (PROVENTIL) 2.5 mg /3 mL (0.083 %) nebulizer solution Inhale 2.5 mg into the lungs daily as needed for Wheezing.      albuterol (PROVENTIL/VENTOLIN HFA) 90 mcg/actuation inhaler Inhale 1 puff into the lungs daily as needed for Wheezing.      alendronate (FOSAMAX) 70 MG tablet Take 70 mg by mouth every 7 days.      aspirin (ECOTRIN) 81 MG EC tablet Take 81 mg by mouth once daily.      brimonidine 0.15 % OPTH DROP (ALPHAGAN) 0.15 % ophthalmic solution Place 1 drop into both eyes 3  (three) times daily. (Patient not taking: Reported on 11/2/2022) 6 mL 11    diphenoxylate-atropine 2.5-0.025 mg (LOMOTIL) 2.5-0.025 mg per tablet Take 1 tablet by mouth 4 (four) times daily as needed.      dorzolamide (TRUSOPT) 2 % ophthalmic solution Place 1 drop into both eyes 3 (three) times daily. (Patient not taking: Reported on 11/2/2022) 10 mL 11    EPINEPHRINE (EPIPEN INJ) Inject as directed.      EScitalopram oxalate (LEXAPRO) 10 MG tablet Take 10 mg by mouth once daily.      fluticasone propionate (FLONASE) 50 mcg/actuation nasal spray 1 spray by Each Nostril route 3 (three) times daily as needed for Rhinitis.      gabapentin (NEURONTIN) 300 MG capsule Take 300 mg by mouth 3 (three) times daily.      nystatin-triamcinolone (MYCOLOG) ointment SMARTSIG:Sparingly Topical Twice Daily      ondansetron (ZOFRAN-ODT) 4 MG TbDL Take 4 mg by mouth. Dissolve 1 tablet on the tongue daily as needed for nausea.      oxyCODONE (ROXICODONE) 10 mg Tab immediate release tablet Take 1 tablet (10 mg total) by mouth every 4 (four) hours as needed for Pain. 56 tablet 0    senna-docusate 8.6-50 mg (PERICOLACE) 8.6-50 mg per tablet Take 2 tablets by mouth once daily.      sodium bicarbonate 650 MG tablet Take 1 tablet (650 mg total) by mouth 3 (three) times daily. 42 tablet 0    travoprost (TRAVATAN Z) 0.004 % ophthalmic solution Place 1 drop into both eyes every evening. 2.5 each 11     No current facility-administered medications for this visit.       Past Medical History:   Diagnosis Date    Anxiety 4/13/2014    Arthritis     Asthma     Asthma 4/13/2014    Cancer     Breast    Gout     HTN (hypertension) 4/13/2014    Hyperlipidemia 4/13/2014    Hypertension      Past Surgical History:   Procedure Laterality Date    APPENDECTOMY      BREAST SURGERY      DECOMPRESSION OF CERVICAL SPINE BY POSTERIOR APPROACH N/A 10/7/2022    Procedure: DECOMPRESSION, SPINE, CERVICAL, POSTERIOR APPROACH C3-T1;  Surgeon: Freddy Miles DO;   Location: [facility] OR [address] [place] [address];  Service: Neurosurgery;  Laterality: N/A;  ANESTHESIA GENERAL BLOOD TYPE & SCREEN NEURO MONITORING EMG SEP MEP BRACE [facility] TABLE 4 POSTER HEADREST GW TONGS POSITION PRONE RADIOLOGY C-ARM SPECIAL EQUIPTMENT DEPUY POST OP DISPOSITION PACU     HYSTERECTOMY      Port a cath placement and removal      SHOULDER ARTHROSCOPY  2012    TONSILLECTOMY       Family History    None       Social History     Socioeconomic History    Marital status:    Tobacco Use    Smoking status: Former     Types: Cigarettes     Start date: 1966     Quit date: 2011     Years since quittin.9    Smokeless tobacco: Never   Substance and Sexual Activity    Alcohol use: No     Comment: socially    Drug use: No       Review of Systems  14 point ROS was negative    OBJECTIVE:     Vital Signs  Pulse: 79  BP: (!) 174/102  Pain Score:   7  There is no height or weight on file to calculate BMI.    Neurosurgery Physical Exam  Constitutional: She appears well-developed and well-nourished.      Eyes: Pupils are equal, round, and reactive to light.      Cardiovascular: Normal rate and regular rhythm.      Abdominal: Soft.     Psych/Behavior: She is alert. She is oriented to person, place, and time. She has a normal mood and affect.     Musculoskeletal: Gait is abnormal.        Neck: Range of motion is limited.        Back: Range of motion is limited.     Neurological:        Coordination: She has abnormal tandem walking coordination. She has a normal Romberg Test.        DTRs: Bicep reflexes are 3+ on the right side and 3+ on the left side. Patellar reflexes are 2+ on the right side and 2+ on the left side.        Cranial nerves: Cranial nerve(s) II, III, IV, V, VI, VII, VIII, IX, X, XI and XII are intact.      5/5 in BUE except 4+/5 in left triceps.  4+/5 in right HI, 4+/5 in left HI  5/5 in BLE    No difficulty from sitting to standing without hands.     SILT     No davis's    Post cervical incision  well healed    Moderate bilateral cervical paraspinal muscle tenderness    Upper anterior chest TTP    Diagnostic Results:  C spine xrays: s/p C3-T1 posterior decompression/fusion, no failure. Good alignment.  Reviewed    ASSESSMENT/PLAN:     77 F s/p C3-T1 PCDF on 10/7/22 for cervical myelopathy presents in fu.  Her xrays look good.  Overall she is doing well with incremental improvement in LUE strength and sensation and her gait has improved.  She still has LUE radiculopathy and moderate posterior neck pain.  I don't believe her anterior chest pain is related to her spinal fusion.  -PT  -Fu in 6 weeks with CT c spine  -No further need for collar.

## 2022-12-06 ENCOUNTER — DOCUMENT SCAN (OUTPATIENT)
Dept: HOME HEALTH SERVICES | Facility: HOSPITAL | Age: 77
End: 2022-12-06
Payer: MEDICARE

## 2023-01-30 ENCOUNTER — OFFICE VISIT (OUTPATIENT)
Dept: NEUROSURGERY | Facility: CLINIC | Age: 78
End: 2023-01-30
Payer: MEDICARE

## 2023-01-30 ENCOUNTER — HOSPITAL ENCOUNTER (OUTPATIENT)
Dept: RADIOLOGY | Facility: HOSPITAL | Age: 78
Discharge: HOME | End: 2023-01-30
Attending: STUDENT IN AN ORGANIZED HEALTH CARE EDUCATION/TRAINING PROGRAM
Payer: MEDICARE

## 2023-01-30 VITALS
SYSTOLIC BLOOD PRESSURE: 172 MMHG | HEART RATE: 88 BPM | WEIGHT: 117.06 LBS | DIASTOLIC BLOOD PRESSURE: 84 MMHG | BODY MASS INDEX: 23.6 KG/M2 | HEIGHT: 59 IN

## 2023-01-30 DIAGNOSIS — G95.9 CERVICAL MYELOPATHY: ICD-10-CM

## 2023-01-30 DIAGNOSIS — Z98.1 S/P CERVICAL SPINAL FUSION: ICD-10-CM

## 2023-01-30 DIAGNOSIS — Z98.1 S/P CERVICAL SPINAL FUSION: Primary | ICD-10-CM

## 2023-01-30 PROCEDURE — 1125F AMNT PAIN NOTED PAIN PRSNT: CPT | Mod: CPTII,S$GLB,,

## 2023-01-30 PROCEDURE — 72125 CT NECK SPINE W/O DYE: CPT | Mod: TC

## 2023-01-30 PROCEDURE — 99215 OFFICE O/P EST HI 40 MIN: CPT | Mod: S$GLB,,,

## 2023-01-30 PROCEDURE — 72125 CT NECK SPINE W/O DYE: CPT | Mod: 26,,, | Performed by: RADIOLOGY

## 2023-01-30 PROCEDURE — 1159F PR MEDICATION LIST DOCUMENTED IN MEDICAL RECORD: ICD-10-PCS | Mod: CPTII,S$GLB,,

## 2023-01-30 PROCEDURE — 99999 PR PBB SHADOW E&M-EST. PATIENT-LVL IV: ICD-10-PCS | Mod: PBBFAC,,,

## 2023-01-30 PROCEDURE — 1101F PR PT FALLS ASSESS DOC 0-1 FALLS W/OUT INJ PAST YR: ICD-10-PCS | Mod: CPTII,S$GLB,,

## 2023-01-30 PROCEDURE — 1101F PT FALLS ASSESS-DOCD LE1/YR: CPT | Mod: CPTII,S$GLB,,

## 2023-01-30 PROCEDURE — 99999 PR PBB SHADOW E&M-EST. PATIENT-LVL IV: CPT | Mod: PBBFAC,,,

## 2023-01-30 PROCEDURE — 3288F FALL RISK ASSESSMENT DOCD: CPT | Mod: CPTII,S$GLB,,

## 2023-01-30 PROCEDURE — 3077F SYST BP >= 140 MM HG: CPT | Mod: CPTII,S$GLB,,

## 2023-01-30 PROCEDURE — 1159F MED LIST DOCD IN RCRD: CPT | Mod: CPTII,S$GLB,,

## 2023-01-30 PROCEDURE — 3077F PR MOST RECENT SYSTOLIC BLOOD PRESSURE >= 140 MM HG: ICD-10-PCS | Mod: CPTII,S$GLB,,

## 2023-01-30 PROCEDURE — 1125F PR PAIN SEVERITY QUANTIFIED, PAIN PRESENT: ICD-10-PCS | Mod: CPTII,S$GLB,,

## 2023-01-30 PROCEDURE — 3079F DIAST BP 80-89 MM HG: CPT | Mod: CPTII,S$GLB,,

## 2023-01-30 PROCEDURE — 3288F PR FALLS RISK ASSESSMENT DOCUMENTED: ICD-10-PCS | Mod: CPTII,S$GLB,,

## 2023-01-30 PROCEDURE — 72125 CT CERVICAL SPINE WITHOUT CONTRAST: ICD-10-PCS | Mod: 26,,, | Performed by: RADIOLOGY

## 2023-01-30 PROCEDURE — 3079F PR MOST RECENT DIASTOLIC BLOOD PRESSURE 80-89 MM HG: ICD-10-PCS | Mod: CPTII,S$GLB,,

## 2023-01-30 PROCEDURE — 99215 PR OFFICE/OUTPT VISIT, EST, LEVL V, 40-54 MIN: ICD-10-PCS | Mod: S$GLB,,,

## 2023-01-30 RX ORDER — PANTOPRAZOLE SODIUM 40 MG/1
40 TABLET, DELAYED RELEASE ORAL DAILY
COMMUNITY

## 2023-01-30 RX ORDER — OXYCODONE AND ACETAMINOPHEN 7.5; 325 MG/1; MG/1
1 TABLET ORAL
COMMUNITY
Start: 2023-01-18

## 2023-01-30 NOTE — PROGRESS NOTES
[facility]  Neurosurgery    SUBJECTIVE:   History of Present Illness:  Mariaelena Grubbs is a 77 y.o. female with cervical stenosis presents in fu. She continues to endorse neck pain which radiates into her left shoulder and down to her left elbow. She denies dropping items from her hands although she has had difficulty putting on jewelry. She is unsteady when she walks without falls. She is a nonsmoker.     Interval fu 12/5/22: Pt presents in fu after C3-T1 post decompression/fusion on 10/7/22. She endorses right sided neck pain which radiates into her occiput. She has worn her collar and there are no issues with wound healing. She still has LUE radicular pain into her left elbow however no distal LUE. Her walking has improved and she has had no falls. She denies any new numbness/weakness in her UE. She also endorses chest pain that is worse with swallowing.    Interval fu 1/30/22: Patient presents in follow up s/p C3-T1 PCF on 10/7/22 with CT cervical spine. She reports she is still having intermittent neck pain as well as left shoulder pain. She is still having anterior chest/stomach pain that her PCP thinks may be a hernia. She states her strength has continued to improve as well as her gait. She has been compliant with her bone growth stimulator and is wearing her c-collar during strenuous activities. She denies new pain, numbness, tingling, focal weakness, bowel/bladder dysfunction.    Review of patient's allergies indicates:   Allergen Reactions    Bananas [banana] Anaphylaxis    Crayfish      Hard time breathing    Iodine and iodide containing products      Told to avoid due to crayfish allergy    Avocado (laurus persea) Swelling and Rash    Kiwi (actinidia chinensis) Itching, Swelling and Rash    Latex, natural rubber Itching, Swelling and Rash    Papaya Itching, Swelling and Rash       Current Outpatient Medications:     albuterol (ACCUNEB) 1.25 mg/3 mL Nebu, Take 1.25 mg by nebulization every 6  (six) hours as needed., Disp: , Rfl:     albuterol (PROVENTIL) 2.5 mg /3 mL (0.083 %) nebulizer solution, Inhale 2.5 mg into the lungs daily as needed for Wheezing., Disp: , Rfl:     albuterol (PROVENTIL/VENTOLIN HFA) 90 mcg/actuation inhaler, Inhale 1 puff into the lungs daily as needed for Wheezing., Disp: , Rfl:     alendronate (FOSAMAX) 70 MG tablet, Take 70 mg by mouth every 7 days., Disp: , Rfl:     aspirin (ECOTRIN) 81 MG EC tablet, Take 81 mg by mouth once daily., Disp: , Rfl:     brimonidine 0.15 % OPTH DROP (ALPHAGAN) 0.15 % ophthalmic solution, Place 1 drop into both eyes 3 (three) times daily. (Patient not taking: Reported on 11/2/2022), Disp: 6 mL, Rfl: 11    diphenoxylate-atropine 2.5-0.025 mg (LOMOTIL) 2.5-0.025 mg per tablet, Take 1 tablet by mouth 4 (four) times daily as needed., Disp: , Rfl:     dorzolamide (TRUSOPT) 2 % ophthalmic solution, Place 1 drop into both eyes 3 (three) times daily. (Patient not taking: Reported on 11/2/2022), Disp: 10 mL, Rfl: 11    EPINEPHRINE (EPIPEN INJ), Inject as directed., Disp: , Rfl:     EScitalopram oxalate (LEXAPRO) 10 MG tablet, Take 10 mg by mouth once daily., Disp: , Rfl:     fluticasone propionate (FLONASE) 50 mcg/actuation nasal spray, 1 spray by Each Nostril route 3 (three) times daily as needed for Rhinitis., Disp: , Rfl:     gabapentin (NEURONTIN) 300 MG capsule, Take 300 mg by mouth 3 (three) times daily., Disp: , Rfl:     nystatin-triamcinolone (MYCOLOG) ointment, SMARTSIG:Sparingly Topical Twice Daily, Disp: , Rfl:     ondansetron (ZOFRAN-ODT) 4 MG TbDL, Take 4 mg by mouth. Dissolve 1 tablet on the tongue daily as needed for nausea., Disp: , Rfl:     oxyCODONE (ROXICODONE) 10 mg Tab immediate release tablet, Take 1 tablet (10 mg total) by mouth every 4 (four) hours as needed for Pain., Disp: 56 tablet, Rfl: 0    senna-docusate 8.6-50 mg (PERICOLACE) 8.6-50 mg per tablet, Take 2 tablets by mouth once daily., Disp: , Rfl:     sodium bicarbonate 650 MG  tablet, Take 1 tablet (650 mg total) by mouth 3 (three) times daily., Disp: 42 tablet, Rfl: 0    travoprost (TRAVATAN Z) 0.004 % ophthalmic solution, Place 1 drop into both eyes every evening., Disp: 2.5 each, Rfl: 11     Past Medical History:   Diagnosis Date    Anxiety 2014    Arthritis     Asthma     Asthma 2014    Cancer     Breast    Gout     HTN (hypertension) 2014    Hyperlipidemia 2014    Hypertension      Past Surgical History:   Procedure Laterality Date    APPENDECTOMY      BREAST SURGERY      DECOMPRESSION OF CERVICAL SPINE BY POSTERIOR APPROACH N/A 10/7/2022    Procedure: DECOMPRESSION, SPINE, CERVICAL, POSTERIOR APPROACH C3-T1;  Surgeon: Freddy Miles DO;  Location: [facility] OR [address] [place] [address];  Service: Neurosurgery;  Laterality: N/A;  ANESTHESIA GENERAL BLOOD TYPE & SCREEN NEURO MONITORING EMG SEP MEP BRACE [facility] TABLE 4 POSTER HEADREST GW TONGS POSITION PRONE RADIOLOGY C-ARM SPECIAL EQUIPTMENT DEPUY POST OP DISPOSITION PACU     HYSTERECTOMY      Port a cath placement and removal      SHOULDER ARTHROSCOPY  2012    TONSILLECTOMY       No family history on file.  Social History     Tobacco Use    Smoking status: Former     Types: Cigarettes     Start date: 1966     Quit date: 2011     Years since quittin.0    Smokeless tobacco: Never   Substance Use Topics    Alcohol use: No     Comment: socially    Drug use: No       Review of Systems:  Constitutional: No fever or chills. No fatigue.  Eyes: No visual changes.  ENT: No nasal congestion or sore throat. No trouble swallowing.  Respiratory: No cough or shortness of breath.  Cardiovascular: No chest pain or palpitations.  Gastrointestinal: No nausea or vomiting, tolerating diet.  Genitourinary: No hematuria or dysuria.  Skin: No rash or pruritis.  Hematologic/Lymphatic: No easy bruising or lymphadenopathy.  Musculoskeletal: No arthralgias, myalgias or weakness.  Neurological: No seizures or tremors. No focal weakness.  No dizziness.  Behavioral/Psych: No auditory or visual hallucinations. No SI/HI.  Endocrine: No heat or cold intolerance.    OBJECTIVE:     Vital Signs (Most Recent):       Physical Exam:  General: Well developed, well nourished, no distress.  Head: Normocephalic, atraumatic.  Neurologic: Alert and oriented. Thought content appropriate  GCS: Motor: 6/Verbal: 5/Eyes: 4 GCS Total: 15  Mental Status: Awake, Alert, Oriented x 4.  Language: No aphasia.  Speech: No dysarthria.  Cranial nerves: Face symmetric, tongue midline, CN II-XII grossly intact.   Eyes: Pupils equal, round, reactive to light with accommodation, EOMI.   Pulmonary: Normal respirations, not labored, no accessory muscles used.  Abdomen: Soft, non-distended, not tender to palpation.  Sensory: Intact to light touch throughout.  Motor Strength: Moves all extremities spontaneously with good tone.  Full strength upper and lower extremities. No abnormal movements seen.     Strength  Deltoids Triceps Biceps Wrist Extension Wrist Flexion Hand    Upper: R 5/5 5/5 5/5 5/5 5/5 4+/5    L 5/5 4+/5 5/5 5/5 5/5 4+/5     Iliopsoas Quadriceps Knee  Flexion Tibialis  anterior Gastro- cnemius EHL   Lower: R 5/5 5/5 5/5 5/5 5/5 5/5    L 5/5 5/5 5/5 5/5 5/5 5/5     Vascular: No LE edema or skin changes.  Skin: Warm, dry and intact, no rashes.  Gait: Normal.      Laboratory:  I have reviewed all pertinent lab results within the past 24 hours.    Diagnostic Results:  CT cervical spine without contrast 1/30/23:  Satisfactory osseous fusion across the synthetic bone. Lucency around bilateral T1 pedicular screws.     ASSESSMENT/PLAN:     Mariaelena Grubbs is a 77 y.o. female  s/p C3-T1 PCDF on 10/7/22 for cervical myelopathy presents in follow up. CT cervical spine reviewed showing lucency around bilateral T1 pedicular screws; however, there is good osseous fusion across the hardware. No evidence of hardware failing at that level. I would like the patient to RTC in 6-8 weeks  with x-ray cervical spine. Continue to wear c-collar during strenuous activities.    All symptoms and signs that require emergent or urgent treatment were reviewed. The plan was discussed with the patient. All of the the patient's questions and concerns were answered and she voiced understanding. Please feel free to call with any further questions.     Time spent on this encounter: 51 minutes. This includes face-to-face time and non-face to face time preparing to see the patient (eg, review of tests), obtaining and/or reviewing separately obtained history, documenting clinical information in the electronic or other health record, independently interpreting results and communicating results to the patient/family/caregiver, or care coordinator.    Chantelle Vincent PA-C  Neurosurgery   [facility]

## 2023-04-03 ENCOUNTER — TELEPHONE (OUTPATIENT)
Dept: NEUROSURGERY | Facility: CLINIC | Age: 78
End: 2023-04-03
Payer: MEDICARE

## 2023-04-03 NOTE — TELEPHONE ENCOUNTER
----- Message from Zoraida García sent at 4/3/2023  9:15 AM CDT -----  Regarding: Refill  Contact: [phone]  Pt is stating she had surgery 10/07/22 and needs a refill for pain medication for oxyCODONE-acetaminophen (PERCOCET) 7.5-325 mg per tablet and is requesting an appt.  Pt is experiencing a lot of pain.  Please call.       [id number] - [place] - [address] AT [facility] [address] [place] [address]  Phone: [phone] Fax: [phone]

## 2023-04-13 ENCOUNTER — TELEPHONE (OUTPATIENT)
Dept: NEUROSURGERY | Facility: CLINIC | Age: 78
End: 2023-04-13
Payer: MEDICARE

## 2023-04-13 NOTE — TELEPHONE ENCOUNTER
----- Message from Margarita Hector sent at 4/13/2023 12:53 PM CDT -----  Contact: pt @ [phone]  Mariaelena Grubbs calling regarding Patient Advice (message) for #pt is calling to speak with dominga, asking for call back

## 2023-04-20 ENCOUNTER — TELEPHONE (OUTPATIENT)
Dept: SPINE | Facility: CLINIC | Age: 78
End: 2023-04-20
Payer: MEDICARE

## 2023-04-20 NOTE — TELEPHONE ENCOUNTER
----- Message from Tavares Marcelino sent at 4/20/2023 10:38 AM CDT -----  Regarding: adv  Contact: @[phone]  Pt returning call to dominga in regards to getting an appt sched .. pls call and adv @[phone]

## 2023-04-24 ENCOUNTER — TELEPHONE (OUTPATIENT)
Dept: NEUROSURGERY | Facility: CLINIC | Age: 78
End: 2023-04-24
Payer: MEDICARE

## 2023-04-24 DIAGNOSIS — M81.0 AGE-RELATED OSTEOPOROSIS WITHOUT CURRENT PATHOLOGICAL FRACTURE: Primary | ICD-10-CM

## 2023-04-24 NOTE — TELEPHONE ENCOUNTER
----- Message from Ryann Ames sent at 4/24/2023  3:10 PM CDT -----  Regarding: Appt  Contact: pt [phone]  Pt is calling to schedule follow up, pt states she has been trying to get an appt without progress. please call

## 2023-07-06 ENCOUNTER — TELEPHONE (OUTPATIENT)
Dept: SPINE | Facility: CLINIC | Age: 78
End: 2023-07-06
Payer: MEDICARE

## 2023-07-06 ENCOUNTER — HOSPITAL ENCOUNTER (OUTPATIENT)
Dept: RADIOLOGY | Facility: CLINIC | Age: 78
Discharge: HOME | End: 2023-07-06
Payer: MEDICARE

## 2023-07-06 DIAGNOSIS — M81.0 AGE-RELATED OSTEOPOROSIS WITHOUT CURRENT PATHOLOGICAL FRACTURE: ICD-10-CM

## 2023-07-06 PROCEDURE — 77080 DXA BONE DENSITY AXIAL SKELETON 1 OR MORE SITES: ICD-10-PCS | Mod: 26,,, | Performed by: INTERNAL MEDICINE

## 2023-07-06 PROCEDURE — 77080 DXA BONE DENSITY AXIAL: CPT | Mod: TC

## 2023-07-06 PROCEDURE — 77080 DXA BONE DENSITY AXIAL: CPT | Mod: 26,,, | Performed by: INTERNAL MEDICINE

## 2023-07-06 NOTE — TELEPHONE ENCOUNTER
----- Message from Geneva Canela sent at 7/6/2023  3:08 PM CDT -----  Regarding: appt  Contact: [phone]  Pt requesting appt. Pt had surgery in Oct 2022. Pls call to discuss.

## (undated) DEVICE — SUT SILK 2-0 PS 18IN BLACK

## (undated) DEVICE — GOWN SURGICAL X-LARGE

## (undated) DEVICE — NDL HYPO REG 25G X 1 1/2

## (undated) DEVICE — SUT MONOCRYL 3-0 PS-2 UND

## (undated) DEVICE — DIFFUSER

## (undated) DEVICE — ELECTRODE EXTENDED BLADE

## (undated) DEVICE — EVACUATOR WOUND BULB 100CC

## (undated) DEVICE — DRAIN CHANNEL ROUND 15FR

## (undated) DEVICE — DURAPREP SURG SCRUB 26ML

## (undated) DEVICE — GAUZE DERMACEA LOW PLY 2X4YRDS

## (undated) DEVICE — KIT SURGIFLO HEMOSTATIC MATRIX

## (undated) DEVICE — SPONGE DERMACEA GAUZE 4X4

## (undated) DEVICE — BLADE SURG #15 CARBON STEEL

## (undated) DEVICE — 4.5 TAP

## (undated) DEVICE — SUT 2-0 VICRYL / CT-1

## (undated) DEVICE — SEE MEDLINE ITEM 152487

## (undated) DEVICE — SEE MEDLINE ITEM 152512

## (undated) DEVICE — APPLICATOR CHLORAPREP ORN 26ML

## (undated) DEVICE — PINS SKULL ADULT MAYFIELD
Type: IMPLANTABLE DEVICE | Site: SCALP | Status: NON-FUNCTIONAL
Removed: 2022-10-07

## (undated) DEVICE — DRESSING XEROFORM FOIL PK 1X8

## (undated) DEVICE — BRA STYLE 7 SIZE 42

## (undated) DEVICE — SOL NS 1000CC

## (undated) DEVICE — GAUZE SPONGE 4X4 12PLY

## (undated) DEVICE — SUT STRATAFIX 1 PDS CT-1

## (undated) DEVICE — STAPLER SKIN ROTATING HEAD

## (undated) DEVICE — ELECTRODE BLADE INSULATED 1 IN

## (undated) DEVICE — DRAPE C-ARM ELAS CLIP 42X120IN

## (undated) DEVICE — PAD ABD 8X10 STERILE

## (undated) DEVICE — SET FLUID TRANSFER ASEPTIC

## (undated) DEVICE — SEE MEDLINE ITEM 146417

## (undated) DEVICE — SYR 50CC LL

## (undated) DEVICE — DRAIN CHANNEL ROUND 10FR

## (undated) DEVICE — SUT CTD VICRYL 4-0 UND PS-1

## (undated) DEVICE — SEE MEDLINE ITEM 152622

## (undated) DEVICE — DRAPE T THYROID STERILE

## (undated) DEVICE — SUT CTD VICRYL 3-0 PS-1

## (undated) DEVICE — TRAY MINOR GEN SURG

## (undated) DEVICE — SEE MEDLINE ITEM 156905

## (undated) DEVICE — SYS PRINEO SKIN CLOSURE

## (undated) DEVICE — SPONGE LAP 18X18 PREWASHED

## (undated) DEVICE — PACK UNIVERSAL SPLIT II

## (undated) DEVICE — COVER PROXIMA MAYO STAND

## (undated) DEVICE — TAP SHORT SURGICAL NS 3.5MM

## (undated) DEVICE — DRAIN EVACUATOR 400CC 1/4IN

## (undated) DEVICE — SUT ETHILON 4-0 PC5 18IN

## (undated) DEVICE — TRAY CATH UM FOLEY SIL W 16FR

## (undated) DEVICE — SUT MCRYL PLUS 4-0 PS2 27IN

## (undated) DEVICE — KIT EVACUATOR FLAT DRAIN 100CC

## (undated) DEVICE — DRAPE C-ARMOR EQUIPMENT COVER

## (undated) DEVICE — SOL 9P NACL IRR PIC IL

## (undated) DEVICE — SUT VICRYL PLUS 0 CT1 18IN

## (undated) DEVICE — SEE MEDLINE ITEM 157128

## (undated) DEVICE — CARTRIDGE OIL

## (undated) DEVICE — SUT 3/0 30IN ETHILON BLK M

## (undated) DEVICE — DRESSING MEPILEX BORDR AG 4X12

## (undated) DEVICE — ELECTRODE REM PLYHSV RETURN 9

## (undated) DEVICE — SUT VICRYL 2-0 8-18 CP-2

## (undated) DEVICE — BOVIE SUCTION

## (undated) DEVICE — SEALER AQUAMANTYS 2.3 BIPOLAR

## (undated) DEVICE — CORD BIPOLAR 12 FOOT

## (undated) DEVICE — SKINMARKER & RULER REGULAR X-F

## (undated) DEVICE — CLOSURE SKIN STERI STRIP 1/2X4

## (undated) DEVICE — TUBE FRAZIER 5MM 2FT SOFT TIP